# Patient Record
Sex: FEMALE | Race: WHITE | NOT HISPANIC OR LATINO | Employment: OTHER | URBAN - NONMETROPOLITAN AREA
[De-identification: names, ages, dates, MRNs, and addresses within clinical notes are randomized per-mention and may not be internally consistent; named-entity substitution may affect disease eponyms.]

---

## 2017-04-10 ENCOUNTER — APPOINTMENT (EMERGENCY)
Dept: ULTRASOUND IMAGING | Facility: HOSPITAL | Age: 57
End: 2017-04-10
Payer: OTHER MISCELLANEOUS

## 2017-04-10 ENCOUNTER — HOSPITAL ENCOUNTER (EMERGENCY)
Facility: HOSPITAL | Age: 57
Discharge: HOME | End: 2017-04-10
Attending: EMERGENCY MEDICINE | Admitting: EMERGENCY MEDICINE
Payer: OTHER MISCELLANEOUS

## 2017-04-10 VITALS
TEMPERATURE: 98.2 F | OXYGEN SATURATION: 98 % | RESPIRATION RATE: 18 BRPM | SYSTOLIC BLOOD PRESSURE: 134 MMHG | BODY MASS INDEX: 45.99 KG/M2 | HEIGHT: 67 IN | HEART RATE: 78 BPM | WEIGHT: 293 LBS | DIASTOLIC BLOOD PRESSURE: 61 MMHG

## 2017-04-10 DIAGNOSIS — M79.662 PAIN OF LEFT CALF: Primary | ICD-10-CM

## 2017-04-10 DIAGNOSIS — M54.16 LEFT LUMBAR RADICULOPATHY: ICD-10-CM

## 2017-04-10 DIAGNOSIS — S86.819A STRAIN OF CALF MUSCLE: ICD-10-CM

## 2017-04-10 PROCEDURE — 99283 EMERGENCY DEPT VISIT LOW MDM: CPT

## 2017-04-10 PROCEDURE — 93971 EXTREMITY STUDY: CPT

## 2017-04-10 RX ORDER — CLONAZEPAM 0.5 MG/1
1 TABLET ORAL
COMMUNITY
End: 2020-02-27 | Stop reason: SDUPTHER

## 2017-04-10 RX ORDER — CITALOPRAM 40 MG/1
40 TABLET ORAL DAILY
COMMUNITY
End: 2020-01-15 | Stop reason: ALTCHOICE

## 2017-04-10 RX ORDER — GABAPENTIN 100 MG/1
100 CAPSULE ORAL 2 TIMES DAILY
COMMUNITY
End: 2020-01-15 | Stop reason: SDUPTHER

## 2017-04-10 RX ORDER — LISINOPRIL AND HYDROCHLOROTHIAZIDE 25; 20 MG/1; MG/1
1 TABLET ORAL DAILY
COMMUNITY
End: 2020-03-25 | Stop reason: SDUPTHER

## 2017-04-10 RX ORDER — NAPROXEN 500 MG/1
500 TABLET ORAL DAILY
COMMUNITY
End: 2020-01-15 | Stop reason: ALTCHOICE

## 2019-02-27 PROCEDURE — 88304 TISSUE EXAM BY PATHOLOGIST: CPT | Performed by: PATHOLOGY

## 2019-02-28 ENCOUNTER — LAB REQUISITION (OUTPATIENT)
Dept: LAB | Facility: HOSPITAL | Age: 59
End: 2019-02-28
Payer: COMMERCIAL

## 2019-02-28 DIAGNOSIS — R26.9 ABNORMALITY OF GAIT AND MOBILITY: ICD-10-CM

## 2019-02-28 DIAGNOSIS — R22.42 LOCALIZED SWELLING, MASS AND LUMP, LEFT LOWER LIMB: ICD-10-CM

## 2020-01-15 ENCOUNTER — OFFICE VISIT (OUTPATIENT)
Dept: FAMILY MEDICINE CLINIC | Facility: CLINIC | Age: 60
End: 2020-01-15
Payer: MEDICARE

## 2020-01-15 VITALS
WEIGHT: 293 LBS | SYSTOLIC BLOOD PRESSURE: 130 MMHG | DIASTOLIC BLOOD PRESSURE: 64 MMHG | BODY MASS INDEX: 45.99 KG/M2 | HEIGHT: 67 IN

## 2020-01-15 DIAGNOSIS — Z13.228 ENCOUNTER FOR SCREENING FOR OTHER METABOLIC DISORDERS: ICD-10-CM

## 2020-01-15 DIAGNOSIS — Z13.29 SCREENING FOR HYPOTHYROIDISM: ICD-10-CM

## 2020-01-15 DIAGNOSIS — Z12.11 SCREEN FOR COLON CANCER: ICD-10-CM

## 2020-01-15 DIAGNOSIS — E66.9 OBESITY (BMI 35.0-39.9 WITHOUT COMORBIDITY): ICD-10-CM

## 2020-01-15 DIAGNOSIS — Z13.220 SCREENING FOR HYPERLIPIDEMIA: ICD-10-CM

## 2020-01-15 DIAGNOSIS — I10 ESSENTIAL HYPERTENSION: ICD-10-CM

## 2020-01-15 DIAGNOSIS — M47.27 OSTEOARTHRITIS OF SPINE WITH RADICULOPATHY, LUMBOSACRAL REGION: ICD-10-CM

## 2020-01-15 DIAGNOSIS — Z12.39 SCREENING FOR BREAST CANCER: Primary | ICD-10-CM

## 2020-01-15 PROBLEM — G47.33 OBSTRUCTIVE SLEEP APNEA: Status: ACTIVE | Noted: 2017-07-25

## 2020-01-15 PROBLEM — Z86.14 HISTORY OF MRSA INFECTION: Status: ACTIVE | Noted: 2017-07-25

## 2020-01-15 PROBLEM — G25.81 RESTLESS LEGS: Status: ACTIVE | Noted: 2018-07-26

## 2020-01-15 PROBLEM — F32.A DEPRESSION: Status: ACTIVE | Noted: 2017-07-25

## 2020-01-15 PROBLEM — M54.50 LOW BACK PAIN: Status: ACTIVE | Noted: 2017-07-25

## 2020-01-15 PROCEDURE — 99203 OFFICE O/P NEW LOW 30 MIN: CPT | Performed by: FAMILY MEDICINE

## 2020-01-15 RX ORDER — ACETAMINOPHEN 500 MG
1000 TABLET ORAL EVERY 6 HOURS PRN
COMMUNITY
End: 2020-01-15 | Stop reason: ALTCHOICE

## 2020-01-15 RX ORDER — DULOXETIN HYDROCHLORIDE 60 MG/1
60 CAPSULE, DELAYED RELEASE ORAL 2 TIMES DAILY
COMMUNITY
End: 2020-01-15 | Stop reason: SDUPTHER

## 2020-01-15 RX ORDER — HYDROCHLOROTHIAZIDE 25 MG/1
25 TABLET ORAL DAILY PRN
COMMUNITY
End: 2020-12-04

## 2020-01-15 RX ORDER — DULOXETIN HYDROCHLORIDE 60 MG/1
60 CAPSULE, DELAYED RELEASE ORAL 2 TIMES DAILY
Qty: 60 CAPSULE | Refills: 1 | Status: SHIPPED | OUTPATIENT
Start: 2020-01-15 | End: 2020-02-14 | Stop reason: SDUPTHER

## 2020-01-15 RX ORDER — FAMOTIDINE 20 MG/1
20 TABLET, FILM COATED ORAL DAILY
COMMUNITY

## 2020-01-15 RX ORDER — GABAPENTIN 100 MG/1
CAPSULE ORAL
Qty: 120 CAPSULE | Refills: 0 | Status: SHIPPED | OUTPATIENT
Start: 2020-01-15 | End: 2020-02-14 | Stop reason: SDUPTHER

## 2020-01-15 RX ORDER — IBUPROFEN 200 MG
400 TABLET ORAL EVERY 6 HOURS PRN
COMMUNITY

## 2020-01-15 NOTE — PROGRESS NOTES
BMI Counseling: Body mass index is 46 92 kg/m²  The BMI is above normal  Nutrition recommendations include decreasing portion sizes, encouraging healthy choices of fruits and vegetables, decreasing fast food intake, consuming healthier snacks, moderation in carbohydrate intake and increasing intake of lean protein  Exercise recommendations include exercising 3-5 times per week  No pharmacotherapy was ordered  Patient referred to PCP due to patient being overweight  Assessment/Plan:    Problem List Items Addressed This Visit        Cardiovascular and Mediastinum    Essential hypertension    Relevant Medications    hydrochlorothiazide (HYDRODIURIL) 25 mg tablet       Other    Obesity (BMI 35 0-39 9 without comorbidity)      Other Visit Diagnoses     Screening for breast cancer    -  Primary    Relevant Orders    Mammo screening bilateral w 3d & cad    Screen for colon cancer        Relevant Orders    Occult Bloood,Fecal Immunochemical           Diagnoses and all orders for this visit:    Screening for breast cancer  -     Mammo screening bilateral w 3d & cad; Future    Essential hypertension    Obesity (BMI 35 0-39 9 without comorbidity)    Screen for colon cancer  -     Occult Bloood,Fecal Immunochemical; Future    Other orders  -     DULoxetine (CYMBALTA) 60 mg delayed release capsule; Take 60 mg by mouth 2 (two) times a day  -     hydrochlorothiazide (HYDRODIURIL) 25 mg tablet; Take 25 mg by mouth daily as needed  -     famotidine (PEPCID) 20 mg tablet; Take 20 mg by mouth daily  -     ibuprofen (MOTRIN) 200 mg tablet; Take 400 mg by mouth every 6 (six) hours as needed for mild pain        No problem-specific Assessment & Plan notes found for this encounter  Subjective:      Patient ID: Janeen Tillman is a 60 y o  female  Mrs Radha garcia is here to establish reestablish herself as a patient she has been under the care of Dr Yolanda shafer who has recently retired she is currently being treated for a lumbar radiculopathy peripheral neuropathy essential hypertension depression with anxiety and then also obesity and obstructive sleep apnea she no longer smokes       The following portions of the patient's history were reviewed and updated as appropriate:   She has a past medical history of Hypertension, Migraine, and MRSA (methicillin resistant Staphylococcus aureus)  ,  does not have any pertinent problems on file  ,   has a past surgical history that includes Achilles tendon repair; Carpal tunnel release (Right);  section; and Cholecystectomy  ,  family history is not on file  ,   reports that she has quit smoking  She has never used smokeless tobacco  She reports that she does not drink alcohol or use drugs  ,  is allergic to pollen extract and vancomycin     Current Outpatient Medications   Medication Sig Dispense Refill    clonazePAM (KlonoPIN) 0 5 mg tablet Take 1 mg by mouth daily at bedtime as needed for seizures       DULoxetine (CYMBALTA) 60 mg delayed release capsule Take 60 mg by mouth 2 (two) times a day      famotidine (PEPCID) 20 mg tablet Take 20 mg by mouth daily      gabapentin (NEURONTIN) 100 mg capsule Take 100 mg by mouth 2 (two) times a day One in am, one at lunch, one at supper and 2 at hs      hydrochlorothiazide (HYDRODIURIL) 25 mg tablet Take 25 mg by mouth daily as needed      ibuprofen (MOTRIN) 200 mg tablet Take 400 mg by mouth every 6 (six) hours as needed for mild pain      lisinopril-hydrochlorothiazide (PRINZIDE,ZESTORETIC) 20-25 MG per tablet Take 1 tablet by mouth daily      Multiple Vitamins-Minerals (CENTRUM SILVER PO) Take 1 tablet by mouth daily       No current facility-administered medications for this visit  Review of Systems   Constitutional: Positive for activity change and fatigue  Negative for appetite change, diaphoresis and fever  HENT: Negative  Negative for dental problem  Eyes: Negative      Respiratory: Negative for apnea, cough, chest tightness, shortness of breath and wheezing  Cardiovascular: Negative for chest pain, palpitations and leg swelling  Gastrointestinal: Negative for abdominal distention, abdominal pain, anal bleeding, constipation, diarrhea, nausea and vomiting  Endocrine: Negative for cold intolerance, heat intolerance, polydipsia, polyphagia and polyuria  Genitourinary: Negative for difficulty urinating, dysuria, flank pain, hematuria and urgency  Musculoskeletal: Positive for back pain  Negative for arthralgias, gait problem, joint swelling and myalgias  Skin: Negative for color change, rash and wound  Allergic/Immunologic: Negative for environmental allergies, food allergies and immunocompromised state  Neurological: Negative for dizziness, seizures, syncope, speech difficulty, numbness and headaches  Hematological: Negative for adenopathy  Does not bruise/bleed easily  Psychiatric/Behavioral: Positive for dysphoric mood  Negative for agitation, behavioral problems, hallucinations, sleep disturbance and suicidal ideas  The patient is nervous/anxious  Objective:  Vitals:    01/15/20 1234   BP: 130/64   BP Location: Left arm   Patient Position: Sitting   Cuff Size: Large   Weight: 136 kg (299 lb 9 6 oz)   Height: 5' 7" (1 702 m)     Body mass index is 46 92 kg/m²  Physical Exam   Constitutional: She is oriented to person, place, and time  She appears well-developed and well-nourished  No distress  HENT:   Head: Normocephalic  Right Ear: External ear normal    Left Ear: External ear normal    Nose: Nose normal    Mouth/Throat: Oropharynx is clear and moist    Eyes: Pupils are equal, round, and reactive to light  Conjunctivae and EOM are normal  Right eye exhibits no discharge  Left eye exhibits no discharge  No scleral icterus  Neck: Normal range of motion  No tracheal deviation present  No thyromegaly present  Cardiovascular: Normal rate, regular rhythm and normal heart sounds   Exam reveals no gallop and no friction rub  No murmur heard  Pulmonary/Chest: Effort normal and breath sounds normal  No respiratory distress  She has no wheezes  Abdominal: Soft  Bowel sounds are normal  She exhibits no mass  There is no tenderness  There is no guarding  Musculoskeletal: She exhibits no edema or deformity  Lymphadenopathy:     She has no cervical adenopathy  Neurological: She is alert and oriented to person, place, and time  No cranial nerve deficit  Skin: Skin is warm and dry  No rash noted  She is not diaphoretic  No erythema  Psychiatric: She has a normal mood and affect   Thought content normal

## 2020-02-14 DIAGNOSIS — M47.27 OSTEOARTHRITIS OF SPINE WITH RADICULOPATHY, LUMBOSACRAL REGION: ICD-10-CM

## 2020-02-14 RX ORDER — DULOXETIN HYDROCHLORIDE 60 MG/1
60 CAPSULE, DELAYED RELEASE ORAL 2 TIMES DAILY
Qty: 60 CAPSULE | Refills: 0 | Status: SHIPPED | OUTPATIENT
Start: 2020-02-14 | End: 2020-03-25 | Stop reason: SDUPTHER

## 2020-02-14 RX ORDER — GABAPENTIN 100 MG/1
CAPSULE ORAL
Qty: 120 CAPSULE | Refills: 0 | Status: SHIPPED | OUTPATIENT
Start: 2020-02-14 | End: 2020-03-25 | Stop reason: SDUPTHER

## 2020-02-27 DIAGNOSIS — F41.1 GENERALIZED ANXIETY DISORDER: Primary | ICD-10-CM

## 2020-02-28 RX ORDER — CLONAZEPAM 0.5 MG/1
1 TABLET ORAL
Qty: 60 TABLET | Refills: 0 | Status: SHIPPED | OUTPATIENT
Start: 2020-02-28 | End: 2020-03-25 | Stop reason: SDUPTHER

## 2020-02-28 NOTE — TELEPHONE ENCOUNTER
Patient has care gaps that need to be closed also looks like she needs a Medicare well visit so let's get her in sometime in the next 60 days for a Medicare well visit

## 2020-03-25 DIAGNOSIS — F41.1 GENERALIZED ANXIETY DISORDER: ICD-10-CM

## 2020-03-25 DIAGNOSIS — M47.27 OSTEOARTHRITIS OF SPINE WITH RADICULOPATHY, LUMBOSACRAL REGION: ICD-10-CM

## 2020-03-25 DIAGNOSIS — I10 ESSENTIAL HYPERTENSION: Primary | ICD-10-CM

## 2020-03-25 RX ORDER — DULOXETIN HYDROCHLORIDE 60 MG/1
60 CAPSULE, DELAYED RELEASE ORAL 2 TIMES DAILY
Qty: 60 CAPSULE | Refills: 0 | Status: SHIPPED | OUTPATIENT
Start: 2020-03-25 | End: 2020-04-27 | Stop reason: SDUPTHER

## 2020-03-25 RX ORDER — CLONAZEPAM 0.5 MG/1
1 TABLET ORAL
Qty: 60 TABLET | Refills: 0 | Status: SHIPPED | OUTPATIENT
Start: 2020-03-25 | End: 2020-04-27 | Stop reason: SDUPTHER

## 2020-03-25 RX ORDER — GABAPENTIN 100 MG/1
CAPSULE ORAL
Qty: 120 CAPSULE | Refills: 0 | Status: SHIPPED | OUTPATIENT
Start: 2020-03-25 | End: 2020-04-27 | Stop reason: SDUPTHER

## 2020-03-25 RX ORDER — LISINOPRIL AND HYDROCHLOROTHIAZIDE 25; 20 MG/1; MG/1
1 TABLET ORAL DAILY
Qty: 90 TABLET | Refills: 1 | Status: SHIPPED | OUTPATIENT
Start: 2020-03-25 | End: 2020-09-24

## 2020-04-14 ENCOUNTER — TELEPHONE (OUTPATIENT)
Dept: OTHER | Facility: OTHER | Age: 60
End: 2020-04-14

## 2020-04-15 ENCOUNTER — TELEMEDICINE (OUTPATIENT)
Dept: FAMILY MEDICINE CLINIC | Facility: CLINIC | Age: 60
End: 2020-04-15
Payer: MEDICARE

## 2020-04-15 VITALS
DIASTOLIC BLOOD PRESSURE: 78 MMHG | BODY MASS INDEX: 45.99 KG/M2 | WEIGHT: 293 LBS | SYSTOLIC BLOOD PRESSURE: 128 MMHG | HEIGHT: 67 IN | TEMPERATURE: 98.3 F | HEART RATE: 76 BPM

## 2020-04-15 DIAGNOSIS — Z00.00 MEDICARE ANNUAL WELLNESS VISIT, SUBSEQUENT: Primary | ICD-10-CM

## 2020-04-15 PROCEDURE — 3074F SYST BP LT 130 MM HG: CPT | Performed by: FAMILY MEDICINE

## 2020-04-15 PROCEDURE — 3008F BODY MASS INDEX DOCD: CPT | Performed by: FAMILY MEDICINE

## 2020-04-15 PROCEDURE — G0438 PPPS, INITIAL VISIT: HCPCS | Performed by: FAMILY MEDICINE

## 2020-04-15 PROCEDURE — 1036F TOBACCO NON-USER: CPT | Performed by: FAMILY MEDICINE

## 2020-04-15 PROCEDURE — 3078F DIAST BP <80 MM HG: CPT | Performed by: FAMILY MEDICINE

## 2020-04-22 DIAGNOSIS — G47.33 OBSTRUCTIVE SLEEP APNEA: Primary | ICD-10-CM

## 2020-04-27 DIAGNOSIS — M47.27 OSTEOARTHRITIS OF SPINE WITH RADICULOPATHY, LUMBOSACRAL REGION: ICD-10-CM

## 2020-04-27 DIAGNOSIS — F41.1 GENERALIZED ANXIETY DISORDER: ICD-10-CM

## 2020-04-27 RX ORDER — GABAPENTIN 100 MG/1
CAPSULE ORAL
Qty: 120 CAPSULE | Refills: 0 | Status: SHIPPED | OUTPATIENT
Start: 2020-04-27 | End: 2020-05-26 | Stop reason: SDUPTHER

## 2020-04-27 RX ORDER — DULOXETIN HYDROCHLORIDE 60 MG/1
60 CAPSULE, DELAYED RELEASE ORAL 2 TIMES DAILY
Qty: 60 CAPSULE | Refills: 0 | Status: SHIPPED | OUTPATIENT
Start: 2020-04-27 | End: 2020-05-26 | Stop reason: SDUPTHER

## 2020-04-27 RX ORDER — CLONAZEPAM 0.5 MG/1
1 TABLET ORAL
Qty: 60 TABLET | Refills: 0 | Status: SHIPPED | OUTPATIENT
Start: 2020-04-27 | End: 2020-05-26 | Stop reason: SDUPTHER

## 2020-05-26 DIAGNOSIS — F41.1 GENERALIZED ANXIETY DISORDER: ICD-10-CM

## 2020-05-26 DIAGNOSIS — M47.27 OSTEOARTHRITIS OF SPINE WITH RADICULOPATHY, LUMBOSACRAL REGION: ICD-10-CM

## 2020-05-26 RX ORDER — DULOXETIN HYDROCHLORIDE 60 MG/1
60 CAPSULE, DELAYED RELEASE ORAL 2 TIMES DAILY
Qty: 60 CAPSULE | Refills: 0 | Status: SHIPPED | OUTPATIENT
Start: 2020-05-26 | End: 2020-06-24 | Stop reason: SDUPTHER

## 2020-05-26 RX ORDER — GABAPENTIN 100 MG/1
CAPSULE ORAL
Qty: 120 CAPSULE | Refills: 0 | Status: SHIPPED | OUTPATIENT
Start: 2020-05-26 | End: 2020-06-24 | Stop reason: SDUPTHER

## 2020-05-26 RX ORDER — CLONAZEPAM 0.5 MG/1
1 TABLET ORAL
Qty: 60 TABLET | Refills: 0 | Status: SHIPPED | OUTPATIENT
Start: 2020-05-26 | End: 2020-06-24 | Stop reason: SDUPTHER

## 2020-06-24 DIAGNOSIS — F41.1 GENERALIZED ANXIETY DISORDER: ICD-10-CM

## 2020-06-24 DIAGNOSIS — M47.27 OSTEOARTHRITIS OF SPINE WITH RADICULOPATHY, LUMBOSACRAL REGION: ICD-10-CM

## 2020-06-24 RX ORDER — DULOXETIN HYDROCHLORIDE 60 MG/1
60 CAPSULE, DELAYED RELEASE ORAL 2 TIMES DAILY
Qty: 180 CAPSULE | Refills: 1 | Status: SHIPPED | OUTPATIENT
Start: 2020-06-24 | End: 2021-02-08 | Stop reason: SDUPTHER

## 2020-06-24 RX ORDER — GABAPENTIN 100 MG/1
CAPSULE ORAL
Qty: 360 CAPSULE | Refills: 1 | Status: SHIPPED | OUTPATIENT
Start: 2020-06-24 | End: 2020-12-04 | Stop reason: SDUPTHER

## 2020-06-24 RX ORDER — CLONAZEPAM 0.5 MG/1
1 TABLET ORAL
Qty: 60 TABLET | Refills: 0 | Status: SHIPPED | OUTPATIENT
Start: 2020-06-24 | End: 2020-07-28 | Stop reason: SDUPTHER

## 2020-07-28 DIAGNOSIS — F41.1 GENERALIZED ANXIETY DISORDER: ICD-10-CM

## 2020-07-28 RX ORDER — CLONAZEPAM 0.5 MG/1
1 TABLET ORAL
Qty: 6 TABLET | Refills: 0 | Status: SHIPPED | OUTPATIENT
Start: 2020-07-28 | End: 2020-07-30 | Stop reason: SDUPTHER

## 2020-07-28 NOTE — TELEPHONE ENCOUNTER
She called for a refill on clonazepam, and she needs an appt    She will schedule, wants to know if you are willing to cover her until she comes in on the 30th

## 2020-07-30 ENCOUNTER — OFFICE VISIT (OUTPATIENT)
Dept: FAMILY MEDICINE CLINIC | Facility: CLINIC | Age: 60
End: 2020-07-30
Payer: MEDICARE

## 2020-07-30 VITALS
OXYGEN SATURATION: 95 % | TEMPERATURE: 98.9 F | SYSTOLIC BLOOD PRESSURE: 122 MMHG | BODY MASS INDEX: 45.42 KG/M2 | HEART RATE: 95 BPM | HEIGHT: 67 IN | DIASTOLIC BLOOD PRESSURE: 76 MMHG | WEIGHT: 289.4 LBS

## 2020-07-30 DIAGNOSIS — F41.1 GENERALIZED ANXIETY DISORDER: ICD-10-CM

## 2020-07-30 PROCEDURE — 1036F TOBACCO NON-USER: CPT | Performed by: PHYSICIAN ASSISTANT

## 2020-07-30 PROCEDURE — 3078F DIAST BP <80 MM HG: CPT | Performed by: PHYSICIAN ASSISTANT

## 2020-07-30 PROCEDURE — 3008F BODY MASS INDEX DOCD: CPT | Performed by: PHYSICIAN ASSISTANT

## 2020-07-30 PROCEDURE — 99213 OFFICE O/P EST LOW 20 MIN: CPT | Performed by: PHYSICIAN ASSISTANT

## 2020-07-30 PROCEDURE — 3074F SYST BP LT 130 MM HG: CPT | Performed by: PHYSICIAN ASSISTANT

## 2020-07-30 RX ORDER — CLONAZEPAM 0.5 MG/1
1 TABLET ORAL
Qty: 60 TABLET | Refills: 0 | Status: SHIPPED | OUTPATIENT
Start: 2020-07-30 | End: 2020-09-08 | Stop reason: SDUPTHER

## 2020-07-30 NOTE — PROGRESS NOTES
Assessment/Plan:    Problem List Items Addressed This Visit     None      Visit Diagnoses     Generalized anxiety disorder        Relevant Medications    clonazePAM (KlonoPIN) 0 5 mg tablet           Diagnoses and all orders for this visit:    Generalized anxiety disorder  -     clonazePAM (KlonoPIN) 0 5 mg tablet; Take 2 tablets (1 mg total) by mouth daily at bedtime as needed for anxiety        [place] PDMP reviewed  Patient is compliant  No red flags  Labs, FIT test, and mammogram were all ordered in the past, but she is waiting for her supplemental insurance  Follow-up in 3 months or sooner if needed  Subjective:      Patient ID: Phani Loredo is a 60 y o  female  Sony Russo is a 60year old female who is here today for a medication check  She is asking for a refill on her clonazepam  She takes this at bedtime to help with her anxiety and sleep  She has been on the same dose for many years  She denies any side effects or problems with the medication  She denies any other concerns or problems at this time  The following portions of the patient's history were reviewed and updated as appropriate:   She has a past medical history of Hypertension, Migraine, and MRSA (methicillin resistant Staphylococcus aureus)  ,  does not have any pertinent problems on file  ,   has a past surgical history that includes Achilles tendon repair; Carpal tunnel release (Right);  section; Cholecystectomy; Ankle surgery (Left, 2019); Nasal sinus surgery (); Lumbar laminectomy (2018); and Ganglion cyst excision (Left, )  ,  family history includes Arrhythmia in her father; Hepatitis in her mother; Hypertension in her mother; Melanoma in her father; Stroke in her father  ,   reports that she has quit smoking  She has never used smokeless tobacco  She reports that she drank alcohol  She reports that she does not use drugs  ,  is allergic to pollen extract and vancomycin     Current Outpatient Medications   Medication Sig Dispense Refill    clonazePAM (KlonoPIN) 0 5 mg tablet Take 2 tablets (1 mg total) by mouth daily at bedtime as needed for anxiety 60 tablet 0    DULoxetine (CYMBALTA) 60 mg delayed release capsule Take 1 capsule (60 mg total) by mouth 2 (two) times a day 180 capsule 1    famotidine (PEPCID) 20 mg tablet Take 20 mg by mouth daily      gabapentin (NEURONTIN) 100 mg capsule One in am, one at lunch,  and 2 at hs 360 capsule 1    hydrochlorothiazide (HYDRODIURIL) 25 mg tablet Take 25 mg by mouth daily as needed      ibuprofen (MOTRIN) 200 mg tablet Take 400 mg by mouth every 6 (six) hours as needed for mild pain      lisinopril-hydrochlorothiazide (PRINZIDE,ZESTORETIC) 20-25 MG per tablet Take 1 tablet by mouth daily 90 tablet 1    Multiple Vitamins-Minerals (CENTRUM SILVER PO) Take 1 tablet by mouth daily       No current facility-administered medications for this visit  Review of Systems   Constitutional: Negative for chills, diaphoresis, fatigue and fever  HENT: Negative for congestion, ear pain, postnasal drip, rhinorrhea, sneezing, sore throat and trouble swallowing  Eyes: Negative for pain and visual disturbance  Respiratory: Negative for apnea, cough, shortness of breath and wheezing  Cardiovascular: Negative for chest pain and palpitations  Gastrointestinal: Negative for abdominal pain, constipation, diarrhea, nausea and vomiting  Genitourinary: Negative for dysuria and hematuria  Musculoskeletal: Positive for arthralgias and gait problem  Negative for myalgias  Neurological: Negative for dizziness, syncope, weakness, light-headedness, numbness and headaches  Psychiatric/Behavioral: Positive for sleep disturbance  Negative for suicidal ideas  The patient is nervous/anxious  Objective:  Vitals:    07/30/20 1502   BP: 122/76   Pulse: 95   Temp: 98 9 °F (37 2 °C)   SpO2: 95%   Weight: 131 kg (289 lb 6 4 oz)   Height: 5' 7" (1 702 m)     Body mass index is 45 33 kg/m²  Physical Exam   Constitutional: She is oriented to person, place, and time  She appears well-developed and well-nourished  Face mask in place  HENT:   Head: Normocephalic and atraumatic  Right Ear: External ear normal    Left Ear: External ear normal    Eyes: EOM are normal    Neck: Normal range of motion  Neck supple  Cardiovascular: Normal rate, regular rhythm and normal heart sounds  Exam reveals no gallop and no friction rub  No murmur heard  Pulmonary/Chest: Effort normal and breath sounds normal  No respiratory distress  She has no wheezes  She has no rales  Neurological: She is alert and oriented to person, place, and time  Skin: Skin is warm and dry  Psychiatric: She has a normal mood and affect  Her behavior is normal  Judgment and thought content normal    Nursing note and vitals reviewed

## 2020-09-08 DIAGNOSIS — F41.1 GENERALIZED ANXIETY DISORDER: ICD-10-CM

## 2020-09-08 RX ORDER — CLONAZEPAM 0.5 MG/1
1 TABLET ORAL
Qty: 60 TABLET | Refills: 0 | Status: SHIPPED | OUTPATIENT
Start: 2020-09-08 | End: 2020-10-09 | Stop reason: SDUPTHER

## 2020-09-24 DIAGNOSIS — I10 ESSENTIAL HYPERTENSION: ICD-10-CM

## 2020-09-24 RX ORDER — LISINOPRIL AND HYDROCHLOROTHIAZIDE 25; 20 MG/1; MG/1
1 TABLET ORAL DAILY
Qty: 90 TABLET | Refills: 1 | Status: SHIPPED | OUTPATIENT
Start: 2020-09-24 | End: 2020-12-29 | Stop reason: SDUPTHER

## 2020-09-24 RX ORDER — LISINOPRIL AND HYDROCHLOROTHIAZIDE 25; 20 MG/1; MG/1
1 TABLET ORAL DAILY
Qty: 90 TABLET | Refills: 1 | Status: SHIPPED | OUTPATIENT
Start: 2020-09-24 | End: 2020-12-04

## 2020-10-09 DIAGNOSIS — F41.1 GENERALIZED ANXIETY DISORDER: ICD-10-CM

## 2020-10-09 RX ORDER — CLONAZEPAM 0.5 MG/1
1 TABLET ORAL
Qty: 60 TABLET | Refills: 0 | Status: SHIPPED | OUTPATIENT
Start: 2020-10-09 | End: 2020-11-10 | Stop reason: SDUPTHER

## 2020-11-09 DIAGNOSIS — F41.1 GENERALIZED ANXIETY DISORDER: ICD-10-CM

## 2020-11-09 RX ORDER — CLONAZEPAM 1 MG/1
1 TABLET ORAL
Qty: 30 TABLET | Refills: 0 | Status: CANCELLED | OUTPATIENT
Start: 2020-11-09

## 2020-11-10 DIAGNOSIS — F41.1 GENERALIZED ANXIETY DISORDER: ICD-10-CM

## 2020-11-10 RX ORDER — CLONAZEPAM 0.5 MG/1
1 TABLET ORAL
Qty: 60 TABLET | Refills: 0 | Status: SHIPPED | OUTPATIENT
Start: 2020-11-10 | End: 2020-12-10 | Stop reason: SDUPTHER

## 2020-11-10 RX ORDER — CLONAZEPAM 0.5 MG/1
1 TABLET ORAL
Qty: 60 TABLET | Refills: 0 | OUTPATIENT
Start: 2020-11-10

## 2020-12-04 ENCOUNTER — TELEMEDICINE (OUTPATIENT)
Dept: FAMILY MEDICINE CLINIC | Facility: CLINIC | Age: 60
End: 2020-12-04
Payer: MEDICARE

## 2020-12-04 VITALS
WEIGHT: 289 LBS | SYSTOLIC BLOOD PRESSURE: 132 MMHG | HEIGHT: 67 IN | BODY MASS INDEX: 45.36 KG/M2 | DIASTOLIC BLOOD PRESSURE: 80 MMHG | TEMPERATURE: 97.9 F

## 2020-12-04 DIAGNOSIS — M47.27 OSTEOARTHRITIS OF SPINE WITH RADICULOPATHY, LUMBOSACRAL REGION: ICD-10-CM

## 2020-12-04 DIAGNOSIS — G25.81 RESTLESS LEGS: ICD-10-CM

## 2020-12-04 DIAGNOSIS — F41.1 GENERALIZED ANXIETY DISORDER: ICD-10-CM

## 2020-12-04 DIAGNOSIS — Z20.822 EXPOSURE TO COVID-19 VIRUS: Primary | ICD-10-CM

## 2020-12-04 PROCEDURE — U0003 INFECTIOUS AGENT DETECTION BY NUCLEIC ACID (DNA OR RNA); SEVERE ACUTE RESPIRATORY SYNDROME CORONAVIRUS 2 (SARS-COV-2) (CORONAVIRUS DISEASE [COVID-19]), AMPLIFIED PROBE TECHNIQUE, MAKING USE OF HIGH THROUGHPUT TECHNOLOGIES AS DESCRIBED BY CMS-2020-01-R: HCPCS | Performed by: PHYSICIAN ASSISTANT

## 2020-12-04 PROCEDURE — 99442 PR PHYS/QHP TELEPHONE EVALUATION 11-20 MIN: CPT | Performed by: PHYSICIAN ASSISTANT

## 2020-12-04 RX ORDER — GABAPENTIN 100 MG/1
CAPSULE ORAL
Qty: 360 CAPSULE | Refills: 1
Start: 2020-12-04 | End: 2020-12-10 | Stop reason: SDUPTHER

## 2020-12-05 LAB — SARS-COV-2 RNA SPEC QL NAA+PROBE: NOT DETECTED

## 2020-12-10 DIAGNOSIS — M47.27 OSTEOARTHRITIS OF SPINE WITH RADICULOPATHY, LUMBOSACRAL REGION: ICD-10-CM

## 2020-12-10 DIAGNOSIS — F41.1 GENERALIZED ANXIETY DISORDER: ICD-10-CM

## 2020-12-10 RX ORDER — GABAPENTIN 100 MG/1
CAPSULE ORAL
Qty: 360 CAPSULE | Refills: 1 | Status: SHIPPED | OUTPATIENT
Start: 2020-12-10 | End: 2021-05-17 | Stop reason: SDUPTHER

## 2020-12-10 RX ORDER — CLONAZEPAM 0.5 MG/1
1 TABLET ORAL
Qty: 60 TABLET | Refills: 0 | Status: SHIPPED | OUTPATIENT
Start: 2020-12-10 | End: 2021-01-11 | Stop reason: SDUPTHER

## 2020-12-23 ENCOUNTER — TELEPHONE (OUTPATIENT)
Dept: FAMILY MEDICINE CLINIC | Facility: CLINIC | Age: 60
End: 2020-12-23

## 2020-12-23 DIAGNOSIS — R50.9 FEBRILE ILLNESS, ACUTE: Primary | ICD-10-CM

## 2020-12-23 RX ORDER — AMOXICILLIN AND CLAVULANATE POTASSIUM 875; 125 MG/1; MG/1
1 TABLET, FILM COATED ORAL EVERY 12 HOURS SCHEDULED
Qty: 20 TABLET | Refills: 0 | Status: SHIPPED | OUTPATIENT
Start: 2020-12-23 | End: 2021-01-02

## 2020-12-29 DIAGNOSIS — I10 ESSENTIAL HYPERTENSION: ICD-10-CM

## 2020-12-29 RX ORDER — LISINOPRIL AND HYDROCHLOROTHIAZIDE 25; 20 MG/1; MG/1
1 TABLET ORAL DAILY
Qty: 90 TABLET | Refills: 2 | Status: SHIPPED | OUTPATIENT
Start: 2020-12-29 | End: 2021-03-02 | Stop reason: ALTCHOICE

## 2021-01-11 DIAGNOSIS — F41.1 GENERALIZED ANXIETY DISORDER: ICD-10-CM

## 2021-01-11 RX ORDER — CLONAZEPAM 0.5 MG/1
1 TABLET ORAL
Qty: 60 TABLET | Refills: 0 | Status: SHIPPED | OUTPATIENT
Start: 2021-01-11 | End: 2021-02-11 | Stop reason: SDUPTHER

## 2021-02-08 DIAGNOSIS — M47.27 OSTEOARTHRITIS OF SPINE WITH RADICULOPATHY, LUMBOSACRAL REGION: ICD-10-CM

## 2021-02-08 RX ORDER — DULOXETIN HYDROCHLORIDE 60 MG/1
60 CAPSULE, DELAYED RELEASE ORAL 2 TIMES DAILY
Qty: 180 CAPSULE | Refills: 0 | Status: SHIPPED | OUTPATIENT
Start: 2021-02-08 | End: 2021-03-04 | Stop reason: SDUPTHER

## 2021-02-11 DIAGNOSIS — F41.1 GENERALIZED ANXIETY DISORDER: ICD-10-CM

## 2021-02-11 RX ORDER — CLONAZEPAM 0.5 MG/1
1 TABLET ORAL
Qty: 60 TABLET | Refills: 0 | Status: SHIPPED | OUTPATIENT
Start: 2021-02-11 | End: 2021-03-12 | Stop reason: SDUPTHER

## 2021-02-15 ENCOUNTER — TELEPHONE (OUTPATIENT)
Dept: FAMILY MEDICINE CLINIC | Facility: CLINIC | Age: 61
End: 2021-02-15

## 2021-02-15 NOTE — TELEPHONE ENCOUNTER
fyi:Her prescription plan is changed, she will need to go to [facility] with the new one, but in the mean time, she is going to get the clonazepam  (only 2 weeks) refilled at [facility], and then call for a new script to [facility]

## 2021-02-16 ENCOUNTER — TELEPHONE (OUTPATIENT)
Dept: FAMILY MEDICINE CLINIC | Facility: CLINIC | Age: 61
End: 2021-02-16

## 2021-02-17 DIAGNOSIS — R53.82 CHRONIC FATIGUE: Primary | ICD-10-CM

## 2021-02-17 DIAGNOSIS — E66.01 MORBID OBESITY (HCC): ICD-10-CM

## 2021-02-17 DIAGNOSIS — E74.39 GLUCOSE INTOLERANCE: ICD-10-CM

## 2021-02-17 DIAGNOSIS — R73.9 ELEVATED BLOOD SUGAR: ICD-10-CM

## 2021-02-17 DIAGNOSIS — I10 ESSENTIAL HYPERTENSION: ICD-10-CM

## 2021-02-24 ENCOUNTER — LAB (OUTPATIENT)
Dept: LAB | Facility: MEDICAL CENTER | Age: 61
End: 2021-02-24
Payer: MEDICARE

## 2021-02-24 ENCOUNTER — TELEPHONE (OUTPATIENT)
Dept: OTHER | Facility: OTHER | Age: 61
End: 2021-02-24

## 2021-02-24 LAB
ALBUMIN SERPL BCP-MCNC: 3.9 G/DL (ref 3.5–5)
ALP SERPL-CCNC: 116 U/L (ref 46–116)
ALT SERPL W P-5'-P-CCNC: 56 U/L (ref 12–78)
ANION GAP SERPL CALCULATED.3IONS-SCNC: 11 MMOL/L (ref 4–13)
AST SERPL W P-5'-P-CCNC: 44 U/L (ref 5–45)
BILIRUB SERPL-MCNC: 0.66 MG/DL (ref 0.2–1)
BUN SERPL-MCNC: 25 MG/DL (ref 5–25)
CALCIUM SERPL-MCNC: 10.2 MG/DL (ref 8.3–10.1)
CHLORIDE SERPL-SCNC: 88 MMOL/L (ref 100–108)
CHOLEST SERPL-MCNC: 191 MG/DL (ref 50–200)
CO2 SERPL-SCNC: 25 MMOL/L (ref 21–32)
CREAT SERPL-MCNC: 1.37 MG/DL (ref 0.6–1.3)
ERYTHROCYTE [DISTWIDTH] IN BLOOD BY AUTOMATED COUNT: 12.4 % (ref 11.6–15.1)
EST. AVERAGE GLUCOSE BLD GHB EST-MCNC: >355 MG/DL
GFR SERPL CREATININE-BSD FRML MDRD: 42 ML/MIN/1.73SQ M
GLUCOSE P FAST SERPL-MCNC: 595 MG/DL (ref 65–99)
HBA1C MFR BLD: >14 %
HCT VFR BLD AUTO: 40.5 % (ref 34.8–46.1)
HDLC SERPL-MCNC: 41 MG/DL
HGB BLD-MCNC: 13.6 G/DL (ref 11.5–15.4)
LDLC SERPL CALC-MCNC: 99 MG/DL (ref 0–100)
MCH RBC QN AUTO: 28.8 PG (ref 26.8–34.3)
MCHC RBC AUTO-ENTMCNC: 33.6 G/DL (ref 31.4–37.4)
MCV RBC AUTO: 86 FL (ref 82–98)
NONHDLC SERPL-MCNC: 150 MG/DL
PLATELET # BLD AUTO: 247 THOUSANDS/UL (ref 149–390)
PMV BLD AUTO: 11 FL (ref 8.9–12.7)
POTASSIUM SERPL-SCNC: 4 MMOL/L (ref 3.5–5.3)
PROT SERPL-MCNC: 8.3 G/DL (ref 6.4–8.2)
RBC # BLD AUTO: 4.72 MILLION/UL (ref 3.81–5.12)
SODIUM SERPL-SCNC: 124 MMOL/L (ref 136–145)
TRIGL SERPL-MCNC: 255 MG/DL
TSH SERPL DL<=0.05 MIU/L-ACNC: 3.7 UIU/ML (ref 0.36–3.74)
WBC # BLD AUTO: 6.1 THOUSAND/UL (ref 4.31–10.16)

## 2021-02-24 PROCEDURE — 36415 COLL VENOUS BLD VENIPUNCTURE: CPT | Performed by: FAMILY MEDICINE

## 2021-02-24 PROCEDURE — 85027 COMPLETE CBC AUTOMATED: CPT | Performed by: FAMILY MEDICINE

## 2021-02-24 PROCEDURE — 83036 HEMOGLOBIN GLYCOSYLATED A1C: CPT | Performed by: FAMILY MEDICINE

## 2021-02-24 PROCEDURE — 80053 COMPREHEN METABOLIC PANEL: CPT | Performed by: FAMILY MEDICINE

## 2021-02-24 PROCEDURE — 80061 LIPID PANEL: CPT | Performed by: FAMILY MEDICINE

## 2021-02-24 PROCEDURE — 84443 ASSAY THYROID STIM HORMONE: CPT | Performed by: FAMILY MEDICINE

## 2021-02-25 ENCOUNTER — TELEPHONE (OUTPATIENT)
Dept: FAMILY MEDICINE CLINIC | Facility: CLINIC | Age: 61
End: 2021-02-25

## 2021-02-25 DIAGNOSIS — E11.9 TYPE 2 DIABETES MELLITUS WITHOUT COMPLICATION, WITHOUT LONG-TERM CURRENT USE OF INSULIN (HCC): Primary | ICD-10-CM

## 2021-02-25 NOTE — TELEPHONE ENCOUNTER
Asking for a call from Dr Sayda Guerrero with Rx's sent in   [address] [place] [address] Asking if you are going to order her a glucometer & supplies to test her blood?     Pharm: [facility]    Pt will be holding off on MO because coverage to begin 3/1/31

## 2021-02-25 NOTE — TELEPHONE ENCOUNTER
Madiha Perez from [facility] AND [facility] labs with Critical lab results

## 2021-02-26 ENCOUNTER — TELEPHONE (OUTPATIENT)
Dept: ENDOCRINOLOGY | Facility: CLINIC | Age: 61
End: 2021-02-26

## 2021-03-02 ENCOUNTER — OFFICE VISIT (OUTPATIENT)
Dept: FAMILY MEDICINE CLINIC | Facility: CLINIC | Age: 61
End: 2021-03-02
Payer: MEDICARE

## 2021-03-02 VITALS
TEMPERATURE: 96.2 F | WEIGHT: 246 LBS | SYSTOLIC BLOOD PRESSURE: 96 MMHG | HEIGHT: 67 IN | BODY MASS INDEX: 38.61 KG/M2 | DIASTOLIC BLOOD PRESSURE: 74 MMHG

## 2021-03-02 DIAGNOSIS — E11.9 TYPE 2 DIABETES MELLITUS WITHOUT COMPLICATION, WITHOUT LONG-TERM CURRENT USE OF INSULIN (HCC): ICD-10-CM

## 2021-03-02 DIAGNOSIS — I10 ESSENTIAL HYPERTENSION: ICD-10-CM

## 2021-03-02 DIAGNOSIS — Z12.31 ENCOUNTER FOR SCREENING MAMMOGRAM FOR MALIGNANT NEOPLASM OF BREAST: Primary | ICD-10-CM

## 2021-03-02 DIAGNOSIS — E66.01 OBESITY, MORBID (HCC): ICD-10-CM

## 2021-03-02 PROCEDURE — 99213 OFFICE O/P EST LOW 20 MIN: CPT | Performed by: FAMILY MEDICINE

## 2021-03-02 RX ORDER — LISINOPRIL 5 MG/1
5 TABLET ORAL DAILY
Qty: 90 TABLET | Refills: 3
Start: 2021-03-02 | End: 2021-05-17 | Stop reason: SDUPTHER

## 2021-03-02 NOTE — PROGRESS NOTES
BMI Counseling: Body mass index is 38 53 kg/m²  The BMI is above normal  Nutrition recommendations include decreasing portion sizes, encouraging healthy choices of fruits and vegetables, decreasing fast food intake, consuming healthier snacks, moderation in carbohydrate intake and increasing intake of lean protein  Exercise recommendations include moderate physical activity 150 minutes/week  Assessment/Plan:    Problem List Items Addressed This Visit        Endocrine    Type 2 diabetes mellitus without complication, without long-term current use of insulin ([address] )       Cardiovascular and Mediastinum    Essential hypertension      Other Visit Diagnoses     Encounter for screening mammogram for malignant neoplasm of breast    -  Primary    Relevant Orders    Mammo screening bilateral w 3d & cad           Diagnoses and all orders for this visit:    Encounter for screening mammogram for malignant neoplasm of breast  -     Mammo screening bilateral w 3d & cad; Future    Type 2 diabetes mellitus without complication, without long-term current use of insulin ([address] )    Essential hypertension        No problem-specific Assessment & Plan notes found for this encounter  Subjective:      Patient ID: Kenya Jones is a 63 y o  female      Sadanikole Smithted is here today because we discovered that she is diabetic her sugars were in the 500 range it is down in the mid 300s now she has started the metformin and the [place] Offer she has not started the true [place] her insurance is just coming into affected and this will affect what medications she can take I have asked her to look for coupons on the Internet there should be coupons available for all of these medications also were going to set her up with [facility] for diabetic education and management on were also going to set her up with Dr Rody khan for diabetic foot care and we will do an iris scan today and will also order a mammogram      The following portions of the patient's history were reviewed and updated as appropriate:   She has a past medical history of Hypertension, Migraine, and MRSA (methicillin resistant Staphylococcus aureus)  ,  does not have any pertinent problems on file  ,   has a past surgical history that includes Achilles tendon repair; Carpal tunnel release (Right);  section; Cholecystectomy; Ankle surgery (Left, 2019); Nasal sinus surgery (); Lumbar laminectomy (2018); and Ganglion cyst excision (Left, )  ,  family history includes Arrhythmia in her father; Hepatitis in her mother; Hypertension in her mother; Melanoma in her father; Stroke in her father  ,   reports that she has quit smoking  She has never used smokeless tobacco  She reports previous alcohol use  She reports that she does not use drugs  ,  is allergic to pollen extract and vancomycin     Current Outpatient Medications   Medication Sig Dispense Refill    clonazePAM (KlonoPIN) 0 5 mg tablet Take 2 tablets (1 mg total) by mouth daily at bedtime as needed for anxiety 60 tablet 0    Dapagliflozin Propanediol 10 MG TABS 1/2 tablet daily for 1 month then increase to 1 full tablet daily in a m  30 tablet 5    Dulaglutide 0 75 MG/0 5ML SOPN Inject 0 5 mL (0 75 mg total) under the skin once a week 1 pen 3    DULoxetine (CYMBALTA) 60 mg delayed release capsule Take 1 capsule (60 mg total) by mouth 2 (two) times a day 180 capsule 0    famotidine (PEPCID) 20 mg tablet Take 20 mg by mouth daily      gabapentin (NEURONTIN) 100 mg capsule One in am, one at lunch, and 3 at hs 360 capsule 1    ibuprofen (MOTRIN) 200 mg tablet Take 400 mg by mouth every 6 (six) hours as needed for mild pain      metFORMIN (GLUCOPHAGE) 500 mg tablet Take 1 tablet with breakfast for 2 weeks then 1 tablet with breakfast and 1 tablet with lunch for 2 weeks and then 1 tablet t i d  with meals 90 tablet 5    Multiple Vitamins-Minerals (CENTRUM SILVER PO) Take 1 tablet by mouth daily       No current facility-administered medications for this visit  Review of Systems   Constitutional: Positive for activity change  Negative for appetite change, diaphoresis, fatigue and fever  HENT: Positive for dental problem  Negative for hearing loss  Eyes: Positive for visual disturbance  Visual disturbance secondary to elevated blood sugars   Respiratory: Negative for apnea, cough, chest tightness, shortness of breath and wheezing  Cardiovascular: Negative for chest pain, palpitations and leg swelling  Gastrointestinal: Negative for abdominal distention, abdominal pain, anal bleeding, constipation, diarrhea, nausea and vomiting  Endocrine: Negative for cold intolerance, heat intolerance, polydipsia, polyphagia and polyuria  Genitourinary: Negative for difficulty urinating, dysuria, flank pain, hematuria and urgency  Musculoskeletal: Positive for arthralgias and back pain  Negative for gait problem, joint swelling and myalgias  Skin: Negative for color change, rash and wound  Allergic/Immunologic: Negative for environmental allergies, food allergies and immunocompromised state  Neurological: Negative for dizziness, seizures, syncope, speech difficulty, numbness and headaches  Hematological: Negative for adenopathy  Does not bruise/bleed easily  Psychiatric/Behavioral: Negative for agitation, behavioral problems, hallucinations, sleep disturbance and suicidal ideas  Objective:  Vitals:    03/02/21 0947   BP: 96/74   BP Location: Left arm   Patient Position: Sitting   Cuff Size: Standard   Temp: (!) 96 2 °F (35 7 °C)   TempSrc: Temporal   Weight: 112 kg (246 lb)   Height: 5' 7" (1 702 m)     Body mass index is 38 53 kg/m²  Physical Exam  Constitutional:       General: She is not in acute distress  Appearance: She is well-developed  She is not diaphoretic  HENT:      Head: Normocephalic        Right Ear: External ear normal       Left Ear: External ear normal       Nose: Nose normal    Eyes:      General: No scleral icterus  Right eye: No discharge  Left eye: No discharge  Conjunctiva/sclera: Conjunctivae normal       Pupils: Pupils are equal, round, and reactive to light  Neck:      Musculoskeletal: Normal range of motion  Thyroid: No thyromegaly  Trachea: No tracheal deviation  Cardiovascular:      Rate and Rhythm: Normal rate and regular rhythm  Pulses: no weak pulses          Dorsalis pedis pulses are 2+ on the right side and 2+ on the left side  Posterior tibial pulses are 2+ on the right side and 2+ on the left side  Heart sounds: Normal heart sounds  No murmur  No friction rub  No gallop  Pulmonary:      Effort: Pulmonary effort is normal  No respiratory distress  Breath sounds: Normal breath sounds  No wheezing  Abdominal:      General: Bowel sounds are normal       Palpations: Abdomen is soft  There is no mass  Tenderness: There is no abdominal tenderness  There is no guarding  Musculoskeletal:         General: No deformity  Feet:      Right foot:      Skin integrity: No ulcer, skin breakdown, erythema, warmth, callus or dry skin  Left foot:      Skin integrity: No ulcer, skin breakdown, erythema, warmth, callus or dry skin  Lymphadenopathy:      Cervical: No cervical adenopathy  Skin:     General: Skin is warm and dry  Findings: No erythema or rash  Neurological:      Mental Status: She is alert and oriented to person, place, and time  Cranial Nerves: No cranial nerve deficit  Psychiatric:         Thought Content: Thought content normal        Patient's shoes and socks removed  Right Foot/Ankle   Right Foot Inspection  Skin Exam: skin normal and skin intact no dry skin, no warmth, no callus, no erythema, no maceration, no abnormal color, no pre-ulcer, no ulcer and no callus                          Toe Exam: ROM and strength within normal limits  Sensory   Vibration: intact  Proprioception: intact   Monofilament testing: intact  Vascular  Capillary refills: < 3 seconds  The right DP pulse is 2+  The right PT pulse is 2+  Left Foot/Ankle  Left Foot Inspection  Skin Exam: skin normal and skin intactno dry skin, no warmth, no erythema, no maceration, normal color, no pre-ulcer, no ulcer and no callus                         Toe Exam: ROM and strength within normal limits                   Sensory   Vibration: intact  Proprioception: intact  Monofilament: intact  Vascular  Capillary refills: < 3 seconds  The left DP pulse is 2+  The left PT pulse is 2+  Assign Risk Category:  No deformity present; No loss of protective sensation;  No weak pulses       Risk: 0

## 2021-03-04 DIAGNOSIS — M47.27 OSTEOARTHRITIS OF SPINE WITH RADICULOPATHY, LUMBOSACRAL REGION: ICD-10-CM

## 2021-03-04 DIAGNOSIS — E11.9 TYPE 2 DIABETES MELLITUS WITHOUT COMPLICATION, WITHOUT LONG-TERM CURRENT USE OF INSULIN (HCC): ICD-10-CM

## 2021-03-04 RX ORDER — DULOXETIN HYDROCHLORIDE 60 MG/1
60 CAPSULE, DELAYED RELEASE ORAL 2 TIMES DAILY
Qty: 180 CAPSULE | Refills: 1 | Status: SHIPPED | OUTPATIENT
Start: 2021-03-04 | End: 2021-09-10 | Stop reason: SDUPTHER

## 2021-03-04 NOTE — TELEPHONE ENCOUNTER
Pt will be working on getting on [employer] Pt assistance Program, but it may take 3 weeks to a month until she is a[pproved, & won't be able to start Trulicity until approved by Pt Assistance

## 2021-03-05 RX ORDER — DAPAGLIFLOZIN 10 MG/1
TABLET, FILM COATED ORAL DAILY
Qty: 90 TABLET | Refills: 3 | OUTPATIENT
Start: 2021-03-05

## 2021-03-05 NOTE — TELEPHONE ENCOUNTER
Patient has prescription is for the original prescription that she got an the signature is wrong also I think she already has no one that has gone to her mail order please check with the patient

## 2021-03-10 ENCOUNTER — TELEPHONE (OUTPATIENT)
Dept: FAMILY MEDICINE CLINIC | Facility: CLINIC | Age: 61
End: 2021-03-10

## 2021-03-10 DIAGNOSIS — E66.9 TYPE 2 DIABETES MELLITUS WITH OBESITY (HCC): Primary | ICD-10-CM

## 2021-03-10 DIAGNOSIS — Z23 ENCOUNTER FOR IMMUNIZATION: ICD-10-CM

## 2021-03-10 DIAGNOSIS — E11.69 TYPE 2 DIABETES MELLITUS WITH OBESITY (HCC): Primary | ICD-10-CM

## 2021-03-10 NOTE — TELEPHONE ENCOUNTER
Provider update -   1  Only able to take Metformin 500 mg daily will be increasing this Friday - Waiting on [address] shipment (Approved for Pt assistance)     2  Left multiple voice mails trying to reach [place] w/o response - Not sure how to get callback for appt  Feeling pretty good - Sugars coming down slowly (290 this morning)  Starting to move more when able    Any thoughts or recommendations Please let her know

## 2021-03-10 NOTE — TELEPHONE ENCOUNTER
Please call and order diabetic teaching and nutritional teaching for patient she would prefer going to [facility] but she will go as far as [place]

## 2021-03-12 DIAGNOSIS — F41.1 GENERALIZED ANXIETY DISORDER: ICD-10-CM

## 2021-03-12 RX ORDER — CLONAZEPAM 0.5 MG/1
1 TABLET ORAL
Qty: 60 TABLET | Refills: 0 | Status: SHIPPED | OUTPATIENT
Start: 2021-03-12 | End: 2021-04-19 | Stop reason: SDUPTHER

## 2021-03-15 ENCOUNTER — TELEPHONE (OUTPATIENT)
Dept: FAMILY MEDICINE CLINIC | Facility: CLINIC | Age: 61
End: 2021-03-15

## 2021-03-15 NOTE — TELEPHONE ENCOUNTER
Patient has eye dr mcclure and due for new glasses  You told her to wait until her sugars get under control  Asking how long do you think she has to wait?

## 2021-03-18 ENCOUNTER — TELEPHONE (OUTPATIENT)
Dept: NUTRITION | Facility: HOSPITAL | Age: 61
End: 2021-03-18

## 2021-03-18 NOTE — TELEPHONE ENCOUNTER
Received phone call from patient requesting virtual visit is possible  She depends on her son to bring her to visit and doesn't know if would be available  RD attempted to schedule but unable to schedule due to missing booking  Working with IT to add  Call patient next week to schedule virtual visit

## 2021-03-19 ENCOUNTER — TELEPHONE (OUTPATIENT)
Dept: FAMILY MEDICINE CLINIC | Facility: CLINIC | Age: 61
End: 2021-03-19

## 2021-03-19 ENCOUNTER — TELEPHONE (OUTPATIENT)
Dept: ENDOCRINOLOGY | Facility: CLINIC | Age: 61
End: 2021-03-19

## 2021-03-19 NOTE — TELEPHONE ENCOUNTER
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

## 2021-03-19 NOTE — TELEPHONE ENCOUNTER
She wants to hold off on the trulicity, does not like all the side effects and feels she can get her sugars down with her current drug regiment   She would conceder it at the next a1c level if it is elevated  She changed her diet and is taking 2 metformin and farxiga    Her bs are, Tuesday 205, Wednesday 164, Thursday 193 and today 164

## 2021-03-25 ENCOUNTER — TELEMEDICINE (OUTPATIENT)
Dept: ENDOCRINOLOGY | Facility: CLINIC | Age: 61
End: 2021-03-25
Payer: MEDICARE

## 2021-03-25 DIAGNOSIS — E11.9 TYPE 2 DIABETES MELLITUS WITHOUT COMPLICATION, WITHOUT LONG-TERM CURRENT USE OF INSULIN (HCC): Primary | ICD-10-CM

## 2021-03-25 DIAGNOSIS — E11.69 TYPE 2 DIABETES MELLITUS WITH OBESITY (HCC): ICD-10-CM

## 2021-03-25 DIAGNOSIS — E66.9 TYPE 2 DIABETES MELLITUS WITH OBESITY (HCC): ICD-10-CM

## 2021-03-25 DIAGNOSIS — I10 ESSENTIAL HYPERTENSION: ICD-10-CM

## 2021-03-25 DIAGNOSIS — G47.33 OBSTRUCTIVE SLEEP APNEA: ICD-10-CM

## 2021-03-25 PROCEDURE — 99204 OFFICE O/P NEW MOD 45 MIN: CPT | Performed by: NURSE PRACTITIONER

## 2021-03-25 NOTE — PROGRESS NOTES
Virtual Regular Visit      Assessment/Plan:    Problem List Items Addressed This Visit        Endocrine    Type 2 diabetes mellitus without complication, without long-term current use of insulin ([facility] ) - Primary     Patient's control is improving rapidly through improved diet and use of Metformin  Due to her reduced GFR, recommend that she not start farxiga at this time (GFR 2/24/2021 is 42)  Will recheck CMP prior to next appointment  Educated patient on the pathophysiology of type 2 diabetes  Counseled on the long-term negative effects of uncontrolled diabetes including retinopathy, neuropathy, nephropathy, heart attack, and stroke  Unfortunately, based on limited lab data, it would appear patient already has chronic kidney disease  This will limit medication options for diabetes management  Encouraged patient to establish an exercise regimen  Counseled on symptoms of hypoglycemia as well as appropriate treatments of any blood sugars under 70  Lab Results   Component Value Date    HGBA1C >14 0 (H) 02/24/2021            Relevant Orders    Comprehensive metabolic panel Lab Collect    HEMOGLOBIN A1C W/ EAG ESTIMATION Lab Collect    Microalbumin / creatinine urine ratio Lab Collect       Respiratory    Obstructive sleep apnea      Continue CPAP  Cardiovascular and Mediastinum    Essential hypertension       Continue current regimen             Other Visit Diagnoses     Type 2 diabetes mellitus with obesity ([facility] )                   Reason for visit is   Chief Complaint   Patient presents with    Diabetes Type 2    Virtual Regular Visit        Encounter provider COLIN Hughes    Provider located at [address] [address] [phone]      Recent Visits  Date Type Provider Dept   03/19/21 Telephone Tenisha Muñoz RN [facility] [place] Showing recent visits within past 7 days and meeting all other requirements     Today's Visits  Date Type Provider Dept   03/25/21 [address], CRNP [facility] [place]   Showing today's visits and meeting all other requirements     Future Appointments  No visits were found meeting these conditions  Showing future appointments within next 150 days and meeting all other requirements        The patient was identified by name and date of birth  Skip Serrano was informed that this is a telemedicine visit and that the visit is being conducted through [employer] and patient was informed that this is a secure, HIPAA-compliant platform  She agrees to proceed     My office door was closed  No one else was in the room  She acknowledged consent and understanding of privacy and security of the video platform  The patient has agreed to participate and understands they can discontinue the visit at any time  Patient is aware this is a billable service  History of Present Illness:   Skip Serrano is a 63 y o  female with hypertension, elevated triglycerides, obstructive sleep apnea, and new diagnosis of type 2 diabetes presents today to establish care  Patient was having symptoms of polydipsia, polyuria, and fatigue  Her PCP ordered routine laboratory work  Lab work from 02/24/2021 showed a hemoglobin A1c greater than 14%, a fasting blood sugar of 595 mg/dL, elevated triglycerides of 255 mg/dL, and a GFR of 42  Review of past laboratory values show a hemoglobin A1c of 6 8% on 01/23/2018  And a GFR of 50 on 01/25/2019  Neither of these were addressed at the time according to the patient  However, she does admit that she has not been a "good patient" and never followed up with any of her providers regularly  Patient reports resolution of polydipsia and polyuria since starting her metformin      Current regimen:   Metformin 500 mg (currently only on two with BF and L), starts third one at supper on monday    Medications: PCP ordered metformin, farxiga, and trulicity  Patient was in between insurance carriers; took [employer] for two weeks but cost became prohibitive  Spoke with [place] [facility] [address] who will provide financial assistance, she has a 30 day supply arriving shortly    Blood Sugars: Once daily in the morning  3/25 183  3/26 178  3/27 179  3/28 151  3/29 156  3/30 130    Meter: Relion     Diet: switched to whole wheat, stopped potatoes and started yams; stopped drinking sweetened iced tea and soda  Will have 1-2 cans of diet soda with dinner  Only drinks water other times     Does eat BF, L, D- son is a professional  and has calculated carbs/calories    Exercise: Hasn't been doing any exercise yet, plans to start in the next few weeks    Influenza vaccine: Missed this year, but usually gets    Pneumovax: not yet    Ophthalmology: Upcoming on 3/30/2021    Podiatry/Foot care: 3/25/2021; + neuropathy since spinal surgery    Dentist: Had started the process of having teeth pulled for dentures, looking for dental insurance    Family history:  Denies    Diabetes education/nutrition: getting printed materials from our office, can't drive currently              Past Medical History:   Diagnosis Date    Hypertension     Migraine     MRSA (methicillin resistant Staphylococcus aureus)        Past Surgical History:   Procedure Laterality Date    ACHILLES TENDON REPAIR      ANKLE SURGERY Left 2019    Excision of Lipoma    CARPAL TUNNEL RELEASE Right      SECTION      CHOLECYSTECTOMY      GANGLION CYST EXCISION Left     Left wrist    LUMBAR LAMINECTOMY  2018    x 3 disks    NASAL SINUS SURGERY         Current Outpatient Medications   Medication Sig Dispense Refill    clonazePAM (KlonoPIN) 0 5 mg tablet Take 2 tablets (1 mg total) by mouth daily at bedtime as needed for anxiety 60 tablet 0    Dapagliflozin Propanediol 10 MG TABS Take 0 5 tablets (5 mg total) by mouth daily for 30 days, THEN 1 tablet (10 mg total) daily  90 tablet 3    Dulaglutide 0 75 MG/0 5ML SOPN Inject 0 5 mL (0 75 mg total) under the skin once a week 1 pen 3    DULoxetine (CYMBALTA) 60 mg delayed release capsule Take 1 capsule (60 mg total) by mouth 2 (two) times a day 180 capsule 1    famotidine (PEPCID) 20 mg tablet Take 20 mg by mouth daily      gabapentin (NEURONTIN) 100 mg capsule One in am, one at lunch, and 3 at hs 360 capsule 1    ibuprofen (MOTRIN) 200 mg tablet Take 400 mg by mouth every 6 (six) hours as needed for mild pain      lisinopril (ZESTRIL) 5 mg tablet Take 1 tablet (5 mg total) by mouth daily 90 tablet 3    metFORMIN (GLUCOPHAGE) 500 mg tablet Take 1 tablet with breakfast for 2 weeks then 1 tablet with breakfast and 1 tablet with lunch for 2 weeks and then 1 tablet t i d  with meals 270 tablet 1    Multiple Vitamins-Minerals (CENTRUM SILVER PO) Take 1 tablet by mouth daily       No current facility-administered medications for this visit  Allergies   Allergen Reactions    Pollen Extract     Vancomycin        Review of Systems   Constitutional: Negative for activity change, appetite change, fatigue and unexpected weight change  HENT: Negative for sore throat, trouble swallowing and voice change  Eyes: Negative for visual disturbance  Respiratory: Negative for cough, chest tightness and shortness of breath  Cardiovascular: Negative for chest pain, palpitations and leg swelling  Gastrointestinal: Negative for constipation, diarrhea, nausea and vomiting  Endocrine: Negative for polydipsia, polyphagia and polyuria  Genitourinary: Negative for frequency  Musculoskeletal: Positive for back pain  Negative for arthralgias, gait problem, joint swelling and myalgias  Skin: Negative for wound  Allergic/Immunologic: Negative for food allergies  Neurological: Positive for numbness   Negative for dizziness, weakness, light-headedness and headaches  Hematological: Does not bruise/bleed easily  Psychiatric/Behavioral: Negative for decreased concentration, dysphoric mood and sleep disturbance  The patient is not nervous/anxious  Video Exam    There were no vitals filed for this visit  Physical Exam     Physical Exam   Constitutional: She is oriented to person, place, and time  She appears well-developed and well-nourished  No distress  HENT:   Head: Normocephalic and atraumatic  Neck: Normal range of motion  Pulmonary/Chest: Effort normal    Musculoskeletal: Normal range of motion  Neurological: She is alert and oriented to person, place, and time  Skin: She is not diaphoretic  Psychiatric: He has a normal mood and affect  His behavior is normal        I spent 45 minutes directly with the patient during this visit      VIRTUAL VISIT DISCLAIMER    Sarina Plascencia acknowledges that she has consented to an online visit or consultation  She understands that the online visit is based solely on information provided by her, and that, in the absence of a face-to-face physical evaluation by the physician, the diagnosis she receives is both limited and provisional in terms of accuracy and completeness  This is not intended to replace a full medical face-to-face evaluation by the physician  Sarina Plascencia understands and accepts these terms

## 2021-03-25 NOTE — ASSESSMENT & PLAN NOTE
Patient's control is improving rapidly through improved diet and use of Metformin  Due to her reduced GFR, recommend that she not start farxiga at this time (GFR 2/24/2021 is 42)  Will recheck CMP prior to next appointment  Educated patient on the pathophysiology of type 2 diabetes  Counseled on the long-term negative effects of uncontrolled diabetes including retinopathy, neuropathy, nephropathy, heart attack, and stroke  Unfortunately, based on limited lab data, it would appear patient already has chronic kidney disease  This will limit medication options for diabetes management  Encouraged patient to establish an exercise regimen  Counseled on symptoms of hypoglycemia as well as appropriate treatments of any blood sugars under 70    Lab Results   Component Value Date    HGBA1C >14 0 (H) 02/24/2021

## 2021-03-26 ENCOUNTER — TELEPHONE (OUTPATIENT)
Dept: FAMILY MEDICINE CLINIC | Facility: CLINIC | Age: 61
End: 2021-03-26

## 2021-03-26 ENCOUNTER — IMMUNIZATIONS (OUTPATIENT)
Dept: FAMILY MEDICINE CLINIC | Facility: HOSPITAL | Age: 61
End: 2021-03-26

## 2021-03-26 DIAGNOSIS — Z23 ENCOUNTER FOR IMMUNIZATION: Primary | ICD-10-CM

## 2021-03-26 PROCEDURE — 91301 SARS-COV-2 / COVID-19 MRNA VACCINE (MODERNA) 100 MCG: CPT

## 2021-03-26 PROCEDURE — 0011A SARS-COV-2 / COVID-19 MRNA VACCINE (MODERNA) 100 MCG: CPT

## 2021-03-26 NOTE — TELEPHONE ENCOUNTER
Lat night she had dizziness/ nausea, it lasted a couple of hours  She is having waves of skin sweating and face flushing, they think it is form the metformin    Her blood sugar is 155

## 2021-03-26 NOTE — TELEPHONE ENCOUNTER
So just go back to once a day for another month and then we can try doing the twice a day again after that it may be just to related titrate her dosage up

## 2021-03-26 NOTE — TELEPHONE ENCOUNTER
It started at 5:30 last night, and her bs was 150  She took sips of regular spite and feels better  She feels her body is acting that 150 is hypoglycemia    She is taking metformin 500mg bid, just started bid last week,

## 2021-03-30 LAB
LEFT EYE DIABETIC RETINOPATHY: NORMAL
RIGHT EYE DIABETIC RETINOPATHY: NORMAL

## 2021-04-15 ENCOUNTER — TELEMEDICINE (OUTPATIENT)
Dept: FAMILY MEDICINE CLINIC | Facility: CLINIC | Age: 61
End: 2021-04-15
Payer: MEDICARE

## 2021-04-15 VITALS — HEIGHT: 67 IN | WEIGHT: 243 LBS | BODY MASS INDEX: 38.14 KG/M2

## 2021-04-15 DIAGNOSIS — E11.9 TYPE 2 DIABETES MELLITUS WITHOUT COMPLICATION, WITHOUT LONG-TERM CURRENT USE OF INSULIN (HCC): Primary | ICD-10-CM

## 2021-04-15 PROCEDURE — 97802 MEDICAL NUTRITION INDIV IN: CPT | Performed by: DIETITIAN, REGISTERED

## 2021-04-15 NOTE — PROGRESS NOTES
Initial Nutrition Assessment Form    Patient Name: Patricio Cohen    YOB: 1960    Sex: Female     Assessment Date: 4/15/2021  Start Time: 1:00 Stop Time: 2:00 Total Minutes: 61     Data:  Present at session: Self/son   Parent/Patient Concerns: "what to eat with diabetes"   Medical Dx/Reason for Referral: E11 69, E66 9 (ICD-10-CM) - Type 2 diabetes mellitus with obesity ([facility] )   Past Medical History:   Diagnosis Date    Hypertension     Migraine     MRSA (methicillin resistant Staphylococcus aureus)        Current Outpatient Medications   Medication Sig Dispense Refill    clonazePAM (KlonoPIN) 0 5 mg tablet Take 2 tablets (1 mg total) by mouth daily at bedtime as needed for anxiety 60 tablet 0    Dapagliflozin Propanediol 10 MG TABS Take 0 5 tablets (5 mg total) by mouth daily for 30 days, THEN 1 tablet (10 mg total) daily  90 tablet 3    Dulaglutide 0 75 MG/0 5ML SOPN Inject 0 5 mL (0 75 mg total) under the skin once a week 1 pen 3    DULoxetine (CYMBALTA) 60 mg delayed release capsule Take 1 capsule (60 mg total) by mouth 2 (two) times a day 180 capsule 1    famotidine (PEPCID) 20 mg tablet Take 20 mg by mouth daily      gabapentin (NEURONTIN) 100 mg capsule One in am, one at lunch, and 3 at hs 360 capsule 1    ibuprofen (MOTRIN) 200 mg tablet Take 400 mg by mouth every 6 (six) hours as needed for mild pain      lisinopril (ZESTRIL) 5 mg tablet Take 1 tablet (5 mg total) by mouth daily 90 tablet 3    metFORMIN (GLUCOPHAGE) 500 mg tablet Take 1 tablet with breakfast for 2 weeks then 1 tablet with breakfast and 1 tablet with lunch for 2 weeks and then 1 tablet t i d  with meals 270 tablet 1    Multiple Vitamins-Minerals (CENTRUM SILVER PO) Take 1 tablet by mouth daily       No current facility-administered medications for this visit  Additional Meds/Supplements: meds reviewed; patient was on metformin BID, trulicity and farxiga     She started with metformin once daily as prescribed and farxiga but started experiencing s/s of hypoglycemia; instructed by PCP to stop farxiga and reduce metformin dose  Did not start trulicity due to cost    Special Learning Needs:    Height: HC Readings from Last 3 Encounters:   No data found for [facility]       Weight: Wt Readings from Last 10 Encounters:   04/15/21 110 kg (243 lb)   03/02/21 112 kg (246 lb)   12/04/20 131 kg (289 lb)   07/30/20 131 kg (289 lb 6 4 oz)   04/15/20 136 kg (299 lb)   01/15/20 136 kg (299 lb 9 6 oz)   04/10/17 136 kg (299 lb)     Estimated body mass index is 38 06 kg/m² as calculated from the following:    Height as of this encounter: 5' 7" (1 702 m)  Weight as of this encounter: 110 kg (243 lb)  Recent Weight Change: [x]Yes     []No  Amount: Patient reports a 46# wt loss since December when she changed her diet       Energy Needs: 1800 calories/225 g carbs (30 benson/kg IBW)   Allergies   Allergen Reactions    Pollen Extract     Vancomycin        Social History     Substance and Sexual Activity   Alcohol Use Not Currently       Social History     Tobacco Use   Smoking Status Former Smoker   Smokeless Tobacco Never Used       Who shops? patient/son   Who cooks? patient/son   Exercise: Sedentary but her son is a  and she plans to start exercise program once her sugars are better controlled   Prior Counseling? []Yes     [x]No  When:      Why:         Diet Hx:  Breakfast: 10-1 p m   2 scramble or fried eggs with 1-2 slices whole wheat toast (was white in past) with margarine or multi-grain cheerios with whole milk and sugar; water   Lunch: 4-5 p m  [facility] with whole wheat bread of either ham or baked turkey breast and ibarra; gave up chips as patient reports she could eat a whole bag in 2 days       Dinner: 8-9 p m     Cooked meal most nights; meat, not breaded with yam and carrots or green beans or broccoli or asparagus; occasionally corn or peas       Snacks: Patient reports completely giving up sugary drinks of soda or ice tea which she reports she was consuming excessive amounts  She is only consuming water and perhaps 2 diet sodas per day    HS - small amount of doritos, sour dough pretzels nuggets or salad with lettuce, tomato and onions        Nutrition Diagnosis:   Excess carbohydrate intake  related to Physiological causes requiring modified carbohydrate intake (i e  diabetes mellitus) as  evidenced by Hemoglobin A1C >6%       Medical Nutrition Therapy Intervention:  [x]Individualized Meal Plan:  Carb counting reviewed [x]Understanding Lab Values   []Basic Pathophysiology of Disease []Food/Medication Interactions   []Food Diary [x]Exercise   [x]Lifestyle/Behavior Modification Techniques []Medication, Mechanism of Action   [x]Label Reading [x]Self Blood Glucose Monitoring   [x]Weight/BMI Goals:  Patient would like to continue her wt loss journey  [x]Other -  Patient made many beneficial diet changes  She use to work night shift an reports trouble sleeping which explains her erratic meal schedule  Other Notes:        Comprehension: []Excellent  []Very Good  [x]Good  []Fair   []Poor    Receptivity: []Excellent  []Very Good  [x]Good  []Fair   []Poor    Expected Compliance: []Excellent  []Very Good  [x]Good  []Fair   []Poor        Goals:  1  Carb counting with goal intake <225 g/day   2  Wt loss 1# weekly until goal weight acheived   3    Exercise 150 minutes weekly       Follow up May 18, 2021  Labs:  [facility]  Lab Results   Component Value Date    K 4 0 02/24/2021    CL 88 (L) 02/24/2021    CO2 25 02/24/2021    BUN 25 02/24/2021    CREATININE 1 37 (H) 02/24/2021    GLUF 595 (HH) 02/24/2021    CALCIUM 10 2 (H) 02/24/2021    AST 44 02/24/2021    ALT 56 02/24/2021    ALKPHOS 116 02/24/2021    EGFR 42 02/24/2021       BMP  Lab Results   Component Value Date    CALCIUM 10 2 (H) 02/24/2021    K 4 0 02/24/2021    CO2 25 02/24/2021    CL 88 (L) 02/24/2021    BUN 25 02/24/2021    CREATININE 1 37 (H) 02/24/2021       Lipids  No results found for: CHOL  Lab Results   Component Value Date    HDL 41 02/24/2021     Lab Results   Component Value Date    LDLCALC 99 02/24/2021     Lab Results   Component Value Date    TRIG 255 (H) 02/24/2021     No results found for: CHOLHDL    Hemoglobin A1C  Lab Results   Component Value Date    HGBA1C >14 0 (H) 02/24/2021       Fasting Glucose  Lab Results   Component Value Date    GLUF 595 (HH) 02/24/2021       Insulin     Thyroid  No results found for: TSH, L5FCKRS, V4ECUGX, THYROIDAB    Hepatic Function Panel  Lab Results   Component Value Date    ALT 56 02/24/2021    AST 44 02/24/2021    ALKPHOS 116 02/24/2021       Celiac Disease Antibody Panel  No results found for: ENDOMYSIAL IGA, GLIADIN IGA, GLIADIN IGG, IGA, TISSUE TRANSGLUT AB, TTG IGA   Iron  No results found for: IRON, TIBC, FERRITIN    Vitamins  No results found for: VITAMIN B2   No results found for: NICOTINAMIDE, NICOTINIC ACID   No results found for: VITAMINB6  No results found for: PNOHJEGB15  No results found for: VITB5  No results found for: U9FHACLE  No results found for: THYROGLB  No results found for: VITAMIN K   No results found for: 25-HYDROXY VIT D   No components found for: [facility] [facility] MA,RD,LDN,[facility]  [address] [address]

## 2021-04-15 NOTE — PROGRESS NOTES
Virtual Brief Visit    Assessment/Plan:  Medical nutrition therapy for diabetes education    Problem List Items Addressed This Visit     None                Reason for visit is   Chief Complaint   Patient presents with    Nutrition Counseling     inital visit        Encounter provider Yamilex Leahy    Provider located at [address] [place] [address] [place] [phone]    Recent Visits  No visits were found meeting these conditions  Showing recent visits within past 7 days and meeting all other requirements     Today's Visits  Date Type Provider Dept   04/15/21 Telemedicine [address] today's visits and meeting all other requirements     Future Appointments  No visits were found meeting these conditions  Showing future appointments within next 150 days and meeting all other requirements        After connecting through [employer] and patient was informed that this is a secure, HIPAA-compliant platform  She agrees to proceed  , the patient was identified by name and date of birth  Christina Matute was informed that this is a telemedicine visit and that the visit is being conducted through [employer] and patient was informed that this is a secure, HIPAA-compliant platform  She agrees to proceed     My office door was closed  No one else was in the room  She acknowledged consent and understanding of privacy and security of the platform  The patient has agreed to participate and understands she can discontinue the visit at any time  Patient is aware this is a billable service  Subjective    Christina Matute is a 63 y o  female newly dx with Type 2 diabetes  Referred for MNT counseling  Refer to initial visit documentation    HPI     Past Medical History:   Diagnosis Date    Hypertension     Migraine     MRSA (methicillin resistant Staphylococcus aureus)        Past Surgical History:   Procedure Laterality Date  ACHILLES TENDON REPAIR      ANKLE SURGERY Left 2019    Excision of Lipoma    CARPAL TUNNEL RELEASE Right      SECTION      CHOLECYSTECTOMY      GANGLION CYST EXCISION Left 1999    Left wrist    LUMBAR LAMINECTOMY  2018    x 3 disks    NASAL SINUS SURGERY  2000       Current Outpatient Medications   Medication Sig Dispense Refill    clonazePAM (KlonoPIN) 0 5 mg tablet Take 2 tablets (1 mg total) by mouth daily at bedtime as needed for anxiety 60 tablet 0    Dapagliflozin Propanediol 10 MG TABS Take 0 5 tablets (5 mg total) by mouth daily for 30 days, THEN 1 tablet (10 mg total) daily  90 tablet 3    Dulaglutide 0 75 MG/0 5ML SOPN Inject 0 5 mL (0 75 mg total) under the skin once a week 1 pen 3    DULoxetine (CYMBALTA) 60 mg delayed release capsule Take 1 capsule (60 mg total) by mouth 2 (two) times a day 180 capsule 1    famotidine (PEPCID) 20 mg tablet Take 20 mg by mouth daily      gabapentin (NEURONTIN) 100 mg capsule One in am, one at lunch, and 3 at hs 360 capsule 1    ibuprofen (MOTRIN) 200 mg tablet Take 400 mg by mouth every 6 (six) hours as needed for mild pain      lisinopril (ZESTRIL) 5 mg tablet Take 1 tablet (5 mg total) by mouth daily 90 tablet 3    metFORMIN (GLUCOPHAGE) 500 mg tablet Take 1 tablet with breakfast for 2 weeks then 1 tablet with breakfast and 1 tablet with lunch for 2 weeks and then 1 tablet t i d  with meals 270 tablet 1    Multiple Vitamins-Minerals (CENTRUM SILVER PO) Take 1 tablet by mouth daily       No current facility-administered medications for this visit  Allergies   Allergen Reactions    Pollen Extract     Vancomycin        Review of Systems    There were no vitals filed for this visit  I spent 60 minutes directly with the patient during this visit    VIRTUAL VISIT DISCLAIMER    Luis Antonio Belle acknowledges that she has consented to an online visit or consultation   She understands that the online visit is based solely on information provided by her, and that, in the absence of a face-to-face physical evaluation by the physician, the diagnosis she receives is both limited and provisional in terms of accuracy and completeness  This is not intended to replace a full medical face-to-face evaluation by the physician  Robert Ku understands and accepts these terms

## 2021-04-19 DIAGNOSIS — F41.1 GENERALIZED ANXIETY DISORDER: ICD-10-CM

## 2021-04-19 RX ORDER — CLONAZEPAM 0.5 MG/1
1 TABLET ORAL
Qty: 60 TABLET | Refills: 0 | Status: SHIPPED | OUTPATIENT
Start: 2021-04-19 | End: 2021-05-17 | Stop reason: SDUPTHER

## 2021-04-28 ENCOUNTER — IMMUNIZATIONS (OUTPATIENT)
Dept: FAMILY MEDICINE CLINIC | Facility: HOSPITAL | Age: 61
End: 2021-04-28

## 2021-04-28 DIAGNOSIS — Z23 ENCOUNTER FOR IMMUNIZATION: Primary | ICD-10-CM

## 2021-04-28 PROCEDURE — 91301 SARS-COV-2 / COVID-19 MRNA VACCINE (MODERNA) 100 MCG: CPT

## 2021-04-28 PROCEDURE — 0012A SARS-COV-2 / COVID-19 MRNA VACCINE (MODERNA) 100 MCG: CPT

## 2021-05-17 DIAGNOSIS — I10 ESSENTIAL HYPERTENSION: ICD-10-CM

## 2021-05-17 DIAGNOSIS — E11.9 TYPE 2 DIABETES MELLITUS WITHOUT COMPLICATION, WITHOUT LONG-TERM CURRENT USE OF INSULIN (HCC): ICD-10-CM

## 2021-05-17 DIAGNOSIS — M47.27 OSTEOARTHRITIS OF SPINE WITH RADICULOPATHY, LUMBOSACRAL REGION: ICD-10-CM

## 2021-05-17 DIAGNOSIS — F41.1 GENERALIZED ANXIETY DISORDER: ICD-10-CM

## 2021-05-17 RX ORDER — GABAPENTIN 100 MG/1
CAPSULE ORAL
Qty: 360 CAPSULE | Refills: 0 | Status: SHIPPED | OUTPATIENT
Start: 2021-05-17 | End: 2021-08-17 | Stop reason: SDUPTHER

## 2021-05-17 RX ORDER — CLONAZEPAM 0.5 MG/1
1 TABLET ORAL
Qty: 60 TABLET | Refills: 0 | Status: SHIPPED | OUTPATIENT
Start: 2021-05-17 | End: 2021-06-21 | Stop reason: SDUPTHER

## 2021-05-17 RX ORDER — LISINOPRIL 5 MG/1
5 TABLET ORAL DAILY
Qty: 90 TABLET | Refills: 0
Start: 2021-05-17 | End: 2021-05-18 | Stop reason: SDUPTHER

## 2021-05-18 ENCOUNTER — CLINICAL SUPPORT (OUTPATIENT)
Dept: FAMILY MEDICINE CLINIC | Facility: CLINIC | Age: 61
End: 2021-05-18
Payer: MEDICARE

## 2021-05-18 VITALS — BODY MASS INDEX: 37.59 KG/M2 | WEIGHT: 240 LBS

## 2021-05-18 DIAGNOSIS — E11.69 TYPE 2 DIABETES MELLITUS WITH OBESITY (HCC): Primary | ICD-10-CM

## 2021-05-18 DIAGNOSIS — E11.9 TYPE 2 DIABETES MELLITUS WITHOUT COMPLICATION, WITHOUT LONG-TERM CURRENT USE OF INSULIN (HCC): ICD-10-CM

## 2021-05-18 DIAGNOSIS — I10 ESSENTIAL HYPERTENSION: ICD-10-CM

## 2021-05-18 DIAGNOSIS — E66.9 TYPE 2 DIABETES MELLITUS WITH OBESITY (HCC): Primary | ICD-10-CM

## 2021-05-18 PROCEDURE — 97803 MED NUTRITION INDIV SUBSEQ: CPT

## 2021-05-18 RX ORDER — LISINOPRIL 5 MG/1
5 TABLET ORAL DAILY
Qty: 90 TABLET | Refills: 1 | Status: SHIPPED | OUTPATIENT
Start: 2021-05-18 | End: 2021-11-19 | Stop reason: SDUPTHER

## 2021-05-18 NOTE — PROGRESS NOTES
Follow-Up Nutrition Assessment Form    Patient Name: Shawn Abel    YOB: 1960    Sex: Female      Follow Up Date: 5/18/2021  Start Time: 1:45 Stop Time: 2:30 Total Minutes: 39     Data:  Present at session: Self, son Marquise Mcginnis   Parent/Patient Concerns: She doesn't want to go on more diabetes medication   Medical Dx/Reason for Referral: E11 69, E66 9 (ICD-10-CM) - Type 2 diabetes mellitus with obesity ([facility] [address] )   Past Medical History:   Diagnosis Date    Hypertension     Migraine     MRSA (methicillin resistant Staphylococcus aureus)        Current Outpatient Medications   Medication Sig Dispense Refill    clonazePAM (KlonoPIN) 0 5 mg tablet Take 2 tablets (1 mg total) by mouth daily at bedtime as needed for anxiety 60 tablet 0    Dapagliflozin Propanediol 10 MG TABS Take 0 5 tablets (5 mg total) by mouth daily for 30 days, THEN 1 tablet (10 mg total) daily  90 tablet 3    Dulaglutide 0 75 MG/0 5ML SOPN Inject 0 5 mL (0 75 mg total) under the skin once a week 1 pen 3    DULoxetine (CYMBALTA) 60 mg delayed release capsule Take 1 capsule (60 mg total) by mouth 2 (two) times a day 180 capsule 1    famotidine (PEPCID) 20 mg tablet Take 20 mg by mouth daily      gabapentin (NEURONTIN) 100 mg capsule One in am, one at lunch, and 3 at hs 360 capsule 0    ibuprofen (MOTRIN) 200 mg tablet Take 400 mg by mouth every 6 (six) hours as needed for mild pain      lisinopril (ZESTRIL) 5 mg tablet Take 1 tablet (5 mg total) by mouth daily 90 tablet 0    metFORMIN (GLUCOPHAGE) 500 mg tablet Take 1 tablet with breakfast for 2 weeks then 1 tablet with breakfast and 1 tablet with lunch for 2 weeks and then 1 tablet t i d  with meals 270 tablet 1    Multiple Vitamins-Minerals (CENTRUM SILVER PO) Take 1 tablet by mouth daily       No current facility-administered medications for this visit           Additional Meds/Supplements: No medication changes   Barriers to Learning: None   Labs: Verbally reviewed SMBG levels; per patient, her fasting blood sugars range from 150-145; occasionally will take a random blood sugars and not above 150 for the last month   Height: Ht Readings from Last 3 Encounters:   04/15/21 5' 7" (1 702 m)   03/02/21 5' 7" (1 702 m)   12/04/20 5' 7" (1 702 m)      Weight: Wt Readings from Last 10 Encounters:   04/15/21 110 kg (243 lb)   03/02/21 112 kg (246 lb)   12/04/20 131 kg (289 lb)   07/30/20 131 kg (289 lb 6 4 oz)   04/15/20 136 kg (299 lb)   01/15/20 136 kg (299 lb 9 6 oz)   04/10/17 136 kg (299 lb)     Estimated body mass index is 38 06 kg/m² as calculated from the following:    Height as of 4/15/21: 5' 7" (1 702 m)  Weight as of 4/15/21: 110 kg (243 lb)  Wt  Change Since Last Visit: []Yes     [x]No  Amount:       Energy Needs: No calculations performed for this visit   Pain Screen: Are you having pain now? No      Goals Achieved:  1  Under goal carb count most days      New Goals:   1  Start exercise plan with goal of 150 minutes weekly   2  Check post prandial blood sugars several times per month   3  Initial PES: Excess carbohydrate intake  related to Physiological causes requiring modified carbohydrate intake (i e  diabetes mellitus) as  evidenced by Hemoglobin A1C >6%      New PES: No Change      New Problem List:  1  Did not implement exercise as previously planned   2      3        Assessment:  Patient had virtual visit for diabetes  She has been carb counting and continues to monitor her po intake closely  She reports her blood sugars have not been >150 for the month of May  She did not lose any weight but is feeling better  Discussed adding several post prandial blood sugars to make sure she is in goal range  Now that her blood sugars are improved, she is going to start her exercise program   Her plan is to use treadmill or stationary bike in 5 to 10 minutes increments every other day and continue to increase as tolerated    She was hesitant to start when her blood sugars were high and when she first started on metformin  Discussed with patient how to find nutritional information for eating out, especially for chain restaurants  Patient is not interested in diabetes classes at current time  She is aware that once she starts the classes, she has 1 year to complete them  She is for blood work next week and is anxious to have a repeat A1c level         Medical Nutrition Therapy Intervention:  []Individualized Meal Plan []Understanding Lab Values   []Basic Pathophysiology of Disease []Food/Medication Interactions   []Food Diary [x]Exercise   [x]Lifestyle/Behavior Modification Techniques []Medication, Mechanism of Action   []Label Reading [x]Self Blood Glucose Monitoring   []Weight/BMI Goals []Other -    Other Notes:        Comprehension: []Excellent  []Very Good  [x]Good  []Fair   []Poor    Receptivity: []Excellent  []Very Good  [x]Good  []Fair   []Poor    Expected Compliance: []Excellent  []Very Good  [x]Good  []Fair   []Poor      Labs:  CMP  Lab Results   Component Value Date    K 4 0 02/24/2021    CL 88 (L) 02/24/2021    CO2 25 02/24/2021    BUN 25 02/24/2021    CREATININE 1 37 (H) 02/24/2021    GLUF 595 (HH) 02/24/2021    CALCIUM 10 2 (H) 02/24/2021    AST 44 02/24/2021    ALT 56 02/24/2021    ALKPHOS 116 02/24/2021    EGFR 42 02/24/2021       BMP  Lab Results   Component Value Date    CALCIUM 10 2 (H) 02/24/2021    K 4 0 02/24/2021    CO2 25 02/24/2021    CL 88 (L) 02/24/2021    BUN 25 02/24/2021    CREATININE 1 37 (H) 02/24/2021       Lipids  No results found for: CHOL  Lab Results   Component Value Date    HDL 41 02/24/2021     Lab Results   Component Value Date    LDLCALC 99 02/24/2021     Lab Results   Component Value Date    TRIG 255 (H) 02/24/2021     No results found for: CHOLHDL    Hemoglobin A1C  Lab Results   Component Value Date    HGBA1C >14 0 (H) 02/24/2021       Fasting Glucose  Lab Results   Component Value Date    GLUF 595 ([facility]) 02/24/2021       Insulin Thyroid  No results found for: TSH, V3WBSNR, B1ZSSTX, THYROIDAB    Hepatic Function Panel  Lab Results   Component Value Date    ALT 56 02/24/2021    AST 44 02/24/2021    ALKPHOS 116 02/24/2021       Celiac Disease Antibody Panel  No results found for: ENDOMYSIAL IGA, GLIADIN IGA, GLIADIN IGG, IGA, TISSUE TRANSGLUT AB, TTG IGA   Iron  No results found for: IRON, TIBC, FERRITIN    Vitamins  No results found for: VITAMIN B2   No results found for: NICOTINAMIDE, NICOTINIC ACID   No results found for: VITAMINB6  No results found for: OHFDKPXE06  No results found for: VITB5  No results found for: T5MNYTCN  No results found for: THYROGLB  No results found for: VITAMIN K   No results found for: 25-HYDROXY VIT D   No components found for: VITAMINE     Follow up to be determined      Maverick Russell MA,RD,LDN,[address] [id number]

## 2021-06-01 ENCOUNTER — LAB (OUTPATIENT)
Dept: LAB | Facility: MEDICAL CENTER | Age: 61
End: 2021-06-01
Payer: MEDICARE

## 2021-06-01 DIAGNOSIS — E11.9 TYPE 2 DIABETES MELLITUS WITHOUT COMPLICATION, WITHOUT LONG-TERM CURRENT USE OF INSULIN (HCC): ICD-10-CM

## 2021-06-01 LAB
ALBUMIN SERPL BCP-MCNC: 4 G/DL (ref 3.5–5)
ALP SERPL-CCNC: 74 U/L (ref 46–116)
ALT SERPL W P-5'-P-CCNC: 30 U/L (ref 12–78)
ANION GAP SERPL CALCULATED.3IONS-SCNC: 7 MMOL/L (ref 4–13)
AST SERPL W P-5'-P-CCNC: 22 U/L (ref 5–45)
BILIRUB SERPL-MCNC: 0.34 MG/DL (ref 0.2–1)
BUN SERPL-MCNC: 26 MG/DL (ref 5–25)
CALCIUM SERPL-MCNC: 9.8 MG/DL (ref 8.3–10.1)
CHLORIDE SERPL-SCNC: 107 MMOL/L (ref 100–108)
CO2 SERPL-SCNC: 23 MMOL/L (ref 21–32)
CREAT SERPL-MCNC: 1.28 MG/DL (ref 0.6–1.3)
EST. AVERAGE GLUCOSE BLD GHB EST-MCNC: 160 MG/DL
GFR SERPL CREATININE-BSD FRML MDRD: 45 ML/MIN/1.73SQ M
GLUCOSE P FAST SERPL-MCNC: 116 MG/DL (ref 65–99)
HBA1C MFR BLD: 7.2 %
POTASSIUM SERPL-SCNC: 4.2 MMOL/L (ref 3.5–5.3)
PROT SERPL-MCNC: 8.4 G/DL (ref 6.4–8.2)
SODIUM SERPL-SCNC: 137 MMOL/L (ref 136–145)

## 2021-06-01 PROCEDURE — 36415 COLL VENOUS BLD VENIPUNCTURE: CPT

## 2021-06-01 PROCEDURE — 83036 HEMOGLOBIN GLYCOSYLATED A1C: CPT

## 2021-06-01 PROCEDURE — 80053 COMPREHEN METABOLIC PANEL: CPT

## 2021-06-02 ENCOUNTER — APPOINTMENT (OUTPATIENT)
Dept: LAB | Facility: MEDICAL CENTER | Age: 61
End: 2021-06-02
Payer: MEDICARE

## 2021-06-02 ENCOUNTER — OFFICE VISIT (OUTPATIENT)
Dept: FAMILY MEDICINE CLINIC | Facility: CLINIC | Age: 61
End: 2021-06-02
Payer: MEDICARE

## 2021-06-02 VITALS
WEIGHT: 255 LBS | BODY MASS INDEX: 40.02 KG/M2 | TEMPERATURE: 97.3 F | DIASTOLIC BLOOD PRESSURE: 70 MMHG | HEIGHT: 67 IN | SYSTOLIC BLOOD PRESSURE: 102 MMHG

## 2021-06-02 DIAGNOSIS — Z00.00 MEDICARE ANNUAL WELLNESS VISIT, SUBSEQUENT: Primary | ICD-10-CM

## 2021-06-02 LAB
CREAT UR-MCNC: 47.3 MG/DL
MICROALBUMIN UR-MCNC: 11.2 MG/L (ref 0–20)
MICROALBUMIN/CREAT 24H UR: 24 MG/G CREATININE (ref 0–30)

## 2021-06-02 PROCEDURE — G0439 PPPS, SUBSEQ VISIT: HCPCS | Performed by: FAMILY MEDICINE

## 2021-06-02 PROCEDURE — 82570 ASSAY OF URINE CREATININE: CPT

## 2021-06-02 PROCEDURE — 82043 UR ALBUMIN QUANTITATIVE: CPT

## 2021-06-02 RX ORDER — DOCUSATE SODIUM 100 MG/1
100 CAPSULE, LIQUID FILLED ORAL DAILY PRN
COMMUNITY

## 2021-06-02 NOTE — PROGRESS NOTES
Assessment and Plan:     Problem List Items Addressed This Visit     None          Tobacco Cessation Counseling: Tobacco cessation counseling was provided  The patient is sincerely urged to quit consumption of tobacco  She is not ready to quit tobacco  Medication options discussed  Side effects of medication not discussed  Patient refused medication  Preventive health issues were discussed with patient, and age appropriate screening tests were ordered as noted in patient's After Visit Summary  Personalized health advice and appropriate referrals for health education or preventive services given if needed, as noted in patient's After Visit Summary  History of Present Illness:     Patient presents for Welcome to Medicare visit  Patient Care Team:  Jacinta Gamboa DO as PCP - General (Family Medicine)  Yury Bass DPM as PCP - Surgeon (Podiatry)     Review of Systems:     Review of Systems   Constitutional: Negative for activity change, appetite change, diaphoresis, fatigue and fever  HENT: Positive for dental problem  Negative for hearing loss  Eyes: Positive for visual disturbance  Respiratory: Negative for apnea, cough, chest tightness, shortness of breath and wheezing  Cardiovascular: Negative for chest pain, palpitations and leg swelling  Gastrointestinal: Negative for abdominal distention, abdominal pain, anal bleeding, constipation, diarrhea, nausea and vomiting  Endocrine: Negative for cold intolerance, heat intolerance, polydipsia, polyphagia and polyuria  Genitourinary: Negative for difficulty urinating, dysuria, flank pain, hematuria and urgency  Musculoskeletal: Positive for arthralgias and back pain  Negative for gait problem, joint swelling and myalgias  Skin: Negative for color change, rash and wound  Allergic/Immunologic: Negative for environmental allergies, food allergies and immunocompromised state     Neurological: Negative for dizziness, seizures, syncope, speech difficulty, numbness and headaches  Hematological: Negative for adenopathy  Does not bruise/bleed easily  Psychiatric/Behavioral: Positive for dysphoric mood  Negative for agitation, behavioral problems, hallucinations, sleep disturbance and suicidal ideas  The patient is nervous/anxious         Problem List:     Patient Active Problem List   Diagnosis    Essential hypertension    History of MRSA infection    Low back pain    Obesity (BMI 35 0-39 9 without comorbidity)    Obstructive sleep apnea    Depression    Restless legs    Osteoarthritis of spine with radiculopathy, lumbosacral region    Generalized anxiety disorder    Type 2 diabetes mellitus without complication, without long-term current use of insulin ([facility])    Obesity, morbid ([address] )      Past Medical and Surgical History:     Past Medical History:   Diagnosis Date    Hypertension     Migraine     MRSA (methicillin resistant Staphylococcus aureus)      Past Surgical History:   Procedure Laterality Date    ACHILLES TENDON REPAIR      ANKLE SURGERY Left 2019    Excision of Lipoma    CARPAL TUNNEL RELEASE Right      SECTION      CHOLECYSTECTOMY      GANGLION CYST EXCISION Left     Left wrist    LUMBAR LAMINECTOMY  2018    x 3 disks    NASAL SINUS SURGERY        Family History:     Family History   Problem Relation Age of Onset    Hypertension Mother     Hepatitis Mother         Hep C    Stroke Father     Arrhythmia Father     Melanoma Father       Social History:     E-Cigarette/Vaping    E-Cigarette Use Current Every Day User     Start Date 15     Cartridges/Day 3 - 3mL tanks / day      E-Cigarette/Vaping Substances    Nicotine Yes     THC No     CBD No     Flavoring Yes      Social History     Socioeconomic History    Marital status: Single     Spouse name: None    Number of children: None    Years of education: None    Highest education level: None   Occupational History    None   Social Needs    Financial resource strain: None    Food insecurity     Worry: None     Inability: None    Transportation needs     Medical: None     Non-medical: None   Tobacco Use    Smoking status: Former Smoker     Quit date:      Years since quittin 4    Smokeless tobacco: Current User    Tobacco comment: Vapes   Substance and Sexual Activity    Alcohol use: Not Currently    Drug use: Never    Sexual activity: None   Lifestyle    Physical activity     Days per week: None     Minutes per session: None    Stress: None   Relationships    Social connections     Talks on phone: None     Gets together: None     Attends Hindu service: None     Active member of club or organization: None     Attends meetings of clubs or organizations: None     Relationship status: None    Intimate partner violence     Fear of current or ex partner: None     Emotionally abused: None     Physically abused: None     Forced sexual activity: None   Other Topics Concern    None   Social History Narrative    None      Medications and Allergies:     Current Outpatient Medications   Medication Sig Dispense Refill    clonazePAM (KlonoPIN) 0 5 mg tablet Take 2 tablets (1 mg total) by mouth daily at bedtime as needed for anxiety 60 tablet 0    docusate sodium (COLACE) 100 mg capsule Take 100 mg by mouth daily as needed for constipation      DULoxetine (CYMBALTA) 60 mg delayed release capsule Take 1 capsule (60 mg total) by mouth 2 (two) times a day 180 capsule 1    famotidine (PEPCID) 20 mg tablet Take 20 mg by mouth daily      gabapentin (NEURONTIN) 100 mg capsule One in am, one at lunch, and 3 at hs 360 capsule 0    ibuprofen (MOTRIN) 200 mg tablet Take 400 mg by mouth every 6 (six) hours as needed for mild pain      lisinopril (ZESTRIL) 5 mg tablet Take 1 tablet (5 mg total) by mouth daily 90 tablet 1    metFORMIN (GLUCOPHAGE) 500 mg tablet Take 1 tablet with breakfast for 2 weeks then 1 tablet with breakfast and 1 tablet with lunch for 2 weeks and then 1 tablet t i d  with meals (Patient taking differently: Take 500 mg by mouth daily with breakfast ) 270 tablet 1    Multiple Vitamins-Minerals (CENTRUM SILVER PO) Take 1 tablet by mouth daily      Dapagliflozin Propanediol 10 MG TABS Take 0 5 tablets (5 mg total) by mouth daily for 30 days, THEN 1 tablet (10 mg total) daily  90 tablet 3    Dulaglutide 0 75 MG/0 5ML SOPN Inject 0 5 mL (0 75 mg total) under the skin once a week 1 pen 3     No current facility-administered medications for this visit  Allergies   Allergen Reactions    Pollen Extract     Vancomycin       Immunizations:     Immunization History   Administered Date(s) Administered    SARS-CoV-2 / COVID-19 mRNA IM (Moderna) 03/26/2021, 04/28/2021      Health Maintenance:         Topic Date Due    Hepatitis C Screening  Never done    MAMMOGRAM  Never done    HIV Screening  Never done    Cervical Cancer Screening  Never done    Colorectal Cancer Screening  Never done         Topic Date Due    Pneumococcal Vaccine: Pediatrics (0 to 5 Years) and At-Risk Patients (6 to 59 Years) (1 of 1 - PPSV23) Never done    DTaP,Tdap,and Td Vaccines (1 - Tdap) Never done    Influenza Vaccine (Season Ended) 09/01/2021      Medicare Screening Tests and Risk Assessments:     Ebenezer Nathan is here for her Subsequent Wellness visit  Health Risk Assessment:   Patient rates overall health as very good  Patient feels that their physical health rating is slightly better  Patient is satisfied with their life  Eyesight was rated as same  Hearing was rated as same  Patient feels that their emotional and mental health rating is same  Patients states they are sometimes angry  Patient states they are sometimes unusually tired/fatigued  Pain experienced in the last 7 days has been some  Patient's pain rating has been 5/10  Patient states that she has experienced weight loss or gain in last 6 months   Weight loss due to diabetes    Depression Screening:   PHQ-2 Score: 1  PHQ-9 Score: 3      Fall Risk Screening: In the past year, patient has experienced: no history of falling in past year      Urinary Incontinence Screening:   Patient has leaked urine accidently in the last six months  Urinary incontinence is improving as sugars are coming under control    Home Safety:  Patient does not have trouble with stairs inside or outside of their home  Patient has working smoke alarms and has working carbon monoxide detector  Home safety hazards include: none  Nutrition:   Current diet is Regular and Limited junk food  Portion control - watching carbs    Medications:   Patient is currently taking over-the-counter supplements  OTC medications include: see medication list  Patient is able to manage medications  Activities of Daily Living (ADLs)/Instrumental Activities of Daily Living (IADLs):   Walk and transfer into and out of bed and chair?: Yes  Dress and groom yourself?: Yes    Bathe or shower yourself?: Yes    Feed yourself?  Yes  Do your laundry/housekeeping?: Yes  Manage your money, pay your bills and track your expenses?: Yes  Make your own meals?: Yes    Do your own shopping?: Yes    Previous Hospitalizations:   Any hospitalizations or ED visits within the last 12 months?: No      Advance Care Planning:   Living will: No    Durable POA for healthcare: No    Advanced directive: No    Advanced directive counseling given: Yes    Five wishes given: No    Patient declined ACP directive: No      Cognitive Screening:   Provider or family/friend/caregiver concerned regarding cognition?: No    PREVENTIVE SCREENINGS      Cardiovascular Screening:    General: Screening Current      Diabetes Screening:     General: Screening Not Indicated and History Diabetes      Colorectal Cancer Screening:     General: Patient Declines      Breast Cancer Screening:     General: Patient Declines      Cervical Cancer Screening:    General: Patient Declines      Lung Cancer Screening:     General: Screening Not Indicated      Hepatitis C Screening:    General: Screening Not Indicated    Screening, Brief Intervention, and Referral to Treatment (SBIRT)    Screening  Typical number of drinks in a day: 0  Typical number of drinks in a week: 0  Interpretation: Low risk drinking behavior  Single Item Drug Screening:  How often have you used an illegal drug (including marijuana) or a prescription medication for non-medical reasons in the past year? never    Single Item Drug Screen Score: 0  Interpretation: Negative screen for possible drug use disorder    No exam data present     Physical Exam:     /70 (BP Location: Left arm, Patient Position: Sitting, Cuff Size: Large)   Temp (!) 97 3 °F (36 3 °C) (Temporal)   Ht 5' 7" (1 702 m)   Wt 116 kg (255 lb)   LMP 04/10/2017   BMI 39 94 kg/m²     Physical Exam  Constitutional:       Appearance: She is well-developed  She is obese  HENT:      Head: Normocephalic and atraumatic  Right Ear: External ear normal       Left Ear: External ear normal       Nose: Nose normal    Eyes:      Conjunctiva/sclera: Conjunctivae normal       Pupils: Pupils are equal, round, and reactive to light  Neck:      Musculoskeletal: Normal range of motion and neck supple  Cardiovascular:      Rate and Rhythm: Normal rate and regular rhythm  Heart sounds: Normal heart sounds  No murmur  No friction rub  Pulmonary:      Effort: Pulmonary effort is normal  No respiratory distress  Breath sounds: Normal breath sounds  No wheezing or rales  Chest:      Chest wall: No tenderness  Abdominal:      General: Bowel sounds are normal       Palpations: Abdomen is soft  Musculoskeletal: Normal range of motion  Skin:     General: Skin is warm and dry  Capillary Refill: Capillary refill takes 2 to 3 seconds  Neurological:      Mental Status: She is alert and oriented to person, place, and time     Psychiatric: Behavior: Behavior normal          Thought Content:  Thought content normal          Judgment: Judgment normal           Biagio Closs, DO

## 2021-06-02 NOTE — PATIENT INSTRUCTIONS
Medicare Preventive Visit Patient Instructions  Thank you for completing your Welcome to Medicare Visit or Medicare Annual Wellness Visit today  Your next wellness visit will be due in one year (6/3/2022)  The screening/preventive services that you may require over the next 5-10 years are detailed below  Some tests may not apply to you based off risk factors and/or age  Screening tests ordered at today's visit but not completed yet may show as past due  Also, please note that scanned in results may not display below  Preventive Screenings:  Service Recommendations Previous Testing/Comments   Colorectal Cancer Screening  * Colonoscopy    * Fecal Occult Blood Test (FOBT)/Fecal Immunochemical Test (FIT)  * Fecal DNA/Cologuard Test  * Flexible Sigmoidoscopy Age: 54-65 years old   Colonoscopy: every 10 years (may be performed more frequently if at higher risk)  OR  FOBT/FIT: every 1 year  OR  Cologuard: every 3 years  OR  Sigmoidoscopy: every 5 years  Screening may be recommended earlier than age 48 if at higher risk for colorectal cancer  Also, an individualized decision between you and your healthcare provider will decide whether screening between the ages of 74-80 would be appropriate  Colonoscopy: Not on file  FOBT/FIT: Not on file  Cologuard: Not on file  Sigmoidoscopy: Not on file          Breast Cancer Screening Age: 36 years old  Frequency: every 1-2 years  Not required if history of left and right mastectomy Mammogram: Not on file        Cervical Cancer Screening Between the ages of 21-29, pap smear recommended once every 3 years  Between the ages of 33-67, can perform pap smear with HPV co-testing every 5 years     Recommendations may differ for women with a history of total hysterectomy, cervical cancer, or abnormal pap smears in past  Pap Smear: Not on file        Hepatitis C Screening Once for adults born between [facility]  More frequently in patients at high risk for Hepatitis C Hep C Antibody: Not on file        Diabetes Screening 1-2 times per year if you're at risk for diabetes or have pre-diabetes Fasting glucose: 116 mg/dL   A1C: 7 2 %    Screening Not Indicated  History Diabetes   Cholesterol Screening Once every 5 years if you don't have a lipid disorder  May order more often based on risk factors  Lipid panel: 02/24/2021    Screening Current     Other Preventive Screenings Covered by Medicare:  1  Abdominal Aortic Aneurysm (AAA) Screening: covered once if your at risk  You're considered to be at risk if you have a family history of AAA  2  Lung Cancer Screening: covers low dose CT scan once per year if you meet all of the following conditions: (1) Age 50-69; (2) No signs or symptoms of lung cancer; (3) Current smoker or have quit smoking within the last 15 years; (4) You have a tobacco smoking history of at least 30 pack years (packs per day multiplied by number of years you smoked); (5) You get a written order from a healthcare provider  3  Glaucoma Screening: covered annually if you're considered high risk: (1) You have diabetes OR (2) Family history of glaucoma OR (3)  aged 48 and older OR (3)  American aged 72 and older  3  Osteoporosis Screening: covered every 2 years if you meet one of the following conditions: (1) You're estrogen deficient and at risk for osteoporosis based off medical history and other findings; (2) Have a vertebral abnormality; (3) On glucocorticoid therapy for more than 3 months; (4) Have primary hyperparathyroidism; (5) On osteoporosis medications and need to assess response to drug therapy  · Last bone density test (DXA Scan): Not on file  5  HIV Screening: covered annually if you're between the age of 12-76  Also covered annually if you are younger than 13 and older than 72 with risk factors for HIV infection  For pregnant patients, it is covered up to 3 times per pregnancy      Immunizations:  Immunization Recommendations   Influenza Vaccine Annual influenza vaccination during flu season is recommended for all persons aged >= 6 months who do not have contraindications   Pneumococcal Vaccine (Prevnar and Pneumovax)  * Prevnar = PCV13  * Pneumovax = PPSV23   Adults 25-60 years old: 1-3 doses may be recommended based on certain risk factors  Adults 72 years old: Prevnar (PCV13) vaccine recommended followed by Pneumovax (PPSV23) vaccine  If already received PPSV23 since turning 65, then PCV13 recommended at least one year after PPSV23 dose  Hepatitis B Vaccine 3 dose series if at intermediate or high risk (ex: diabetes, end stage renal disease, liver disease)   Tetanus (Td) Vaccine - COST NOT COVERED BY MEDICARE PART B Following completion of primary series, a booster dose should be given every 10 years to maintain immunity against tetanus  Td may also be given as tetanus wound prophylaxis  Tdap Vaccine - COST NOT COVERED BY MEDICARE PART B Recommended at least once for all adults  For pregnant patients, recommended with each pregnancy  Shingles Vaccine (Shingrix) - COST NOT COVERED BY MEDICARE PART B  2 shot series recommended in those aged 48 and above     Health Maintenance Due:      Topic Date Due    Hepatitis C Screening  Never done    MAMMOGRAM  Never done    HIV Screening  Never done    Cervical Cancer Screening  Never done    Colorectal Cancer Screening  Never done     Immunizations Due:      Topic Date Due    Pneumococcal Vaccine: Pediatrics (0 to 5 Years) and At-Risk Patients (6 to 59 Years) (1 of 1 - PPSV23) Never done    DTaP,Tdap,and Td Vaccines (1 - Tdap) Never done    Influenza Vaccine (Season Ended) 09/01/2021     Advance Directives   What are advance directives? Advance directives are legal documents that state your wishes and plans for medical care  These plans are made ahead of time in case you lose your ability to make decisions for yourself   Advance directives can apply to any medical decision, such as the treatments you want, and if you want to donate organs  What are the types of advance directives? There are many types of advance directives, and each state has rules about how to use them  You may choose a combination of any of the following:  · Living will: This is a written record of the treatment you want  You can also choose which treatments you do not want, which to limit, and which to stop at a certain time  This includes surgery, medicine, IV fluid, and tube feedings  · Durable power of  for healthcare [facility] [place]): This is a written record that states who you want to make healthcare choices for you when you are unable to make them for yourself  This person, called a proxy, is usually a family member or a friend  You may choose more than 1 proxy  · Do not resuscitate (DNR) order:  A DNR order is used in case your heart stops beating or you stop breathing  It is a request not to have certain forms of treatment, such as CPR  A DNR order may be included in other types of advance directives  · Medical directive: This covers the care that you want if you are in a coma, near death, or unable to make decisions for yourself  You can list the treatments you want for each condition  Treatment may include pain medicine, surgery, blood transfusions, dialysis, IV or tube feedings, and a ventilator (breathing machine)  · Values history: This document has questions about your views, beliefs, and how you feel and think about life  This information can help others choose the care that you would choose  Why are advance directives important? An advance directive helps you control your care  Although spoken wishes may be used, it is better to have your wishes written down  Spoken wishes can be misunderstood, or not followed  Treatments may be given even if you do not want them  An advance directive may make it easier for your family to make difficult choices about your care     Urinary Incontinence   Urinary incontinence (UI)  is when you lose control of your bladder  UI develops because your bladder cannot store or empty urine properly  The 3 most common types of UI are stress incontinence, urge incontinence, or both  Medicines:   · May be given to help strengthen your bladder control  Report any side effects of medication to your healthcare provider  Do pelvic muscle exercises often:  Your pelvic muscles help you stop urinating  Squeeze these muscles tight for 5 seconds, then relax for 5 seconds  Gradually work up to squeezing for 10 seconds  Do 3 sets of 15 repetitions a day, or as directed  This will help strengthen your pelvic muscles and improve bladder control  Train your bladder:  Go to the bathroom at set times, such as every 2 hours, even if you do not feel the urge to go  You can also try to hold your urine when you feel the urge to go  For example, hold your urine for 5 minutes when you feel the urge to go  As that becomes easier, hold your urine for 10 minutes  Self-care:   · Keep a UI record  Write down how often you leak urine and how much you leak  Make a note of what you were doing when you leaked urine  · Drink liquids as directed  You may need to limit the amount of liquid you drink to help control your urine leakage  Do not drink any liquid right before you go to bed  Limit or do not have drinks that contain caffeine or alcohol  · Prevent constipation  Eat a variety of high-fiber foods  Good examples are high-fiber cereals, beans, vegetables, and whole-grain breads  Walking is the best way to trigger your intestines to have a bowel movement  · Exercise regularly and maintain a healthy weight  Weight loss and exercise will decrease pressure on your bladder and help you control your leakage  · Use a catheter as directed  to help empty your bladder  A catheter is a tiny, plastic tube that is put into your bladder to drain your urine  · Go to behavior therapy as directed    Behavior therapy may be used to help you learn to control your urge to urinate  How to Quit Using Smokeless Tobacco   Why it is important to stop using smokeless tobacco:  Smokeless tobacco comes in many forms  Examples include chew, snuff, dip, dissolvable tobacco, and snus  All smokeless tobacco products contain nicotine and may contain as much nicotine as 3 cigarettes  You may be physically dependent on nicotine  You may also be emotionally addicted to it  The cravings can be strong, but it is important to quit using smokeless tobacco  You will improve your health and decrease your cancer, stroke, and heart attack risk  Mouth sores and tooth problems will also improve when you quit  You can benefit from quitting no matter how long you have used smokeless tobacco    Prepare to stop using smokeless tobacco:  Nicotine is a highly addictive drug  Withdrawal symptoms can happen when you stop and make it hard to quit  The following can help keep you on track:  · Set a quit date  · Tell friends, family, and coworkers that you plan to quit  · Remove all smokeless tobacco products from your home, car, and workplace  Manage weight gain after you quit:  Nicotine can affect your metabolism  You may gain a few pounds after you quit  The following can help you control your weight:  · Eat healthy foods  · Drink water before, during, and between meals  · Exercise as directed  Weight Management   Why it is important to manage your weight:  Being overweight increases your risk of health conditions such as heart disease, high blood pressure, type 2 diabetes, and certain types of cancer  It can also increase your risk for osteoarthritis, sleep apnea, and other respiratory problems  Aim for a slow, steady weight loss  Even a small amount of weight loss can lower your risk of health problems  How to lose weight safely:  A safe and healthy way to lose weight is to eat fewer calories and get regular exercise   You can lose up about 1 pound a week by decreasing the number of calories you eat by 500 calories each day  Healthy meal plan for weight management:  A healthy meal plan includes a variety of foods, contains fewer calories, and helps you stay healthy  A healthy meal plan includes the following:  · Eat whole-grain foods more often  A healthy meal plan should contain fiber  Fiber is the part of grains, fruits, and vegetables that is not broken down by your body  Whole-grain foods are healthy and provide extra fiber in your diet  Some examples of whole-grain foods are whole-wheat breads and pastas, oatmeal, brown rice, and bulgur  · Eat a variety of vegetables every day  Include dark, leafy greens such as spinach, kale, dominick greens, and mustard greens  Eat yellow and orange vegetables such as carrots, sweet potatoes, and winter squash  · Eat a variety of fruits every day  Choose fresh or canned fruit (canned in its own juice or light syrup) instead of juice  Fruit juice has very little or no fiber  · Eat low-fat dairy foods  Drink fat-free (skim) milk or 1% milk  Eat fat-free yogurt and low-fat cottage cheese  Try low-fat cheeses such as mozzarella and other reduced-fat cheeses  · Choose meat and other protein foods that are low in fat  Choose beans or other legumes such as split peas or lentils  Choose fish, skinless poultry (chicken or turkey), or lean cuts of red meat (beef or pork)  Before you cook meat or poultry, cut off any visible fat  · Use less fat and oil  Try baking foods instead of frying them  Add less fat, such as margarine, sour cream, regular salad dressing and mayonnaise to foods  Eat fewer high-fat foods  Some examples of high-fat foods include french fries, doughnuts, ice cream, and cakes  · Eat fewer sweets  Limit foods and drinks that are high in sugar  This includes candy, cookies, regular soda, and sweetened drinks    Exercise:  Exercise at least 30 minutes per day on most days of the week  Some examples of exercise include walking, biking, dancing, and swimming  You can also fit in more physical activity by taking the stairs instead of the elevator or parking farther away from stores  Ask your healthcare provider about the best exercise plan for you  © Copyright [employer] 2018 Information is for End User's use only and may not be sold, redistributed or otherwise used for commercial purposes   All illustrations and images included in CareNotes® are the copyrighted property of A D A M , Inc  or [address] [place] [address]

## 2021-06-03 ENCOUNTER — TELEPHONE (OUTPATIENT)
Dept: ENDOCRINOLOGY | Facility: CLINIC | Age: 61
End: 2021-06-03

## 2021-06-03 NOTE — RESULT ENCOUNTER NOTE
Results reviewed  Hemoglobin A1cs improved significantly from over 14% down to 7 2%  This is almost at goal of less than 7%  No protein in the urine

## 2021-06-03 NOTE — TELEPHONE ENCOUNTER
----- Message from [id number] sent at 6/3/2021 11:13 AM EDT -----  Results reviewed  Hemoglobin A1cs improved significantly from over 14% down to 7 2%  This is almost at goal of less than 7%  No protein in the urine

## 2021-06-21 DIAGNOSIS — F41.1 GENERALIZED ANXIETY DISORDER: ICD-10-CM

## 2021-06-21 RX ORDER — CLONAZEPAM 0.5 MG/1
1 TABLET ORAL
Qty: 60 TABLET | Refills: 0 | Status: SHIPPED | OUTPATIENT
Start: 2021-06-21 | End: 2021-07-21 | Stop reason: SDUPTHER

## 2021-07-21 DIAGNOSIS — F41.1 GENERALIZED ANXIETY DISORDER: ICD-10-CM

## 2021-07-21 RX ORDER — CLONAZEPAM 0.5 MG/1
1 TABLET ORAL
Qty: 60 TABLET | Refills: 0 | Status: SHIPPED | OUTPATIENT
Start: 2021-07-21 | End: 2021-08-25 | Stop reason: SDUPTHER

## 2021-08-17 DIAGNOSIS — M47.27 OSTEOARTHRITIS OF SPINE WITH RADICULOPATHY, LUMBOSACRAL REGION: ICD-10-CM

## 2021-08-17 RX ORDER — GABAPENTIN 100 MG/1
CAPSULE ORAL
Qty: 360 CAPSULE | Refills: 0 | Status: SHIPPED | OUTPATIENT
Start: 2021-08-17 | End: 2021-11-04 | Stop reason: SDUPTHER

## 2021-08-25 DIAGNOSIS — F41.1 GENERALIZED ANXIETY DISORDER: ICD-10-CM

## 2021-08-25 RX ORDER — CLONAZEPAM 0.5 MG/1
1 TABLET ORAL
Qty: 60 TABLET | Refills: 0 | Status: SHIPPED | OUTPATIENT
Start: 2021-08-25 | End: 2021-10-04 | Stop reason: SDUPTHER

## 2021-09-10 DIAGNOSIS — M47.27 OSTEOARTHRITIS OF SPINE WITH RADICULOPATHY, LUMBOSACRAL REGION: ICD-10-CM

## 2021-09-13 RX ORDER — DULOXETIN HYDROCHLORIDE 60 MG/1
60 CAPSULE, DELAYED RELEASE ORAL 2 TIMES DAILY
Qty: 180 CAPSULE | Refills: 1 | Status: SHIPPED | OUTPATIENT
Start: 2021-09-13 | End: 2022-03-01 | Stop reason: SDUPTHER

## 2021-10-04 DIAGNOSIS — F41.1 GENERALIZED ANXIETY DISORDER: ICD-10-CM

## 2021-10-04 RX ORDER — CLONAZEPAM 0.5 MG/1
1 TABLET ORAL
Qty: 60 TABLET | Refills: 0 | Status: SHIPPED | OUTPATIENT
Start: 2021-10-04 | End: 2021-11-04 | Stop reason: SDUPTHER

## 2021-10-08 ENCOUNTER — APPOINTMENT (OUTPATIENT)
Dept: LAB | Facility: MEDICAL CENTER | Age: 61
End: 2021-10-08
Payer: MEDICARE

## 2021-10-12 ENCOUNTER — OFFICE VISIT (OUTPATIENT)
Dept: FAMILY MEDICINE CLINIC | Facility: CLINIC | Age: 61
End: 2021-10-12
Payer: MEDICARE

## 2021-10-12 VITALS
TEMPERATURE: 96 F | BODY MASS INDEX: 41.03 KG/M2 | DIASTOLIC BLOOD PRESSURE: 74 MMHG | WEIGHT: 261.4 LBS | SYSTOLIC BLOOD PRESSURE: 130 MMHG | HEIGHT: 67 IN

## 2021-10-12 DIAGNOSIS — E11.9 TYPE 2 DIABETES MELLITUS WITHOUT COMPLICATION, WITHOUT LONG-TERM CURRENT USE OF INSULIN (HCC): Primary | ICD-10-CM

## 2021-10-12 DIAGNOSIS — I10 ESSENTIAL HYPERTENSION: ICD-10-CM

## 2021-10-12 DIAGNOSIS — M54.50 LUMBAR PAIN: ICD-10-CM

## 2021-10-12 DIAGNOSIS — G47.33 OBSTRUCTIVE SLEEP APNEA: ICD-10-CM

## 2021-10-12 PROCEDURE — 99213 OFFICE O/P EST LOW 20 MIN: CPT | Performed by: FAMILY MEDICINE

## 2021-10-12 RX ORDER — METHOCARBAMOL 500 MG/1
TABLET, FILM COATED ORAL
Qty: 15 TABLET | Refills: 0 | Status: SHIPPED | OUTPATIENT
Start: 2021-10-12 | End: 2022-04-28 | Stop reason: SDUPTHER

## 2021-11-04 DIAGNOSIS — F41.1 GENERALIZED ANXIETY DISORDER: ICD-10-CM

## 2021-11-04 DIAGNOSIS — M47.27 OSTEOARTHRITIS OF SPINE WITH RADICULOPATHY, LUMBOSACRAL REGION: ICD-10-CM

## 2021-11-04 RX ORDER — CLONAZEPAM 0.5 MG/1
1 TABLET ORAL
Qty: 60 TABLET | Refills: 0 | Status: SHIPPED | OUTPATIENT
Start: 2021-11-04 | End: 2021-12-06 | Stop reason: SDUPTHER

## 2021-11-04 RX ORDER — GABAPENTIN 100 MG/1
CAPSULE ORAL
Qty: 360 CAPSULE | Refills: 0 | Status: SHIPPED | OUTPATIENT
Start: 2021-11-04 | End: 2022-02-08 | Stop reason: SDUPTHER

## 2021-11-19 DIAGNOSIS — E11.9 TYPE 2 DIABETES MELLITUS WITHOUT COMPLICATION, WITHOUT LONG-TERM CURRENT USE OF INSULIN (HCC): ICD-10-CM

## 2021-11-19 DIAGNOSIS — I10 ESSENTIAL HYPERTENSION: ICD-10-CM

## 2021-11-22 RX ORDER — LISINOPRIL 5 MG/1
5 TABLET ORAL DAILY
Qty: 90 TABLET | Refills: 1 | Status: SHIPPED | OUTPATIENT
Start: 2021-11-22 | End: 2022-05-10 | Stop reason: SDUPTHER

## 2021-12-06 DIAGNOSIS — F41.1 GENERALIZED ANXIETY DISORDER: ICD-10-CM

## 2021-12-07 RX ORDER — CLONAZEPAM 0.5 MG/1
1 TABLET ORAL
Qty: 60 TABLET | Refills: 0 | Status: SHIPPED | OUTPATIENT
Start: 2021-12-07 | End: 2022-01-14 | Stop reason: SDUPTHER

## 2021-12-29 ENCOUNTER — TELEPHONE (OUTPATIENT)
Dept: FAMILY MEDICINE CLINIC | Facility: CLINIC | Age: 61
End: 2021-12-29

## 2021-12-29 DIAGNOSIS — B34.9 VIRAL INFECTION, UNSPECIFIED: Primary | ICD-10-CM

## 2021-12-29 PROCEDURE — 87636 SARSCOV2 & INF A&B AMP PRB: CPT | Performed by: FAMILY MEDICINE

## 2022-01-14 DIAGNOSIS — F41.1 GENERALIZED ANXIETY DISORDER: ICD-10-CM

## 2022-01-14 RX ORDER — CLONAZEPAM 0.5 MG/1
1 TABLET ORAL
Qty: 60 TABLET | Refills: 0 | Status: SHIPPED | OUTPATIENT
Start: 2022-01-14 | End: 2022-02-24 | Stop reason: SDUPTHER

## 2022-02-08 ENCOUNTER — TELEPHONE (OUTPATIENT)
Dept: FAMILY MEDICINE CLINIC | Facility: CLINIC | Age: 62
End: 2022-02-08

## 2022-02-08 DIAGNOSIS — J01.01 ACUTE RECURRENT MAXILLARY SINUSITIS: Primary | ICD-10-CM

## 2022-02-08 DIAGNOSIS — M47.27 OSTEOARTHRITIS OF SPINE WITH RADICULOPATHY, LUMBOSACRAL REGION: ICD-10-CM

## 2022-02-08 RX ORDER — AMOXICILLIN AND CLAVULANATE POTASSIUM 875; 125 MG/1; MG/1
1 TABLET, FILM COATED ORAL EVERY 12 HOURS SCHEDULED
Qty: 20 TABLET | Refills: 0 | Status: SHIPPED | OUTPATIENT
Start: 2022-02-08 | End: 2022-02-18

## 2022-02-08 RX ORDER — GABAPENTIN 100 MG/1
CAPSULE ORAL
Qty: 360 CAPSULE | Refills: 1 | Status: SHIPPED | OUTPATIENT
Start: 2022-02-08 | End: 2022-07-11 | Stop reason: SDUPTHER

## 2022-02-08 NOTE — TELEPHONE ENCOUNTER
She is fighting a sinus infection for 2 weeks, she would like a script for Augmentin    She needs at least 2 weeks of the antibiotic

## 2022-02-24 DIAGNOSIS — F41.1 GENERALIZED ANXIETY DISORDER: ICD-10-CM

## 2022-02-24 RX ORDER — CLONAZEPAM 0.5 MG/1
1 TABLET ORAL
Qty: 60 TABLET | Refills: 0 | Status: SHIPPED | OUTPATIENT
Start: 2022-02-24 | End: 2022-03-29 | Stop reason: SDUPTHER

## 2022-02-28 ENCOUNTER — APPOINTMENT (OUTPATIENT)
Dept: LAB | Facility: MEDICAL CENTER | Age: 62
End: 2022-02-28
Payer: COMMERCIAL

## 2022-03-01 ENCOUNTER — HOSPITAL ENCOUNTER (OUTPATIENT)
Dept: MAMMOGRAPHY | Facility: HOSPITAL | Age: 62
Discharge: HOME | End: 2022-03-01
Attending: FAMILY MEDICINE
Payer: COMMERCIAL

## 2022-03-01 VITALS — WEIGHT: 262.35 LBS | HEIGHT: 67 IN | BODY MASS INDEX: 41.18 KG/M2

## 2022-03-01 DIAGNOSIS — M47.27 OSTEOARTHRITIS OF SPINE WITH RADICULOPATHY, LUMBOSACRAL REGION: ICD-10-CM

## 2022-03-01 DIAGNOSIS — Z12.31 ENCOUNTER FOR SCREENING MAMMOGRAM FOR MALIGNANT NEOPLASM OF BREAST: ICD-10-CM

## 2022-03-01 PROCEDURE — 77063 BREAST TOMOSYNTHESIS BI: CPT

## 2022-03-01 PROCEDURE — 77067 SCR MAMMO BI INCL CAD: CPT

## 2022-03-01 RX ORDER — DULOXETIN HYDROCHLORIDE 60 MG/1
60 CAPSULE, DELAYED RELEASE ORAL 2 TIMES DAILY
Qty: 180 CAPSULE | Refills: 1 | Status: SHIPPED | OUTPATIENT
Start: 2022-03-01

## 2022-03-02 ENCOUNTER — OFFICE VISIT (OUTPATIENT)
Dept: FAMILY MEDICINE CLINIC | Facility: CLINIC | Age: 62
End: 2022-03-02
Payer: COMMERCIAL

## 2022-03-02 VITALS
TEMPERATURE: 96.4 F | HEIGHT: 67 IN | BODY MASS INDEX: 41.28 KG/M2 | SYSTOLIC BLOOD PRESSURE: 118 MMHG | WEIGHT: 263 LBS | DIASTOLIC BLOOD PRESSURE: 74 MMHG

## 2022-03-02 DIAGNOSIS — Z12.12 SCREENING FOR COLORECTAL CANCER: ICD-10-CM

## 2022-03-02 DIAGNOSIS — Z12.11 SCREENING FOR COLORECTAL CANCER: ICD-10-CM

## 2022-03-02 DIAGNOSIS — I10 ESSENTIAL HYPERTENSION: ICD-10-CM

## 2022-03-02 DIAGNOSIS — E66.01 OBESITY, MORBID (HCC): ICD-10-CM

## 2022-03-02 DIAGNOSIS — G47.33 OBSTRUCTIVE SLEEP APNEA: ICD-10-CM

## 2022-03-02 DIAGNOSIS — E11.9 TYPE 2 DIABETES MELLITUS WITHOUT COMPLICATION, WITHOUT LONG-TERM CURRENT USE OF INSULIN (HCC): Primary | ICD-10-CM

## 2022-03-02 DIAGNOSIS — N18.31 STAGE 3A CHRONIC KIDNEY DISEASE (HCC): ICD-10-CM

## 2022-03-02 PROCEDURE — G0439 PPPS, SUBSEQ VISIT: HCPCS | Performed by: FAMILY MEDICINE

## 2022-03-02 PROCEDURE — 3074F SYST BP LT 130 MM HG: CPT | Performed by: FAMILY MEDICINE

## 2022-03-02 PROCEDURE — 1036F TOBACCO NON-USER: CPT | Performed by: FAMILY MEDICINE

## 2022-03-02 PROCEDURE — 3008F BODY MASS INDEX DOCD: CPT | Performed by: FAMILY MEDICINE

## 2022-03-02 PROCEDURE — 3078F DIAST BP <80 MM HG: CPT | Performed by: FAMILY MEDICINE

## 2022-03-02 NOTE — ASSESSMENT & PLAN NOTE
Lab Results   Component Value Date    EGFR 50 02/28/2022    EGFR 51 10/08/2021    EGFR 45 06/01/2021    CREATININE 1 16 02/28/2022    CREATININE 1 15 10/08/2021    CREATININE 1 28 06/01/2021

## 2022-03-02 NOTE — PROGRESS NOTES
BMI Counseling: Body mass index is 41 19 kg/m²  The BMI is above normal  Nutrition recommendations include decreasing portion sizes, encouraging healthy choices of fruits and vegetables, moderation in carbohydrate intake and increasing intake of lean protein  Rationale for BMI follow-up plan is due to patient being overweight or obese  Assessment/Plan:    Problem List Items Addressed This Visit        Endocrine    Type 2 diabetes mellitus without complication, without long-term current use of insulin ([address] ) - Primary       Respiratory    Obstructive sleep apnea       Cardiovascular and Mediastinum    Essential hypertension       Other    Obesity, morbid ([address] )           Diagnoses and all orders for this visit:    Type 2 diabetes mellitus without complication, without long-term current use of insulin ([address] )    Obstructive sleep apnea    Essential hypertension    Obesity, morbid ([address] )        No problem-specific Assessment & Plan notes found for this encounter  Subjective:      Patient ID: Luis Antonio Belle is a 57 y o  female  Mrs Radha garcia is here to day for a follow-up visit her diabetic log book was examined and it is absolutely great all of her sugars are very well controlled her most recent hemoglobin A1c is 7 1 that is great on we did do a diabetic foot exam she has a small eschar on her left heel where she had an Achilles tendon repair but on it is basically an eschar from rubbing against her shoes and her her in her monofilament exam is intact except for where she has a deficit from her lumbar radiculopathy      The following portions of the patient's history were reviewed and updated as appropriate:   She has a past medical history of Hypertension, Migraine, and MRSA (methicillin resistant Staphylococcus aureus)  ,  does not have any pertinent problems on file  ,   has a past surgical history that includes Achilles tendon repair; Carpal tunnel release (Right);  section; Cholecystectomy;  Ankle surgery (Left, 2019); Nasal sinus surgery (2000); Lumbar laminectomy (2018); and Ganglion cyst excision (Left, 1999)  ,  family history includes Arrhythmia in her father; Hepatitis in her mother; Hypertension in her mother; Melanoma in her father; No Known Problems in her maternal aunt, maternal aunt, maternal aunt, maternal aunt, maternal grandfather, maternal grandmother, paternal aunt, paternal grandfather, paternal grandmother, sister, and son; Stroke in her father  ,   reports that she quit smoking about 7 years ago  She uses smokeless tobacco  She reports previous alcohol use  She reports that she does not use drugs  ,  is allergic to pollen extract and vancomycin     Current Outpatient Medications   Medication Sig Dispense Refill    clonazePAM (KlonoPIN) 0 5 mg tablet Take 2 tablets (1 mg total) by mouth daily at bedtime as needed for anxiety 60 tablet 0    docusate sodium (COLACE) 100 mg capsule Take 100 mg by mouth daily as needed for constipation      DULoxetine (CYMBALTA) 60 mg delayed release capsule Take 1 capsule (60 mg total) by mouth 2 (two) times a day 180 capsule 1    famotidine (PEPCID) 20 mg tablet Take 20 mg by mouth daily      gabapentin (NEURONTIN) 100 mg capsule One in am, one at lunch, and 3 at hs 360 capsule 1    ibuprofen (MOTRIN) 200 mg tablet Take 400 mg by mouth every 6 (six) hours as needed for mild pain      lisinopril (ZESTRIL) 5 mg tablet Take 1 tablet (5 mg total) by mouth daily 90 tablet 1    metFORMIN (GLUCOPHAGE) 500 mg tablet Take 1 tablet (500 mg total) by mouth daily with breakfast 90 tablet 1    methocarbamol (ROBAXIN) 500 mg tablet 1 tablet twice daily if needed for back spasm 15 tablet 0    Multiple Vitamins-Minerals (CENTRUM SILVER PO) Take 1 tablet by mouth daily       No current facility-administered medications for this visit  Review of Systems   Constitutional: Negative for activity change, appetite change, diaphoresis, fatigue and fever  HENT: Negative  Negative for dental problem and hearing loss  Eyes: Positive for visual disturbance  Patient wears corrective lenses does not have any macular degeneration glaucoma or diabetic retinopathy at the present time   Respiratory: Negative for apnea, cough, chest tightness, shortness of breath and wheezing  Cardiovascular: Negative for chest pain, palpitations and leg swelling  Gastrointestinal: Negative for abdominal distention, abdominal pain, anal bleeding, constipation, diarrhea, nausea and vomiting  Endocrine: Negative for cold intolerance, heat intolerance, polydipsia, polyphagia and polyuria  Genitourinary: Positive for urgency  Negative for difficulty urinating, dysuria, flank pain and hematuria  Musculoskeletal: Positive for arthralgias  Negative for back pain, gait problem, joint swelling and myalgias  Skin: Negative for color change, rash and wound  Allergic/Immunologic: Negative for environmental allergies, food allergies and immunocompromised state  Neurological: Positive for headaches  Negative for dizziness, seizures, syncope, speech difficulty and numbness  Hematological: Negative for adenopathy  Does not bruise/bleed easily  Psychiatric/Behavioral: Negative for agitation, behavioral problems, hallucinations, sleep disturbance and suicidal ideas  The patient is nervous/anxious  Objective:  Vitals:    03/02/22 1234   BP: 118/74   BP Location: Left arm   Patient Position: Sitting   Cuff Size: Large   Temp: (!) 96 4 °F (35 8 °C)   TempSrc: Temporal   Weight: 119 kg (263 lb)   Height: 5' 7" (1 702 m)     Body mass index is 41 19 kg/m²  Physical Exam  Constitutional:       General: She is not in acute distress  Appearance: She is well-developed  She is obese  She is not diaphoretic  HENT:      Head: Normocephalic  Right Ear: External ear normal       Left Ear: External ear normal       Nose: Nose normal    Eyes:      General: No scleral icterus  Right eye: No discharge  Left eye: No discharge  Conjunctiva/sclera: Conjunctivae normal       Pupils: Pupils are equal, round, and reactive to light  Neck:      Thyroid: No thyromegaly  Trachea: No tracheal deviation  Cardiovascular:      Rate and Rhythm: Normal rate and regular rhythm  Pulses: no weak pulses          Dorsalis pedis pulses are 2+ on the right side and 2+ on the left side  Posterior tibial pulses are 2+ on the right side and 2+ on the left side  Heart sounds: Normal heart sounds  No murmur heard  No friction rub  No gallop  Pulmonary:      Effort: Pulmonary effort is normal  No respiratory distress  Breath sounds: Normal breath sounds  No wheezing  Abdominal:      General: Bowel sounds are normal       Palpations: Abdomen is soft  There is no mass  Tenderness: There is no abdominal tenderness  There is no guarding  Musculoskeletal:         General: No deformity  Cervical back: Normal range of motion  Feet:    Feet:      Right foot:      Skin integrity: No ulcer, skin breakdown, erythema, warmth, callus or dry skin  Left foot:      Skin integrity: No ulcer, skin breakdown, erythema, warmth, callus or dry skin  Lymphadenopathy:      Cervical: No cervical adenopathy  Skin:     General: Skin is warm and dry  Findings: No erythema or rash  Neurological:      Mental Status: She is alert and oriented to person, place, and time  Cranial Nerves: No cranial nerve deficit  Psychiatric:         Thought Content: Thought content normal        Patient's shoes and socks removed  Right Foot/Ankle   Right Foot Inspection  Skin Exam: skin normal and skin intact  No dry skin, no warmth, no callus, no erythema, no maceration, no abnormal color, no pre-ulcer, no ulcer and no callus  Toe Exam: ROM and strength within normal limits       Sensory   Vibration: intact  Proprioception: intact  Monofilament testing: intact    Vascular  Capillary refills: < 3 seconds  The right DP pulse is 2+  The right PT pulse is 2+  Left Foot/Ankle  Left Foot Inspection  Skin Exam: skin normal and skin intact  No dry skin, no warmth, no erythema, no maceration, normal color, no pre-ulcer, no ulcer and no callus  Toe Exam: ROM and strength within normal limits  Sensory   Vibration: intact  Proprioception: intact  Monofilament testing: intact    Vascular  Capillary refills: < 3 seconds  The left DP pulse is 2+  The left PT pulse is 2+       Assign Risk Category  No deformity present  No loss of protective sensation  No weak pulses  Risk: 0

## 2022-03-24 ENCOUNTER — TELEPHONE (OUTPATIENT)
Dept: FAMILY MEDICINE CLINIC | Facility: CLINIC | Age: 62
End: 2022-03-24

## 2022-03-24 NOTE — TELEPHONE ENCOUNTER
Pt thinks she needs to start seeing someone for depression symptoms getting worse    Currently Meds: Clonazepam 1mg hs & Cymbalta 60mg BID    Looking for guidance    Do you have anyone you would recommend? Who would manager her medication? Could or would you manage medication if they do not?

## 2022-03-29 DIAGNOSIS — F41.1 GENERALIZED ANXIETY DISORDER: ICD-10-CM

## 2022-03-29 RX ORDER — CLONAZEPAM 0.5 MG/1
1 TABLET ORAL
Qty: 60 TABLET | Refills: 0 | Status: SHIPPED | OUTPATIENT
Start: 2022-03-29 | End: 2022-04-28 | Stop reason: SDUPTHER

## 2022-04-28 DIAGNOSIS — F41.1 GENERALIZED ANXIETY DISORDER: ICD-10-CM

## 2022-04-28 DIAGNOSIS — M54.50 LUMBAR PAIN: ICD-10-CM

## 2022-04-28 RX ORDER — METHOCARBAMOL 500 MG/1
TABLET, FILM COATED ORAL
Qty: 15 TABLET | Refills: 0 | Status: SHIPPED | OUTPATIENT
Start: 2022-04-28

## 2022-04-29 RX ORDER — CLONAZEPAM 0.5 MG/1
1 TABLET ORAL
Qty: 60 TABLET | Refills: 0 | Status: SHIPPED | OUTPATIENT
Start: 2022-04-29 | End: 2022-06-10 | Stop reason: SDUPTHER

## 2022-05-10 DIAGNOSIS — I10 ESSENTIAL HYPERTENSION: ICD-10-CM

## 2022-05-10 DIAGNOSIS — E11.9 TYPE 2 DIABETES MELLITUS WITHOUT COMPLICATION, WITHOUT LONG-TERM CURRENT USE OF INSULIN (HCC): ICD-10-CM

## 2022-05-10 RX ORDER — LISINOPRIL 5 MG/1
5 TABLET ORAL DAILY
Qty: 90 TABLET | Refills: 1 | Status: SHIPPED | OUTPATIENT
Start: 2022-05-10

## 2022-06-10 DIAGNOSIS — F41.1 GENERALIZED ANXIETY DISORDER: ICD-10-CM

## 2022-06-10 RX ORDER — CLONAZEPAM 0.5 MG/1
1 TABLET ORAL
Qty: 60 TABLET | Refills: 0 | Status: SHIPPED | OUTPATIENT
Start: 2022-06-10 | End: 2022-07-21 | Stop reason: SDUPTHER

## 2022-07-11 DIAGNOSIS — M47.27 OSTEOARTHRITIS OF SPINE WITH RADICULOPATHY, LUMBOSACRAL REGION: ICD-10-CM

## 2022-07-11 RX ORDER — GABAPENTIN 100 MG/1
CAPSULE ORAL
Qty: 360 CAPSULE | Refills: 1 | Status: SHIPPED | OUTPATIENT
Start: 2022-07-11

## 2022-07-21 DIAGNOSIS — F41.1 GENERALIZED ANXIETY DISORDER: ICD-10-CM

## 2022-07-22 RX ORDER — CLONAZEPAM 0.5 MG/1
1 TABLET ORAL
Qty: 60 TABLET | Refills: 0 | Status: SHIPPED | OUTPATIENT
Start: 2022-07-22 | End: 2022-09-16 | Stop reason: SDUPTHER

## 2022-09-06 DIAGNOSIS — F41.1 GENERALIZED ANXIETY DISORDER: ICD-10-CM

## 2022-09-07 DIAGNOSIS — E11.9 TYPE 2 DIABETES MELLITUS WITHOUT COMPLICATION, WITHOUT LONG-TERM CURRENT USE OF INSULIN (HCC): Primary | ICD-10-CM

## 2022-09-07 DIAGNOSIS — M47.27 OSTEOARTHRITIS OF SPINE WITH RADICULOPATHY, LUMBOSACRAL REGION: ICD-10-CM

## 2022-09-07 DIAGNOSIS — N18.31 STAGE 3A CHRONIC KIDNEY DISEASE (HCC): ICD-10-CM

## 2022-09-08 RX ORDER — DULOXETIN HYDROCHLORIDE 60 MG/1
60 CAPSULE, DELAYED RELEASE ORAL 2 TIMES DAILY
Qty: 180 CAPSULE | Refills: 1 | Status: SHIPPED | OUTPATIENT
Start: 2022-09-08

## 2022-09-08 RX ORDER — CLONAZEPAM 0.5 MG/1
1 TABLET ORAL
Qty: 60 TABLET | Refills: 0 | OUTPATIENT
Start: 2022-09-08

## 2022-09-15 ENCOUNTER — LAB (OUTPATIENT)
Dept: LAB | Facility: MEDICAL CENTER | Age: 62
End: 2022-09-15
Payer: COMMERCIAL

## 2022-09-15 DIAGNOSIS — N18.31 STAGE 3A CHRONIC KIDNEY DISEASE (HCC): ICD-10-CM

## 2022-09-15 DIAGNOSIS — E11.9 TYPE 2 DIABETES MELLITUS WITHOUT COMPLICATION, WITHOUT LONG-TERM CURRENT USE OF INSULIN (HCC): ICD-10-CM

## 2022-09-15 LAB
CHOLEST SERPL-MCNC: 166 MG/DL
HDLC SERPL-MCNC: 42 MG/DL
LDLC SERPL CALC-MCNC: 96 MG/DL (ref 0–100)
NONHDLC SERPL-MCNC: 124 MG/DL
TRIGL SERPL-MCNC: 139 MG/DL

## 2022-09-15 PROCEDURE — 3060F POS MICROALBUMINURIA REV: CPT | Performed by: FAMILY MEDICINE

## 2022-09-15 PROCEDURE — 80061 LIPID PANEL: CPT

## 2022-09-16 ENCOUNTER — OFFICE VISIT (OUTPATIENT)
Dept: FAMILY MEDICINE CLINIC | Facility: CLINIC | Age: 62
End: 2022-09-16
Payer: COMMERCIAL

## 2022-09-16 VITALS
HEIGHT: 67 IN | BODY MASS INDEX: 41.91 KG/M2 | SYSTOLIC BLOOD PRESSURE: 124 MMHG | WEIGHT: 267 LBS | DIASTOLIC BLOOD PRESSURE: 72 MMHG | HEART RATE: 87 BPM | OXYGEN SATURATION: 97 % | TEMPERATURE: 97.3 F

## 2022-09-16 DIAGNOSIS — F33.41 RECURRENT MAJOR DEPRESSIVE DISORDER, IN PARTIAL REMISSION (HCC): ICD-10-CM

## 2022-09-16 DIAGNOSIS — F41.1 GENERALIZED ANXIETY DISORDER: ICD-10-CM

## 2022-09-16 DIAGNOSIS — N18.31 STAGE 3A CHRONIC KIDNEY DISEASE (HCC): ICD-10-CM

## 2022-09-16 DIAGNOSIS — I10 ESSENTIAL HYPERTENSION: ICD-10-CM

## 2022-09-16 DIAGNOSIS — G47.33 OBSTRUCTIVE SLEEP APNEA: ICD-10-CM

## 2022-09-16 DIAGNOSIS — E11.9 TYPE 2 DIABETES MELLITUS WITHOUT COMPLICATION, WITHOUT LONG-TERM CURRENT USE OF INSULIN (HCC): ICD-10-CM

## 2022-09-16 DIAGNOSIS — Z23 ENCOUNTER FOR IMMUNIZATION: Primary | ICD-10-CM

## 2022-09-16 PROCEDURE — 90682 RIV4 VACC RECOMBINANT DNA IM: CPT | Performed by: FAMILY MEDICINE

## 2022-09-16 PROCEDURE — 99214 OFFICE O/P EST MOD 30 MIN: CPT | Performed by: FAMILY MEDICINE

## 2022-09-16 PROCEDURE — G0008 ADMIN INFLUENZA VIRUS VAC: HCPCS | Performed by: FAMILY MEDICINE

## 2022-09-16 PROCEDURE — 3078F DIAST BP <80 MM HG: CPT | Performed by: FAMILY MEDICINE

## 2022-09-16 PROCEDURE — 3074F SYST BP LT 130 MM HG: CPT | Performed by: FAMILY MEDICINE

## 2022-09-16 RX ORDER — CLONAZEPAM 0.5 MG/1
1 TABLET ORAL
Qty: 60 TABLET | Refills: 0 | Status: SHIPPED | OUTPATIENT
Start: 2022-09-16 | End: 2022-10-24 | Stop reason: SDUPTHER

## 2022-09-16 NOTE — PROGRESS NOTES
Tobacco Cessation Counseling: Tobacco cessation counseling was provided  The patient is sincerely urged to quit consumption of tobacco  She is ready to quit tobacco  Medication options and side effects of medication discussed  Patient refused medication  Nicotine inhaler was prescribed  Assessment/Plan:    Problem List Items Addressed This Visit        Endocrine    Type 2 diabetes mellitus without complication, without long-term current use of insulin ([facility] )       Respiratory    Obstructive sleep apnea    Relevant Orders    Cpap New DME       Cardiovascular and Mediastinum    Essential hypertension       Genitourinary    Stage 3a chronic kidney disease ([facility] )       Other    Depression      Other Visit Diagnoses     Encounter for immunization    -  Primary    Relevant Orders    influenza vaccine, quadrivalent, recombinant, PF, 0 5 mL, for patients 18 yr+ ([facility]) (Completed)           Diagnoses and all orders for this visit:    Encounter for immunization  -     influenza vaccine, quadrivalent, recombinant, PF, 0 5 mL, for patients 18 yr+ ([facility])    Type 2 diabetes mellitus without complication, without long-term current use of insulin ([facility])    Obstructive sleep apnea  -     Cpap New DME    Essential hypertension    Stage 3a chronic kidney disease ([facility] )    Recurrent major depressive disorder, in partial remission ([place] [address] )        No problem-specific Assessment & Plan notes found for this encounter  Subjective:      Patient ID: Savage Lay is a 57 y o  female      Shereen garcia is here for a follow-up of visit she looks well she has been active she has been walking also has actually met with some friends so she is getting out of the house thinking of looking for a part-time job weight is constant blood sugars good hemoglobin A1c is good so everything seems to be going along quite nicely should continue her metformin and her other medications she also will investigate the cost of a Prevnar injection and Shingrix injection she will complete her Cologuard which was ordered and we will see her again in 3 months      The following portions of the patient's history were reviewed and updated as appropriate:   She has a past medical history of Hypertension, Migraine, and MRSA (methicillin resistant Staphylococcus aureus)  ,  does not have any pertinent problems on file  ,   has a past surgical history that includes Achilles tendon repair; Carpal tunnel release (Right);  section; Cholecystectomy; Ankle surgery (Left, 2019); Nasal sinus surgery (); Lumbar laminectomy (2018); and Ganglion cyst excision (Left, )  ,  family history includes Arrhythmia in her father; Hepatitis in her mother; Hypertension in her mother; Melanoma in her father; No Known Problems in her maternal aunt, maternal aunt, maternal aunt, maternal aunt, maternal grandfather, maternal grandmother, paternal aunt, paternal grandfather, paternal grandmother, sister, and son; Stroke in her father  ,   reports that she quit smoking about 7 years ago  She quit after 50 00 years of use  She uses smokeless tobacco  She reports previous alcohol use  She reports that she does not use drugs  ,  is allergic to pollen extract and vancomycin     Current Outpatient Medications   Medication Sig Dispense Refill    clonazePAM (KlonoPIN) 0 5 mg tablet Take 2 tablets (1 mg total) by mouth daily at bedtime as needed for anxiety 60 tablet 0    docusate sodium (COLACE) 100 mg capsule Take 100 mg by mouth daily as needed for constipation      DULoxetine (CYMBALTA) 60 mg delayed release capsule Take 1 capsule (60 mg total) by mouth 2 (two) times a day 180 capsule 1    famotidine (PEPCID) 20 mg tablet Take 20 mg by mouth daily      gabapentin (NEURONTIN) 100 mg capsule One in am, one at lunch, and 3 at hs 360 capsule 1    ibuprofen (MOTRIN) 200 mg tablet Take 400 mg by mouth every 6 (six) hours as needed for mild pain      lisinopril (ZESTRIL) 5 mg tablet Take 1 tablet (5 mg total) by mouth daily 90 tablet 1    metFORMIN (GLUCOPHAGE) 500 mg tablet Take 1 tablet (500 mg total) by mouth daily with breakfast 90 tablet 1    methocarbamol (ROBAXIN) 500 mg tablet 1 tablet twice daily if needed for back spasm 15 tablet 0    Multiple Vitamins-Minerals (CENTRUM SILVER PO) Take 1 tablet by mouth daily       No current facility-administered medications for this visit  Review of Systems   Constitutional: Negative for activity change, appetite change, diaphoresis, fatigue and fever  HENT: Negative  Eyes: Negative  Respiratory: Negative for apnea, cough, chest tightness, shortness of breath and wheezing  Cardiovascular: Negative for chest pain, palpitations and leg swelling  Gastrointestinal: Negative for abdominal distention, abdominal pain, anal bleeding, constipation, diarrhea, nausea and vomiting  Endocrine: Negative for cold intolerance, heat intolerance, polydipsia, polyphagia and polyuria  Genitourinary: Negative for difficulty urinating, dysuria, flank pain, hematuria and urgency  Musculoskeletal: Negative for arthralgias, back pain, gait problem, joint swelling and myalgias  Skin: Negative for color change, rash and wound  Allergic/Immunologic: Negative for environmental allergies, food allergies and immunocompromised state  Neurological: Negative for dizziness, seizures, syncope, speech difficulty, numbness and headaches  Hematological: Negative for adenopathy  Does not bruise/bleed easily  Psychiatric/Behavioral: Negative for agitation, behavioral problems, hallucinations, sleep disturbance and suicidal ideas  Objective:  Vitals:    09/16/22 1309   BP: 124/72   BP Location: Left arm   Patient Position: Sitting   Cuff Size: Standard   Pulse: 87   Temp: (!) 97 3 °F (36 3 °C)   TempSrc: Temporal   SpO2: 97%   Weight: 121 kg (267 lb)   Height: 5' 7" (1 702 m)     Body mass index is 41 82 kg/m²       Physical Exam  Constitutional: General: She is not in acute distress  Appearance: She is well-developed  She is obese  She is not diaphoretic  HENT:      Head: Normocephalic  Right Ear: External ear normal       Left Ear: External ear normal       Nose: Nose normal    Eyes:      General: No scleral icterus  Right eye: No discharge  Left eye: No discharge  Conjunctiva/sclera: Conjunctivae normal       Pupils: Pupils are equal, round, and reactive to light  Neck:      Thyroid: No thyromegaly  Trachea: No tracheal deviation  Cardiovascular:      Rate and Rhythm: Normal rate and regular rhythm  Heart sounds: Normal heart sounds  No murmur heard  No friction rub  No gallop  Pulmonary:      Effort: Pulmonary effort is normal  No respiratory distress  Breath sounds: Normal breath sounds  No wheezing  Abdominal:      General: Bowel sounds are normal       Palpations: Abdomen is soft  There is no mass  Tenderness: There is no abdominal tenderness  There is no guarding  Musculoskeletal:         General: No deformity  Cervical back: Normal range of motion  Lymphadenopathy:      Cervical: No cervical adenopathy  Skin:     General: Skin is warm and dry  Findings: No erythema or rash  Neurological:      Mental Status: She is alert and oriented to person, place, and time  Cranial Nerves: No cranial nerve deficit  Psychiatric:         Thought Content:  Thought content normal

## 2022-10-24 DIAGNOSIS — F41.1 GENERALIZED ANXIETY DISORDER: ICD-10-CM

## 2022-10-25 RX ORDER — CLONAZEPAM 0.5 MG/1
1 TABLET ORAL
Qty: 60 TABLET | Refills: 0 | Status: SHIPPED | OUTPATIENT
Start: 2022-10-25

## 2022-10-31 DIAGNOSIS — G47.33 OBSTRUCTIVE SLEEP APNEA: Primary | ICD-10-CM

## 2022-11-08 DIAGNOSIS — E11.9 TYPE 2 DIABETES MELLITUS WITHOUT COMPLICATION, WITHOUT LONG-TERM CURRENT USE OF INSULIN (HCC): ICD-10-CM

## 2022-11-08 DIAGNOSIS — I10 ESSENTIAL HYPERTENSION: ICD-10-CM

## 2022-11-08 RX ORDER — LISINOPRIL 5 MG/1
5 TABLET ORAL DAILY
Qty: 90 TABLET | Refills: 1 | Status: SHIPPED | OUTPATIENT
Start: 2022-11-08

## 2022-11-21 DIAGNOSIS — M47.27 OSTEOARTHRITIS OF SPINE WITH RADICULOPATHY, LUMBOSACRAL REGION: ICD-10-CM

## 2022-11-21 RX ORDER — GABAPENTIN 100 MG/1
CAPSULE ORAL
Qty: 360 CAPSULE | Refills: 1 | Status: SHIPPED | OUTPATIENT
Start: 2022-11-21

## 2022-12-04 DIAGNOSIS — F41.1 GENERALIZED ANXIETY DISORDER: ICD-10-CM

## 2022-12-05 RX ORDER — CLONAZEPAM 0.5 MG/1
1 TABLET ORAL
Qty: 60 TABLET | Refills: 0 | Status: SHIPPED | OUTPATIENT
Start: 2022-12-05

## 2022-12-11 DIAGNOSIS — M47.27 OSTEOARTHRITIS OF SPINE WITH RADICULOPATHY, LUMBOSACRAL REGION: ICD-10-CM

## 2022-12-12 RX ORDER — DULOXETIN HYDROCHLORIDE 60 MG/1
60 CAPSULE, DELAYED RELEASE ORAL 2 TIMES DAILY
Qty: 180 CAPSULE | Refills: 0 | Status: SHIPPED | OUTPATIENT
Start: 2022-12-12

## 2023-01-04 ENCOUNTER — TELEPHONE (OUTPATIENT)
Dept: FAMILY MEDICINE CLINIC | Facility: CLINIC | Age: 63
End: 2023-01-04

## 2023-01-04 DIAGNOSIS — R53.82 CHRONIC FATIGUE: Primary | ICD-10-CM

## 2023-01-04 DIAGNOSIS — E11.9 TYPE 2 DIABETES MELLITUS WITHOUT COMPLICATION, UNSPECIFIED WHETHER LONG TERM INSULIN USE (HCC): ICD-10-CM

## 2023-01-04 NOTE — TELEPHONE ENCOUNTER
Pt is going to schedule a 3 month diabetic appointment with you and will need to have labs drawn prior  Can you please order normal routine labs along with a cbc, thyroid, iron and any other labs you think she should have done  Pt c/o feeling very exhausted lately with no want to get up off the couch

## 2023-01-10 ENCOUNTER — LAB (OUTPATIENT)
Dept: LAB | Facility: MEDICAL CENTER | Age: 63
End: 2023-01-10

## 2023-01-10 DIAGNOSIS — R53.82 CHRONIC FATIGUE: ICD-10-CM

## 2023-01-10 LAB
ANION GAP SERPL CALCULATED.3IONS-SCNC: 6 MMOL/L (ref 4–13)
BUN SERPL-MCNC: 33 MG/DL (ref 5–25)
CALCIUM SERPL-MCNC: 9.7 MG/DL (ref 8.3–10.1)
CHLORIDE SERPL-SCNC: 106 MMOL/L (ref 96–108)
CO2 SERPL-SCNC: 25 MMOL/L (ref 21–32)
CREAT SERPL-MCNC: 1.48 MG/DL (ref 0.6–1.3)
CREAT UR-MCNC: 60.1 MG/DL
ERYTHROCYTE [DISTWIDTH] IN BLOOD BY AUTOMATED COUNT: 13.7 % (ref 11.6–15.1)
FERRITIN SERPL-MCNC: 37 NG/ML (ref 8–388)
GFR SERPL CREATININE-BSD FRML MDRD: 37 ML/MIN/1.73SQ M
GLUCOSE P FAST SERPL-MCNC: 139 MG/DL (ref 65–99)
HCT VFR BLD AUTO: 36.4 % (ref 34.8–46.1)
HGB BLD-MCNC: 11.2 G/DL (ref 11.5–15.4)
IRON SATN MFR SERPL: 13 % (ref 15–50)
IRON SERPL-MCNC: 43 UG/DL (ref 50–170)
LDLC SERPL DIRECT ASSAY-MCNC: 98 MG/DL (ref 0–100)
MCH RBC QN AUTO: 28 PG (ref 26.8–34.3)
MCHC RBC AUTO-ENTMCNC: 30.8 G/DL (ref 31.4–37.4)
MCV RBC AUTO: 91 FL (ref 82–98)
MICROALBUMIN UR-MCNC: 28.7 MG/L (ref 0–20)
MICROALBUMIN/CREAT 24H UR: 48 MG/G CREATININE (ref 0–30)
PLATELET # BLD AUTO: 336 THOUSANDS/UL (ref 149–390)
PMV BLD AUTO: 9.8 FL (ref 8.9–12.7)
POTASSIUM SERPL-SCNC: 4.3 MMOL/L (ref 3.5–5.3)
RBC # BLD AUTO: 4 MILLION/UL (ref 3.81–5.12)
SODIUM SERPL-SCNC: 137 MMOL/L (ref 135–147)
TIBC SERPL-MCNC: 326 UG/DL (ref 250–450)
TSH SERPL DL<=0.05 MIU/L-ACNC: 3.3 UIU/ML (ref 0.45–4.5)
WBC # BLD AUTO: 7.1 THOUSAND/UL (ref 4.31–10.16)

## 2023-01-11 NOTE — RESULT ENCOUNTER NOTE
Please call the patient regarding her abnormal result    Kidney function stable small amount of protein in the urine but very good for diabetic iron saturation is slightly low do not recommend iron supplementation but I do recommend multiple vitamin that contains iron

## 2023-01-11 NOTE — RESULT ENCOUNTER NOTE
Please call the patient regarding her abnormal result    Iron level slightly low eat green vegetables eat red meats and start ferrous sulfate 324 mg 1 capsule once daily with food

## 2023-01-12 LAB
EST. AVERAGE GLUCOSE BLD GHB EST-MCNC: 157 MG/DL
HBA1C MFR BLD: 7.1 %

## 2023-01-12 NOTE — RESULT ENCOUNTER NOTE
Please call the patient regarding her abnormal result    Hemoglobin A1c is 7 1 which is much better than it was 1 year ago but it is creeping up so be careful and watch diet and continue exercising

## 2023-01-16 ENCOUNTER — OFFICE VISIT (OUTPATIENT)
Dept: URGENT CARE | Facility: MEDICAL CENTER | Age: 63
End: 2023-01-16

## 2023-01-16 VITALS
TEMPERATURE: 98 F | RESPIRATION RATE: 20 BRPM | DIASTOLIC BLOOD PRESSURE: 84 MMHG | HEIGHT: 67 IN | HEART RATE: 97 BPM | WEIGHT: 270 LBS | SYSTOLIC BLOOD PRESSURE: 114 MMHG | OXYGEN SATURATION: 98 % | BODY MASS INDEX: 42.38 KG/M2

## 2023-01-16 DIAGNOSIS — I10 CHRONIC HYPERTENSION: Primary | ICD-10-CM

## 2023-01-16 NOTE — LETTER
January 16, 2023     DO [facility] [address]    Patient: Tamanna Garcia   YOB: 1960   Date of Visit: 1/16/2023        Dear Jagruti Mitchell DO:    Your patient, Tamanna Garcia was seen in our urgent care department on 1/16/2023  Enclosed is a full report of that visit  Please follow up with patient regarding her concerns for slight elevations in her BP's and potential for change in her chronic medications  I have not made/recommended any changes at this time  I also recommended she provide a record of her home BP readings for your review  If you have any questions or concerns, please don't hesitate to call             Sincerely,        COLIN Woods      CC: [unfilled]    Enclosure

## 2023-01-16 NOTE — PROGRESS NOTES
[facility]        NAME: Ludy Begum is a 60 y o  female  : 1960    MRN: [id number]  DATE: 2023  TIME: 5:20 PM    Assessment and Plan   Chronic hypertension [I10]  1  Chronic hypertension              Patient Instructions     Please see the patient's After Visit Summary for patient provided instructions  Other verbal instructions given as noted within  Follow up with PCP in 3-5 days  Proceed to  ER if symptoms worsen  Chief Complaint     Chief Complaint   Patient presents with   • Dizziness   • Fatigue         History of Present Illness       Back in  had a similar episode of dizziness and lightheaded feeling with DX of hypertension at that time  Up until last year was on Zestoretic (Lisinopril/HCTZ) and then approximately in early spring 2022 Dr Israel Jacobo took the HCTZ out and she is now just on plain lisinopril  Typically see's Dr Paige Sierra for check up and has an appt  for routine check up  She was just recently Dx with DM last year and has been closely following it - initial A1C was 14 and significant drop over 3mth with diet and exercise to 7 2  Sugars at home 130-150 per patient's reports  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 Reports she did have a eadache last weekend, woke up with it and has been intermittent since that time took tylenol with some relief  She has been avoiding NSAIDs as much as possible due to her kidney numbers  She has been monitoring her BP at home in the mornings, but has not checked them at other times throughout the day   Recommended she start checking them throughout the day once in awhile and keep record- also recommended she provide a record for Dr Dami Felton to allow him to make any adjustments to her chronic medications  Patient understands that should she have any acute changes in symptoms or extreme elevations in her BP numbers she should be evaluated in the ED  Review of Systems   Review of Systems   Constitutional: Positive for fatigue  Negative for chills and fever  HENT: Negative for congestion, ear pain, postnasal drip, rhinorrhea, sinus pain and sore throat  Eyes: Negative for pain and visual disturbance  Respiratory: Negative for cough, chest tightness and shortness of breath  Cardiovascular: Negative for chest pain, palpitations and leg swelling  Gastrointestinal: Negative for abdominal pain, constipation, diarrhea, nausea and vomiting  Genitourinary: Negative for dysuria and hematuria  Musculoskeletal: Negative for arthralgias and back pain  Skin: Negative for color change and rash  Neurological: Positive for light-headedness  Negative for seizures and syncope  All other systems reviewed and are negative          Current Medications       Current Outpatient Medications:   •  clonazePAM (KlonoPIN) 0 5 mg tablet, Take 2 tablets (1 mg total) by mouth daily at bedtime as needed for anxiety, Disp: 60 tablet, Rfl: 0  •  docusate sodium (COLACE) 100 mg capsule, Take 100 mg by mouth daily as needed for constipation, Disp: , Rfl:   •  DULoxetine (CYMBALTA) 60 mg delayed release capsule, Take 1 capsule (60 mg total) by mouth 2 (two) times a day, Disp: 180 capsule, Rfl: 0  •  famotidine (PEPCID) 20 mg tablet, Take 20 mg by mouth daily, Disp: , Rfl:   •  Ferrous Sulfate 28 MG TABS, Take 1 tablet by mouth in the morning, Disp: , Rfl:   •  gabapentin (NEURONTIN) 100 mg capsule, One in am, one at lunch, and 3 at hs, Disp: 360 capsule, Rfl: 1  •  ibuprofen (MOTRIN) 200 mg tablet, Take 400 mg by mouth every 6 (six) hours as needed for mild pain, Disp: , Rfl:   •  lisinopril (ZESTRIL) 5 mg tablet, Take 1 tablet (5 mg total) by mouth daily, Disp: 90 tablet, Rfl: 1  •  metFORMIN (GLUCOPHAGE) 500 mg tablet, Take 1 tablet (500 mg total) by mouth daily with breakfast, Disp: 90 tablet, Rfl: 1  •  methocarbamol (ROBAXIN) 500 mg tablet, 1 tablet twice daily if needed for back spasm, Disp: 15 tablet, Rfl: 0  •  Multiple Vitamins-Minerals (CENTRUM SILVER PO), Take 1 tablet by mouth daily, Disp: , Rfl:     Current Allergies     Allergies as of 2023 - Reviewed 2023   Allergen Reaction Noted   • Pollen extract Sneezing 2017   • Vancomycin Rash 04/10/2017            The following portions of the patient's history were reviewed and updated as appropriate: allergies, current medications, past family history, past medical history, past social history, past surgical history and problem list      Past Medical History:   Diagnosis Date   • Hypertension    • Migraine    • MRSA (methicillin resistant Staphylococcus aureus)        Past Surgical History:   Procedure Laterality Date   • ACHILLES TENDON REPAIR     • ANKLE SURGERY Left 2019    Excision of Lipoma   • CARPAL TUNNEL RELEASE Right    •  SECTION     • CHOLECYSTECTOMY     • GANGLION CYST EXCISION Left     Left wrist   • LUMBAR LAMINECTOMY  2018    x 3 disks   • NASAL SINUS SURGERY         Family History   Problem Relation Age of Onset   • Hypertension Mother    • Hepatitis Mother         Hep C   • Stroke Father    • Arrhythmia Father    • Melanoma Father    • No Known Problems Sister    • No Known Problems Maternal Grandmother    • No Known Problems Maternal Grandfather    • No Known Problems Paternal Grandmother    • No Known Problems Paternal Grandfather    • No Known Problems Son    • No Known Problems Maternal Aunt    • No Known Problems Maternal Aunt    • No Known Problems Maternal Aunt    • No Known Problems Maternal Aunt    • No Known Problems Paternal Aunt Medications have been verified  Objective   /84 (BP Location: Right arm, Patient Position: Sitting, Cuff Size: Adult) Comment: Taken Manually  Pulse 97   Temp 98 °F (36 7 °C)   Resp 20   Ht 5' 7" (1 702 m)   Wt 122 kg (270 lb)   LMP 04/10/2017   SpO2 98%   BMI 42 29 kg/m²        Physical Exam     Physical Exam  Vitals and nursing note reviewed  Constitutional:       General: She is not in acute distress  Appearance: Normal appearance  She is normal weight  She is not ill-appearing  HENT:      Head: Normocephalic and atraumatic  Nose: Nose normal       Mouth/Throat:      Mouth: Mucous membranes are moist       Pharynx: Oropharynx is clear  Eyes:      Extraocular Movements: Extraocular movements intact  Conjunctiva/sclera: Conjunctivae normal       Pupils: Pupils are equal, round, and reactive to light  Cardiovascular:      Rate and Rhythm: Normal rate and regular rhythm  Pulses: Normal pulses  Heart sounds: S1 normal and S2 normal      No gallop  Comments: Very slight/subtle S4 noted on auscultation with amplification stethoscope  Patient states her BP has been running 140-150s SBP She has a record that she has been keeping - she has been checking her BP in the AM    Pulmonary:      Effort: Pulmonary effort is normal       Breath sounds: Normal breath sounds  Abdominal:      General: Abdomen is flat  Bowel sounds are normal       Palpations: Abdomen is soft  Musculoskeletal:         General: Normal range of motion  Cervical back: Normal range of motion and neck supple  Skin:     General: Skin is warm  Capillary Refill: Capillary refill takes less than 2 seconds  Neurological:      General: No focal deficit present  Mental Status: She is alert and oriented to person, place, and time  GCS: GCS eye subscore is 4  GCS verbal subscore is 5  GCS motor subscore is 6  Cranial Nerves: Cranial nerves 2-12 are intact  Comments: Has peripheral neuropathy prior to diabetes      Psychiatric:         Mood and Affect: Mood normal          Behavior: Behavior normal

## 2023-01-16 NOTE — PATIENT INSTRUCTIONS
Continue to monitor your BP at home and record for your PCP, please drop off this record for your PCP for him to review and if needed suggest changes if necessary prior to your appt on 2/1  Should you have any significant changes in symptoms or any acute symptoms please present to the ED for full evaluation  Monitor and limit salt intake avoid high-sodium items like processed foods

## 2023-01-22 DIAGNOSIS — F41.1 GENERALIZED ANXIETY DISORDER: ICD-10-CM

## 2023-01-23 RX ORDER — CLONAZEPAM 0.5 MG/1
1 TABLET ORAL
Qty: 60 TABLET | Refills: 0 | Status: SHIPPED | OUTPATIENT
Start: 2023-01-23

## 2023-02-01 ENCOUNTER — TELEPHONE (OUTPATIENT)
Dept: FAMILY MEDICINE CLINIC | Facility: CLINIC | Age: 63
End: 2023-02-01

## 2023-02-01 ENCOUNTER — OFFICE VISIT (OUTPATIENT)
Dept: FAMILY MEDICINE CLINIC | Facility: CLINIC | Age: 63
End: 2023-02-01

## 2023-02-01 VITALS
DIASTOLIC BLOOD PRESSURE: 82 MMHG | WEIGHT: 277 LBS | SYSTOLIC BLOOD PRESSURE: 142 MMHG | HEART RATE: 88 BPM | BODY MASS INDEX: 43.47 KG/M2 | HEIGHT: 67 IN | TEMPERATURE: 96.5 F | OXYGEN SATURATION: 97 %

## 2023-02-01 DIAGNOSIS — Z12.31 ENCOUNTER FOR SCREENING MAMMOGRAM FOR MALIGNANT NEOPLASM OF BREAST: ICD-10-CM

## 2023-02-01 DIAGNOSIS — N18.32 CHRONIC KIDNEY DISEASE (CKD) STAGE G3B/A3, MODERATELY DECREASED GLOMERULAR FILTRATION RATE (GFR) BETWEEN 30-44 ML/MIN/1.73 SQUARE METER AND ALBUMINURIA CREATININE RATIO GREATER THAN 300 MG/G (HCC): ICD-10-CM

## 2023-02-01 DIAGNOSIS — E66.01 OBESITY, MORBID (HCC): ICD-10-CM

## 2023-02-01 DIAGNOSIS — F33.41 RECURRENT MAJOR DEPRESSIVE DISORDER, IN PARTIAL REMISSION (HCC): ICD-10-CM

## 2023-02-01 DIAGNOSIS — I10 ESSENTIAL HYPERTENSION: ICD-10-CM

## 2023-02-01 DIAGNOSIS — M54.16 LUMBAR RADICULOPATHY, CHRONIC: ICD-10-CM

## 2023-02-01 DIAGNOSIS — F41.1 GENERALIZED ANXIETY DISORDER: ICD-10-CM

## 2023-02-01 DIAGNOSIS — G47.33 OBSTRUCTIVE SLEEP APNEA: ICD-10-CM

## 2023-02-01 DIAGNOSIS — N18.31 STAGE 3A CHRONIC KIDNEY DISEASE (HCC): ICD-10-CM

## 2023-02-01 DIAGNOSIS — E11.9 TYPE 2 DIABETES MELLITUS WITHOUT COMPLICATION, WITHOUT LONG-TERM CURRENT USE OF INSULIN (HCC): Primary | ICD-10-CM

## 2023-02-01 RX ORDER — AMLODIPINE BESYLATE 5 MG/1
5 TABLET ORAL DAILY
Qty: 30 TABLET | Refills: 5 | Status: SHIPPED | OUTPATIENT
Start: 2023-02-01

## 2023-02-01 NOTE — PROGRESS NOTES
Assessment/Plan:    Problem List Items Addressed This Visit        Endocrine    Type 2 diabetes mellitus without complication, without long-term current use of insulin ([facility] [address] ) - Primary       Respiratory    Obstructive sleep apnea       Cardiovascular and Mediastinum    Essential hypertension       Genitourinary    Stage 3a chronic kidney disease (HCC)       Other    Generalized anxiety disorder    Obesity, morbid ([facility] [address] )   Other Visit Diagnoses     Chronic kidney disease (CKD) stage G3b/A3, moderately decreased glomerular filtration rate (GFR) between 30-44 mL/min/1 73 square meter and albuminuria creatinine ratio greater than 300 mg/g (HCC)        Lumbar radiculopathy, chronic               Diagnoses and all orders for this visit:    Type 2 diabetes mellitus without complication, without long-term current use of insulin (HCC)    Obstructive sleep apnea    Essential hypertension    Stage 3a chronic kidney disease (HCC)    Obesity, morbid (HCC)    Generalized anxiety disorder    Chronic kidney disease (CKD) stage G3b/A3, moderately decreased glomerular filtration rate (GFR) between 30-44 mL/min/1 73 square meter and albuminuria creatinine ratio greater than 300 mg/g (HCC)    Lumbar radiculopathy, chronic    Other orders  -     Acetaminophen 500 MG        No problem-specific Assessment & Plan notes found for this encounter  Subjective:      Patient ID: Arpan Loaiza is a 60 y o  female  follow up on ckd jennifer,hbp      The following portions of the patient's history were reviewed and updated as appropriate:   She has a past medical history of Hypertension, Migraine, and MRSA (methicillin resistant Staphylococcus aureus)  ,  does not have any pertinent problems on file  ,   has a past surgical history that includes Achilles tendon repair; Carpal tunnel release (Right);  section; Cholecystectomy; Ankle surgery (Left, 2019); Nasal sinus surgery ();  Lumbar laminectomy (2018); and Ganglion cyst excision (Left, 1999)  ,  family history includes Arrhythmia in her father; Hepatitis in her mother; Hypertension in her mother; Melanoma in her father; No Known Problems in her maternal aunt, maternal aunt, maternal aunt, maternal aunt, maternal grandfather, maternal grandmother, paternal aunt, paternal grandfather, paternal grandmother, sister, and son; Stroke in her father  ,   reports that she quit smoking about 8 years ago  Her smoking use included cigarettes  She uses smokeless tobacco  She reports that she does not currently use alcohol  She reports that she does not use drugs  ,  is allergic to pollen extract and vancomycin     Current Outpatient Medications   Medication Sig Dispense Refill   • Acetaminophen 500 MG      • clonazePAM (KlonoPIN) 0 5 mg tablet Take 2 tablets (1 mg total) by mouth daily at bedtime as needed for anxiety 60 tablet 0   • docusate sodium (COLACE) 100 mg capsule Take 100 mg by mouth daily as needed for constipation     • DULoxetine (CYMBALTA) 60 mg delayed release capsule Take 1 capsule (60 mg total) by mouth 2 (two) times a day 180 capsule 0   • famotidine (PEPCID) 20 mg tablet Take 20 mg by mouth daily     • Ferrous Sulfate 28 MG TABS Take 1 tablet by mouth in the morning     • gabapentin (NEURONTIN) 100 mg capsule One in am, one at lunch, and 3 at hs 360 capsule 1   • lisinopril (ZESTRIL) 5 mg tablet Take 1 tablet (5 mg total) by mouth daily 90 tablet 1   • metFORMIN (GLUCOPHAGE) 500 mg tablet Take 1 tablet (500 mg total) by mouth daily with breakfast 90 tablet 1   • methocarbamol (ROBAXIN) 500 mg tablet 1 tablet twice daily if needed for back spasm 15 tablet 0   • Multiple Vitamins-Minerals (CENTRUM SILVER PO) Take 1 tablet by mouth daily       No current facility-administered medications for this visit  Review of Systems   Constitutional: Positive for activity change and fatigue  Negative for appetite change, diaphoresis and fever  HENT: Negative      Eyes: Positive for visual disturbance  Respiratory: Negative for apnea, cough, chest tightness, shortness of breath and wheezing  Cardiovascular: Negative for chest pain, palpitations and leg swelling  Gastrointestinal: Negative for abdominal distention, abdominal pain, anal bleeding, constipation, diarrhea, nausea and vomiting  Endocrine: Negative for cold intolerance, heat intolerance, polydipsia, polyphagia and polyuria  Genitourinary: Negative for difficulty urinating, dysuria, flank pain, hematuria and urgency  Musculoskeletal: Positive for arthralgias and back pain  Negative for gait problem, joint swelling and myalgias  Skin: Negative for color change, rash and wound  Allergic/Immunologic: Negative for environmental allergies, food allergies and immunocompromised state  Neurological: Negative for dizziness, seizures, syncope, speech difficulty, numbness and headaches  Lumbar radiculopathy   Hematological: Negative for adenopathy  Does not bruise/bleed easily  Psychiatric/Behavioral: Negative for agitation, behavioral problems, hallucinations, sleep disturbance and suicidal ideas  Objective:  Vitals:    02/01/23 1356   BP: 142/82   BP Location: Left arm   Patient Position: Sitting   Cuff Size: Large   Pulse: 88   Temp: (!) 96 5 °F (35 8 °C)   TempSrc: Temporal   SpO2: 97%   Weight: 126 kg (277 lb)   Height: 5' 7" (1 702 m)     Body mass index is 43 38 kg/m²  Physical Exam  Constitutional:       General: She is not in acute distress  Appearance: She is well-developed  She is obese  She is not diaphoretic  HENT:      Head: Normocephalic  Right Ear: External ear normal       Left Ear: External ear normal       Nose: Nose normal    Eyes:      General: No scleral icterus  Right eye: No discharge  Left eye: No discharge  Conjunctiva/sclera: Conjunctivae normal       Pupils: Pupils are equal, round, and reactive to light  Neck:      Thyroid: No thyromegaly        Trachea: No tracheal deviation  Cardiovascular:      Rate and Rhythm: Normal rate and regular rhythm  Heart sounds: Normal heart sounds  No murmur heard  No friction rub  No gallop  Pulmonary:      Effort: Pulmonary effort is normal  No respiratory distress  Breath sounds: Normal breath sounds  No wheezing  Abdominal:      General: Bowel sounds are normal       Palpations: Abdomen is soft  There is no mass  Tenderness: There is no abdominal tenderness  There is no guarding  Musculoskeletal:         General: No deformity  Cervical back: Normal range of motion  Lymphadenopathy:      Cervical: No cervical adenopathy  Skin:     General: Skin is warm and dry  Findings: No erythema or rash  Neurological:      Mental Status: She is alert and oriented to person, place, and time  Cranial Nerves: No cranial nerve deficit  Psychiatric:         Thought Content:  Thought content normal

## 2023-02-02 NOTE — TELEPHONE ENCOUNTER
LEFT VM FIRST AVAILABLE FOR DR ARREAGA OFFICE WAS 6/1/23 PER PATIENT REQUEST  CHART FAXED FOR INTAKE TO REVIEW  ALSO GAVE OFFICE # FOR PT TO CX OR CHANGE APPT

## 2023-02-08 ENCOUNTER — HOSPITAL ENCOUNTER (OUTPATIENT)
Dept: ULTRASOUND IMAGING | Facility: HOSPITAL | Age: 63
Discharge: HOME | End: 2023-02-08
Attending: FAMILY MEDICINE

## 2023-02-08 DIAGNOSIS — N18.32 CHRONIC KIDNEY DISEASE (CKD) STAGE G3B/A3, MODERATELY DECREASED GLOMERULAR FILTRATION RATE (GFR) BETWEEN 30-44 ML/MIN/1.73 SQUARE METER AND ALBUMINURIA CREATININE RATIO GREATER THAN 300 MG/G (HCC): ICD-10-CM

## 2023-02-14 ENCOUNTER — TELEPHONE (OUTPATIENT)
Dept: FAMILY MEDICINE CLINIC | Facility: CLINIC | Age: 63
End: 2023-02-14

## 2023-02-14 NOTE — TELEPHONE ENCOUNTER
CC: Chronic Sinusitis from November Discolored sinuses drainage     Sleeping 18 hrs a day - No energy - Taking the Iron    Asking for a Refill of Augmentin 875 BID x 10 days -  It's the only thing that relieves her symptoms

## 2023-02-15 DIAGNOSIS — J01.01 ACUTE RECURRENT MAXILLARY SINUSITIS: Primary | ICD-10-CM

## 2023-02-15 RX ORDER — AMOXICILLIN AND CLAVULANATE POTASSIUM 875; 125 MG/1; MG/1
1 TABLET, FILM COATED ORAL EVERY 12 HOURS SCHEDULED
Qty: 20 TABLET | Refills: 0 | Status: SHIPPED | OUTPATIENT
Start: 2023-02-15 | End: 2023-02-25

## 2023-02-27 DIAGNOSIS — I10 ESSENTIAL HYPERTENSION: ICD-10-CM

## 2023-02-27 DIAGNOSIS — N18.32 CHRONIC KIDNEY DISEASE (CKD) STAGE G3B/A3, MODERATELY DECREASED GLOMERULAR FILTRATION RATE (GFR) BETWEEN 30-44 ML/MIN/1.73 SQUARE METER AND ALBUMINURIA CREATININE RATIO GREATER THAN 300 MG/G (HCC): ICD-10-CM

## 2023-02-27 DIAGNOSIS — N18.31 STAGE 3A CHRONIC KIDNEY DISEASE (HCC): ICD-10-CM

## 2023-02-27 DIAGNOSIS — F41.1 GENERALIZED ANXIETY DISORDER: ICD-10-CM

## 2023-02-27 DIAGNOSIS — M47.27 OSTEOARTHRITIS OF SPINE WITH RADICULOPATHY, LUMBOSACRAL REGION: ICD-10-CM

## 2023-02-27 RX ORDER — CLONAZEPAM 0.5 MG/1
1 TABLET ORAL
Qty: 60 TABLET | Refills: 0 | Status: SHIPPED | OUTPATIENT
Start: 2023-02-27

## 2023-02-27 RX ORDER — DULOXETIN HYDROCHLORIDE 60 MG/1
60 CAPSULE, DELAYED RELEASE ORAL 2 TIMES DAILY
Qty: 180 CAPSULE | Refills: 0 | Status: SHIPPED | OUTPATIENT
Start: 2023-02-27

## 2023-02-27 RX ORDER — AMLODIPINE BESYLATE 5 MG/1
5 TABLET ORAL DAILY
Qty: 30 TABLET | Refills: 0 | Status: SHIPPED | OUTPATIENT
Start: 2023-02-27

## 2023-03-15 ENCOUNTER — CONSULT (OUTPATIENT)
Dept: NEPHROLOGY | Facility: CLINIC | Age: 63
End: 2023-03-15

## 2023-03-15 VITALS
WEIGHT: 267 LBS | OXYGEN SATURATION: 97 % | BODY MASS INDEX: 41.91 KG/M2 | HEART RATE: 91 BPM | HEIGHT: 67 IN | SYSTOLIC BLOOD PRESSURE: 112 MMHG | DIASTOLIC BLOOD PRESSURE: 72 MMHG

## 2023-03-15 DIAGNOSIS — N18.32 CHRONIC KIDNEY DISEASE (CKD) STAGE G3B/A3, MODERATELY DECREASED GLOMERULAR FILTRATION RATE (GFR) BETWEEN 30-44 ML/MIN/1.73 SQUARE METER AND ALBUMINURIA CREATININE RATIO GREATER THAN 300 MG/G (HCC): Primary | ICD-10-CM

## 2023-03-15 DIAGNOSIS — M54.50 LUMBAR PAIN: ICD-10-CM

## 2023-03-15 DIAGNOSIS — D50.0 IRON DEFICIENCY ANEMIA DUE TO CHRONIC BLOOD LOSS: ICD-10-CM

## 2023-03-15 DIAGNOSIS — E53.8 VITAMIN B12 DEFICIENCY: ICD-10-CM

## 2023-03-15 DIAGNOSIS — I10 ESSENTIAL HYPERTENSION: ICD-10-CM

## 2023-03-15 DIAGNOSIS — R80.9 ALBUMINURIA: ICD-10-CM

## 2023-03-15 DIAGNOSIS — E11.9 TYPE 2 DIABETES MELLITUS WITHOUT COMPLICATION, WITHOUT LONG-TERM CURRENT USE OF INSULIN (HCC): ICD-10-CM

## 2023-03-15 RX ORDER — TIZANIDINE 2 MG/1
2 TABLET ORAL EVERY 8 HOURS PRN
Qty: 45 TABLET | Refills: 3 | Status: SHIPPED | OUTPATIENT
Start: 2023-03-15

## 2023-03-15 RX ORDER — METHOCARBAMOL 500 MG/1
TABLET, FILM COATED ORAL
Qty: 15 TABLET | Refills: 0
Start: 2023-03-15 | End: 2023-03-15

## 2023-03-15 RX ORDER — LISINOPRIL 20 MG/1
20 TABLET ORAL DAILY
Qty: 90 TABLET | Refills: 1 | Status: SHIPPED | OUTPATIENT
Start: 2023-03-15

## 2023-03-15 NOTE — PATIENT INSTRUCTIONS
Go to [facility] and type "heme iron" please take 1 tab twice daily for now  Then, after a month or two, reduce to once daily  For the Zanaflex 2 mg, if this is not enough, please take 2 tabs and let me know

## 2023-03-20 ENCOUNTER — TELEPHONE (OUTPATIENT)
Dept: NEPHROLOGY | Facility: CLINIC | Age: 63
End: 2023-03-20

## 2023-03-20 NOTE — TELEPHONE ENCOUNTER
Yes, we can try to place the iron infusions and will hopefully be covered, if not, we will need to show that she continues to feel iron supplementation and requires infusions

## 2023-03-20 NOTE — TELEPHONE ENCOUNTER
Call from patient to let you know she started iron tablets you recommended  She was questioning iron infusion you talked about  She states she is is totally exhausted, even when she just takes the dog out  She is sleeping sometimes 16 to 20 hours a day  She is wondering if she is eligible with insurance for iron infusion sooner

## 2023-03-22 DIAGNOSIS — D50.0 IRON DEFICIENCY ANEMIA DUE TO CHRONIC BLOOD LOSS: Primary | ICD-10-CM

## 2023-03-22 PROBLEM — D50.9 IRON DEFICIENCY ANEMIA: Status: ACTIVE | Noted: 2023-03-22

## 2023-03-22 RX ORDER — SODIUM CHLORIDE 9 MG/ML
20 INJECTION, SOLUTION INTRAVENOUS ONCE
Status: CANCELLED | OUTPATIENT
Start: 2023-03-27

## 2023-03-22 NOTE — TELEPHONE ENCOUNTER
Tried reaching patient, no answer  Left message on voicemail for patient, infusion center will be reaching out to her to schedule infusion  Copy of lab orders , mailing to her for end of April  She should contact office with any questions/concerns  to return call

## 2023-03-22 NOTE — TELEPHONE ENCOUNTER
Orders are in the chart for the patient to have her infusions at [facility]  Please contact the infusion center and have them schedule when possible    Labs are already in the chart to be done toward the end of April, may include rechecking iron stores at that time

## 2023-03-24 NOTE — ASSESSMENT & PLAN NOTE
Patient has failed elemental iron supplementation  She will trial heme iron supplement at this time, but we will likely order iron infusions for the patient given how low her iron stores are and how long she has been on oral iron supplements to try to achieve a repleted state

## 2023-03-24 NOTE — ASSESSMENT & PLAN NOTE
Lab Results   Component Value Date    EGFR 37 01/10/2023    EGFR 42 09/15/2022    EGFR 50 02/28/2022    CREATININE 1 48 (H) 01/10/2023    CREATININE 1 35 (H) 09/15/2022    CREATININE 1 16 02/28/2022     Patient kidney function is a little lower than typical, unclear if this represents a progression of her underlying chronic kidney disease or if this simply represents a transient dip that may improve with next set of labs  Patient recently had a kidney ultrasound which noted appropriate kidney anatomy  Continue to avoid potential nephrotoxins, may continue lisinopril at this time, and reassess kidney function in a few weeks

## 2023-03-24 NOTE — ASSESSMENT & PLAN NOTE
Lab Results   Component Value Date    HGBA1C 7 1 (H) 01/10/2023     Most recent A1c is excellent for the patient, continue with current care according to endocrinology and primary care provider recommendations  As mentioned above, patient may benefit from the use of an SGLT-2 inhibitor

## 2023-03-24 NOTE — ASSESSMENT & PLAN NOTE
Is most likely due to diabetic nephropathy  We will rule out a monoclonal gammopathy serum and urine electrophoresis with immunofixation  Patient may be a candidate for an SGLT-2 inhibitor depending on how she progresses  In the meantime continue with lisinopril 20 mg once daily

## 2023-03-27 ENCOUNTER — HOSPITAL ENCOUNTER (OUTPATIENT)
Dept: INFUSION CENTER | Facility: HOSPITAL | Age: 63
Discharge: HOME | End: 2023-03-27
Attending: INTERNAL MEDICINE

## 2023-03-27 VITALS
SYSTOLIC BLOOD PRESSURE: 158 MMHG | OXYGEN SATURATION: 94 % | HEART RATE: 89 BPM | RESPIRATION RATE: 18 BRPM | TEMPERATURE: 96.4 F | DIASTOLIC BLOOD PRESSURE: 74 MMHG

## 2023-03-27 DIAGNOSIS — D50.0 IRON DEFICIENCY ANEMIA DUE TO CHRONIC BLOOD LOSS: Primary | ICD-10-CM

## 2023-03-27 RX ORDER — ALPHA LIPOIC ACID 300 MG
300 CAPSULE ORAL 2 TIMES DAILY
COMMUNITY

## 2023-03-27 RX ORDER — SODIUM CHLORIDE 9 MG/ML
20 INJECTION, SOLUTION INTRAVENOUS ONCE
Status: CANCELLED | OUTPATIENT
Start: 2023-04-03

## 2023-03-27 RX ORDER — SODIUM CHLORIDE 9 MG/ML
20 INJECTION, SOLUTION INTRAVENOUS ONCE
Status: COMPLETED | OUTPATIENT
Start: 2023-03-27 | End: 2023-03-27

## 2023-03-27 RX ADMIN — FERUMOXYTOL 510 MG: 510 INJECTION INTRAVENOUS at 11:44

## 2023-03-27 RX ADMIN — SODIUM CHLORIDE 20 ML/HR: 0.9 INJECTION, SOLUTION INTRAVENOUS at 11:43

## 2023-03-28 ENCOUNTER — EVALUATION (OUTPATIENT)
Dept: PHYSICAL THERAPY | Facility: CLINIC | Age: 63
End: 2023-03-28

## 2023-03-28 DIAGNOSIS — H81.10 BENIGN PAROXYSMAL POSITIONAL VERTIGO, UNSPECIFIED LATERALITY: Primary | ICD-10-CM

## 2023-03-28 NOTE — PROGRESS NOTES
PT Evaluation     Today's date: 3/28/2023  Patient name: Dillan Del Cid  : 1960  MRN: [id number]  Referring provider: Diony Combs MD  Dx:   Encounter Diagnosis     ICD-10-CM    1  Benign paroxysmal positional vertigo, unspecified laterality  H81 10           Start Time: 1515  Stop Time: 1550  Total time in clinic (min): 35 minutes    Assessment  Assessment details: The patient is a 59 yo female who presents to physical therapy with signs and symptoms consistent with vertigo  She was sick last November with sinus congestion and a month later experienced a bout of vertigo when laying on her right side  The vertigo continues to occur when she lays on her side or turns her head quickly to the right  Today, she experienced mild vertigo during positional testing but no nystagmus is present  Static balance and oculomotor exam is normal  A R Epley manuever was performed with a negative Francia-Hallpike afterwards  She is instructed to monitor her symptoms and to contact the PT clinic is symptoms return  Other impairment: vertigo  Understanding of Dx/Px/POC: good   Prognosis: good    Goals  STG (1 week)   1  Patient will reports no episodes of vertigo for 1 week   2  Patient will resume all daily activities with no vertigo    LTG (3 weeks)   1  Patient will remain vertigo free   2  Return to PLOF   3  Patient will be independent with HEP     Plan  Patient would benefit from: skilled physical therapy  Planned therapy interventions: canalith repositioning, neuromuscular re-education and balance  Frequency: 2x week  Duration in weeks: 3  Plan of Care beginning date: 3/28/2023  Plan of Care expiration date: 2023  Treatment plan discussed with: patient        Subjective Evaluation    History of Present Illness  Mechanism of injury: In 2022, patient developed a sinus infection  She states that it took her a long time to get over it  She noticed about a month later, she rolled in bed and had vertigo   She saw a neurologist last week  She is now referred to OPPT  She describes spinning, tilting and nauseous that lasted a few seconds to a minute  The symptoms occur when laying on her R side or when turning her head quickly to the right  Recurrent probem    Pain  No pain reported    Social Support  Steps to enter house: yes  Stairs in house: yes   Lives in: multiple-level home  Lives with: adult children    Employment status: not working  Treatments  No previous or current treatments  Patient Goals  Patient goal: get rid of vertigo        Objective     Concurrent Complaints  Positive for memory loss, poor concentration and peripheral neuropathy  Negative for headaches, nausea/motion sickness, tinnitus, visual change, hearing loss and aural fullness  Neuro Exam:     Dizziness  Positive for vertigo  Negative for disequilibrium, oscillopsia, motion sickness, light-headedness, rocking or swaying, diplopia and floating or swimming  Exacerbating factors  Positive for rolling in bed, turning head and supine to/from sitting (if laying on R side)  Negative for bending over, looking up, walking, optokinetic movement and walking in busy environment       Symptoms   Duration: seconds to a minute  Frequency: daily    Headaches   Patient reports headaches: No      Oculomotor exam   Oculomotor ROM: WNL  Resting nystagmus: not present   Gaze holding nystagmus: not present left  and not present right  Smooth pursuits: within normal limits  Vertical saccades: hypometric  Horizontal saccades: hypometric   Convergence: normal    Positional testing   [place]-Hallpike   Left posterior canal: WNL  Right posterior canal: symptomatic  Roll test   Left horizontal canal: WNL  Right horizontal canal: WNL      Balance assessments   MCTSIB   Eyes open level surface: 30 seconds  Eyes closed level surface: 30 seconds             Precautions: DM  Diagnosis, prognosis and PT POC discussed with patient      Manuals 3/28 Neuro Re-Ed             Assessment JM Epley R Epleyx1                                                                             Ther Ex                                                                                                                     Ther Activity                                                                              Modalities

## 2023-03-28 NOTE — LETTER
2023    Reed Frazier MD  [address]  [address] [place] [address] [place]    Patient: Ramesh Grider   YOB: 1960   Date of Visit: 3/28/2023     Encounter Diagnosis     ICD-10-CM    1  Benign paroxysmal positional vertigo, unspecified laterality  H81 10           Dear Dr Hancock Hallmark: Thank you for your recent referral of Ramesh Grider  Please review the attached evaluation summary from Loretta's recent visit  Please verify that you agree with the plan of care by signing the attached order  If you have any questions or concerns, please do not hesitate to call  I sincerely appreciate the opportunity to share in the care of one of your patients and hope to have another opportunity to work with you in the near future  Sincerely,    Sheba Brasher, PT      Referring Provider:      I certify that I have read the below Plan of Care and certify the need for these services furnished under this plan of treatment while under my care  Reed Frazier MD  [address]  [address] [place] [address]  Via Fax: [phone]          PT Evaluation     Today's date: 3/28/2023  Patient name: Ramesh Grider  : 1960  MRN: [id number]  Referring provider: Jesu Pepper MD  Dx:   Encounter Diagnosis     ICD-10-CM    1  Benign paroxysmal positional vertigo, unspecified laterality  H81 10           Start Time: 1515  Stop Time: 1550  Total time in clinic (min): 35 minutes    Assessment  Assessment details: The patient is a 59 yo female who presents to physical therapy with signs and symptoms consistent with vertigo  She was sick last November with sinus congestion and a month later experienced a bout of vertigo when laying on her right side  The vertigo continues to occur when she lays on her side or turns her head quickly to the right  Today, she experienced mild vertigo during positional testing but no nystagmus is present   Static balance and oculomotor exam is normal  A R Epley manuever was performed with a negative Francia-Hallpike afterwards  She is instructed to monitor her symptoms and to contact the PT clinic is symptoms return  Other impairment: vertigo  Understanding of Dx/Px/POC: good   Prognosis: good    Goals  STG (1 week)   1  Patient will reports no episodes of vertigo for 1 week   2  Patient will resume all daily activities with no vertigo    LTG (3 weeks)   1  Patient will remain vertigo free   2  Return to PLOF   3  Patient will be independent with HEP     Plan  Patient would benefit from: skilled physical therapy  Planned therapy interventions: canalith repositioning, neuromuscular re-education and balance  Frequency: 2x week  Duration in weeks: 3  Plan of Care beginning date: 3/28/2023  Plan of Care expiration date: 4/18/2023  Treatment plan discussed with: patient        Subjective Evaluation    History of Present Illness  Mechanism of injury: In November 2022, patient developed a sinus infection  She states that it took her a long time to get over it  She noticed about a month later, she rolled in bed and had vertigo  She saw a neurologist last week  She is now referred to OPPT  She describes spinning, tilting and nauseous that lasted a few seconds to a minute  The symptoms occur when laying on her R side or when turning her head quickly to the right  Recurrent probem    Pain  No pain reported    Social Support  Steps to enter house: yes  Stairs in house: yes   Lives in: multiple-level home  Lives with: adult children    Employment status: not working  Treatments  No previous or current treatments  Patient Goals  Patient goal: get rid of vertigo        Objective     Concurrent Complaints  Positive for memory loss, poor concentration and peripheral neuropathy  Negative for headaches, nausea/motion sickness, tinnitus, visual change, hearing loss and aural fullness  Neuro Exam:     Dizziness  Positive for vertigo     Negative for disequilibrium, oscillopsia, motion sickness, light-headedness, rocking or swaying, diplopia and floating or swimming  Exacerbating factors  Positive for rolling in bed, turning head and supine to/from sitting (if laying on R side)  Negative for bending over, looking up, walking, optokinetic movement and walking in busy environment       Symptoms   Duration: seconds to a minute  Frequency: daily    Headaches   Patient reports headaches: No      Oculomotor exam   Oculomotor ROM: WNL  Resting nystagmus: not present   Gaze holding nystagmus: not present left  and not present right  Smooth pursuits: within normal limits  Vertical saccades: hypometric  Horizontal saccades: hypometric   Convergence: normal    Positional testing   [place]-Hallpike   Left posterior canal: WNL  Right posterior canal: symptomatic  Roll test   Left horizontal canal: WNL  Right horizontal canal: WNL      Balance assessments   MCTSIB   Eyes open level surface: 30 seconds  Eyes closed level surface: 30 seconds            Precautions: DM  Diagnosis, prognosis and PT POC discussed with patient      Manuals 3/28                                                                Neuro Re-Ed             Assessment JM Epley R Epleyx1                                                                             Ther Ex                                                                                                                     Ther Activity                                                                              Modalities

## 2023-04-03 ENCOUNTER — HOSPITAL ENCOUNTER (OUTPATIENT)
Dept: INFUSION CENTER | Facility: HOSPITAL | Age: 63
Discharge: HOME | End: 2023-04-03
Attending: INTERNAL MEDICINE

## 2023-04-03 VITALS
RESPIRATION RATE: 18 BRPM | HEART RATE: 95 BPM | DIASTOLIC BLOOD PRESSURE: 58 MMHG | SYSTOLIC BLOOD PRESSURE: 125 MMHG | TEMPERATURE: 97 F

## 2023-04-03 DIAGNOSIS — D50.0 IRON DEFICIENCY ANEMIA DUE TO CHRONIC BLOOD LOSS: Primary | ICD-10-CM

## 2023-04-03 RX ORDER — SODIUM CHLORIDE 9 MG/ML
20 INJECTION, SOLUTION INTRAVENOUS ONCE
Status: COMPLETED | OUTPATIENT
Start: 2023-04-03 | End: 2023-04-03

## 2023-04-03 RX ORDER — SODIUM CHLORIDE 9 MG/ML
20 INJECTION, SOLUTION INTRAVENOUS ONCE
Status: CANCELLED | OUTPATIENT
Start: 2023-04-03

## 2023-04-03 RX ADMIN — SODIUM CHLORIDE 20 ML/HR: 9 INJECTION, SOLUTION INTRAVENOUS at 13:55

## 2023-04-03 RX ADMIN — FERUMOXYTOL 510 MG: 510 INJECTION INTRAVENOUS at 14:01

## 2023-04-21 ENCOUNTER — LAB (OUTPATIENT)
Dept: LAB | Facility: MEDICAL CENTER | Age: 63
End: 2023-04-21

## 2023-04-21 DIAGNOSIS — E11.9 TYPE 2 DIABETES MELLITUS WITHOUT COMPLICATION, WITHOUT LONG-TERM CURRENT USE OF INSULIN (HCC): ICD-10-CM

## 2023-04-21 DIAGNOSIS — E53.8 VITAMIN B12 DEFICIENCY: ICD-10-CM

## 2023-04-21 DIAGNOSIS — R80.9 ALBUMINURIA: ICD-10-CM

## 2023-04-21 DIAGNOSIS — N18.32 CHRONIC KIDNEY DISEASE (CKD) STAGE G3B/A3, MODERATELY DECREASED GLOMERULAR FILTRATION RATE (GFR) BETWEEN 30-44 ML/MIN/1.73 SQUARE METER AND ALBUMINURIA CREATININE RATIO GREATER THAN 300 MG/G (HCC): ICD-10-CM

## 2023-04-21 LAB
ALBUMIN SERPL BCP-MCNC: 3.8 G/DL (ref 3.5–5)
ALP SERPL-CCNC: 65 U/L (ref 46–116)
ALT SERPL W P-5'-P-CCNC: 25 U/L (ref 12–78)
ANION GAP SERPL CALCULATED.3IONS-SCNC: 5 MMOL/L (ref 4–13)
AST SERPL W P-5'-P-CCNC: 18 U/L (ref 5–45)
BASOPHILS # BLD AUTO: 0.03 THOUSANDS/ΜL (ref 0–0.1)
BASOPHILS NFR BLD AUTO: 1 % (ref 0–1)
BILIRUB SERPL-MCNC: 0.3 MG/DL (ref 0.2–1)
BUN SERPL-MCNC: 22 MG/DL (ref 5–25)
CALCIUM SERPL-MCNC: 9.7 MG/DL (ref 8.3–10.1)
CHLORIDE SERPL-SCNC: 107 MMOL/L (ref 96–108)
CO2 SERPL-SCNC: 21 MMOL/L (ref 21–32)
CREAT SERPL-MCNC: 1.31 MG/DL (ref 0.6–1.3)
EOSINOPHIL # BLD AUTO: 0.13 THOUSAND/ΜL (ref 0–0.61)
EOSINOPHIL NFR BLD AUTO: 2 % (ref 0–6)
ERYTHROCYTE [DISTWIDTH] IN BLOOD BY AUTOMATED COUNT: 14 % (ref 11.6–15.1)
FERRITIN SERPL-MCNC: 410 NG/ML (ref 8–388)
GFR SERPL CREATININE-BSD FRML MDRD: 43 ML/MIN/1.73SQ M
GLUCOSE P FAST SERPL-MCNC: 128 MG/DL (ref 65–99)
HCT VFR BLD AUTO: 36.9 % (ref 34.8–46.1)
HGB BLD-MCNC: 11.6 G/DL (ref 11.5–15.4)
IMM GRANULOCYTES # BLD AUTO: 0.01 THOUSAND/UL (ref 0–0.2)
IMM GRANULOCYTES NFR BLD AUTO: 0 % (ref 0–2)
IRON SATN MFR SERPL: 34 % (ref 15–50)
IRON SERPL-MCNC: 92 UG/DL (ref 50–170)
LYMPHOCYTES # BLD AUTO: 1.97 THOUSANDS/ΜL (ref 0.6–4.47)
LYMPHOCYTES NFR BLD AUTO: 35 % (ref 14–44)
MCH RBC QN AUTO: 28.9 PG (ref 26.8–34.3)
MCHC RBC AUTO-ENTMCNC: 31.4 G/DL (ref 31.4–37.4)
MCV RBC AUTO: 92 FL (ref 82–98)
MONOCYTES # BLD AUTO: 0.4 THOUSAND/ΜL (ref 0.17–1.22)
MONOCYTES NFR BLD AUTO: 7 % (ref 4–12)
NEUTROPHILS # BLD AUTO: 3.11 THOUSANDS/ΜL (ref 1.85–7.62)
NEUTS SEG NFR BLD AUTO: 55 % (ref 43–75)
NRBC BLD AUTO-RTO: 0 /100 WBCS
PLATELET # BLD AUTO: 266 THOUSANDS/UL (ref 149–390)
PMV BLD AUTO: 10.3 FL (ref 8.9–12.7)
POTASSIUM SERPL-SCNC: 4.1 MMOL/L (ref 3.5–5.3)
PROT SERPL-MCNC: 8 G/DL (ref 6.4–8.4)
RBC # BLD AUTO: 4.01 MILLION/UL (ref 3.81–5.12)
SODIUM SERPL-SCNC: 133 MMOL/L (ref 135–147)
TIBC SERPL-MCNC: 269 UG/DL (ref 250–450)
VIT B12 SERPL-MCNC: 497 PG/ML (ref 100–900)
WBC # BLD AUTO: 5.65 THOUSAND/UL (ref 4.31–10.16)

## 2023-04-22 LAB
EST. AVERAGE GLUCOSE BLD GHB EST-MCNC: 163 MG/DL
HBA1C MFR BLD: 7.3 %

## 2023-04-25 LAB
ALBUMIN SERPL ELPH-MCNC: 5.21 G/DL (ref 3.5–5)
ALBUMIN SERPL ELPH-MCNC: 67.6 % (ref 52–65)
ALBUMIN UR ELPH-MCNC: 100 %
ALPHA1 GLOB MFR UR ELPH: 0 %
ALPHA1 GLOB SERPL ELPH-MCNC: 0.27 G/DL (ref 0.1–0.4)
ALPHA1 GLOB SERPL ELPH-MCNC: 3.5 % (ref 2.5–5)
ALPHA2 GLOB MFR UR ELPH: 0 %
ALPHA2 GLOB SERPL ELPH-MCNC: 0.72 G/DL (ref 0.4–1.2)
ALPHA2 GLOB SERPL ELPH-MCNC: 9.3 % (ref 7–13)
B-GLOBULIN MFR UR ELPH: 0 %
BETA GLOB ABNORMAL SERPL ELPH-MCNC: 0.27 G/DL (ref 0.4–0.8)
BETA1 GLOB SERPL ELPH-MCNC: 3.5 % (ref 5–13)
BETA2 GLOB SERPL ELPH-MCNC: 5.1 % (ref 2–8)
BETA2+GAMMA GLOB SERPL ELPH-MCNC: 0.39 G/DL (ref 0.2–0.5)
GAMMA GLOB ABNORMAL SERPL ELPH-MCNC: 0.85 G/DL (ref 0.5–1.6)
GAMMA GLOB MFR UR ELPH: 0 %
GAMMA GLOB SERPL ELPH-MCNC: 11 % (ref 12–22)
IGG/ALB SER: 2.09 {RATIO} (ref 1.1–1.8)
PROT PATTERN SERPL ELPH-IMP: ABNORMAL
PROT PATTERN UR ELPH-IMP: NORMAL
PROT SERPL-MCNC: 7.7 G/DL (ref 6.4–8.2)
PROT UR-MCNC: 6 MG/DL

## 2023-04-26 ENCOUNTER — OFFICE VISIT (OUTPATIENT)
Dept: FAMILY MEDICINE CLINIC | Facility: CLINIC | Age: 63
End: 2023-04-26

## 2023-04-26 VITALS
HEIGHT: 67 IN | OXYGEN SATURATION: 98 % | DIASTOLIC BLOOD PRESSURE: 66 MMHG | TEMPERATURE: 97.6 F | HEART RATE: 86 BPM | WEIGHT: 278 LBS | SYSTOLIC BLOOD PRESSURE: 128 MMHG | BODY MASS INDEX: 43.63 KG/M2

## 2023-04-26 DIAGNOSIS — Z12.31 ENCOUNTER FOR SCREENING MAMMOGRAM FOR MALIGNANT NEOPLASM OF BREAST: ICD-10-CM

## 2023-04-26 DIAGNOSIS — Z00.00 MEDICARE ANNUAL WELLNESS VISIT, SUBSEQUENT: Primary | ICD-10-CM

## 2023-04-26 DIAGNOSIS — E11.22 TYPE 2 DIABETES MELLITUS WITH STAGE 3A CHRONIC KIDNEY DISEASE, WITHOUT LONG-TERM CURRENT USE OF INSULIN (HCC): ICD-10-CM

## 2023-04-26 DIAGNOSIS — N18.31 TYPE 2 DIABETES MELLITUS WITH STAGE 3A CHRONIC KIDNEY DISEASE, WITHOUT LONG-TERM CURRENT USE OF INSULIN (HCC): ICD-10-CM

## 2023-04-26 NOTE — PATIENT INSTRUCTIONS
Medicare Preventive Visit Patient Instructions  Thank you for completing your Welcome to Medicare Visit or Medicare Annual Wellness Visit today  Your next wellness visit will be due in one year (4/26/2024)  The screening/preventive services that you may require over the next 5-10 years are detailed below  Some tests may not apply to you based off risk factors and/or age  Screening tests ordered at today's visit but not completed yet may show as past due  Also, please note that scanned in results may not display below  Preventive Screenings:  Service Recommendations Previous Testing/Comments   Colorectal Cancer Screening  * Colonoscopy    * Fecal Occult Blood Test (FOBT)/Fecal Immunochemical Test (FIT)  * Fecal DNA/Cologuard Test  * Flexible Sigmoidoscopy Age: 39-70 years old   Colonoscopy: every 10 years (may be performed more frequently if at higher risk)  OR  FOBT/FIT: every 1 year  OR  Cologuard: every 3 years  OR  Sigmoidoscopy: every 5 years  Screening may be recommended earlier than age 39 if at higher risk for colorectal cancer  Also, an individualized decision between you and your healthcare provider will decide whether screening between the ages of 74-80 would be appropriate  Colonoscopy: Not on file  FOBT/FIT: Not on file  Cologuard: Not on file  Sigmoidoscopy: Not on file          Breast Cancer Screening Age: 36 years old  Frequency: every 1-2 years  Not required if history of left and right mastectomy Mammogram: 03/01/2022    Screening Current   Cervical Cancer Screening Between the ages of 21-29, pap smear recommended once every 3 years  Between the ages of 33-67, can perform pap smear with HPV co-testing every 5 years     Recommendations may differ for women with a history of total hysterectomy, cervical cancer, or abnormal pap smears in past  Pap Smear: Not on file        Hepatitis C Screening Once for adults born between 1945 and 1965  More frequently in patients at high risk for Hepatitis C Hep C Antibody: Not on file        Diabetes Screening 1-2 times per year if you're at risk for diabetes or have pre-diabetes Fasting glucose: 128 mg/dL (4/21/2023)  A1C: 7 3 % (4/21/2023)  Screening Not Indicated  History Diabetes   Cholesterol Screening Once every 5 years if you don't have a lipid disorder  May order more often based on risk factors  Lipid panel: 09/15/2022    Screening Current     Other Preventive Screenings Covered by Medicare:  1  Abdominal Aortic Aneurysm (AAA) Screening: covered once if your at risk  You're considered to be at risk if you have a family history of AAA  2  Lung Cancer Screening: covers low dose CT scan once per year if you meet all of the following conditions: (1) Age 50-69; (2) No signs or symptoms of lung cancer; (3) Current smoker or have quit smoking within the last 15 years; (4) You have a tobacco smoking history of at least 20 pack years (packs per day multiplied by number of years you smoked); (5) You get a written order from a healthcare provider  3  Glaucoma Screening: covered annually if you're considered high risk: (1) You have diabetes OR (2) Family history of glaucoma OR (3)  aged 48 and older OR (3)  American aged 72 and older  3  Osteoporosis Screening: covered every 2 years if you meet one of the following conditions: (1) You're estrogen deficient and at risk for osteoporosis based off medical history and other findings; (2) Have a vertebral abnormality; (3) On glucocorticoid therapy for more than 3 months; (4) Have primary hyperparathyroidism; (5) On osteoporosis medications and need to assess response to drug therapy  · Last bone density test (DXA Scan): Not on file  5  HIV Screening: covered annually if you're between the age of 12-76  Also covered annually if you are younger than 13 and older than 72 with risk factors for HIV infection   For pregnant patients, it is covered up to 3 times per pregnancy  Immunizations:  Immunization Recommendations   Influenza Vaccine Annual influenza vaccination during flu season is recommended for all persons aged >= 6 months who do not have contraindications   Pneumococcal Vaccine   * Pneumococcal conjugate vaccine = PCV13 (Prevnar 13), PCV15 (Vaxneuvance), PCV20 (Prevnar 20)  * Pneumococcal polysaccharide vaccine = PPSV23 (Pneumovax) Adults 25-60 years old: 1-3 doses may be recommended based on certain risk factors  Adults 72 years old: 1-2 doses may be recommended based off what pneumonia vaccine you previously received   Hepatitis B Vaccine 3 dose series if at intermediate or high risk (ex: diabetes, end stage renal disease, liver disease)   Tetanus (Td) Vaccine - COST NOT COVERED BY MEDICARE PART B Following completion of primary series, a booster dose should be given every 10 years to maintain immunity against tetanus  Td may also be given as tetanus wound prophylaxis  Tdap Vaccine - COST NOT COVERED BY MEDICARE PART B Recommended at least once for all adults  For pregnant patients, recommended with each pregnancy  Shingles Vaccine (Shingrix) - COST NOT COVERED BY MEDICARE PART B  2 shot series recommended in those aged 48 and above     Health Maintenance Due:      Topic Date Due   • Hepatitis C Screening  Never done   • HIV Screening  Never done   • Cervical Cancer Screening  Never done   • Colorectal Cancer Screening  Never done   • Breast Cancer Screening: Mammogram  03/01/2023     Immunizations Due:      Topic Date Due   • Pneumococcal Vaccine: Pediatrics (0 to 5 Years) and At-Risk Patients (6 to 59 Years) (1 - PCV) Never done   • COVID-19 Vaccine (3 - Booster for Valeda Phillips series) 06/23/2021     Advance Directives   What are advance directives? Advance directives are legal documents that state your wishes and plans for medical care  These plans are made ahead of time in case you lose your ability to make decisions for yourself   Advance directives can apply to any medical decision, such as the treatments you want, and if you want to donate organs  What are the types of advance directives? There are many types of advance directives, and each state has rules about how to use them  You may choose a combination of any of the following:  · Living will: This is a written record of the treatment you want  You can also choose which treatments you do not want, which to limit, and which to stop at a certain time  This includes surgery, medicine, IV fluid, and tube feedings  · Durable power of  for healthcare [facility]): This is a written record that states who you want to make healthcare choices for you when you are unable to make them for yourself  This person, called a proxy, is usually a family member or a friend  You may choose more than 1 proxy  · Do not resuscitate (DNR) order:  A DNR order is used in case your heart stops beating or you stop breathing  It is a request not to have certain forms of treatment, such as CPR  A DNR order may be included in other types of advance directives  · Medical directive: This covers the care that you want if you are in a coma, near death, or unable to make decisions for yourself  You can list the treatments you want for each condition  Treatment may include pain medicine, surgery, blood transfusions, dialysis, IV or tube feedings, and a ventilator (breathing machine)  · Values history: This document has questions about your views, beliefs, and how you feel and think about life  This information can help others choose the care that you would choose  Why are advance directives important? An advance directive helps you control your care  Although spoken wishes may be used, it is better to have your wishes written down  Spoken wishes can be misunderstood, or not followed  Treatments may be given even if you do not want them   An advance directive may make it easier for your family to make difficult choices about your care    Urinary Incontinence   Urinary incontinence (UI)  is when you lose control of your bladder  UI develops because your bladder cannot store or empty urine properly  The 3 most common types of UI are stress incontinence, urge incontinence, or both  Medicines:   · May be given to help strengthen your bladder control  Report any side effects of medication to your healthcare provider  Do pelvic muscle exercises often:  Your pelvic muscles help you stop urinating  Squeeze these muscles tight for 5 seconds, then relax for 5 seconds  Gradually work up to squeezing for 10 seconds  Do 3 sets of 15 repetitions a day, or as directed  This will help strengthen your pelvic muscles and improve bladder control  Train your bladder:  Go to the bathroom at set times, such as every 2 hours, even if you do not feel the urge to go  You can also try to hold your urine when you feel the urge to go  For example, hold your urine for 5 minutes when you feel the urge to go  As that becomes easier, hold your urine for 10 minutes  Self-care:   · Keep a UI record  Write down how often you leak urine and how much you leak  Make a note of what you were doing when you leaked urine  · Drink liquids as directed  You may need to limit the amount of liquid you drink to help control your urine leakage  Do not drink any liquid right before you go to bed  Limit or do not have drinks that contain caffeine or alcohol  · Prevent constipation  Eat a variety of high-fiber foods  Good examples are high-fiber cereals, beans, vegetables, and whole-grain breads  Walking is the best way to trigger your intestines to have a bowel movement  · Exercise regularly and maintain a healthy weight  Weight loss and exercise will decrease pressure on your bladder and help you control your leakage  · Use a catheter as directed  to help empty your bladder  A catheter is a tiny, plastic tube that is put into your bladder to drain your urine     · Go to behavior therapy as directed  Behavior therapy may be used to help you learn to control your urge to urinate  How to Quit Using Smokeless Tobacco   Why it is important to stop using smokeless tobacco:  Smokeless tobacco comes in many forms  Examples include chew, snuff, dip, dissolvable tobacco, and snus  All smokeless tobacco products contain nicotine and may contain as much nicotine as 3 cigarettes  You may be physically dependent on nicotine  You may also be emotionally addicted to it  The cravings can be strong, but it is important to quit using smokeless tobacco  You will improve your health and decrease your cancer, stroke, and heart attack risk  Mouth sores and tooth problems will also improve when you quit  You can benefit from quitting no matter how long you have used smokeless tobacco    Prepare to stop using smokeless tobacco:  Nicotine is a highly addictive drug  Withdrawal symptoms can happen when you stop and make it hard to quit  The following can help keep you on track:  · Set a quit date  · Tell friends, family, and coworkers that you plan to quit  · Remove all smokeless tobacco products from your home, car, and workplace  Manage weight gain after you quit:  Nicotine can affect your metabolism  You may gain a few pounds after you quit  The following can help you control your weight:  · Eat healthy foods  · Drink water before, during, and between meals  · Exercise as directed  Weight Management   Why it is important to manage your weight:  Being overweight increases your risk of health conditions such as heart disease, high blood pressure, type 2 diabetes, and certain types of cancer  It can also increase your risk for osteoarthritis, sleep apnea, and other respiratory problems  Aim for a slow, steady weight loss  Even a small amount of weight loss can lower your risk of health problems    How to lose weight safely:  A safe and healthy way to lose weight is to eat fewer calories and get regular exercise  You can lose up about 1 pound a week by decreasing the number of calories you eat by 500 calories each day  Healthy meal plan for weight management:  A healthy meal plan includes a variety of foods, contains fewer calories, and helps you stay healthy  A healthy meal plan includes the following:  · Eat whole-grain foods more often  A healthy meal plan should contain fiber  Fiber is the part of grains, fruits, and vegetables that is not broken down by your body  Whole-grain foods are healthy and provide extra fiber in your diet  Some examples of whole-grain foods are whole-wheat breads and pastas, oatmeal, brown rice, and bulgur  · Eat a variety of vegetables every day  Include dark, leafy greens such as spinach, kale, dominick greens, and mustard greens  Eat yellow and orange vegetables such as carrots, sweet potatoes, and winter squash  · Eat a variety of fruits every day  Choose fresh or canned fruit (canned in its own juice or light syrup) instead of juice  Fruit juice has very little or no fiber  · Eat low-fat dairy foods  Drink fat-free (skim) milk or 1% milk  Eat fat-free yogurt and low-fat cottage cheese  Try low-fat cheeses such as mozzarella and other reduced-fat cheeses  · Choose meat and other protein foods that are low in fat  Choose beans or other legumes such as split peas or lentils  Choose fish, skinless poultry (chicken or turkey), or lean cuts of red meat (beef or pork)  Before you cook meat or poultry, cut off any visible fat  · Use less fat and oil  Try baking foods instead of frying them  Add less fat, such as margarine, sour cream, regular salad dressing and mayonnaise to foods  Eat fewer high-fat foods  Some examples of high-fat foods include french fries, doughnuts, ice cream, and cakes  · Eat fewer sweets  Limit foods and drinks that are high in sugar  This includes candy, cookies, regular soda, and sweetened drinks    Exercise:  Exercise at least 30 minutes per day on most days of the week  Some examples of exercise include walking, biking, dancing, and swimming  You can also fit in more physical activity by taking the stairs instead of the elevator or parking farther away from stores  Ask your healthcare provider about the best exercise plan for you  © Copyright [employer] 2018 Information is for End User's use only and may not be sold, redistributed or otherwise used for commercial purposes   All illustrations and images included in CareNotes® are the copyrighted property of [employer]  or [address] [place] [address]

## 2023-04-26 NOTE — PROGRESS NOTES
Assessment and Plan:     Problem List Items Addressed This Visit    None    BMI Counseling: Body mass index is 43 54 kg/m²  The BMI is above normal  Nutrition recommendations include decreasing portion sizes, encouraging healthy choices of fruits and vegetables, moderation in carbohydrate intake and increasing intake of lean protein  Exercise recommendations include moderate physical activity 150 minutes/week  Rationale for BMI follow-up plan is due to patient being overweight or obese  Preventive health issues were discussed with patient, and age appropriate screening tests were ordered as noted in patient's After Visit Summary  Personalized health advice and appropriate referrals for health education or preventive services given if needed, as noted in patient's After Visit Summary  History of Present Illness:     Patient presents for a Medicare Wellness Visit    Shereen is here today for the purposes of a Medicare well visit however any acute issues that are discovered will be addressed immediately     Patient Care Team:  Gertrude Clifford DO as PCP - General (Family Medicine)  Jamilah Alexis DPM as PCP - Surgeon (Podiatry)  Tyler Lynch DO (Nephrology)     Review of Systems:     Review of Systems   Constitutional: Positive for activity change and fatigue  Negative for appetite change, diaphoresis and fever  HENT: Positive for dental problem  Negative for hearing loss and tinnitus  Eyes: Positive for visual disturbance  Patient had a good eye exam in December there was no sign of any diabetic retinopathy no macular degeneration no glaucoma she was refracted for new lenses   Respiratory: Negative for apnea, cough, chest tightness, shortness of breath and wheezing  Cardiovascular: Negative for chest pain, palpitations and leg swelling  Gastrointestinal: Negative for abdominal distention, abdominal pain, anal bleeding, constipation, diarrhea, nausea and vomiting     Endocrine: Negative for cold intolerance, heat intolerance, polydipsia, polyphagia and polyuria  Genitourinary: Negative for difficulty urinating, dysuria, flank pain, hematuria and urgency  Mild female stress incontinence   Musculoskeletal: Positive for arthralgias, back pain and joint swelling  Negative for gait problem and myalgias  Skin: Negative for color change, rash and wound  Allergic/Immunologic: Negative for environmental allergies, food allergies and immunocompromised state  Neurological: Positive for headaches  Negative for dizziness, seizures, syncope, speech difficulty and numbness  Hematological: Negative for adenopathy  Does not bruise/bleed easily  Psychiatric/Behavioral: Negative for agitation, behavioral problems, hallucinations, sleep disturbance and suicidal ideas  The patient is nervous/anxious           Problem List:     Patient Active Problem List   Diagnosis   • Essential hypertension   • History of MRSA infection   • Low back pain   • Obesity (BMI 35 0-39 9 without comorbidity)   • Obstructive sleep apnea   • Depression   • Restless legs   • Osteoarthritis of spine with radiculopathy, lumbosacral region   • Generalized anxiety disorder   • Type 2 diabetes mellitus without complication, without long-term current use of insulin ([facility])   • Obesity, morbid ([facility])   • Chronic kidney disease (CKD) stage G3b/A3, moderately decreased glomerular filtration rate (GFR) between 30-44 mL/min/1 73 square meter and albuminuria creatinine ratio greater than 300 mg/g ([facility])   • Recurrent major depressive disorder, in partial remission [facility] )   • Albuminuria   • Iron deficiency anemia      Past Medical and Surgical History:     Past Medical History:   Diagnosis Date   • Hypertension    • Migraine    • MRSA (methicillin resistant Staphylococcus aureus)      Past Surgical History:   Procedure Laterality Date   • ACHILLES TENDON REPAIR     • ANKLE SURGERY Left 2019    Excision of Lipoma   • CARPAL TUNNEL RELEASE Right    •  SECTION     • CHOLECYSTECTOMY     • GANGLION CYST EXCISION Left     Left wrist   • LUMBAR LAMINECTOMY  2018    x 3 disks   • NASAL SINUS SURGERY        Family History:     Family History   Problem Relation Age of Onset   • Hypertension Mother    • Hepatitis Mother         Hep C   • Stroke Father    • Arrhythmia Father    • Melanoma Father    • No Known Problems Sister    • No Known Problems Maternal Grandmother    • No Known Problems Maternal Grandfather    • No Known Problems Paternal Grandmother    • No Known Problems Paternal Grandfather    • No Known Problems Son    • No Known Problems Maternal Aunt    • No Known Problems Maternal Aunt    • No Known Problems Maternal Aunt    • No Known Problems Maternal Aunt    • No Known Problems Paternal Aunt       Social History:     Social History     Socioeconomic History   • Marital status: Single     Spouse name: None   • Number of children: None   • Years of education: None   • Highest education level: None   Occupational History   • None   Tobacco Use   • Smoking status: Former     Years: 50 00     Types: Cigarettes     Quit date:      Years since quittin 3   • Smokeless tobacco: Current   • Tobacco comments:     Vapes - Daily - includes 3 mg nicotine   Vaping Use   • Vaping Use: Every day   • Start date: 2016   • Substances: Nicotine, Flavoring   Substance and Sexual Activity   • Alcohol use: Not Currently   • Drug use: Never   • Sexual activity: Not Currently   Other Topics Concern   • None   Social History Narrative   • None     Social Determinants of Health     Financial Resource Strain: Not on file   Food Insecurity: Not on file   Transportation Needs: Not on file   Physical Activity: Not on file   Stress: Not on file   Social Connections: Not on file   Intimate Partner Violence: Not on file   Housing Stability: Not on file      Medications and Allergies:     Current Outpatient Medications   Medication Sig Dispense Refill   • Alpha-Lipoic Acid 300 MG CAPS Take 300 mg by mouth 2 (two) times a day     • clonazePAM (KlonoPIN) 0 5 mg tablet Take 2 tablets (1 mg total) by mouth daily at bedtime as needed for anxiety 60 tablet 0   • docusate sodium (COLACE) 100 mg capsule Take 100 mg by mouth daily as needed for constipation     • DULoxetine (CYMBALTA) 60 mg delayed release capsule Take 1 capsule (60 mg total) by mouth 2 (two) times a day 180 capsule 0   • famotidine (PEPCID) 20 mg tablet Take 20 mg by mouth daily     • gabapentin (NEURONTIN) 100 mg capsule One in am, one at lunch, and 3 at hs 360 capsule 1   • IRON HEME POLYPEPTIDE PO Take 20 mg by mouth 3 (three) times a week     • lisinopril (ZESTRIL) 20 mg tablet Take 1 tablet (20 mg total) by mouth daily 90 tablet 1   • metFORMIN (GLUCOPHAGE) 500 mg tablet Take 1 tablet (500 mg total) by mouth daily with breakfast 90 tablet 1   • Multiple Vitamins-Minerals (CENTRUM SILVER PO) Take 1 tablet by mouth daily     • tiZANidine (ZANAFLEX) 2 mg tablet Take 1 tablet (2 mg total) by mouth every 8 (eight) hours as needed for muscle spasms 45 tablet 3     No current facility-administered medications for this visit       Allergies   Allergen Reactions   • Pollen Extract Sneezing   • Vancomycin Rash      Immunizations:     Immunization History   Administered Date(s) Administered   • COVID-19 MODERNA VACC 0 5 ML IM 03/26/2021, 04/28/2021   • Influenza, recombinant, quadrivalent,injectable, preservative free 09/16/2022      Health Maintenance:         Topic Date Due   • Hepatitis C Screening  Never done   • HIV Screening  Never done   • Cervical Cancer Screening  Never done   • Colorectal Cancer Screening  Never done   • Breast Cancer Screening: Mammogram  03/01/2023         Topic Date Due   • Pneumococcal Vaccine: Pediatrics (0 to 5 Years) and At-Risk Patients (6 to 59 Years) (1 - PCV) Never done   • COVID-19 Vaccine (3 - Booster for Moderna series) 06/23/2021      Medicare Screening Tests and Risk Assessments:     Gabriele Marcelo is here for her Subsequent Wellness visit  Health Risk Assessment:   Patient rates overall health as good  Patient feels that their physical health rating is slightly better  Patient is very satisfied with their life  Eyesight was rated as same  Hearing was rated as same  Patient feels that their emotional and mental health rating is much better  Patients states they are sometimes angry  Patient states they are sometimes unusually tired/fatigued  Pain experienced in the last 7 days has been some  Patient's pain rating has been 10/10  Patient states that she has experienced no weight loss or gain in last 6 months  Depression Screening:   PHQ-9 Score: 1      Fall Risk Screening: In the past year, patient has experienced: no history of falling in past year      Urinary Incontinence Screening:   Patient has leaked urine accidently in the last six months  Home Safety:  Patient does not have trouble with stairs inside or outside of their home  Patient has working smoke alarms and has no working carbon monoxide detector  Home safety hazards include: none  Nutrition:   Current diet is Regular and Limited junk food  Medications:   Patient is currently taking over-the-counter supplements  OTC medications include: see medication list  Patient is able to manage medications  Activities of Daily Living (ADLs)/Instrumental Activities of Daily Living (IADLs):   Walk and transfer into and out of bed and chair?: Yes  Dress and groom yourself?: Yes    Bathe or shower yourself?: Yes    Feed yourself?  Yes  Do your laundry/housekeeping?: Yes  Manage your money, pay your bills and track your expenses?: Yes  Make your own meals?: Yes    Do your own shopping?: Yes    Previous Hospitalizations:   Any hospitalizations or ED visits within the last 12 months?: No      Advance Care Planning:   Living will: No    Durable POA for healthcare: No    Advanced directive: No    Advanced directive "counseling given: Yes    Five wishes given: Yes    Patient declined ACP directive: No    End of Life Decisions reviewed with patient: Yes    Provider agrees with end of life decisions: Yes      Cognitive Screening:   Provider or family/friend/caregiver concerned regarding cognition?: No    PREVENTIVE SCREENINGS      Cardiovascular Screening:    General: Screening Current      Diabetes Screening:     General: Screening Not Indicated and History Diabetes      Colorectal Cancer Screening:     General: Patient Declines      Breast Cancer Screening:     General: Screening Current      Cervical Cancer Screening:    General: Screening Not Indicated      Osteoporosis Screening:    General: Screening Not Indicated      Abdominal Aortic Aneurysm (AAA) Screening:        General: Screening Not Indicated      Lung Cancer Screening:     General: Screening Not Indicated      Hepatitis C Screening:    General: Screening Not Indicated    Screening, Brief Intervention, and Referral to Treatment (SBIRT)    Screening  Typical number of drinks in a day: 0  Typical number of drinks in a week: 0  Interpretation: Low risk drinking behavior  Single Item Drug Screening:  How often have you used an illegal drug (including marijuana) or a prescription medication for non-medical reasons in the past year? never    Single Item Drug Screen Score: 0  Interpretation: Negative screen for possible drug use disorder    No results found  Physical Exam:     /66 (BP Location: Left arm, Patient Position: Sitting, Cuff Size: Standard)   Pulse 86   Temp 97 6 °F (36 4 °C) (Temporal)   Ht 5' 7\" (1 702 m)   Wt 126 kg (278 lb)   LMP 04/10/2017   SpO2 98%   BMI 43 54 kg/m²     Physical Exam  Vitals and nursing note reviewed  Constitutional:       General: She is not in acute distress  Appearance: She is well-developed  She is obese  HENT:      Head: Normocephalic and atraumatic     Eyes:      Conjunctiva/sclera: Conjunctivae normal  " Cardiovascular:      Rate and Rhythm: Normal rate and regular rhythm  Pulses: no weak pulses          Dorsalis pedis pulses are 2+ on the right side and 2+ on the left side  Posterior tibial pulses are 2+ on the right side and 2+ on the left side  Heart sounds: No murmur heard  Pulmonary:      Effort: Pulmonary effort is normal  No respiratory distress  Breath sounds: Normal breath sounds  Abdominal:      Palpations: Abdomen is soft  Tenderness: There is no abdominal tenderness  Musculoskeletal:         General: No swelling  Cervical back: Neck supple  Feet:      Right foot:      Skin integrity: No ulcer, skin breakdown, erythema, warmth, callus or dry skin  Left foot:      Skin integrity: No ulcer, skin breakdown, erythema, warmth, callus or dry skin  Skin:     General: Skin is warm and dry  Capillary Refill: Capillary refill takes less than 2 seconds  Neurological:      Mental Status: She is alert  Psychiatric:         Mood and Affect: Mood normal       Patient's shoes and socks removed  Right Foot/Ankle   Right Foot Inspection  Skin Exam: skin normal and skin intact  No dry skin, no warmth, no callus, no erythema, no maceration, no abnormal color, no pre-ulcer, no ulcer and no callus  Toe Exam: ROM and strength within normal limits  Sensory   Vibration: intact  Proprioception: intact  Monofilament testing: intact    Vascular  Capillary refills: < 3 seconds  The right DP pulse is 2+  The right PT pulse is 2+  Left Foot/Ankle  Left Foot Inspection  Skin Exam: skin normal and skin intact  No dry skin, no warmth, no erythema, no maceration, normal color, no pre-ulcer, no ulcer and no callus  Toe Exam: ROM and strength within normal limits  Sensory   Vibration: intact  Proprioception: intact  Monofilament testing: intact    Vascular  Capillary refills: < 3 seconds  The left DP pulse is 2+  The left PT pulse is 2+       Assign Risk Category  No deformity present  No loss of protective sensation  No weak pulses  Risk: 0      Heather Boston DO

## 2023-04-27 ENCOUNTER — TELEPHONE (OUTPATIENT)
Dept: ADMINISTRATIVE | Facility: OTHER | Age: 63
End: 2023-04-27

## 2023-04-27 NOTE — LETTER
Diabetic Eye Exam Form    Date Requested: 23  Patient: Eliot Ndiaye  Patient : 1960   Referring Provider: Rajwinder Camara DO      DIABETIC Eye Exam Date _______________________________      Type of Exam MUST be documented for Diabetic Eye Exams  Please CHECK ONE  Retinal Exam       Dilated Retinal Exam       OCT       Optomap-Iris Exam      Fundus Photography       Left Eye - Please check Retinopathy or No Retinopathy        Exam did show retinopathy    Exam did not show retinopathy       Right Eye - Please check Retinopathy or No Retinopathy       Exam did show retinopathy    Exam did not show retinopathy       Comments __________________________________________________________    Practice Providing Exam ______________________________________________    Exam Performed By (print name) _______________________________________      Provider Signature ___________________________________________________      These reports are needed for  compliance  Please fax this completed form and a copy of the Diabetic Eye Exam report to our office located at [place] [address] as soon as possible via Fax [phone] attention Karissa: Phone [phone]  We thank you for your assistance in treating our mutual patient

## 2023-04-27 NOTE — LETTER
Diabetic Eye Exam Form    Date Requested: 23  Patient: Devi Anand  Patient : 1960   Referring Provider: Kaley Medina DO      DIABETIC Eye Exam Date _______________________________      Type of Exam MUST be documented for Diabetic Eye Exams  Please CHECK ONE  Retinal Exam       Dilated Retinal Exam       OCT       Optomap-Iris Exam      Fundus Photography       Left Eye - Please check Retinopathy or No Retinopathy        Exam did show retinopathy    Exam did not show retinopathy       Right Eye - Please check Retinopathy or No Retinopathy       Exam did show retinopathy    Exam did not show retinopathy       Comments __________________________________________________________    Practice Providing Exam ______________________________________________    Exam Performed By (print name) _______________________________________      Provider Signature ___________________________________________________      These reports are needed for  compliance  Please fax this completed form and a copy of the Diabetic Eye Exam report to our office located at [place] [address] as soon as possible via Fax [phone] attention Karissa: Phone [phone]  We thank you for your assistance in treating our mutual patient

## 2023-04-27 NOTE — TELEPHONE ENCOUNTER
Upon review of the In Basket request and the patient's chart, initial outreach has been made via fax to facility  Please see Contacts section for details       Thank you  Kong Bui MA

## 2023-04-27 NOTE — TELEPHONE ENCOUNTER
----- Message from Declan Sharpe sent at 4/26/2023  1:53 PM EDT -----  04/26/23 1:53 PM    Maynor, our patient Oz Butler has had Diabetic Eye Exam completed/performed  Please assist in updating the patient chart by making an External outreach to [facility] facility located in [address] [place] [address]  The date of service is 12/2022      Thank you,  Barbara Beebe   [facility]

## 2023-05-03 NOTE — TELEPHONE ENCOUNTER
As a follow-up, a second attempt has been made for outreach via fax to facility  Please see Contacts section for details      Thank you  Diya Wise MA

## 2023-05-08 DIAGNOSIS — E11.9 TYPE 2 DIABETES MELLITUS WITHOUT COMPLICATION, WITHOUT LONG-TERM CURRENT USE OF INSULIN (HCC): ICD-10-CM

## 2023-05-08 NOTE — TELEPHONE ENCOUNTER
As a final attempt, a third outreach has been made via telephone call to facility  Please see Contacts section for details  This encounter will be closed and completed by end of day  Should we receive the requested information because of previous outreach attempts, the requested patient's chart will be updated appropriately       Thank you  Odilia Prado MA

## 2023-05-24 DIAGNOSIS — M47.27 OSTEOARTHRITIS OF SPINE WITH RADICULOPATHY, LUMBOSACRAL REGION: ICD-10-CM

## 2023-05-24 DIAGNOSIS — F41.1 GENERALIZED ANXIETY DISORDER: ICD-10-CM

## 2023-05-24 RX ORDER — DULOXETIN HYDROCHLORIDE 60 MG/1
60 CAPSULE, DELAYED RELEASE ORAL 2 TIMES DAILY
Qty: 180 CAPSULE | Refills: 0 | Status: SHIPPED | OUTPATIENT
Start: 2023-05-24

## 2023-05-24 RX ORDER — CLONAZEPAM 0.5 MG/1
1 TABLET ORAL
Qty: 60 TABLET | Refills: 0 | Status: SHIPPED | OUTPATIENT
Start: 2023-05-24

## 2023-05-31 DIAGNOSIS — F41.1 GENERALIZED ANXIETY DISORDER: Primary | ICD-10-CM

## 2023-05-31 RX ORDER — CLONAZEPAM 1 MG/1
1 TABLET ORAL
Qty: 30 TABLET | Refills: 0 | Status: SHIPPED | OUTPATIENT
Start: 2023-05-31 | End: 2023-06-30

## 2023-07-07 ENCOUNTER — HOSPITAL ENCOUNTER (EMERGENCY)
Facility: HOSPITAL | Age: 63
Discharge: HOME | DRG: 871 | End: 2023-07-07
Attending: EMERGENCY MEDICINE
Payer: COMMERCIAL

## 2023-07-07 ENCOUNTER — APPOINTMENT (EMERGENCY)
Dept: RADIOLOGY | Facility: HOSPITAL | Age: 63
DRG: 871 | End: 2023-07-07
Payer: COMMERCIAL

## 2023-07-07 ENCOUNTER — OFFICE VISIT (OUTPATIENT)
Dept: URGENT CARE | Facility: MEDICAL CENTER | Age: 63
End: 2023-07-07
Payer: COMMERCIAL

## 2023-07-07 VITALS
BODY MASS INDEX: 43.16 KG/M2 | HEIGHT: 67 IN | WEIGHT: 275 LBS | HEART RATE: 93 BPM | OXYGEN SATURATION: 98 % | RESPIRATION RATE: 20 BRPM | TEMPERATURE: 99.3 F | SYSTOLIC BLOOD PRESSURE: 117 MMHG | DIASTOLIC BLOOD PRESSURE: 56 MMHG

## 2023-07-07 VITALS
BODY MASS INDEX: 43.63 KG/M2 | TEMPERATURE: 98.7 F | DIASTOLIC BLOOD PRESSURE: 78 MMHG | OXYGEN SATURATION: 96 % | RESPIRATION RATE: 23 BRPM | HEART RATE: 109 BPM | SYSTOLIC BLOOD PRESSURE: 150 MMHG | HEIGHT: 67 IN | WEIGHT: 278 LBS

## 2023-07-07 DIAGNOSIS — R00.0 TACHYCARDIA: Primary | ICD-10-CM

## 2023-07-07 DIAGNOSIS — R53.1 WEAKNESS: Primary | ICD-10-CM

## 2023-07-07 DIAGNOSIS — R53.83 FATIGUE, UNSPECIFIED TYPE: ICD-10-CM

## 2023-07-07 DIAGNOSIS — B34.9 VIRAL SYNDROME: ICD-10-CM

## 2023-07-07 PROBLEM — M54.16 LUMBAR RADICULOPATHY: Status: ACTIVE | Noted: 2023-07-07

## 2023-07-07 PROBLEM — M79.2 NEUROPATHIC PAIN: Status: ACTIVE | Noted: 2018-07-26

## 2023-07-07 LAB
ALBUMIN SERPL BCP-MCNC: 4.2 G/DL (ref 3.5–5)
ALP SERPL-CCNC: 55 U/L (ref 34–104)
ALT SERPL W P-5'-P-CCNC: 23 U/L (ref 7–52)
ANION GAP SERPL CALCULATED.3IONS-SCNC: 12 MMOL/L
APTT PPP: 36 SECONDS (ref 23–37)
AST SERPL W P-5'-P-CCNC: 57 U/L (ref 13–39)
BACTERIA UR QL AUTO: NORMAL /HPF
BASE EX.OXY STD BLDV CALC-SCNC: 76.3 % (ref 60–80)
BASE EXCESS BLDV CALC-SCNC: -4.8 MMOL/L
BETA-HYDROXYBUTYRATE: 0.6 MMOL/L
BILIRUB SERPL-MCNC: 0.67 MG/DL (ref 0.2–1)
BILIRUB UR QL STRIP: NEGATIVE
BUN SERPL-MCNC: 30 MG/DL (ref 5–25)
CALCIUM SERPL-MCNC: 9.8 MG/DL (ref 8.4–10.2)
CHLORIDE SERPL-SCNC: 94 MMOL/L (ref 96–108)
CLARITY UR: ABNORMAL
CO2 SERPL-SCNC: 20 MMOL/L (ref 21–32)
COLOR UR: ABNORMAL
CREAT SERPL-MCNC: 2.61 MG/DL (ref 0.6–1.3)
CRYSTALS URNS QL MICRO: NORMAL /HPF
ERYTHROCYTE [DISTWIDTH] IN BLOOD BY AUTOMATED COUNT: 12.9 % (ref 11.6–15.1)
FLUAV RNA RESP QL NAA+PROBE: NEGATIVE
FLUBV RNA RESP QL NAA+PROBE: NEGATIVE
GFR SERPL CREATININE-BSD FRML MDRD: 18 ML/MIN/1.73SQ M
GLUCOSE SERPL-MCNC: 155 MG/DL (ref 65–140)
GLUCOSE SERPL-MCNC: 161 MG/DL (ref 65–140)
GLUCOSE UR STRIP-MCNC: NEGATIVE MG/DL
HCO3 BLDV-SCNC: 19.7 MMOL/L (ref 24–30)
HCT VFR BLD AUTO: 33.7 % (ref 34.8–46.1)
HGB BLD-MCNC: 11.6 G/DL (ref 11.5–15.4)
HGB UR QL STRIP.AUTO: ABNORMAL
INR PPP: 1.15 (ref 0.84–1.19)
KETONES UR STRIP-MCNC: NEGATIVE MG/DL
LACTATE SERPL-SCNC: 1.9 MMOL/L (ref 0.5–2)
LEUKOCYTE ESTERASE UR QL STRIP: ABNORMAL
MCH RBC QN AUTO: 31.4 PG (ref 26.8–34.3)
MCHC RBC AUTO-ENTMCNC: 34.4 G/DL (ref 31.4–37.4)
MCV RBC AUTO: 91 FL (ref 82–98)
NITRITE UR QL STRIP: NEGATIVE
NON-SQ EPI CELLS URNS QL MICRO: NORMAL /HPF
O2 CT BLDV-SCNC: 13.4 ML/DL
PCO2 BLDV: 35.1 MM HG (ref 42–50)
PH BLDV: 7.37 [PH] (ref 7.3–7.4)
PH UR STRIP.AUTO: 5.5 [PH]
PLATELET # BLD AUTO: 218 THOUSANDS/UL (ref 149–390)
PMV BLD AUTO: 9.5 FL (ref 8.9–12.7)
PO2 BLDV: 41.8 MM HG (ref 35–45)
POTASSIUM SERPL-SCNC: 4.2 MMOL/L (ref 3.5–5.3)
PROCALCITONIN SERPL-MCNC: 191.87 NG/ML
PROT SERPL-MCNC: 7.9 G/DL (ref 6.4–8.4)
PROT UR STRIP-MCNC: NEGATIVE MG/DL
PROTHROMBIN TIME: 14.9 SECONDS (ref 11.6–14.5)
RBC # BLD AUTO: 3.69 MILLION/UL (ref 3.81–5.12)
RBC #/AREA URNS AUTO: NORMAL /HPF
RSV RNA RESP QL NAA+PROBE: NEGATIVE
S PYO DNA THROAT QL NAA+PROBE: NOT DETECTED
SARS-COV-2 RNA RESP QL NAA+PROBE: NEGATIVE
SODIUM SERPL-SCNC: 126 MMOL/L (ref 135–147)
SP GR UR STRIP.AUTO: 1.01 (ref 1–1.03)
UROBILINOGEN UR QL STRIP.AUTO: 0.2 E.U./DL
WBC # BLD AUTO: 15.99 THOUSAND/UL (ref 4.31–10.16)
WBC #/AREA URNS AUTO: NORMAL /HPF

## 2023-07-07 PROCEDURE — 99212 OFFICE O/P EST SF 10 MIN: CPT

## 2023-07-07 PROCEDURE — 87186 SC STD MICRODIL/AGAR DIL: CPT | Performed by: EMERGENCY MEDICINE

## 2023-07-07 PROCEDURE — 96360 HYDRATION IV INFUSION INIT: CPT

## 2023-07-07 PROCEDURE — 99285 EMERGENCY DEPT VISIT HI MDM: CPT

## 2023-07-07 PROCEDURE — 85730 THROMBOPLASTIN TIME PARTIAL: CPT | Performed by: EMERGENCY MEDICINE

## 2023-07-07 PROCEDURE — 71045 X-RAY EXAM CHEST 1 VIEW: CPT

## 2023-07-07 PROCEDURE — S9088 SERVICES PROVIDED IN URGENT: HCPCS

## 2023-07-07 PROCEDURE — 82948 REAGENT STRIP/BLOOD GLUCOSE: CPT

## 2023-07-07 PROCEDURE — 84145 PROCALCITONIN (PCT): CPT | Performed by: EMERGENCY MEDICINE

## 2023-07-07 PROCEDURE — 82805 BLOOD GASES W/O2 SATURATION: CPT | Performed by: EMERGENCY MEDICINE

## 2023-07-07 PROCEDURE — 36415 COLL VENOUS BLD VENIPUNCTURE: CPT | Performed by: EMERGENCY MEDICINE

## 2023-07-07 PROCEDURE — 96361 HYDRATE IV INFUSION ADD-ON: CPT

## 2023-07-07 PROCEDURE — 87154 CUL TYP ID BLD PTHGN 6+ TRGT: CPT | Performed by: EMERGENCY MEDICINE

## 2023-07-07 PROCEDURE — 93005 ELECTROCARDIOGRAM TRACING: CPT

## 2023-07-07 PROCEDURE — 87651 STREP A DNA AMP PROBE: CPT | Performed by: EMERGENCY MEDICINE

## 2023-07-07 PROCEDURE — 0241U HB NFCT DS VIR RESP RNA 4 TRGT: CPT | Performed by: EMERGENCY MEDICINE

## 2023-07-07 PROCEDURE — 85610 PROTHROMBIN TIME: CPT | Performed by: EMERGENCY MEDICINE

## 2023-07-07 PROCEDURE — 87040 BLOOD CULTURE FOR BACTERIA: CPT | Performed by: EMERGENCY MEDICINE

## 2023-07-07 PROCEDURE — 80053 COMPREHEN METABOLIC PANEL: CPT | Performed by: EMERGENCY MEDICINE

## 2023-07-07 PROCEDURE — 81001 URINALYSIS AUTO W/SCOPE: CPT | Performed by: EMERGENCY MEDICINE

## 2023-07-07 PROCEDURE — 99285 EMERGENCY DEPT VISIT HI MDM: CPT | Performed by: EMERGENCY MEDICINE

## 2023-07-07 PROCEDURE — 82010 KETONE BODYS QUAN: CPT | Performed by: EMERGENCY MEDICINE

## 2023-07-07 PROCEDURE — 87077 CULTURE AEROBIC IDENTIFY: CPT | Performed by: EMERGENCY MEDICINE

## 2023-07-07 PROCEDURE — 83605 ASSAY OF LACTIC ACID: CPT | Performed by: EMERGENCY MEDICINE

## 2023-07-07 PROCEDURE — 85025 COMPLETE CBC W/AUTO DIFF WBC: CPT | Performed by: EMERGENCY MEDICINE

## 2023-07-07 RX ORDER — ERYTHROMYCIN 5 MG/G
0.5 OINTMENT OPHTHALMIC ONCE
Status: DISCONTINUED | OUTPATIENT
Start: 2023-07-07 | End: 2023-07-07

## 2023-07-07 RX ORDER — ACETAMINOPHEN 325 MG/1
975 TABLET ORAL ONCE
Status: COMPLETED | OUTPATIENT
Start: 2023-07-07 | End: 2023-07-07

## 2023-07-07 RX ADMIN — ACETAMINOPHEN 975 MG: 325 TABLET, FILM COATED ORAL at 22:26

## 2023-07-07 RX ADMIN — SODIUM CHLORIDE 1000 ML: 0.9 INJECTION, SOLUTION INTRAVENOUS at 21:30

## 2023-07-07 RX ADMIN — SODIUM CHLORIDE 1000 ML: 0.9 INJECTION, SOLUTION INTRAVENOUS at 19:30

## 2023-07-07 NOTE — PROGRESS NOTES
[facility]        NAME: Adrián Brennan is a 60 y.o. female  : 1960    MRN: [id number]  DATE: 2023  TIME: 5:44 PM    Assessment and Plan   Tachycardia [R00.0]  1. Tachycardia  Transfer to other facility    ECG 12 lead      2. Fatigue, unspecified type  Transfer to other facility    ECG 12 lead            Patient Instructions       Follow up with PCP in 3-5 days. Proceed to  ER if symptoms worsen. Chief Complaint     Chief Complaint   Patient presents with   • Fever     Fever of 103.5 last night , chills , nausea and vomiting thinks she ate under cooked pork last night   • Fall     Fell x 4 days ago outside when letting the dogs out. Slipped in the mud no pain noted          History of Present Illness       About 3-4 days ago (/Monday) she was walking the dogs, after it had rained and she stepped in mud and fell. She denies striking her head. She does not take any blood thinners. Patient started with a fever last night TMAX 103. 4. today she has had some nausea without vomiting. Patient has had some change in mental status, per son, she is less responsive at times and inability to concentrate and increased fatigue. She states she feels "wifty". She is also concerned because she ate 'undercooked pork'. Son was cooking and patient ate some which was not fully cooked- this was on Wednesday evening. She has not had any headache or dizziness. But has felt lightheaded. She hasn't been eating much. Today drank some sprite but did not eat anything. Her Fingerstick currently is 161. She is not insulin dependent and takes metformin. Last BM was last night- she states this was a little loose. She does feel a little short of breath. She is tachycardic and diaphoretic. Patient appears ill, pale, diaphoretic. Discussed options for treatment. Patient and son agreeable to being evaluated in the ER for full workup given decreased appetite and PO intake complicated by chronic diseases.  Offered EMS, patient would like to go POV. Son willing to take her POV. She denies any abdominal pain, trouble with urination. PERC Rule to clinically rule out PE (<15%)  If any of the follow are positive a PE cannot be ruled out. Age>/= 50 :     YES: 1  HR >/= 100:     YES: 1  O2 Sat on Room Air <95%:   YES: 1  Unilateral leg swelling:     NO: 0  Hemoptysis:     NO: 0  Recent surgery or trauma <4wks:  NO: 0    Prior PE or DVT:    NO: 0   Hormone use:    NO: 0      Fever  This is a new problem. Associated symptoms include diaphoresis, fatigue, a fever, myalgias and nausea. Pertinent negatives include no abdominal pain, arthralgias, chest pain, chills, congestion, coughing, rash, sore throat or vomiting. Fall  Associated symptoms include a fever and nausea. Pertinent negatives include no abdominal pain, hematuria or vomiting. Review of Systems   Review of Systems   Constitutional: Positive for appetite change, diaphoresis, fatigue and fever. Negative for chills. HENT: Negative for congestion, ear pain, postnasal drip, rhinorrhea, sinus pressure, sinus pain and sore throat. Eyes: Negative for pain and visual disturbance. Respiratory: Negative for cough and shortness of breath. Cardiovascular: Negative for chest pain, palpitations and leg swelling. Gastrointestinal: Positive for nausea. Negative for abdominal pain and vomiting. Genitourinary: Negative for dysuria and hematuria. Musculoskeletal: Positive for myalgias. Negative for arthralgias and back pain. Skin: Negative for color change and rash. Neurological: Negative for seizures and syncope. All other systems reviewed and are negative.         Current Medications       Current Outpatient Medications:   •  Alpha-Lipoic Acid 300 MG CAPS, Take 300 mg by mouth 2 (two) times a day, Disp: , Rfl:   •  clonazePAM (KlonoPIN) 0.5 mg tablet, Take 2 tablets (1 mg total) by mouth daily at bedtime as needed for anxiety, Disp: 60 tablet, Rfl: 0  •  docusate sodium (COLACE) 100 mg capsule, Take 100 mg by mouth daily as needed for constipation, Disp: , Rfl:   •  DULoxetine (CYMBALTA) 60 mg delayed release capsule, Take 1 capsule (60 mg total) by mouth 2 (two) times a day, Disp: 180 capsule, Rfl: 0  •  famotidine (PEPCID) 20 mg tablet, Take 20 mg by mouth daily, Disp: , Rfl:   •  gabapentin (NEURONTIN) 100 mg capsule, One in am, one at lunch, and 3 at hs, Disp: 360 capsule, Rfl: 1  •  IRON HEME POLYPEPTIDE PO, Take 20 mg by mouth 3 (three) times a week, Disp: , Rfl:   •  lisinopril (ZESTRIL) 20 mg tablet, Take 1 tablet (20 mg total) by mouth daily, Disp: 90 tablet, Rfl: 1  •  metFORMIN (GLUCOPHAGE) 500 mg tablet, Take 1 tablet (500 mg total) by mouth daily with breakfast, Disp: 90 tablet, Rfl: 1  •  Multiple Vitamins-Minerals (CENTRUM SILVER PO), Take 1 tablet by mouth daily, Disp: , Rfl:   •  tiZANidine (ZANAFLEX) 2 mg tablet, Take 1 tablet (2 mg total) by mouth every 8 (eight) hours as needed for muscle spasms, Disp: 45 tablet, Rfl: 3  •  clonazePAM (KlonoPIN) 1 mg tablet, Take 1 tablet (1 mg total) by mouth daily at bedtime, Disp: 30 tablet, Rfl: 0    Current Allergies     Allergies as of 2023 - Reviewed 2023   Allergen Reaction Noted   • Pollen extract Sneezing 2017   • Vancomycin Rash 04/10/2017            The following portions of the patient's history were reviewed and updated as appropriate: allergies, current medications, past family history, past medical history, past social history, past surgical history and problem list.     Past Medical History:   Diagnosis Date   • Hypertension    • Migraine    • MRSA (methicillin resistant Staphylococcus aureus)        Past Surgical History:   Procedure Laterality Date   • ACHILLES TENDON REPAIR     • ANKLE SURGERY Left 2019    Excision of Lipoma   • CARPAL TUNNEL RELEASE Right    •  SECTION     • CHOLECYSTECTOMY     • GANGLION CYST EXCISION Left     Left wrist   • LUMBAR LAMINECTOMY  2018    x 3 disks   • NASAL SINUS SURGERY  2000       Family History   Problem Relation Age of Onset   • Hypertension Mother    • Hepatitis Mother         Hep C   • Stroke Father    • Arrhythmia Father    • Melanoma Father    • No Known Problems Sister    • No Known Problems Maternal Grandmother    • No Known Problems Maternal Grandfather    • No Known Problems Paternal Grandmother    • No Known Problems Paternal Grandfather    • No Known Problems Son    • No Known Problems Maternal Aunt    • No Known Problems Maternal Aunt    • No Known Problems Maternal Aunt    • No Known Problems Maternal Aunt    • No Known Problems Paternal Aunt          Medications have been verified. Objective   /78   Pulse (!) 109   Temp 98.7 °F (37.1 °C)   Resp (!) 23   Ht 5' 7" (1.702 m)   Wt 126 kg (278 lb)   LMP 04/10/2017   SpO2 96%   BMI 43.54 kg/m²        Physical Exam     Physical Exam  Vitals and nursing note reviewed. Constitutional:       General: She is not in acute distress. Appearance: Normal appearance. She is normal weight. She is ill-appearing and toxic-appearing. HENT:      Head: Normocephalic and atraumatic. Nose: Nose normal.      Mouth/Throat:      Lips: Pink. Mouth: Mucous membranes are moist.   Eyes:      Extraocular Movements: Extraocular movements intact. Conjunctiva/sclera: Conjunctivae normal.      Pupils: Pupils are equal, round, and reactive to light. Cardiovascular:      Rate and Rhythm: Normal rate and regular rhythm. Pulses: Normal pulses. Heart sounds: Normal heart sounds. Comments: EKG Interpretation:     Rate: 106   Rhythm: sinus tachycardia   QRS Axis: 32   KY Interval: 134   QRS Duration: 80   QTc: 430      Narrative Interpretation: Sinus tachycarida    Pulmonary:      Effort: Tachypnea present. Breath sounds: Normal breath sounds. Abdominal:      Palpations: Abdomen is soft. Tenderness: There is no abdominal tenderness. Musculoskeletal:         General: Normal range of motion. Cervical back: Normal range of motion and neck supple. Lymphadenopathy:      Cervical: No cervical adenopathy. Skin:     General: Skin is warm and dry. Capillary Refill: Capillary refill takes less than 2 seconds. Neurological:      General: No focal deficit present. Mental Status: She is oriented to person, place, and time. She is lethargic. GCS: GCS eye subscore is 4. GCS verbal subscore is 5. GCS motor subscore is 6.    Psychiatric:         Mood and Affect: Mood normal.         Behavior: Behavior normal.

## 2023-07-07 NOTE — ED PROVIDER NOTES
Final Diagnosis:  1. Weakness    2. Viral syndrome        Chief Complaint   Patient presents with   • Weakness - Generalized     Patient c/o fever and feeling weak/sweaty since yesterday     HPI  Patient presents for evaluation of fever and generalized malaise. Patient states that she woke up this morning and she felt warm and she just does not feel right. She denies any trauma. No chest pain, shortness of breath, cough, congestion, sick contacts, abdominal pain, dysuria, hematuria, constipation or diarrhea. Overall prior to this patient states that she was feeling her normal self. Patient denies any rash. Unless otherwise specified:  - No language barrier.   - History obtained from patient. - There are no limitations to the history obtained. - Previous charting was reviewed    PMH:   has a past medical history of Diabetes mellitus ([address]), Hypertension, Migraine, and MRSA (methicillin resistant Staphylococcus aureus). PSH:   has a past surgical history that includes Achilles tendon repair; Carpal tunnel release (Right);  section; Cholecystectomy; Ankle surgery (Left, 2019); Nasal sinus surgery (); Lumbar laminectomy (2018); and Ganglion cyst excision (Left, ). ROS:  Review of Systems   - 13 point ROS was performed and all are normal unless stated in the history above. - Nursing note reviewed. Vitals reviewed. - Orders placed by myself and/or advanced practitioner / resident. PE:   Vitals:    23 2100 23 2130 23 2155 23 2200   BP: 117/56 124/60  117/56   Pulse: 89 99  93   Resp: 20 22  20   Temp:   99.3 °F (37.4 °C)    TempSrc:   Oral    SpO2: 96% 99%  98%   Weight:       Height:         Vitals reviewed by me. Patient is laying back in bed, appears nondistressed. No actual work of breathing. No significant erythema. No lower extremity swelling. No tenderness of abdomen.     Unless otherwise specified above:    General: VS reviewed  Appears in NAD    Head: Normocephalic, atraumatic  Eyes: EOM-I.     ENT: Atraumatic external nose and ears. No drooling. Neck: No JVD. CV: No pallor noted  Lungs:   No tachypnea  No respiratory distress    Abdomen:  Soft, non-tender, non-distended    MSK:   No obvious deformity    Skin: Dry, intact. No obvious rash. Psychiatric/Behavioral: Appropriate mood and affect   Exam: deferred    Physical Exam     Procedures   A:  - Nursing note reviewed. ED Course as of 07/07/23 2216 Fri Jul 07, 2023 1948 Comprehensive metabolic panel(!)  Patient with significant INESSA at 2.6 from baseline of about 1.3. Also hyponatremic. I went back and discussed with patient as well as son who is bedside. Discussed admission to the hospital.  Patient states that she does not want to be admitted and preferred to be discharged home. We will provide fluids here. Will check UA. XR chest 1 view portable   ED Interpretation   No clear pneumothorax, effusion on my interpretation        Orders Placed This Encounter   Procedures   • Blood culture #1   • Blood culture #2   • Strep A PCR   • FLU/RSV/COVID - if FLU/RSV clinically relevant   • XR chest 1 view portable   • CBC and differential   • Comprehensive metabolic panel   • Lactic acid   • Procalcitonin   • Protime-INR   • APTT   • UA w Reflex to Microscopic w Reflex to Culture   • Blood gas, venous   • Beta Hydroxybutyrate   • Urine Microscopic     Labs Reviewed   CBC AND DIFFERENTIAL - Abnormal       Result Value Ref Range Status    WBC 15.99 (*) 4.31 - 10.16 Thousand/uL Final    RBC 3.69 (*) 3.81 - 5.12 Million/uL Final    Hemoglobin 11.6  11.5 - 15.4 g/dL Final    Hematocrit 33.7 (*) 34.8 - 46.1 % Final    MCV 91  82 - 98 fL Final    MCH 31.4  26.8 - 34.3 pg Final    MCHC 34.4  31.4 - 37.4 g/dL Final    RDW 12.9  11.6 - 15.1 % Final    MPV 9.5  8.9 - 12.7 fL Final    Platelets 011  413 - 390 Thousands/uL Final    Narrative: This is an appended report. These results have been appended to a previously verified report. COMPREHENSIVE METABOLIC PANEL - Abnormal    Sodium 126 (*) 135 - 147 mmol/L Final    Potassium 4.2  3.5 - 5.3 mmol/L Final    Chloride 94 (*) 96 - 108 mmol/L Final    CO2 20 (*) 21 - 32 mmol/L Final    ANION GAP 12  mmol/L Final    BUN 30 (*) 5 - 25 mg/dL Final    Creatinine 2.61 (*) 0.60 - 1.30 mg/dL Final    Comment: Standardized to IDMS reference method    Glucose 155 (*) 65 - 140 mg/dL Final    Comment: If the patient is fasting, the ADA then defines impaired fasting glucose as > 100 mg/dL and diabetes as > or equal to 123 mg/dL. Calcium 9.8  8.4 - 10.2 mg/dL Final    AST 57 (*) 13 - 39 U/L Final    ALT 23  7 - 52 U/L Final    Comment: Specimen collection should occur prior to Sulfasalazine administration due to the potential for falsely depressed results. Alkaline Phosphatase 55  34 - 104 U/L Final    Total Protein 7.9  6.4 - 8.4 g/dL Final    Albumin 4.2  3.5 - 5.0 g/dL Final    Total Bilirubin 0.67  0.20 - 1.00 mg/dL Final    Comment: Use of this assay is not recommended for patients undergoing treatment with eltrombopag due to the potential for falsely elevated results. N-acetyl-p-benzoquinone imine (metabolite of Acetaminophen) will generate erroneously low results in samples for patients that have taken an overdose of Acetaminophen.     eGFR 18  ml/min/1.73sq m Final    Narrative:     [place] guidelines for Chronic Kidney Disease (CKD):   •  Stage 1 with normal or high GFR (GFR > 90 mL/min/1.73 square meters)  •  Stage 2 Mild CKD (GFR = 60-89 mL/min/1.73 square meters)  •  Stage 3A Moderate CKD (GFR = 45-59 mL/min/1.73 square meters)  •  Stage 3B Moderate CKD (GFR = 30-44 mL/min/1.73 square meters)  •  Stage 4 Severe CKD (GFR = 15-29 mL/min/1.73 square meters)  •  Stage 5 End Stage CKD (GFR <15 mL/min/1.73 square meters)  Note: GFR calculation is accurate only with a steady state creatinine   PROTIME-INR - Abnormal    Protime 14.9 (*) 11.6 - 14.5 seconds Final    INR 1.15  0.84 - 1.19 Final   UA W REFLEX TO MICROSCOPIC WITH REFLEX TO CULTURE - Abnormal    Color, UA Light Yellow   Final    Clarity, UA Hazy   Final    Specific Gravity, UA 1.010  1.003 - 1.030 Final    pH, UA 5.5  4.5, 5.0, 5.5, 6.0, 6.5, 7.0, 7.5, 8.0 Final    Leukocytes, UA Moderate (*) Negative Final    Nitrite, UA Negative  Negative Final    Protein, UA Negative  Negative mg/dl Final    Glucose, UA Negative  Negative mg/dl Final    Ketones, UA Negative  Negative mg/dl Final    Urobilinogen, UA 0.2  0.2, 1.0 E.U./dl E.U./dl Final    Bilirubin, UA Negative  Negative Final    Occult Blood, UA Moderate (*) Negative Final   BLOOD GAS, VENOUS - Abnormal    pH, Julian 7.368  7.300 - 7.400 Final    pCO2, Julian 35.1 (*) 42.0 - 50.0 mm Hg Final    pO2, Julian 41.8  35.0 - 45.0 mm Hg Final    HCO3, Julain 19.7 (*) 24 - 30 mmol/L Final    Base Excess, Julian -4.8  mmol/L Final    O2 Content, Julian 13.4  ml/dL Final    O2 HGB, VENOUS 76.3  60.0 - 80.0 % Final   BETA HYDROXYBUTYRATE - Abnormal    BETA-HYDROXYBUTYRATE 0.6 (*) <0.6 mmol/L Final   STREP A PCR - Normal    STREP A PCR Not Detected  Not Detected Final   COVID19, INFLUENZA A/B, RSV PCR, [facility] - Normal    SARS-CoV-2 Negative  Negative Final    INFLUENZA A PCR Negative  Negative Final    INFLUENZA B PCR Negative  Negative Final    RSV PCR Negative  Negative Final    Narrative:     FOR PEDIATRIC PATIENTS - copy/paste COVID Guidelines URL to browser: [email or web address]    SARS-CoV-2 assay is a Nucleic Acid Amplification assay intended for the  qualitative detection of nucleic acid from SARS-CoV-2 in nasopharyngeal  swabs. Results are for the presumptive identification of SARS-CoV-2 RNA. Positive results are indicative of infection with SARS-CoV-2, the virus  causing COVID-19, but do not rule out bacterial infection or co-infection  with other viruses.  Laboratories within the [facility] and its  territories are required to report all positive results to the appropriate  public health authorities. Negative results do not preclude SARS-CoV-2  infection and should not be used as the sole basis for treatment or other  patient management decisions. Negative results must be combined with  clinical observations, patient history, and epidemiological information. This test has not been FDA cleared or approved. This test has been authorized by FDA under an Emergency Use Authorization  (EUA). This test is only authorized for the duration of time the  declaration that circumstances exist justifying the authorization of the  emergency use of an in vitro diagnostic tests for detection of SARS-CoV-2  virus and/or diagnosis of COVID-19 infection under section 564(b)(1) of  the Act, 21 U. S.C. 095JQF-0(A)(2), unless the authorization is terminated  or revoked sooner. The test has been validated but independent review by FDA  and CLIA is pending. Test performed using [employer]: This RT-PCR assay targets N2,  a region unique to SARS-CoV-2. A conserved region in the E-gene was chosen  for pan-Sarbecovirus detection which includes SARS-CoV-2. According to CMS-2020-01-R, this platform meets the definition of high-throughput technology. LACTIC ACID, PLASMA (W/REFLEX IF RESULT > 2.0) - Normal    LACTIC ACID 1.9  0.5 - 2.0 mmol/L Final    Narrative:     Result may be elevated if tourniquet was used during collection.    APTT - Normal    PTT 36  23 - 37 seconds Final    Comment: Therapeutic Heparin Range =  60-90 seconds   BLOOD CULTURE   BLOOD CULTURE   URINE MICROSCOPIC    RBC, UA 2-4  None Seen, 0-1, 1-2, 2-4, 0-5 /hpf Final    WBC, UA 0-5  None Seen, 0-1, 1-2, 0-5, 2-4 /hpf Final    Epithelial Cells Occasional  None Seen, Occasional /hpf Final    Bacteria, UA Occasional  None Seen, Occasional /hpf Final    OTHER CRYSTALS Leucine  /hpf Final   PROCALCITONIN TEST         Final Diagnosis:  1. Weakness    2. Viral syndrome        P:  -Patient presents for evaluation of generalized malaise. Will provide broad infectious work-up evaluating for any signs of pneumonia, UTI, flu, strep.  -No obvious explanation for the patient's current presentation. No obvious source of infection. Patient may have some other viral illness. Reviewed once again's again admission versus discharge home. Patient is insistent that she is okay to be discharged home. Son is bedside as well. Provided 2 L of fluids here. Will provide Tylenol as well as patient has occasional chills. Discussed return precautions and aggressive treatment. Unless otherwise noted the patient's medications were reviewed and they should continue as directed. Medications   sodium chloride 0.9 % bolus 1,000 mL (1,000 mL Intravenous New Bag 7/7/23 2130)   acetaminophen (TYLENOL) tablet 975 mg (has no administration in time range)   sodium chloride 0.9 % bolus 1,000 mL (0 mL Intravenous Stopped 7/7/23 2030)     Time reflects when diagnosis was documented in both MDM as applicable and the Disposition within this note     Time User Action Codes Description Comment    7/7/2023 10:14 PM Macy Buys Add [R53.1] Weakness     7/7/2023 10:14 PM Macy Buys Add [B34.9] Viral syndrome       ED Disposition     ED Disposition   Discharge    Condition   Stable    Date/Time   Fri Jul 7, 2023 10:14 PM    Comment   Samson Gotti discharge to home/self care. Follow-up Information     Follow up With Specialties Details Why [address] [facility] [address] [facility] [address] [phone]        Patient's Medications   Discharge Prescriptions    No medications on file     No discharge procedures on file. Prior to Admission Medications   Prescriptions Last Dose Informant Patient Reported? Taking?    Alpha-Lipoic Acid 300 MG CAPS   Yes No   Sig: Take 300 mg by mouth 2 (two) times a day   DULoxetine (CYMBALTA) 60 mg delayed release capsule   No No   Sig: Take 1 capsule (60 mg total) by mouth 2 (two) times a day   IRON HEME POLYPEPTIDE PO  Self Yes No   Sig: Take 20 mg by mouth 3 (three) times a week   Multiple Vitamins-Minerals (CENTRUM SILVER PO)  Self Yes No   Sig: Take 1 tablet by mouth daily   clonazePAM (KlonoPIN) 0.5 mg tablet   No No   Sig: Take 2 tablets (1 mg total) by mouth daily at bedtime as needed for anxiety   clonazePAM (KlonoPIN) 1 mg tablet   No No   Sig: Take 1 tablet (1 mg total) by mouth daily at bedtime   docusate sodium (COLACE) 100 mg capsule  Self Yes No   Sig: Take 100 mg by mouth daily as needed for constipation   famotidine (PEPCID) 20 mg tablet  Self Yes No   Sig: Take 20 mg by mouth daily   gabapentin (NEURONTIN) 100 mg capsule   No No   Sig: One in am, one at lunch, and 3 at hs   lisinopril (ZESTRIL) 20 mg tablet   No No   Sig: Take 1 tablet (20 mg total) by mouth daily   metFORMIN (GLUCOPHAGE) 500 mg tablet   No No   Sig: Take 1 tablet (500 mg total) by mouth daily with breakfast   tiZANidine (ZANAFLEX) 2 mg tablet   No No   Sig: Take 1 tablet (2 mg total) by mouth every 8 (eight) hours as needed for muscle spasms      Facility-Administered Medications: None       Portions of the record may have been created with voice recognition software. Occasional wrong word or "sound a like" substitutions may have occurred due to the inherent limitations of voice recognition software. Read the chart carefully and recognize, using context, where substitutions have occurred.     Electronically signed by:  MD Paulino Gooden MD  07/07/23 [id number]

## 2023-07-09 ENCOUNTER — APPOINTMENT (EMERGENCY)
Dept: CT IMAGING | Facility: HOSPITAL | Age: 63
DRG: 871 | End: 2023-07-09
Payer: COMMERCIAL

## 2023-07-09 ENCOUNTER — HOSPITAL ENCOUNTER (INPATIENT)
Facility: HOSPITAL | Age: 63
LOS: 5 days | Discharge: HOME | DRG: 871 | End: 2023-07-14
Attending: EMERGENCY MEDICINE | Admitting: INTERNAL MEDICINE
Payer: COMMERCIAL

## 2023-07-09 DIAGNOSIS — M47.27 OSTEOARTHRITIS OF SPINE WITH RADICULOPATHY, LUMBOSACRAL REGION: ICD-10-CM

## 2023-07-09 DIAGNOSIS — N17.9 AKI (ACUTE KIDNEY INJURY) (HCC): ICD-10-CM

## 2023-07-09 DIAGNOSIS — A41.9 SEPSIS (HCC): Primary | ICD-10-CM

## 2023-07-09 PROBLEM — R65.20 SEVERE SEPSIS (HCC): Status: ACTIVE | Noted: 2023-07-09

## 2023-07-09 PROBLEM — R65.20 SEPSIS DUE TO ESCHERICHIA COLI WITH ACUTE RENAL FAILURE (HCC): Status: ACTIVE | Noted: 2023-07-09

## 2023-07-09 PROBLEM — A41.51 SEPSIS DUE TO ESCHERICHIA COLI WITH ACUTE RENAL FAILURE (HCC): Status: ACTIVE | Noted: 2023-07-09

## 2023-07-09 PROBLEM — E87.1 HYPONATREMIA: Status: ACTIVE | Noted: 2023-07-09

## 2023-07-09 LAB
ALBUMIN SERPL BCP-MCNC: 3.7 G/DL (ref 3.5–5)
ALP SERPL-CCNC: 69 U/L (ref 34–104)
ALT SERPL W P-5'-P-CCNC: 54 U/L (ref 7–52)
ANION GAP SERPL CALCULATED.3IONS-SCNC: 12 MMOL/L
APTT PPP: 34 SECONDS (ref 23–37)
AST SERPL W P-5'-P-CCNC: 89 U/L (ref 13–39)
BACTERIA BLD CULT: ABNORMAL
BACTERIA UR QL AUTO: ABNORMAL /HPF
BASOPHILS # BLD AUTO: 0.01 THOUSANDS/ÂΜL (ref 0–0.1)
BASOPHILS NFR BLD AUTO: 0 % (ref 0–1)
BILIRUB SERPL-MCNC: 0.59 MG/DL (ref 0.2–1)
BILIRUB UR QL STRIP: NEGATIVE
BUN SERPL-MCNC: 37 MG/DL (ref 5–25)
CALCIUM SERPL-MCNC: 9.3 MG/DL (ref 8.4–10.2)
CHLORIDE SERPL-SCNC: 96 MMOL/L (ref 96–108)
CLARITY UR: CLEAR
CO2 SERPL-SCNC: 20 MMOL/L (ref 21–32)
COLOR UR: YELLOW
CREAT SERPL-MCNC: 3.11 MG/DL (ref 0.6–1.3)
E COLI DNA BLD POS QL NAA+NON-PROBE: DETECTED
EOSINOPHIL # BLD AUTO: 0.03 THOUSAND/ÂΜL (ref 0–0.61)
EOSINOPHIL NFR BLD AUTO: 0 % (ref 0–6)
ERYTHROCYTE [DISTWIDTH] IN BLOOD BY AUTOMATED COUNT: 13.5 % (ref 11.6–15.1)
GFR SERPL CREATININE-BSD FRML MDRD: 15 ML/MIN/1.73SQ M
GLUCOSE SERPL-MCNC: 183 MG/DL (ref 65–140)
GLUCOSE SERPL-MCNC: 218 MG/DL (ref 65–140)
GLUCOSE UR STRIP-MCNC: NEGATIVE MG/DL
GRAM STN SPEC: ABNORMAL
GRAM STN SPEC: ABNORMAL
HCT VFR BLD AUTO: 28.9 % (ref 34.8–46.1)
HGB BLD-MCNC: 9.7 G/DL (ref 11.5–15.4)
HGB UR QL STRIP.AUTO: ABNORMAL
IMM GRANULOCYTES # BLD AUTO: 0.04 THOUSAND/UL (ref 0–0.2)
IMM GRANULOCYTES NFR BLD AUTO: 0 % (ref 0–2)
INR PPP: 1.29 (ref 0.84–1.19)
KETONES UR STRIP-MCNC: NEGATIVE MG/DL
LACTATE SERPL-SCNC: 1.5 MMOL/L (ref 0.5–2)
LACTATE SERPL-SCNC: 1.7 MMOL/L (ref 0.5–2)
LEUKOCYTE ESTERASE UR QL STRIP: ABNORMAL
LYMPHOCYTES # BLD AUTO: 0.61 THOUSANDS/ÂΜL (ref 0.6–4.47)
LYMPHOCYTES NFR BLD AUTO: 6 % (ref 14–44)
MCH RBC QN AUTO: 30.4 PG (ref 26.8–34.3)
MCHC RBC AUTO-ENTMCNC: 33.6 G/DL (ref 31.4–37.4)
MCV RBC AUTO: 91 FL (ref 82–98)
MONOCYTES # BLD AUTO: 0.38 THOUSAND/ÂΜL (ref 0.17–1.22)
MONOCYTES NFR BLD AUTO: 4 % (ref 4–12)
NEUTROPHILS # BLD AUTO: 9.13 THOUSANDS/ÂΜL (ref 1.85–7.62)
NEUTS SEG NFR BLD AUTO: 90 % (ref 43–75)
NITRITE UR QL STRIP: NEGATIVE
NON-SQ EPI CELLS URNS QL MICRO: ABNORMAL /HPF
NRBC BLD AUTO-RTO: 0 /100 WBCS
OTHER STN SPEC: ABNORMAL
PH UR STRIP.AUTO: 6 [PH]
PLATELET # BLD AUTO: 131 THOUSANDS/UL (ref 149–390)
PLATELET # BLD AUTO: 135 THOUSANDS/UL (ref 149–390)
PMV BLD AUTO: 9.6 FL (ref 8.9–12.7)
PMV BLD AUTO: 9.7 FL (ref 8.9–12.7)
POTASSIUM SERPL-SCNC: 4 MMOL/L (ref 3.5–5.3)
PROCALCITONIN SERPL-MCNC: 90.58 NG/ML
PROT SERPL-MCNC: 7.3 G/DL (ref 6.4–8.4)
PROT UR STRIP-MCNC: ABNORMAL MG/DL
PROTHROMBIN TIME: 16.3 SECONDS (ref 11.6–14.5)
RBC # BLD AUTO: 3.19 MILLION/UL (ref 3.81–5.12)
RBC #/AREA URNS AUTO: ABNORMAL /HPF
SODIUM SERPL-SCNC: 128 MMOL/L (ref 135–147)
SP GR UR STRIP.AUTO: <=1.005 (ref 1–1.03)
UROBILINOGEN UR QL STRIP.AUTO: 0.2 E.U./DL
WBC # BLD AUTO: 9.88 THOUSAND/UL (ref 4.31–10.16)
WBC #/AREA URNS AUTO: ABNORMAL /HPF

## 2023-07-09 PROCEDURE — 81001 URINALYSIS AUTO W/SCOPE: CPT | Performed by: EMERGENCY MEDICINE

## 2023-07-09 PROCEDURE — 87154 CUL TYP ID BLD PTHGN 6+ TRGT: CPT | Performed by: EMERGENCY MEDICINE

## 2023-07-09 PROCEDURE — 84145 PROCALCITONIN (PCT): CPT | Performed by: EMERGENCY MEDICINE

## 2023-07-09 PROCEDURE — 99223 1ST HOSP IP/OBS HIGH 75: CPT | Performed by: INTERNAL MEDICINE

## 2023-07-09 PROCEDURE — 85049 AUTOMATED PLATELET COUNT: CPT | Performed by: INTERNAL MEDICINE

## 2023-07-09 PROCEDURE — G1004 CDSM NDSC: HCPCS

## 2023-07-09 PROCEDURE — 85025 COMPLETE CBC W/AUTO DIFF WBC: CPT | Performed by: EMERGENCY MEDICINE

## 2023-07-09 PROCEDURE — 99285 EMERGENCY DEPT VISIT HI MDM: CPT

## 2023-07-09 PROCEDURE — 83605 ASSAY OF LACTIC ACID: CPT | Performed by: EMERGENCY MEDICINE

## 2023-07-09 PROCEDURE — 87086 URINE CULTURE/COLONY COUNT: CPT | Performed by: EMERGENCY MEDICINE

## 2023-07-09 PROCEDURE — 85730 THROMBOPLASTIN TIME PARTIAL: CPT | Performed by: EMERGENCY MEDICINE

## 2023-07-09 PROCEDURE — 83605 ASSAY OF LACTIC ACID: CPT | Performed by: INTERNAL MEDICINE

## 2023-07-09 PROCEDURE — 80053 COMPREHEN METABOLIC PANEL: CPT | Performed by: EMERGENCY MEDICINE

## 2023-07-09 PROCEDURE — 87040 BLOOD CULTURE FOR BACTERIA: CPT | Performed by: EMERGENCY MEDICINE

## 2023-07-09 PROCEDURE — 74176 CT ABD & PELVIS W/O CONTRAST: CPT

## 2023-07-09 PROCEDURE — 87186 SC STD MICRODIL/AGAR DIL: CPT | Performed by: EMERGENCY MEDICINE

## 2023-07-09 PROCEDURE — 36415 COLL VENOUS BLD VENIPUNCTURE: CPT | Performed by: EMERGENCY MEDICINE

## 2023-07-09 PROCEDURE — 87077 CULTURE AEROBIC IDENTIFY: CPT | Performed by: EMERGENCY MEDICINE

## 2023-07-09 PROCEDURE — 82948 REAGENT STRIP/BLOOD GLUCOSE: CPT

## 2023-07-09 PROCEDURE — 85610 PROTHROMBIN TIME: CPT | Performed by: EMERGENCY MEDICINE

## 2023-07-09 PROCEDURE — 99291 CRITICAL CARE FIRST HOUR: CPT | Performed by: EMERGENCY MEDICINE

## 2023-07-09 PROCEDURE — 96365 THER/PROPH/DIAG IV INF INIT: CPT

## 2023-07-09 RX ORDER — INSULIN LISPRO 100 [IU]/ML
1-5 INJECTION, SOLUTION INTRAVENOUS; SUBCUTANEOUS
Status: DISCONTINUED | OUTPATIENT
Start: 2023-07-09 | End: 2023-07-14 | Stop reason: HOSPADM

## 2023-07-09 RX ORDER — CLONAZEPAM 0.5 MG/1
1 TABLET ORAL
Status: CANCELLED | OUTPATIENT
Start: 2023-07-09

## 2023-07-09 RX ORDER — INSULIN LISPRO 100 [IU]/ML
1-5 INJECTION, SOLUTION INTRAVENOUS; SUBCUTANEOUS
Status: CANCELLED | OUTPATIENT
Start: 2023-07-10

## 2023-07-09 RX ORDER — HEPARIN SODIUM 5000 [USP'U]/ML
5000 INJECTION, SOLUTION INTRAVENOUS; SUBCUTANEOUS EVERY 8 HOURS SCHEDULED
Status: DISCONTINUED | OUTPATIENT
Start: 2023-07-09 | End: 2023-07-14 | Stop reason: HOSPADM

## 2023-07-09 RX ORDER — CLONAZEPAM 0.5 MG/1
1 TABLET ORAL
Status: DISCONTINUED | OUTPATIENT
Start: 2023-07-09 | End: 2023-07-14 | Stop reason: HOSPADM

## 2023-07-09 RX ORDER — FAMOTIDINE 20 MG/1
10 TABLET, FILM COATED ORAL DAILY
Status: CANCELLED | OUTPATIENT
Start: 2023-07-10

## 2023-07-09 RX ORDER — DULOXETIN HYDROCHLORIDE 30 MG/1
60 CAPSULE, DELAYED RELEASE ORAL 2 TIMES DAILY
Status: CANCELLED | OUTPATIENT
Start: 2023-07-09

## 2023-07-09 RX ORDER — INSULIN LISPRO 100 [IU]/ML
1-5 INJECTION, SOLUTION INTRAVENOUS; SUBCUTANEOUS
Status: CANCELLED | OUTPATIENT
Start: 2023-07-09

## 2023-07-09 RX ORDER — ACETAMINOPHEN 325 MG/1
650 TABLET ORAL EVERY 6 HOURS PRN
Status: DISCONTINUED | OUTPATIENT
Start: 2023-07-09 | End: 2023-07-14 | Stop reason: HOSPADM

## 2023-07-09 RX ORDER — TIZANIDINE 2 MG/1
2 TABLET ORAL EVERY 8 HOURS PRN
Status: CANCELLED | OUTPATIENT
Start: 2023-07-09

## 2023-07-09 RX ORDER — GABAPENTIN 100 MG/1
100 CAPSULE ORAL
Status: DISCONTINUED | OUTPATIENT
Start: 2023-07-10 | End: 2023-07-14 | Stop reason: HOSPADM

## 2023-07-09 RX ORDER — CEFTRIAXONE 2 G/50ML
2000 INJECTION, SOLUTION INTRAVENOUS ONCE
Status: COMPLETED | OUTPATIENT
Start: 2023-07-09 | End: 2023-07-09

## 2023-07-09 RX ORDER — DOCUSATE SODIUM 100 MG/1
100 CAPSULE, LIQUID FILLED ORAL DAILY PRN
Status: CANCELLED | OUTPATIENT
Start: 2023-07-09

## 2023-07-09 RX ORDER — ENOXAPARIN SODIUM 100 MG/ML
40 INJECTION SUBCUTANEOUS DAILY
Status: CANCELLED | OUTPATIENT
Start: 2023-07-10

## 2023-07-09 RX ORDER — GABAPENTIN 300 MG/1
300 CAPSULE ORAL
Status: CANCELLED | OUTPATIENT
Start: 2023-07-09

## 2023-07-09 RX ORDER — CEFTRIAXONE 2 G/50ML
2000 INJECTION, SOLUTION INTRAVENOUS EVERY 24 HOURS
Status: DISCONTINUED | OUTPATIENT
Start: 2023-07-10 | End: 2023-07-14

## 2023-07-09 RX ORDER — DULOXETIN HYDROCHLORIDE 30 MG/1
60 CAPSULE, DELAYED RELEASE ORAL 2 TIMES DAILY
Status: DISCONTINUED | OUTPATIENT
Start: 2023-07-09 | End: 2023-07-14 | Stop reason: HOSPADM

## 2023-07-09 RX ORDER — ENOXAPARIN SODIUM 100 MG/ML
40 INJECTION SUBCUTANEOUS DAILY
Status: DISCONTINUED | OUTPATIENT
Start: 2023-07-10 | End: 2023-07-09 | Stop reason: ALTCHOICE

## 2023-07-09 RX ORDER — DOCUSATE SODIUM 100 MG/1
100 CAPSULE, LIQUID FILLED ORAL DAILY PRN
Status: DISCONTINUED | OUTPATIENT
Start: 2023-07-09 | End: 2023-07-14 | Stop reason: HOSPADM

## 2023-07-09 RX ORDER — NICOTINE 21 MG/24HR
1 PATCH, TRANSDERMAL 24 HOURS TRANSDERMAL DAILY
Status: DISCONTINUED | OUTPATIENT
Start: 2023-07-10 | End: 2023-07-14 | Stop reason: HOSPADM

## 2023-07-09 RX ORDER — INSULIN LISPRO 100 [IU]/ML
1-5 INJECTION, SOLUTION INTRAVENOUS; SUBCUTANEOUS
Status: DISCONTINUED | OUTPATIENT
Start: 2023-07-10 | End: 2023-07-14 | Stop reason: HOSPADM

## 2023-07-09 RX ORDER — MAGNESIUM HYDROXIDE/ALUMINUM HYDROXICE/SIMETHICONE 120; 1200; 1200 MG/30ML; MG/30ML; MG/30ML
30 SUSPENSION ORAL EVERY 6 HOURS PRN
Status: DISCONTINUED | OUTPATIENT
Start: 2023-07-09 | End: 2023-07-14 | Stop reason: HOSPADM

## 2023-07-09 RX ORDER — NICOTINE 21 MG/24HR
1 PATCH, TRANSDERMAL 24 HOURS TRANSDERMAL DAILY
Status: CANCELLED | OUTPATIENT
Start: 2023-07-10

## 2023-07-09 RX ORDER — ONDANSETRON 2 MG/ML
4 INJECTION INTRAMUSCULAR; INTRAVENOUS EVERY 6 HOURS PRN
Status: DISCONTINUED | OUTPATIENT
Start: 2023-07-09 | End: 2023-07-14 | Stop reason: HOSPADM

## 2023-07-09 RX ORDER — GABAPENTIN 100 MG/1
100 CAPSULE ORAL
Status: CANCELLED | OUTPATIENT
Start: 2023-07-10

## 2023-07-09 RX ORDER — CEFTRIAXONE 2 G/50ML
2000 INJECTION, SOLUTION INTRAVENOUS EVERY 24 HOURS
Status: CANCELLED | OUTPATIENT
Start: 2023-07-10

## 2023-07-09 RX ORDER — TIZANIDINE 4 MG/1
2 TABLET ORAL EVERY 8 HOURS PRN
Status: DISCONTINUED | OUTPATIENT
Start: 2023-07-09 | End: 2023-07-14 | Stop reason: HOSPADM

## 2023-07-09 RX ADMIN — ACETAMINOPHEN 325MG 650 MG: 325 TABLET ORAL at 20:50

## 2023-07-09 RX ADMIN — SODIUM CHLORIDE 1000 ML: 0.9 INJECTION, SOLUTION INTRAVENOUS at 20:49

## 2023-07-09 RX ADMIN — SODIUM CHLORIDE 2000 ML: 0.9 INJECTION, SOLUTION INTRAVENOUS at 17:02

## 2023-07-09 RX ADMIN — CLONAZEPAM 1 MG: 0.5 TABLET ORAL at 21:07

## 2023-07-09 RX ADMIN — DULOXETINE HYDROCHLORIDE 60 MG: 30 CAPSULE, DELAYED RELEASE ORAL at 20:49

## 2023-07-09 RX ADMIN — INSULIN LISPRO 1 UNITS: 100 INJECTION, SOLUTION INTRAVENOUS; SUBCUTANEOUS at 22:29

## 2023-07-09 RX ADMIN — HEPARIN SODIUM 5000 UNITS: 5000 INJECTION INTRAVENOUS; SUBCUTANEOUS at 21:07

## 2023-07-09 RX ADMIN — CEFTRIAXONE 2000 MG: 2 INJECTION, SOLUTION INTRAVENOUS at 17:03

## 2023-07-09 NOTE — PLAN OF CARE
Problem: PAIN - ADULT  Goal: Verbalizes/displays adequate comfort level or baseline comfort level  Description: Interventions:  - Encourage patient to monitor pain and request assistance  - Assess pain using appropriate pain scale  - Administer analgesics based on type and severity of pain and evaluate response  - Implement non-pharmacological measures as appropriate and evaluate response  - Consider cultural and social influences on pain and pain management  - Notify physician/advanced practitioner if interventions unsuccessful or patient reports new pain  Outcome: Progressing     Problem: INFECTION - ADULT  Goal: Absence or prevention of progression during hospitalization  Description: INTERVENTIONS:  - Assess and monitor for signs and symptoms of infection  - Monitor lab/diagnostic results  - Monitor all insertion sites, i.e. indwelling lines, tubes, and drains  - Monitor endotracheal if appropriate and nasal secretions for changes in amount and color  - [place] appropriate cooling/warming therapies per order  - Administer medications as ordered  - Instruct and encourage patient and family to use good hand hygiene technique  - Identify and instruct in appropriate isolation precautions for identified infection/condition  Outcome: Progressing  Goal: Absence of fever/infection during neutropenic period  Description: INTERVENTIONS:  - Monitor WBC    Outcome: Progressing     Problem: SAFETY ADULT  Goal: Patient will remain free of falls  Description: INTERVENTIONS:  - Educate patient/family on patient safety including physical limitations  - Instruct patient to call for assistance with activity   - Consult OT/PT to assist with strengthening/mobility   - Keep Call bell within reach  - Keep bed low and locked with side rails adjusted as appropriate  - Keep care items and personal belongings within reach  - Initiate and maintain comfort rounds  - Make Fall Risk Sign visible to staff  - Offer Toileting every 2 Hours, in advance of need  - Initiate/Maintain bed/chair alarm  - Obtain necessary fall risk management equipment: non skid socks  - Apply yellow socks and bracelet for high fall risk patients  - Consider moving patient to room near nurses station  Outcome: Progressing  Goal: Maintain or return to baseline ADL function  Description: INTERVENTIONS:  -  Assess patient's ability to carry out ADLs; assess patient's baseline for ADL function and identify physical deficits which impact ability to perform ADLs (bathing, care of mouth/teeth, toileting, grooming, dressing, etc.)  - Assess/evaluate cause of self-care deficits   - Assess range of motion  - Assess patient's mobility; develop plan if impaired  - Assess patient's need for assistive devices and provide as appropriate  - Encourage maximum independence but intervene and supervise when necessary  - Involve family in performance of ADLs  - Assess for home care needs following discharge   - Consider OT consult to assist with ADL evaluation and planning for discharge  - Provide patient education as appropriate  Outcome: Progressing  Goal: Maintains/Returns to pre admission functional level  Description: INTERVENTIONS:  - Perform BMAT or MOVE assessment daily.   - Set and communicate daily mobility goal to care team and patient/family/caregiver. - Collaborate with rehabilitation services on mobility goals if consulted  - Perform Range of Motion 3 times a day. - Reposition patient every 3 hours.   - Dangle patient 3 times a day  - Stand patient 3 times a day  - Ambulate patient 3 times a day  - Out of bed to chair 3 times a day   - Out of bed for meals 3 times a day  - Out of bed for toileting  - Record patient progress and toleration of activity level   Outcome: Progressing     Problem: DISCHARGE PLANNING  Goal: Discharge to home or other facility with appropriate resources  Description: INTERVENTIONS:  - Identify barriers to discharge w/patient and caregiver  - Arrange for needed discharge resources and transportation as appropriate  - Identify discharge learning needs (meds, wound care, etc.)  - Arrange for interpretive services to assist at discharge as needed  - Refer to Case Management Department for coordinating discharge planning if the patient needs post-hospital services based on physician/advanced practitioner order or complex needs related to functional status, cognitive ability, or social support system  Outcome: Progressing     Problem: METABOLIC, FLUID AND ELECTROLYTES - ADULT  Goal: Electrolytes maintained within normal limits  Description: INTERVENTIONS:  - Monitor labs and assess patient for signs and symptoms of electrolyte imbalances  - Administer electrolyte replacement as ordered  - Monitor response to electrolyte replacements, including repeat lab results as appropriate  - Instruct patient on fluid and nutrition as appropriate  Outcome: Progressing  Goal: Fluid balance maintained  Description: INTERVENTIONS:  - Monitor labs   - Monitor I/O and WT  - Instruct patient on fluid and nutrition as appropriate  - Assess for signs & symptoms of volume excess or deficit  Outcome: Progressing  Goal: Glucose maintained within target range  Description: INTERVENTIONS:  - Monitor Blood Glucose as ordered  - Assess for signs and symptoms of hyperglycemia and hypoglycemia  - Administer ordered medications to maintain glucose within target range  - Assess nutritional intake and initiate nutrition service referral as needed  Outcome: Progressing

## 2023-07-09 NOTE — ED PROVIDER NOTES
Final Diagnosis:  1. Sepsis ([facility])    2. INESSA (acute kidney injury) [facility])        Chief Complaint   Patient presents with   • Abnormal Lab     Pt had blood cultures done 2 days ago and both were positive. Came back for admission     HPI  Patient presents for evaluation of positive blood cultures. Patient was seen here 2 days ago for generalized malaise and weakness. Found to have an INESSA. Determined that she would prefer to go home and was discharged. Today it was found that both of her blood cultures were positive for E. coli, sensitive to Rocephin. She was contacted to come back in for evaluation. Patient states that she has been drinking Pedialyte to help with her dehydration. States that she otherwise feels tired and well but continues to deny any cough, congestion, dysuria hematuria. Patient does endorse today that she is having some loose stools and complains of some generalized right upper quadrant abdominal pain. Patient does have a history of cholecystectomy. Unless otherwise specified:  - No language barrier.   - History obtained from patient. - There are no limitations to the history obtained. - Previous charting was reviewed    PMH:   has a past medical history of Diabetes mellitus ([facility]), Hypertension, Migraine, and MRSA (methicillin resistant Staphylococcus aureus). PSH:   has a past surgical history that includes Achilles tendon repair; Carpal tunnel release (Right);  section; Cholecystectomy; Ankle surgery (Left, 2019); Nasal sinus surgery (); Lumbar laminectomy (2018); and Ganglion cyst excision (Left, ). ROS:  Review of Systems   - 13 point ROS was performed and all are normal unless stated in the history above. - Nursing note reviewed. Vitals reviewed. - Orders placed by myself and/or advanced practitioner / resident.         PE:   Vitals:    23 1653 23 1745 23 1830   BP: 108/73 134/65 168/71   BP Location: Right arm Right arm Right arm Pulse: 103 99 (!) 111   Resp: 20 18 18   Temp: (!) 97.1 °F (36.2 °C)     TempSrc: Temporal     SpO2: 99% 99% 99%   Weight: 125 kg (275 lb 9.2 oz)       Vitals reviewed by me. Patient appears ill but nondistressed sitting in bed. Nonlabored respirations, saturating well on room air. Patient was able to ambulate in to the room without any issue. Unless otherwise specified above:    General: VS reviewed  Appears in NAD    Head: Normocephalic, atraumatic  Eyes: EOM-I.     ENT: Atraumatic external nose and ears. No drooling. Neck: No JVD. CV: No pallor noted  Lungs:   No tachypnea  No respiratory distress    Abdomen:  Soft, non-tender, non-distended    MSK:   No obvious deformity    Skin: Dry, intact. No obvious rash. Psychiatric/Behavioral: Appropriate mood and affect   Exam: deferred    Physical Exam     CriticalCare Time    Date/Time: 7/9/2023 6:58 PM    Performed by: Simran Saldana MD  Authorized by: Simran Saldana MD    Critical care provider statement:     Critical care time (minutes):  37    Critical care was necessary to treat or prevent imminent or life-threatening deterioration of the following conditions:  Sepsis and renal failure    Critical care was time spent personally by me on the following activities:  Obtaining history from patient or surrogate, development of treatment plan with patient or surrogate, examination of patient, review of old charts, ordering and review of radiographic studies and ordering and review of laboratory studies       A:  - Nursing note reviewed. ED Course as of 07/09/23 1858   Khanh Pascual Jul 09, 2023   1729 CT abdomen pelvis wo contrast  Some stranding around the right kidney, anticipate pyelonephritis as interpretation. No free air on my interpretation. 1735 Creatinine(!): 3.11  Worsening INESSA     CT abdomen pelvis wo contrast   Final Result      1.  Bilateral perinephric soft tissue stranding with mild distention of the upper collecting systems, without evidence of renal calculus. Findings are most suspicious for pyelonephritis. Nonspecific renal inflammatory disease also in the differential.   2. Small right-sided pleural effusion   3. Sigmoid diverticulosis and small hiatal hernia   4. Hepatomegaly and hepatic steatosis            Workstation performed: [id number]           Orders Placed This Encounter   Procedures   • Critical Care   • Blood culture #1   • Blood culture #2   • Urine culture   • CT abdomen pelvis wo contrast   • CBC and differential   • Comprehensive metabolic panel   • Lactic acid   • Procalcitonin   • Protime-INR   • APTT   • UA w Reflex to Microscopic w Reflex to Culture   • Urine Microscopic   • Urinalysis with microscopic   • Intake and Output   • Daily weights   • Bladder Scan   • Urinary retention protocol   • Medication hold parameters, if not noted on medication order   • INPATIENT ADMISSION     Labs Reviewed   CBC AND DIFFERENTIAL - Abnormal       Result Value Ref Range Status    WBC 9.88  4.31 - 10.16 Thousand/uL Final    RBC 3.19 (*) 3.81 - 5.12 Million/uL Final    Hemoglobin 9.7 (*) 11.5 - 15.4 g/dL Final    Hematocrit 28.9 (*) 34.8 - 46.1 % Final    MCV 91  82 - 98 fL Final    MCH 30.4  26.8 - 34.3 pg Final    MCHC 33.6  31.4 - 37.4 g/dL Final    RDW 13.5  11.6 - 15.1 % Final    MPV 9.6  8.9 - 12.7 fL Final    Platelets 869 (*) 881 - 390 Thousands/uL Final    Comment: This is a corrected result. Previous result was 125 Thousands/uL on 7/9/2023 at 1746 EDT    nRBC 0  /100 WBCs Final    Comment: This is an appended report. These results have been appended to a previously preliminary verified report.     Neutrophils Relative 90 (*) 43 - 75 % Final    Immat GRANS % 0  0 - 2 % Final    Lymphocytes Relative 6 (*) 14 - 44 % Final    Monocytes Relative 4  4 - 12 % Final    Eosinophils Relative 0  0 - 6 % Final    Basophils Relative 0  0 - 1 % Final    Neutrophils Absolute 9.13 (*) 1.85 - 7.62 Thousands/µL Final Immature Grans Absolute 0.04  0.00 - 0.20 Thousand/uL Final    Lymphocytes Absolute 0.61  0.60 - 4.47 Thousands/µL Final    Monocytes Absolute 0.38  0.17 - 1.22 Thousand/µL Final    Eosinophils Absolute 0.03  0.00 - 0.61 Thousand/µL Final    Basophils Absolute 0.01  0.00 - 0.10 Thousands/µL Final    Narrative: This is an appended report. These results have been appended to a previously verified report. COMPREHENSIVE METABOLIC PANEL - Abnormal    Sodium 128 (*) 135 - 147 mmol/L Final    Potassium 4.0  3.5 - 5.3 mmol/L Final    Chloride 96  96 - 108 mmol/L Final    CO2 20 (*) 21 - 32 mmol/L Final    ANION GAP 12  mmol/L Final    BUN 37 (*) 5 - 25 mg/dL Final    Creatinine 3.11 (*) 0.60 - 1.30 mg/dL Final    Comment: Standardized to IDMS reference method    Glucose 183 (*) 65 - 140 mg/dL Final    Comment: If the patient is fasting, the ADA then defines impaired fasting glucose as > 100 mg/dL and diabetes as > or equal to 123 mg/dL. Calcium 9.3  8.4 - 10.2 mg/dL Final    AST 89 (*) 13 - 39 U/L Final    ALT 54 (*) 7 - 52 U/L Final    Comment: Specimen collection should occur prior to Sulfasalazine administration due to the potential for falsely depressed results. Alkaline Phosphatase 69  34 - 104 U/L Final    Total Protein 7.3  6.4 - 8.4 g/dL Final    Albumin 3.7  3.5 - 5.0 g/dL Final    Total Bilirubin 0.59  0.20 - 1.00 mg/dL Final    Comment: Use of this assay is not recommended for patients undergoing treatment with eltrombopag due to the potential for falsely elevated results. N-acetyl-p-benzoquinone imine (metabolite of Acetaminophen) will generate erroneously low results in samples for patients that have taken an overdose of Acetaminophen.     eGFR 15  ml/min/1.73sq m Final    Narrative:     [place] guidelines for Chronic Kidney Disease (CKD):   •  Stage 1 with normal or high GFR (GFR > 90 mL/min/1.73 square meters)  •  Stage 2 Mild CKD (GFR = 60-89 mL/min/1.73 square meters)  •  Stage 3A Moderate CKD (GFR = 45-59 mL/min/1.73 square meters)  •  Stage 3B Moderate CKD (GFR = 30-44 mL/min/1.73 square meters)  •  Stage 4 Severe CKD (GFR = 15-29 mL/min/1.73 square meters)  •  Stage 5 End Stage CKD (GFR <15 mL/min/1.73 square meters)  Note: GFR calculation is accurate only with a steady state creatinine   PROCALCITONIN TEST - Abnormal    Procalcitonin 90.58 (*) <=0.25 ng/ml Final    Comment: Suspected Lower Respiratory Tract Infection (LRTI):  - LESS than or EQUAL to 0.25 ng/mL:   low likelihood for bacterial LRTI; antibiotics DISCOURAGED.  - GREATER than 0.25 ng/mL:   increased likelihood for bacterial LRTI; antibiotics ENCOURAGED. Suspected Sepsis:  - Strongly consider initiating antibiotics in ALL UNSTABLE patients. - LESS than or EQUAL to 0.5 ng/mL:   low likelihood for bacterial sepsis; antibiotics DISCOURAGED.  - GREATER than 0.5 ng/mL:   increased likelihood for bacterial sepsis; antibiotics ENCOURAGED.  - GREATER than 2 ng/mL:   high risk for severe sepsis / septic shock; antibiotics strongly ENCOURAGED. Decisions on antibiotic use should not be based solely on Procalcitonin (PCT) levels. If PCT is low but uncertainty exists with stopping antibiotics, repeat PCT in 6-24 hours to confirm the low level. If antibiotics are administered (regardless if initial PCT was high or low), repeat PCT every 1-2 days to consider early antibiotic cessation (when GREATER than 80% decrease from the peak OR when PCT drops below designated cutoffs, whichever comes first), so long as the infection is NOT one that typically requires prolonged treatment durations (e.g., bone/joint infections, endocarditis, Staph. aureus bacteremia).     Situations of FALSE-POSITIVE Procalcitonin values:  1) Newborns < 67 hours old  2) Massive stress from severe trauma / burns, major surgery, acute pancreatitis, cardiogenic / hemorrhagic shock, sickle cell crisis, or other organ perfusion abnormalities  3) Malaria and some Candidal infections  4) Treatment with agents that stimulate cytokines (e.g., OKT3, anti-lymphocyte globulins, alemtuzumab, IL-2, granulocyte transfusion [NOT GCSFs])  5) Chronic renal disease causes elevated baseline levels (consider GREATER than 0.75 ng/mL as an abnormal cut-off); initiating HD/CRRT may cause transient decreases  6) Paraneoplastic syndromes from medullary thyroid or SCLC, some forms of vasculitis, and acute [id number] disease    Situations of FALSE-NEGATIVE Procalcitonin values:  1) Too early in clinical course for PCT to have reached its peak (may repeat in 6-24 hours to confirm low level)  2) Localized infection WITHOUT systemic (SIRS / sepsis) response (e.g., an abscess, osteomyelitis, cystitis)  3) Mycobacteria (e.g., Tuberculosis, MAC)  4) Cystic fibrosis exacerbations     PROTIME-INR - Abnormal    Protime 16.3 (*) 11.6 - 14.5 seconds Final    INR 1.29 (*) 0.84 - 1.19 Final   UA W REFLEX TO MICROSCOPIC WITH REFLEX TO CULTURE - Abnormal    Color, UA Yellow   Final    Clarity, UA Clear   Final    Specific Gravity, UA <=1.005  1.003 - 1.030 Final    pH, UA 6.0  4.5, 5.0, 5.5, 6.0, 6.5, 7.0, 7.5, 8.0 Final    Leukocytes, UA Moderate (*) Negative Final    Nitrite, UA Negative  Negative Final    Protein,  (2+) (*) Negative mg/dl Final    Glucose, UA Negative  Negative mg/dl Final    Ketones, UA Negative  Negative mg/dl Final    Urobilinogen, UA 0.2  0.2, 1.0 E.U./dl E.U./dl Final    Bilirubin, UA Negative  Negative Final    Occult Blood, UA Large (*) Negative Final   URINE MICROSCOPIC - Abnormal    RBC, UA 20-30 (*) None Seen, 0-1, 1-2, 2-4, 0-5 /hpf Final    WBC, UA 20-30 (*) None Seen, 0-1, 1-2, 0-5, 2-4 /hpf Final    Epithelial Cells Occasional  None Seen, Occasional /hpf Final    Bacteria, UA Occasional  None Seen, Occasional /hpf Final    OTHER OBSERVATIONS Renal Epithelial Cells Present   Final    Comment: SEE OTHER OBSERVATIONS RESULTS!!!   LACTIC ACID, PLASMA (W/REFLEX IF RESULT > 2.0) - Normal    LACTIC ACID 1.7  0.5 - 2.0 mmol/L Final    Narrative:     Result may be elevated if tourniquet was used during collection. APTT - Normal    PTT 34  23 - 37 seconds Final    Comment: Therapeutic Heparin Range =  60-90 seconds   BLOOD CULTURE   BLOOD CULTURE   URINE CULTURE   URINALYSIS WITH MICROSCOPIC         Final Diagnosis:  1. Sepsis ([address])    2. INESSA (acute kidney injury) ([address])        P:  -Patient presents for admission secondary to bacteremia. We will repeat sepsis evaluation we will add on a CT of the abdomen and pelvis to evaluate for any signs of significant pyelonephritis or other intra-abdominal pathology. Patient will be admitted  -Laboratory analysis showed increased procalcitonin. Normal white count. CT shows areas concerning for pyelonephritis. Patient was admitted to medical team for further treatment. Unless otherwise noted the patient's medications were reviewed and they should continue as directed. Medications   cefTRIAXone (ROCEPHIN) IVPB (premix in dextrose) 2,000 mg 50 mL (0 mg Intravenous Stopped 7/9/23 1836)   sodium chloride 0.9 % bolus 2,000 mL (2,000 mL Intravenous New Bag 7/9/23 1702)     Time reflects when diagnosis was documented in both MDM as applicable and the Disposition within this note     Time User Action Codes Description Comment    7/9/2023  6:32 PM Rosie Villalobos Add [A41.9] Sepsis ([address])     7/9/2023  6:32 PM Rajat Williamson Add [N17.9] INESSA (acute kidney injury) [facility])       ED Disposition     ED Disposition   Admit    Condition   Stable    Date/Time   Sun Jul 9, 2023  6:32 PM    Comment   Case was discussed with MEREDITH and the patient's admission status was agreed to be Admission Status: inpatient status to the service of Dr. ANDERSEN .            Follow-up Information    None       Current Discharge Medication List      CONTINUE these medications which have NOT CHANGED    Details   Alpha-Lipoic Acid 300 MG CAPS Take 300 mg by mouth 2 (two) times a day      clonazePAM (KlonoPIN) 0.5 mg tablet Take 2 tablets (1 mg total) by mouth daily at bedtime as needed for anxiety  Qty: 60 tablet, Refills: 0    Associated Diagnoses: Generalized anxiety disorder      docusate sodium (COLACE) 100 mg capsule Take 100 mg by mouth daily as needed for constipation      DULoxetine (CYMBALTA) 60 mg delayed release capsule Take 1 capsule (60 mg total) by mouth 2 (two) times a day  Qty: 180 capsule, Refills: 0    Associated Diagnoses: Osteoarthritis of spine with radiculopathy, lumbosacral region      famotidine (PEPCID) 20 mg tablet Take 20 mg by mouth daily      gabapentin (NEURONTIN) 100 mg capsule One in am, one at lunch, and 3 at hs  Qty: 360 capsule, Refills: 1    Associated Diagnoses: Osteoarthritis of spine with radiculopathy, lumbosacral region      IRON HEME POLYPEPTIDE PO Take 20 mg by mouth 3 (three) times a week      lisinopril (ZESTRIL) 20 mg tablet Take 1 tablet (20 mg total) by mouth daily  Qty: 90 tablet, Refills: 1    Associated Diagnoses: Type 2 diabetes mellitus without complication, without long-term current use of insulin ([address]); Essential hypertension      metFORMIN (GLUCOPHAGE) 500 mg tablet Take 1 tablet (500 mg total) by mouth daily with breakfast  Qty: 90 tablet, Refills: 1    Associated Diagnoses: Type 2 diabetes mellitus without complication, without long-term current use of insulin (HCC)      Multiple Vitamins-Minerals (CENTRUM SILVER PO) Take 1 tablet by mouth daily      tiZANidine (ZANAFLEX) 2 mg tablet Take 1 tablet (2 mg total) by mouth every 8 (eight) hours as needed for muscle spasms  Qty: 45 tablet, Refills: 3    Associated Diagnoses: Lumbar pain           No discharge procedures on file. Prior to Admission Medications   Prescriptions Last Dose Informant Patient Reported? Taking?    Alpha-Lipoic Acid 300 MG CAPS   Yes No   Sig: Take 300 mg by mouth 2 (two) times a day   DULoxetine (CYMBALTA) 60 mg delayed release capsule   No No   Sig: Take 1 capsule (60 mg total) by mouth 2 (two) times a day   IRON HEME POLYPEPTIDE PO  Self Yes No   Sig: Take 20 mg by mouth 3 (three) times a week   Multiple Vitamins-Minerals (CENTRUM SILVER PO)  Self Yes No   Sig: Take 1 tablet by mouth daily   clonazePAM (KlonoPIN) 0.5 mg tablet   No No   Sig: Take 2 tablets (1 mg total) by mouth daily at bedtime as needed for anxiety   clonazePAM (KlonoPIN) 1 mg tablet   No No   Sig: Take 1 tablet (1 mg total) by mouth daily at bedtime   docusate sodium (COLACE) 100 mg capsule  Self Yes No   Sig: Take 100 mg by mouth daily as needed for constipation   famotidine (PEPCID) 20 mg tablet  Self Yes No   Sig: Take 20 mg by mouth daily   gabapentin (NEURONTIN) 100 mg capsule   No No   Sig: One in am, one at lunch, and 3 at hs   lisinopril (ZESTRIL) 20 mg tablet   No No   Sig: Take 1 tablet (20 mg total) by mouth daily   metFORMIN (GLUCOPHAGE) 500 mg tablet   No No   Sig: Take 1 tablet (500 mg total) by mouth daily with breakfast   tiZANidine (ZANAFLEX) 2 mg tablet   No No   Sig: Take 1 tablet (2 mg total) by mouth every 8 (eight) hours as needed for muscle spasms      Facility-Administered Medications: None       Portions of the record may have been created with voice recognition software. Occasional wrong word or "sound a like" substitutions may have occurred due to the inherent limitations of voice recognition software. Read the chart carefully and recognize, using context, where substitutions have occurred.     Electronically signed by:  MD Lina Lindsey MD  07/09/23 0265

## 2023-07-09 NOTE — H&P
[address]  H&P  Name: Rafael Hernández 60 y.o. female I MRN: [id number]  Unit/Bed#: 405-01 I Date of Admission: 7/9/2023   Date of Service: 7/9/2023 I Hospital Day: 0      Assessment/Plan   * Sepsis due to Escherichia coli with acute renal failure [facility])  Assessment & Plan  Sepsis secondary to acute complicated urinary tract infection, bilateral pyelonephritis with gram-negative cyndee bacteremia. Continue IV ceftriaxone    Follow-up repeat cultures, treat underlying acute kidney injury. INESSA (acute kidney injury) ([address])  Assessment & Plan  Baseline creatinine 1.31, now increased to 3.11 consistent with acute kidney injury    Neurology consult, avoid nephrotoxins, possible ATN versus prerenal    Check urine electrolytes    Hyponatremia  Assessment & Plan  Continue IV fluid, suspect hypovolemic hyponatremia    Type 2 diabetes mellitus with stage 3a chronic kidney disease, without long-term current use of insulin [facility])  Assessment & Plan  Lab Results   Component Value Date    HGBA1C 7.3 (H) 04/21/2023       Recent Labs     07/07/23  1724   POCGLU 161*       Blood Sugar Average: Last 72 hrs:  Diabetic diet, Accu-Cheks before meals and at bedtime, sliding scale insulin. Hold metformin    Essential hypertension  Assessment & Plan  Hold lisinopril, monitor BP    Obesity, morbid (HCC)  Assessment & Plan  BMI 43.16    Neuropathic pain  Assessment & Plan  Continue Neurontin, Cymbalta         VTE Pharmacologic Prophylaxis:   High Risk (Score >/= 5) - Pharmacological DVT Prophylaxis Ordered: enoxaparin (Lovenox). Sequential Compression Devices Ordered. Code Status: Full code  Discussion with family: Patient declined call to . Anticipated Length of Stay: Patient will be admitted on an inpatient basis with an anticipated length of stay of greater than 2 midnights secondary to Gram-negative cyndee bacteremia, complicated UTI with acute kidney injury.     Total Time Spent on Date of Encounter in care of patient: 85 minutes This time was spent on one or more of the following: performing physical exam; counseling and coordination of care; obtaining or reviewing history; documenting in the medical record; reviewing/ordering tests, medications or procedures; communicating with other healthcare professionals and discussing with patient's family/caregivers. Chief Complaint: Patient reported to ER after being contacted about positive blood cultures 2 days ago. History of Present Illness:  Prince Beck is a 60 y.o. female with a PMH as outlined below who presents with febrile illness, fever at home, was seen in the emergency room 2 days ago, had blood cultures drawn, they turn positive, contacted to return. Again was seen 2 days ago for generalized weakness, was diagnosed with acute kidney injury, patient opted for discharge home with continued p.o. intake. Patient did report some diarrhea, generalized right upper quadrant abdominal pain, shaking chills at home. Review of Systems:  Review of Systems   Constitutional: Positive for appetite change, chills, diaphoresis, fatigue and fever. HENT: Negative. Respiratory: Negative. Cardiovascular: Negative. All other systems reviewed and are negative. Past Medical and Surgical History:   Past Medical History:   Diagnosis Date   • Diabetes mellitus ([address])    • Hypertension    • Migraine    • MRSA (methicillin resistant Staphylococcus aureus)        Past Surgical History:   Procedure Laterality Date   • ACHILLES TENDON REPAIR     • ANKLE SURGERY Left 2019    Excision of Lipoma   • CARPAL TUNNEL RELEASE Right    •  SECTION     • CHOLECYSTECTOMY     • GANGLION CYST EXCISION Left     Left wrist   • LUMBAR LAMINECTOMY  2018    x 3 disks   • NASAL SINUS SURGERY         Meds/Allergies:  Prior to Admission medications    Medication Sig Start Date End Date Taking?  Authorizing Provider   Alpha-Lipoic Acid 300 MG CAPS Take 300 mg by mouth 2 (two) times a day    Historical Provider, MD   clonazePAM (KlonoPIN) 0.5 mg tablet Take 2 tablets (1 mg total) by mouth daily at bedtime as needed for anxiety 23   Loretta Gomez DO   clonazePAM (KlonoPIN) 1 mg tablet Take 1 tablet (1 mg total) by mouth daily at bedtime 23  Loretta Gomez DO   docusate sodium (COLACE) 100 mg capsule Take 100 mg by mouth daily as needed for constipation    Historical Provider, MD   DULoxetine (CYMBALTA) 60 mg delayed release capsule Take 1 capsule (60 mg total) by mouth 2 (two) times a day 23   Loretta Gomez DO   famotidine (PEPCID) 20 mg tablet Take 20 mg by mouth daily    Historical Provider, MD   gabapentin (NEURONTIN) 100 mg capsule One in am, one at lunch, and 3 at hs 23   Loretta Gomez DO   IRON HEME POLYPEPTIDE PO Take 20 mg by mouth 3 (three) times a week    Historical Provider, MD   lisinopril (ZESTRIL) 20 mg tablet Take 1 tablet (20 mg total) by mouth daily 3/15/23   Matt Khan DO   metFORMIN (GLUCOPHAGE) 500 mg tablet Take 1 tablet (500 mg total) by mouth daily with breakfast 23   Loretta Gomez DO   Multiple Vitamins-Minerals (CENTRUM SILVER PO) Take 1 tablet by mouth daily    Historical Provider, MD   tiZANidine (ZANAFLEX) 2 mg tablet Take 1 tablet (2 mg total) by mouth every 8 (eight) hours as needed for muscle spasms 3/15/23   Matt Khan DO     I have reviewed home medications with patient personally. Allergies:    Allergies   Allergen Reactions   • Pollen Extract Sneezing   • Vancomycin Rash       Social History:  Marital Status: Single   Substance Use History:   Social History     Substance and Sexual Activity   Alcohol Use Not Currently     Social History     Tobacco Use   Smoking Status Former   • Years: 50.00   • Types: Cigarettes   • Quit date:    • Years since quittin.5   Smokeless Tobacco Current   Tobacco Comments    Vapes - Daily - includes 3 mg nicotine     Social History     Substance and Sexual Activity   Drug Use Never       Family History:  Family History   Problem Relation Age of Onset   • Hypertension Mother    • Hepatitis Mother         Hep C   • Stroke Father    • Arrhythmia Father    • Melanoma Father    • No Known Problems Sister    • No Known Problems Maternal Grandmother    • No Known Problems Maternal Grandfather    • No Known Problems Paternal Grandmother    • No Known Problems Paternal Grandfather    • No Known Problems Son    • No Known Problems Maternal Aunt    • No Known Problems Maternal Aunt    • No Known Problems Maternal Aunt    • No Known Problems Maternal Aunt    • No Known Problems Paternal Aunt        Physical Exam:     Vitals:   Blood Pressure: 168/71 (07/09/23 1830)  Pulse: (!) 111 (07/09/23 1830)  Temperature: (!) 97.1 °F (36.2 °C) (07/09/23 1653)  Temp Source: Temporal (07/09/23 1653)  Respirations: 18 (07/09/23 1830)  Weight - Scale: 125 kg (275 lb 9.2 oz) (07/09/23 1653)  SpO2: 99 % (07/09/23 1830)    Physical Exam  Vitals and nursing note reviewed. Constitutional:       General: She is not in acute distress. Appearance: She is ill-appearing. HENT:      Head: Normocephalic and atraumatic. Cardiovascular:      Rate and Rhythm: Normal rate. Pulses: Normal pulses. Heart sounds: Normal heart sounds. Pulmonary:      Effort: Pulmonary effort is normal.      Breath sounds: Normal breath sounds. Abdominal:      General: Bowel sounds are normal. There is no distension. Palpations: Abdomen is soft. Tenderness: There is no abdominal tenderness. Comments: Positive CVA tenderness bilaterally   Musculoskeletal:         General: No swelling or tenderness. Normal range of motion. Cervical back: Normal range of motion. Right lower leg: No edema. Left lower leg: No edema. Skin:     General: Skin is warm. Coloration: Skin is pale. Neurological:      Mental Status: She is alert and oriented to person, place, and time. Mental status is at baseline. Psychiatric:         Mood and Affect: Mood normal.         Behavior: Behavior normal.         Thought Content: Thought content normal.         Judgment: Judgment normal.          Additional Data:     Lab Results:  Results from last 7 days   Lab Units 07/09/23  1731   WBC Thousand/uL 9.88   HEMOGLOBIN g/dL 9.7*   HEMATOCRIT % 28.9*   PLATELETS Thousands/uL 135*   NEUTROS PCT % 90*   LYMPHS PCT % 6*   MONOS PCT % 4   EOS PCT % 0     Results from last 7 days   Lab Units 07/09/23  1702   SODIUM mmol/L 128*   POTASSIUM mmol/L 4.0   CHLORIDE mmol/L 96   CO2 mmol/L 20*   BUN mg/dL 37*   CREATININE mg/dL 3.11*   ANION GAP mmol/L 12   CALCIUM mg/dL 9.3   ALBUMIN g/dL 3.7   TOTAL BILIRUBIN mg/dL 0.59   ALK PHOS U/L 69   ALT U/L 54*   AST U/L 89*   GLUCOSE RANDOM mg/dL 183*     Results from last 7 days   Lab Units 07/09/23  1702   INR  1.29*     Results from last 7 days   Lab Units 07/07/23  1724   POC GLUCOSE mg/dl 161*         Results from last 7 days   Lab Units 07/09/23  1702 07/07/23  1854   LACTIC ACID mmol/L 1.7 1.9   PROCALCITONIN ng/ml 90.58* 191.87*       Lines/Drains:  Invasive Devices     Peripheral Intravenous Line  Duration           Peripheral IV 07/09/23 Left Antecubital <1 day                    Imaging: Reviewed radiology reports from this admission including: abdominal/pelvic CT  CT abdomen pelvis wo contrast   Final Result by Shanta Chaudhry MD (07/09 1828)      1. Bilateral perinephric soft tissue stranding with mild distention of the upper collecting systems, without evidence of renal calculus. Findings are most suspicious for pyelonephritis. Nonspecific renal inflammatory disease also in the differential.   2. Small right-sided pleural effusion   3. Sigmoid diverticulosis and small hiatal hernia   4. Hepatomegaly and hepatic steatosis            Workstation performed: [id number]             ** Please Note: This note has been constructed using a voice recognition system.  **

## 2023-07-09 NOTE — ASSESSMENT & PLAN NOTE
Baseline creatinine 1.31, now increased to 3.11 consistent with acute kidney injury    Neurology consult, avoid nephrotoxins, possible ATN versus prerenal    Check urine electrolytes

## 2023-07-09 NOTE — ASSESSMENT & PLAN NOTE
Lab Results   Component Value Date    HGBA1C 7.3 (H) 04/21/2023       Recent Labs     07/07/23  1724   POCGLU 161*       Blood Sugar Average: Last 72 hrs:  Diabetic diet, Accu-Cheks before meals and at bedtime, sliding scale insulin.     Hold metformin

## 2023-07-09 NOTE — ASSESSMENT & PLAN NOTE
Sepsis secondary to acute complicated urinary tract infection, bilateral pyelonephritis with gram-negative cyndee bacteremia. Continue IV ceftriaxone    Follow-up repeat cultures, treat underlying acute kidney injury.

## 2023-07-10 LAB
ALBUMIN SERPL BCP-MCNC: 3.5 G/DL (ref 3.5–5)
ALP SERPL-CCNC: 73 U/L (ref 34–104)
ALT SERPL W P-5'-P-CCNC: 53 U/L (ref 7–52)
ANION GAP SERPL CALCULATED.3IONS-SCNC: 11 MMOL/L
AST SERPL W P-5'-P-CCNC: 79 U/L (ref 13–39)
BACTERIA UR QL AUTO: ABNORMAL /HPF
BASOPHILS # BLD MANUAL: 0 THOUSAND/UL (ref 0–0.1)
BASOPHILS NFR MAR MANUAL: 0 % (ref 0–1)
BILIRUB SERPL-MCNC: 0.57 MG/DL (ref 0.2–1)
BILIRUB UR QL STRIP: NEGATIVE
BUN SERPL-MCNC: 38 MG/DL (ref 5–25)
CALCIUM SERPL-MCNC: 8.9 MG/DL (ref 8.4–10.2)
CHLORIDE SERPL-SCNC: 100 MMOL/L (ref 96–108)
CLARITY UR: CLEAR
CO2 SERPL-SCNC: 21 MMOL/L (ref 21–32)
COLOR UR: YELLOW
CREAT SERPL-MCNC: 2.93 MG/DL (ref 0.6–1.3)
CREAT UR-MCNC: 43.9 MG/DL
EOSINOPHIL # BLD MANUAL: 0 THOUSAND/UL (ref 0–0.4)
EOSINOPHIL NFR BLD MANUAL: 0 % (ref 0–6)
ERYTHROCYTE [DISTWIDTH] IN BLOOD BY AUTOMATED COUNT: 13.4 % (ref 11.6–15.1)
GFR SERPL CREATININE-BSD FRML MDRD: 16 ML/MIN/1.73SQ M
GLUCOSE SERPL-MCNC: 100 MG/DL (ref 65–140)
GLUCOSE SERPL-MCNC: 115 MG/DL (ref 65–140)
GLUCOSE SERPL-MCNC: 145 MG/DL (ref 65–140)
GLUCOSE SERPL-MCNC: 181 MG/DL (ref 65–140)
GLUCOSE SERPL-MCNC: 203 MG/DL (ref 65–140)
GLUCOSE UR STRIP-MCNC: NEGATIVE MG/DL
HCT VFR BLD AUTO: 29.2 % (ref 34.8–46.1)
HGB BLD-MCNC: 9.8 G/DL (ref 11.5–15.4)
HGB UR QL STRIP.AUTO: ABNORMAL
KETONES UR STRIP-MCNC: NEGATIVE MG/DL
LEUKOCYTE ESTERASE UR QL STRIP: ABNORMAL
LYMPHOCYTES # BLD AUTO: 1.11 THOUSAND/UL (ref 0.6–4.47)
LYMPHOCYTES # BLD AUTO: 10 % (ref 14–44)
MAGNESIUM SERPL-MCNC: 1.3 MG/DL (ref 1.9–2.7)
MCH RBC QN AUTO: 30.2 PG (ref 26.8–34.3)
MCHC RBC AUTO-ENTMCNC: 33.6 G/DL (ref 31.4–37.4)
MCV RBC AUTO: 90 FL (ref 82–98)
MONOCYTES # BLD AUTO: 0.33 THOUSAND/UL (ref 0–1.22)
MONOCYTES NFR BLD: 3 % (ref 4–12)
NEUTROPHILS # BLD MANUAL: 9.65 THOUSAND/UL (ref 1.85–7.62)
NEUTS BAND NFR BLD MANUAL: 3 % (ref 0–8)
NEUTS SEG NFR BLD AUTO: 84 % (ref 43–75)
NITRITE UR QL STRIP: NEGATIVE
NON-SQ EPI CELLS URNS QL MICRO: ABNORMAL /HPF
PH UR STRIP.AUTO: 6.5 [PH]
PHOSPHATE SERPL-MCNC: 2.9 MG/DL (ref 2.3–4.1)
PLATELET # BLD AUTO: 154 THOUSANDS/UL (ref 149–390)
PLATELET BLD QL SMEAR: ADEQUATE
PMV BLD AUTO: 10.2 FL (ref 8.9–12.7)
POTASSIUM SERPL-SCNC: 4 MMOL/L (ref 3.5–5.3)
PROCALCITONIN SERPL-MCNC: 84.35 NG/ML
PROT SERPL-MCNC: 6.9 G/DL (ref 6.4–8.4)
PROT UR STRIP-MCNC: ABNORMAL MG/DL
RBC # BLD AUTO: 3.24 MILLION/UL (ref 3.81–5.12)
RBC #/AREA URNS AUTO: ABNORMAL /HPF
RBC MORPH BLD: NORMAL
SODIUM 24H UR-SCNC: 14 MOL/L
SODIUM SERPL-SCNC: 132 MMOL/L (ref 135–147)
SP GR UR STRIP.AUTO: <=1.005 (ref 1–1.03)
UROBILINOGEN UR QL STRIP.AUTO: 0.2 E.U./DL
WBC # BLD AUTO: 11.09 THOUSAND/UL (ref 4.31–10.16)
WBC #/AREA URNS AUTO: ABNORMAL /HPF

## 2023-07-10 PROCEDURE — 85027 COMPLETE CBC AUTOMATED: CPT | Performed by: INTERNAL MEDICINE

## 2023-07-10 PROCEDURE — 85007 BL SMEAR W/DIFF WBC COUNT: CPT | Performed by: INTERNAL MEDICINE

## 2023-07-10 PROCEDURE — 99223 1ST HOSP IP/OBS HIGH 75: CPT | Performed by: INTERNAL MEDICINE

## 2023-07-10 PROCEDURE — 82570 ASSAY OF URINE CREATININE: CPT | Performed by: INTERNAL MEDICINE

## 2023-07-10 PROCEDURE — 81001 URINALYSIS AUTO W/SCOPE: CPT | Performed by: INTERNAL MEDICINE

## 2023-07-10 PROCEDURE — 84300 ASSAY OF URINE SODIUM: CPT | Performed by: INTERNAL MEDICINE

## 2023-07-10 PROCEDURE — 82948 REAGENT STRIP/BLOOD GLUCOSE: CPT

## 2023-07-10 PROCEDURE — 80053 COMPREHEN METABOLIC PANEL: CPT | Performed by: INTERNAL MEDICINE

## 2023-07-10 PROCEDURE — 84145 PROCALCITONIN (PCT): CPT | Performed by: INTERNAL MEDICINE

## 2023-07-10 PROCEDURE — 99232 SBSQ HOSP IP/OBS MODERATE 35: CPT | Performed by: FAMILY MEDICINE

## 2023-07-10 PROCEDURE — 84100 ASSAY OF PHOSPHORUS: CPT | Performed by: INTERNAL MEDICINE

## 2023-07-10 PROCEDURE — G0427 INPT/ED TELECONSULT70: HCPCS | Performed by: INTERNAL MEDICINE

## 2023-07-10 PROCEDURE — 83735 ASSAY OF MAGNESIUM: CPT | Performed by: INTERNAL MEDICINE

## 2023-07-10 RX ORDER — MAGNESIUM SULFATE HEPTAHYDRATE 40 MG/ML
2 INJECTION, SOLUTION INTRAVENOUS
Status: COMPLETED | OUTPATIENT
Start: 2023-07-10 | End: 2023-07-10

## 2023-07-10 RX ORDER — SODIUM CHLORIDE 9 MG/ML
100 INJECTION, SOLUTION INTRAVENOUS CONTINUOUS
Status: DISCONTINUED | OUTPATIENT
Start: 2023-07-10 | End: 2023-07-10

## 2023-07-10 RX ORDER — SODIUM CHLORIDE, SODIUM GLUCONATE, SODIUM ACETATE, POTASSIUM CHLORIDE, MAGNESIUM CHLORIDE, SODIUM PHOSPHATE, DIBASIC, AND POTASSIUM PHOSPHATE .53; .5; .37; .037; .03; .012; .00082 G/100ML; G/100ML; G/100ML; G/100ML; G/100ML; G/100ML; G/100ML
100 INJECTION, SOLUTION INTRAVENOUS CONTINUOUS
Status: DISCONTINUED | OUTPATIENT
Start: 2023-07-10 | End: 2023-07-11

## 2023-07-10 RX ADMIN — SODIUM CHLORIDE 100 ML/HR: 0.9 INJECTION, SOLUTION INTRAVENOUS at 08:56

## 2023-07-10 RX ADMIN — GABAPENTIN 100 MG: 100 CAPSULE ORAL at 13:14

## 2023-07-10 RX ADMIN — DULOXETINE HYDROCHLORIDE 60 MG: 30 CAPSULE, DELAYED RELEASE ORAL at 17:23

## 2023-07-10 RX ADMIN — CEFTRIAXONE 2000 MG: 2 INJECTION, SOLUTION INTRAVENOUS at 17:23

## 2023-07-10 RX ADMIN — MAGNESIUM SULFATE HEPTAHYDRATE 2 G: 40 INJECTION, SOLUTION INTRAVENOUS at 10:03

## 2023-07-10 RX ADMIN — DULOXETINE HYDROCHLORIDE 60 MG: 30 CAPSULE, DELAYED RELEASE ORAL at 08:30

## 2023-07-10 RX ADMIN — HEPARIN SODIUM 5000 UNITS: 5000 INJECTION INTRAVENOUS; SUBCUTANEOUS at 13:14

## 2023-07-10 RX ADMIN — HEPARIN SODIUM 5000 UNITS: 5000 INJECTION INTRAVENOUS; SUBCUTANEOUS at 21:18

## 2023-07-10 RX ADMIN — CLONAZEPAM 1 MG: 0.5 TABLET ORAL at 21:18

## 2023-07-10 RX ADMIN — GABAPENTIN 100 MG: 100 CAPSULE ORAL at 08:30

## 2023-07-10 RX ADMIN — INSULIN LISPRO 1 UNITS: 100 INJECTION, SOLUTION INTRAVENOUS; SUBCUTANEOUS at 21:19

## 2023-07-10 RX ADMIN — ACETAMINOPHEN 325MG 650 MG: 325 TABLET ORAL at 17:25

## 2023-07-10 RX ADMIN — ACETAMINOPHEN 325MG 650 MG: 325 TABLET ORAL at 04:35

## 2023-07-10 RX ADMIN — HEPARIN SODIUM 5000 UNITS: 5000 INJECTION INTRAVENOUS; SUBCUTANEOUS at 05:37

## 2023-07-10 RX ADMIN — MAGNESIUM SULFATE HEPTAHYDRATE 2 G: 40 INJECTION, SOLUTION INTRAVENOUS at 11:05

## 2023-07-10 RX ADMIN — SODIUM CHLORIDE, SODIUM GLUCONATE, SODIUM ACETATE, POTASSIUM CHLORIDE, MAGNESIUM CHLORIDE, SODIUM PHOSPHATE, DIBASIC, AND POTASSIUM PHOSPHATE 100 ML/HR: .53; .5; .37; .037; .03; .012; .00082 INJECTION, SOLUTION INTRAVENOUS at 10:01

## 2023-07-10 RX ADMIN — SODIUM CHLORIDE, SODIUM GLUCONATE, SODIUM ACETATE, POTASSIUM CHLORIDE, MAGNESIUM CHLORIDE, SODIUM PHOSPHATE, DIBASIC, AND POTASSIUM PHOSPHATE 100 ML/HR: .53; .5; .37; .037; .03; .012; .00082 INJECTION, SOLUTION INTRAVENOUS at 21:15

## 2023-07-10 RX ADMIN — ACETAMINOPHEN 325MG 650 MG: 325 TABLET ORAL at 11:06

## 2023-07-10 NOTE — CONSULTS
Tamara Mendiola 60 y.o. female MRN: [id number]  Unit/Bed#: 405-01 Encounter: [id number]        Assessment and Plan:    1. Acute kidney injury, present on admission, on CKD 3. Creatinine 1.3 on 4/21. Her creatinine baseline appears to be on 1.3-1.4 range. Creatinine 2.6 on 7/7. Creatinine peaked at 3.1 on 7/9. Creatinine stable at 2.9 today. Potential prerenal effect with loose stools and insensible losses with fevers. Fractional excretion of sodium can be unreliable in CKD. Also potential etiologies are septic physiology/ischemic ATN. She has been appropriately holding her lisinopril. Suspect urine output nonoliguric. Volume status appears euvolemic. Recommend:  Continue to hold lisinopril. Follow daily chemistry. Recommend balanced electrolyte solution like Plasma-Lyte. Continue intravenous fluids until GI losses resolved. She is still tachycardic. 2.  Hyponatremia, hypovolemic, improving. Mild, asymptomatic. Likely due to GI losses and hypotonic fluids. 3.  Non-anion gap metabolic acidosis due to INESSA/CKD. Change IVF to balanced electrolyte solution. 4.  CKD 3B, suspect due to diabetic nephropathy. Seen by Dr. Sam Lawson in March. Baseline creatinine 1.3-1.4. Will need outpatient follow-up. 5.  Hypomagnesemia, moderate due to GI losses. 4 gram x 1.    6. Elevated LFT in setting of sepsis, defer to primary. 7.  Essential hypertension, hold lisinopril. Follow blood pressure trends. Would not be aggressive with treating. 8 sepsis due to E. Coli. Management per primary. Follow-up repeat blood cultures. Infectious disease consultation  Continue Rocephin. HPI:    Lila Melton is a 60 y.o. female with history of type 2 diabetes with likely diabetic nephropathy, CKD 3, essential hypertension maintained on lisinopril presented to the emergency department at the request of the emergency department.   On 7/7, she presented to the emergency department with fevers, loose stools and general malaise. She received intravenous fluids had blood cultures done and was discharged. She was notified that her blood cultures were positive and recommended to come back to the emergency department. Over the past few days she reports loose stools that are foul-smelling. She reports shaking at home and generalized right upper quadrant abdominal pain. Denies changes to urination. She has been tolerating fluids including Pedialyte. She has not been eating like her usual self. Her blood pressure has been stable since being in the hospital.  Urine output has not been accurately recorded. She had a CT abdomen pelvis without contrast that showed bilateral perinephric stranding. Mildly distended upper collecting systems. No calculus. Left upper cavity pole scarring. Findings are concerning for pyelonephritis. Creatinine 1.3 on . Creatinine 2.6 on . She does sodium of 126. Despite her lab work she was discharged. Her creatinine was 3.11 on  and 2.9 today. She is receiving continuous intravenous fluids with normal saline at 100 mill per hour. She received 3 L normal saline bolus on admission. Patient is maintained on lisinopril chronically but stopped taking her medicines on Thursday. Reason for Consult:jordon    Review of Systems:    A complete 10-point review of systems was performed. Aside from what was mentioned in the HPI, it is otherwise negative.       Historical Information   Past Medical History:   Diagnosis Date   • Diabetes mellitus ([address])    • Hypertension    • Migraine    • MRSA (methicillin resistant Staphylococcus aureus)      Past Surgical History:   Procedure Laterality Date   • ACHILLES TENDON REPAIR     • ANKLE SURGERY Left 2019    Excision of Lipoma   • CARPAL TUNNEL RELEASE Right    •  SECTION     • CHOLECYSTECTOMY     • GANGLION CYST EXCISION Left     Left wrist   • LUMBAR LAMINECTOMY  2018    x 3 disks   • NASAL SINUS SURGERY       Social History   Social History     Substance and Sexual Activity   Alcohol Use Not Currently     Social History     Substance and Sexual Activity   Drug Use Never     Social History     Tobacco Use   Smoking Status Former   • Years: 50.00   • Types: Cigarettes   • Quit date:    • Years since quittin.5   Smokeless Tobacco Current   Tobacco Comments    Vapes - Daily - includes 3 mg nicotine       Family History:   Family History   Problem Relation Age of Onset   • Hypertension Mother    • Hepatitis Mother         Hep C   • Stroke Father    • Arrhythmia Father    • Melanoma Father    • No Known Problems Sister    • No Known Problems Maternal Grandmother    • No Known Problems Maternal Grandfather    • No Known Problems Paternal Grandmother    • No Known Problems Paternal Grandfather    • No Known Problems Son    • No Known Problems Maternal Aunt    • No Known Problems Maternal Aunt    • No Known Problems Maternal Aunt    • No Known Problems Maternal Aunt    • No Known Problems Paternal Aunt        Medications:  Pertinent medications were reviewed  Current Facility-Administered Medications   Medication Dose Route Frequency Provider Last Rate   • acetaminophen  650 mg Oral Q6H PRN Katharine Ash, DO     • aluminum-magnesium hydroxide-simethicone  30 mL Oral Q6H PRN Katharine Ash, DO     • cefTRIAXone  2,000 mg Intravenous Q24H Katharine ANDERSEN, DO     • clonazePAM  1 mg Oral HS Katharine ANDERSEN, DO     • docusate sodium  100 mg Oral Daily PRN Katharine Ash DO     • DULoxetine  60 mg Oral BID Katharine Ash DO     • gabapentin  100 mg Oral BID (AM & Afternoon) Brooks Madrid, DO     • heparin (porcine)  5,000 Units Subcutaneous [facility] Katharine ANDERSEN, DO     • insulin lispro  1-5 Units Subcutaneous TID [facility] Katharine ANDERSEN, DO     • insulin lispro  1-5 Units Subcutaneous HS Katharine ANDERSEN, DO     • nicotine  1 patch Transdermal Daily Katharine ANDERSEN, DO     • ondansetron  4 mg Intravenous Q6H PRN Laureen Ash, DO     • sodium chloride  100 mL/hr Intravenous Continuous Justino Bates  mL/hr (07/10/23 0856)   • tiZANidine  2 mg Oral Q8H PRN Natalee Jones,            Allergies   Allergen Reactions   • Pollen Extract Sneezing   • Vancomycin Rash         Vitals:   /66   Pulse (!) 107   Temp 99.6 °F (37.6 °C)   Resp 17   Ht 5' 7" (1.702 m)   Wt 125 kg (274 lb 11.1 oz)   LMP 04/10/2017   SpO2 95%   BMI 43.02 kg/m²   Body mass index is 43.02 kg/m². SpO2: 95 %,   SpO2 Activity: At Rest,   O2 Device: None (Room air)      Intake/Output Summary (Last 24 hours) at 7/10/2023 8221  Last data filed at 7/10/2023 0845  Gross per 24 hour   Intake 770 ml   Output --   Net 770 ml     Invasive Devices     Peripheral Intravenous Line  Duration           Peripheral IV 07/09/23 Left Antecubital <1 day                Physical Exam:  General: conscious, cooperative, in no acute distress  Eyes: conjunctivae pink, anicteric sclerae  ENT: lips and mucous membranes moist  Neck: supple, no JVD, no masses  Chest: clear breath sounds bilateral, no crackles, ronchus or wheezings  CVS: distinct S1 & S2, tachycardic, regular rhythm  Abdomen: soft, non-tender, non-distended, normoactive bowel sounds  Extremities: no edema of both legs  Skin: no rash  Neuro: awake, alert, oriented      Diagnostic Data:  Lab: I have personally reviewed pertinent lab results. ,   CBC:  Results from last 7 days   Lab Units 07/10/23  0432   WBC Thousand/uL 11.09*   HEMOGLOBIN g/dL 9.8*   HEMATOCRIT % 29.2*   PLATELETS Thousands/uL 154      CMP:   Lab Results   Component Value Date    SODIUM 132 (L) 07/10/2023    K 4.0 07/10/2023     07/10/2023    CO2 21 07/10/2023    BUN 38 (H) 07/10/2023    CREATININE 2.93 (H) 07/10/2023    CALCIUM 8.9 07/10/2023    AST 79 (H) 07/10/2023    ALT 53 (H) 07/10/2023    ALKPHOS 73 07/10/2023    EGFR 16 07/10/2023   ,   PT/INR:   Lab Results   Component Value Date    INR 1.29 (H) 07/09/2023   ,   Magnesium: No components found for: "MAG",  Phosphorous:   Lab Results   Component Value Date    PHOS 2.9 07/10/2023       Microbiology:  @LABRCNTIP,(urinecx:7)@        Rachel Carney,     Portions of the record may have been created with voice recognition software. Occasional wrong word or "sound a like" substitutions may have occurred due to the inherent limitations of voice recognition software. Read the chart carefully and recognize, using context, where substitutions have occurred.

## 2023-07-10 NOTE — CASE MANAGEMENT
Case Management Assessment    Patient name Graeme Evans  Location /636-88 MRN [id number]  : 1960 Date 7/10/2023       Current Admission Date: 2023  Current Admission Diagnosis:Sepsis due to Escherichia coli with acute renal failure [facility])   Patient Active Problem List    Diagnosis Date Noted   • Sepsis due to Escherichia coli with acute renal failure ([address]) 2023   • INESSA (acute kidney injury) ([address]) 2023   • Hyponatremia 2023   • Lumbar radiculopathy 2023   • Iron deficiency anemia 2023   • Albuminuria 03/15/2023   • Recurrent major depressive disorder, in partial remission ([address]) 2023   • Chronic kidney disease (CKD) stage G3b/A3, moderately decreased glomerular filtration rate (GFR) between 30-44 mL/min/1.73 square meter and albuminuria creatinine ratio greater than 300 mg/g ([address]) 2022   • Obesity, morbid ([address]) 2021   • Type 2 diabetes mellitus with stage 3a chronic kidney disease, without long-term current use of insulin ([address])    • Generalized anxiety disorder 2020   • Restless legs 2018   • Neuropathic pain 2018   • Essential hypertension 2017   • History of MRSA infection 2017   • Low back pain 2017   • Obesity (BMI 35.0-39.9 without comorbidity) 2017   • Obstructive sleep apnea 2017   • Depression 2017   • Osteoarthritis of spine with radiculopathy, lumbosacral region 2017   • Neuropathy, peripheral axonal 2015      LOS (days): 1  Geometric Mean LOS (GMLOS) (days):   Days to GMLOS:     OBJECTIVE:    Risk of Unplanned Readmission Score: 19.11         Current admission status: Inpatient       Preferred Pharmacy:   [address] [place] [address], [address] [place] [id number]  Phone: [phone] Fax: [phone]    Primary Care Provider: Candida Mlulen DO    Primary Insurance: [facility]  Secondary Insurance:     ASSESSMENT:  Stephanie Proxies     [facility] - Son   Primary Phone: [phone] (Home)               Advance Directives  Does patient have a [address]?: No  Was patient offered paperwork?: Yes (declined)  Does patient currently have a Health Care decision maker?: Yes, please see Health Care Proxy section  Does patient have Advance Directives?: No  Was patient offered paperwork?: Yes (declined)  Primary Contact: Milagro Declan         Readmission Root Cause  30 Day Readmission: No    Patient Information  Admitted from[de-identified] Home  Mental Status: Alert  During Assessment patient was accompanied by: Not accompanied during assessment  Assessment information provided by[de-identified] Patient  Primary Caregiver: Self  Support Systems: Self, [address] of Residence: [address] [place] [address] do you live in?: [address],[facility] entry access options.  Select all that apply.: Stairs  Number of steps to enter home.:  (14 steps)  Do the steps have railings?: Yes  Type of Current Residence: 2 story home  Upon entering residence, is there a bedroom on the main floor (no further steps)?: No  A bedroom is located on the following floor levels of residence (select all that apply):: 2nd Floor  Upon entering residence, is there a bathroom on the main floor (no further steps)?: Yes  Number of steps to 2nd floor from main floor: One Flight  In the last 12 months, was there a time when you were not able to pay the mortgage or rent on time?: No  In the last 12 months, how many places have you lived?: 1  In the last 12 months, was there a time when you did not have a steady place to sleep or slept in a shelter (including now)?: No  Homeless/housing insecurity resource given?: N/A  Living Arrangements: Lives w/ Son  Is patient a ?: No    Activities of Daily Living Prior to Admission  Functional Status: Independent  Completes ADLs independently?: Yes  Ambulates independently?: Yes  Does patient use assisted devices?: Yes  Assisted Devices (DME) used: CPAP  DME Company Name (respiratory supplies): Unsure of company  Does patient currently own DME?: Yes  What DME does the patient currently own?: Areli Urban  Does patient have a history of Outpatient Therapy (PT/OT)?: No  Does the patient have a history of Short-Term Rehab?: No  Does patient have a history of HHC?: No  Does patient currently have [facility]?: No         Patient Information Continued  Income Source: SSI/SSD  Does patient have prescription coverage?: Yes  Within the past 12 months, you worried that your food would run out before you got the money to buy more.: Never true  Within the past 12 months, the food you bought just didn't last and you didn't have money to get more.: Never true  Food insecurity resource given?: N/A  Does patient receive dialysis treatments?: No  Does patient have a history of substance abuse?: No  Does patient have a history of Mental Health Diagnosis?: Yes  Is patient receiving treatment for mental health?: Yes (depression. follows with a counselor in [address] unsure of agency name)  Has patient received inpatient treatment related to mental health in the last 2 years?: No         Means of Transportation  Means of Transport to Appts[de-identified] Drives Self  In the past 12 months, has lack of transportation kept you from medical appointments or from getting medications?: No  In the past 12 months, has lack of transportation kept you from meetings, work, or from getting things needed for daily living?: No  Was application for public transport provided?: N/A  Cm met with the patient to evaluate the patients prior function and living situation and any barriers to d/c and form a safe d/c plan. Cm also evaluated the patient for any services in the home or needs for services. CM also discussed with patient that their preferences will be taken into account/consideration. Pt admitted due to sepsis. Nephrology consult, ID consult. CM to follow for any discharge needs.

## 2023-07-10 NOTE — PLAN OF CARE
Problem: PAIN - ADULT  Goal: Verbalizes/displays adequate comfort level or baseline comfort level  Description: Interventions:  - Encourage patient to monitor pain and request assistance  - Assess pain using appropriate pain scale  - Administer analgesics based on type and severity of pain and evaluate response  - Implement non-pharmacological measures as appropriate and evaluate response  - Consider cultural and social influences on pain and pain management  - Notify physician/advanced practitioner if interventions unsuccessful or patient reports new pain  Outcome: Progressing     Problem: INFECTION - ADULT  Goal: Absence or prevention of progression during hospitalization  Description: INTERVENTIONS:  - Assess and monitor for signs and symptoms of infection  - Monitor lab/diagnostic results  - Monitor all insertion sites, i.e. indwelling lines, tubes, and drains  - Monitor endotracheal if appropriate and nasal secretions for changes in amount and color  - [place] appropriate cooling/warming therapies per order  - Administer medications as ordered  - Instruct and encourage patient and family to use good hand hygiene technique  - Identify and instruct in appropriate isolation precautions for identified infection/condition  Outcome: Progressing  Goal: Absence of fever/infection during neutropenic period  Description: INTERVENTIONS:  - Monitor WBC    Outcome: Progressing     Problem: SAFETY ADULT  Goal: Patient will remain free of falls  Description: INTERVENTIONS:  - Educate patient/family on patient safety including physical limitations  - Instruct patient to call for assistance with activity   - Consult OT/PT to assist with strengthening/mobility   - Keep Call bell within reach  - Keep bed low and locked with side rails adjusted as appropriate  - Keep care items and personal belongings within reach  - Initiate and maintain comfort rounds  - Make Fall Risk Sign visible to staff  - Offer Toileting every 2 Hours, in advance of need  - Initiate/Maintain bed/chair alarm  - Obtain necessary fall risk management equipment: non skid socks  - Apply yellow socks and bracelet for high fall risk patients  - Consider moving patient to room near nurses station  Outcome: Progressing  Goal: Maintain or return to baseline ADL function  Description: INTERVENTIONS:  -  Assess patient's ability to carry out ADLs; assess patient's baseline for ADL function and identify physical deficits which impact ability to perform ADLs (bathing, care of mouth/teeth, toileting, grooming, dressing, etc.)  - Assess/evaluate cause of self-care deficits   - Assess range of motion  - Assess patient's mobility; develop plan if impaired  - Assess patient's need for assistive devices and provide as appropriate  - Encourage maximum independence but intervene and supervise when necessary  - Involve family in performance of ADLs  - Assess for home care needs following discharge   - Consider OT consult to assist with ADL evaluation and planning for discharge  - Provide patient education as appropriate  Outcome: Progressing  Goal: Maintains/Returns to pre admission functional level  Description: INTERVENTIONS:  - Perform BMAT or MOVE assessment daily.   - Set and communicate daily mobility goal to care team and patient/family/caregiver. - Collaborate with rehabilitation services on mobility goals if consulted  - Perform Range of Motion 3 times a day. - Reposition patient every 3 hours.   - Dangle patient 3 times a day  - Stand patient 3 times a day  - Ambulate patient 3 times a day  - Out of bed to chair 3 times a day   - Out of bed for meals 3 times a day  - Out of bed for toileting  - Record patient progress and toleration of activity level   Outcome: Progressing     Problem: DISCHARGE PLANNING  Goal: Discharge to home or other facility with appropriate resources  Description: INTERVENTIONS:  - Identify barriers to discharge w/patient and caregiver  - Arrange for needed discharge resources and transportation as appropriate  - Identify discharge learning needs (meds, wound care, etc.)  - Arrange for interpretive services to assist at discharge as needed  - Refer to Case Management Department for coordinating discharge planning if the patient needs post-hospital services based on physician/advanced practitioner order or complex needs related to functional status, cognitive ability, or social support system  Outcome: Progressing     Problem: METABOLIC, FLUID AND ELECTROLYTES - ADULT  Goal: Electrolytes maintained within normal limits  Description: INTERVENTIONS:  - Monitor labs and assess patient for signs and symptoms of electrolyte imbalances  - Administer electrolyte replacement as ordered  - Monitor response to electrolyte replacements, including repeat lab results as appropriate  - Instruct patient on fluid and nutrition as appropriate  Outcome: Progressing  Goal: Fluid balance maintained  Description: INTERVENTIONS:  - Monitor labs   - Monitor I/O and WT  - Instruct patient on fluid and nutrition as appropriate  - Assess for signs & symptoms of volume excess or deficit  Outcome: Progressing  Goal: Glucose maintained within target range  Description: INTERVENTIONS:  - Monitor Blood Glucose as ordered  - Assess for signs and symptoms of hyperglycemia and hypoglycemia  - Administer ordered medications to maintain glucose within target range  - Assess nutritional intake and initiate nutrition service referral as needed  Outcome: Progressing

## 2023-07-10 NOTE — PLAN OF CARE
Problem: PAIN - ADULT  Goal: Verbalizes/displays adequate comfort level or baseline comfort level  Description: Interventions:  - Encourage patient to monitor pain and request assistance  - Assess pain using appropriate pain scale  - Administer analgesics based on type and severity of pain and evaluate response  - Implement non-pharmacological measures as appropriate and evaluate response  - Consider cultural and social influences on pain and pain management  - Notify physician/advanced practitioner if interventions unsuccessful or patient reports new pain  Outcome: Progressing     Problem: INFECTION - ADULT  Goal: Absence or prevention of progression during hospitalization  Description: INTERVENTIONS:  - Assess and monitor for signs and symptoms of infection  - Monitor lab/diagnostic results  - Monitor all insertion sites, i.e. indwelling lines, tubes, and drains  - Monitor endotracheal if appropriate and nasal secretions for changes in amount and color  - [place] appropriate cooling/warming therapies per order  - Administer medications as ordered  - Instruct and encourage patient and family to use good hand hygiene technique  - Identify and instruct in appropriate isolation precautions for identified infection/condition  Outcome: Progressing     Problem: SAFETY ADULT  Goal: Patient will remain free of falls  Description: INTERVENTIONS:  - Educate patient/family on patient safety including physical limitations  - Instruct patient to call for assistance with activity   - Consult OT/PT to assist with strengthening/mobility   - Keep Call bell within reach  - Keep bed low and locked with side rails adjusted as appropriate  - Keep care items and personal belongings within reach  - Initiate and maintain comfort rounds  - Make Fall Risk Sign visible to staff  - Offer Toileting every 2 Hours, in advance of need  - Initiate/Maintain bed/chair alarm  - Obtain necessary fall risk management equipment: non skid socks  - Apply yellow socks and bracelet for high fall risk patients  - Consider moving patient to room near nurses station  Outcome: Progressing  Goal: Maintain or return to baseline ADL function  Description: INTERVENTIONS:  -  Assess patient's ability to carry out ADLs; assess patient's baseline for ADL function and identify physical deficits which impact ability to perform ADLs (bathing, care of mouth/teeth, toileting, grooming, dressing, etc.)  - Assess/evaluate cause of self-care deficits   - Assess range of motion  - Assess patient's mobility; develop plan if impaired  - Assess patient's need for assistive devices and provide as appropriate  - Encourage maximum independence but intervene and supervise when necessary  - Involve family in performance of ADLs  - Assess for home care needs following discharge   - Consider OT consult to assist with ADL evaluation and planning for discharge  - Provide patient education as appropriate  Outcome: Progressing  Goal: Maintains/Returns to pre admission functional level  Description: INTERVENTIONS:  - Perform BMAT or MOVE assessment daily.   - Set and communicate daily mobility goal to care team and patient/family/caregiver. - Collaborate with rehabilitation services on mobility goals if consulted  - Perform Range of Motion 3 times a day. - Reposition patient every 3 hours.   - Dangle patient 3 times a day  - Stand patient 3 times a day  - Ambulate patient 3 times a day  - Out of bed to chair 3 times a day   - Out of bed for meals 3 times a day  - Out of bed for toileting  - Record patient progress and toleration of activity level   Outcome: Progressing     Problem: DISCHARGE PLANNING  Goal: Discharge to home or other facility with appropriate resources  Description: INTERVENTIONS:  - Identify barriers to discharge w/patient and caregiver  - Arrange for needed discharge resources and transportation as appropriate  - Identify discharge learning needs (meds, wound care, etc.)  - Arrange for interpretive services to assist at discharge as needed  - Refer to Case Management Department for coordinating discharge planning if the patient needs post-hospital services based on physician/advanced practitioner order or complex needs related to functional status, cognitive ability, or social support system  Outcome: Progressing     Problem: METABOLIC, FLUID AND ELECTROLYTES - ADULT  Goal: Electrolytes maintained within normal limits  Description: INTERVENTIONS:  - Monitor labs and assess patient for signs and symptoms of electrolyte imbalances  - Administer electrolyte replacement as ordered  - Monitor response to electrolyte replacements, including repeat lab results as appropriate  - Instruct patient on fluid and nutrition as appropriate  Outcome: Progressing  Goal: Fluid balance maintained  Description: INTERVENTIONS:  - Monitor labs   - Monitor I/O and WT  - Instruct patient on fluid and nutrition as appropriate  - Assess for signs & symptoms of volume excess or deficit  Outcome: Progressing  Goal: Glucose maintained within target range  Description: INTERVENTIONS:  - Monitor Blood Glucose as ordered  - Assess for signs and symptoms of hyperglycemia and hypoglycemia  - Administer ordered medications to maintain glucose within target range  - Assess nutritional intake and initiate nutrition service referral as needed  Outcome: Progressing

## 2023-07-10 NOTE — UTILIZATION REVIEW
Initial Clinical Review    Admission: Date/Time/Statement:   Admission Orders (From admission, onward)     Ordered        07/09/23 1833  INPATIENT ADMISSION  Once                      Orders Placed This Encounter   Procedures   • INPATIENT ADMISSION     Standing Status:   Standing     Number of Occurrences:   1     Order Specific Question:   Level of Care     Answer:   Med Surg [16]     Order Specific Question:   Estimated length of stay     Answer:   More than 2 Midnights     Order Specific Question:   Certification     Answer:   I certify that inpatient services are medically necessary for this patient for a duration of greater than two midnights. See H&P and MD Progress Notes for additional information about the patient's course of treatment. ED Arrival Information     Expected   -    Arrival   7/9/2023 16:45    Acuity   Emergent            Means of arrival   Walk-In    Escorted by   Family Member    Service   Hospitalist    Admission type   Emergency            Arrival complaint   Abnormal labs           Chief Complaint   Patient presents with   • Abnormal Lab     Pt had blood cultures done 2 days ago and both were positive. Came back for admission       Initial Presentation: 60 y.o. female presents to ed from home for evaluation and treatment of positive blood cultures drawn 2 days ago. Found also to have INESSA. Today blood  cultures positive to E coli. Procalcitonin elevated. Imaging concerning for pyelonephritis. Initiate iv ceftriaxone, received 2L Iv .9% ns bolus. Admit to inpatient med surg for e coli sepsis with INESSA 3.11 ( baseline 1.3) , bilateral  pyelonephritis. HWWF:[id number]    Day 2: inpatient med surg   Tachycardia persists. Consult ID recommend continue iv ceftriaxone and follow up urine and blood cultures. Renal dose antibiotics. Consult nephrology completed. CR 2.9 today. Potential pre renal with loose stools and insensible losses with fevers.   Hold lisinopril and follow daily chemistry. Continue iv fluids. .Date: 7-11-23   Day 3: Has surpassed a 2nd midnight with active treatments and services, which include  Iv ceftriaxone, chest, x ray to assess pulmonary edema, iv diuresis if needed, medication adjustments, monitor renal function    ED Triage Vitals [07/09/23 1653]   (!) 97.1 °F   (36.2 °C) 103 20 108/73 99 %      Temporal Monitor         Pain Score       4          07/10/23 125 kg (274 lb 11.1 oz)     Additional Vital Signs:     Patient Vitals for the past 24 hrs:   BP Temp Pulse Resp SpO2 Weight   07/10/23 1443 142/74 98 °F (36.7 °C) 95 20 93 % --   07/10/23 0712 111/66 99.6 °F (37.6 °C) (!) 107 17 95 % --   07/10/23 0536 -- -- -- -- -- 125 kg (274 lb 11.1 oz)   07/10/23 0427 -- 97.5 °F (36.4 °C) (!) 121 -- 97 % --   07/10/23 0005 -- -- 91 -- 94 % --   07/09/23 2056 136/70 98.7 °F (37.1 °C) (!) 129 -- 94 % --   07/09/23 1901 126/96 97.7 °F (36.5 °C) (!) 109 20 95 % 124 kg (273 lb 5.9 oz)   07/09/23 1830 168/71 -- (!) 111 18 99 % --   07/09/23 1745 134/65 -- 99 18 99 % --   07/09/23 1653 108/73 (!)97.1°F   (36.2°C) 103 20 99 % 125 kg (275 lb 9.2 oz)         Pertinent Labs/Diagnostic Test Results:   CT abdomen pelvis wo contrast   Final  (07/09 1828)      1. Bilateral perinephric soft tissue stranding with mild distention of the upper collecting systems, without evidence of renal calculus. Findings are most suspicious for pyelonephritis. Nonspecific renal inflammatory disease also in the differential.   2. Small right-sided pleural effusion   3. Sigmoid diverticulosis and small hiatal hernia   4.  Hepatomegaly and hepatic steatosis           Results from last 7 days   Lab Units 07/07/23  1854   SARS-COV-2  Negative     Results from last 7 days   Lab Units 07/10/23  0432 07/09/23  2045 07/09/23  1731 07/07/23  1854   WBC Thousand/uL 11.09*  --  9.88 15.99*   HEMOGLOBIN g/dL 9.8*  --  9.7* 11.6   HEMATOCRIT % 29.2*  --  28.9* 33.7*   PLATELETS Thousands/uL 154 131* 135* 218 NEUTROS ABS Thousands/µL  --   --  9.13*  --    BANDS PCT % 3  --   --   --          Results from last 7 days   Lab Units 07/10/23  0432 07/09/23  1702 07/07/23  1854   SODIUM mmol/L 132* 128* 126*   POTASSIUM mmol/L 4.0 4.0 4.2   CHLORIDE mmol/L 100 96 94*   CO2 mmol/L 21 20* 20*   ANION GAP mmol/L 11 12 12   BUN mg/dL 38* 37* 30*   CREATININE mg/dL 2.93* 3.11* 2.61*   EGFR ml/min/1.73sq m 16 15 18   CALCIUM mg/dL 8.9 9.3 9.8   MAGNESIUM mg/dL 1.3*  --   --    PHOSPHORUS mg/dL 2.9  --   --      Results from last 7 days   Lab Units 07/10/23  0432 07/09/23  1702 07/07/23  1854   AST U/L 79* 89* 57*   ALT U/L 53* 54* 23   ALK PHOS U/L 73 69 55   TOTAL PROTEIN g/dL 6.9 7.3 7.9   ALBUMIN g/dL 3.5 3.7 4.2   TOTAL BILIRUBIN mg/dL 0.57 0.59 0.67     Results from last 7 days   Lab Units 07/10/23  1544 07/10/23  1053 07/10/23  0708 07/09/23  2210 07/07/23  1724   POC GLUCOSE mg/dl 203* 100 145* 218* 161*     Results from last 7 days   Lab Units 07/10/23  0432 07/09/23  1702 07/07/23  1854   GLUCOSE RANDOM mg/dL 115 183* 155*             BETA-HYDROXYBUTYRATE   Date Value Ref Range Status   07/07/2023 0.6 (H) <0.6 mmol/L Final          Results from last 7 days   Lab Units 07/07/23  1854   PH MELANIE  7.368   PCO2 MELANIE mm Hg 35.1*   PO2 MELANIE mm Hg 41.8   HCO3 MELANIE mmol/L 19.7*   BASE EXC MELANIE mmol/L -4.8   O2 CONTENT MELANIE ml/dL 13.4   O2 HGB, VENOUS % 76.3         Results from last 7 days   Lab Units 07/09/23 1702 07/07/23  1854   PROTIME seconds 16.3* 14.9*   INR  1.29* 1.15   PTT seconds 34 36         Results from last 7 days   Lab Units 07/10/23  0432 07/09/23  1702 07/07/23  1854   PROCALCITONIN ng/ml 84.35* 90.58* 191.87*     Results from last 7 days   Lab Units 07/09/23  2045 07/09/23  1702 07/07/23  1854   LACTIC ACID mmol/L 1.5 1.7 1.9       Results from last 7 days   Lab Units 07/10/23  0111 07/09/23  1740 07/07/23  2128   CLARITY UA  Clear Clear Hazy   COLOR UA  Yellow Yellow Light Yellow   SPEC GRAV UA  <=1.005 <=1.005 1.010   PH UA  6.5 6.0 5.5   GLUCOSE UA mg/dl Negative Negative Negative   KETONES UA mg/dl Negative Negative Negative   BLOOD UA  Moderate* Large* Moderate*   PROTEIN UA mg/dl 30 (1+)* 100 (2+)* Negative   NITRITE UA  Negative Negative Negative   BILIRUBIN UA  Negative Negative Negative   UROBILINOGEN UA E.U./dl 0.2 0.2 0.2   LEUKOCYTES UA  Small* Moderate* Moderate*   WBC UA /hpf 4-10* 20-30* 0-5   RBC UA /hpf 2-4 20-30* 2-4   BACTERIA UA /hpf Occasional Occasional Occasional   EPITHELIAL CELLS WET PREP /hpf Occasional Occasional Occasional   SODIUM UR  14  --   --    CREATININE UR mg/dL 43.9  --   --      Results from last 7 days   Lab Units 07/07/23  1854   INFLUENZA A PCR  Negative   INFLUENZA B PCR  Negative   RSV PCR  Negative         Results from last 7 days   Lab Units 07/09/23  1702 07/07/23  1854   BLOOD CULTURES X2   --  Gram Negative Harris Enteric Like*  Escherichia coli*   GRAM STAIN RESULT Blood cultures x2 Gram negative rods*  Gram negative rods* Gram negative rods*  Gram negative rods*                   ED Treatment:   Medication Administration from 07/09/2023 1645 to 07/09/2023 1848       Date/Time Order Dose Route Action     07/09/2023 1703 EDT cefTRIAXone (ROCEPHIN) IVPB (premix in dextrose) 2,000 mg 50 mL 2,000 mg Intravenous New Bag     07/09/2023 1702 EDT sodium chloride 0.9 % bolus 2,000 mL 2,000 mL Intravenous New Bag        Past Medical History:   Diagnosis Date   • Diabetes mellitus ([facility])    • Hypertension    • Migraine    • MRSA (methicillin resistant Staphylococcus aureus)      Present on Admission:  • Sepsis due to Escherichia coli with acute renal failure ([facility])  • Essential hypertension  • Obesity, morbid ([facility])  • Type 2 diabetes mellitus with stage 3a chronic kidney disease, without long-term current use of insulin ([facility])  • Neuropathic pain  • INESSA (acute kidney injury) ([facility])  • Hyponatremia      Admitting Diagnosis:     Weakness [R53.1]  INESSA (acute kidney injury) ([facility]) [N17.9]  Sepsis ([address]) [A41.9]    Age/Sex: 60 y.o. female    Scheduled Medications:    cefTRIAXone, 2,000 mg, Intravenous, Q24H  clonazePAM, 1 mg, Oral, HS  DULoxetine, 60 mg, Oral, BID  gabapentin, 100 mg, Oral, BID (AM & Afternoon)  heparin (porcine), 5,000 Units, Subcutaneous, Q8H DOMINGA  insulin lispro, 1-5 Units, Subcutaneous, TID AC  insulin lispro, 1-5 Units, Subcutaneous, HS  nicotine, 1 patch, Transdermal, Daily      Continuous IV Infusions:  multi-electrolyte, 100 mL/hr, Intravenous, Continuous      PRN Meds:  acetaminophen, 650 mg, Oral, Q6H PRN  aluminum-magnesium hydroxide-simethicone, 30 mL, Oral, Q6H PRN  docusate sodium, 100 mg, Oral, Daily PRN  ondansetron, 4 mg, Intravenous, Q6H PRN  tiZANidine, 2 mg, Oral, Q8H PRN        IP CONSULT TO NEPHROLOGY  IP CONSULT TO INFECTIOUS DISEASES    Network Utilization Review Department  ATTENTION: Please call with any questions or concerns to [phone] and carefully listen to the prompts so that you are directed to the right person. All voicemails are confidential.  Ivon Yap all requests for admission clinical reviews, approved or denied determinations and any other requests to dedicated fax number below belonging to the campus where the patient is receiving treatment.  List of dedicated fax numbers for the Facilities:  [place] DENIALS (Administrative/Medical Necessity) [phone] [address] (Maternity/NICU/Pediatrics) [phone]   [address] [place] [address] Drive [phone]   [facility] [phone]   [address] [place], [place] [address]e N [phone] [address] [phone]   [facility] [address] [facility]  [facility] [phone]

## 2023-07-10 NOTE — PROGRESS NOTES
[address]  Progress Note  Name: Dalila Luna  MRN: [id number]  Unit/Bed#: 353-83 I Date of Admission: 7/9/2023   Date of Service: 7/10/2023 I Hospital Day: 1    Assessment/Plan   * Sepsis due to Escherichia coli with acute renal failure [facility])  Assessment & Plan  Sepsis secondary to acute complicated urinary tract infection, bilateral pyelonephritis with gram-negative cyndee bacteremia. Repeat blood cultures obtained  Continue ceftriaxone  Consult ID   FeNa =  .7 likely pre-renal, start NS @ 100cc/hr  Follow up nephro input     Hyponatremia  Assessment & Plan  mild    Essential hypertension  Assessment & Plan  Hold lisinopril, monitor BP             Progress Note - Lila Melton 60 y.o. female MRN: [id number]    Unit/Bed#: 405-01 Encounter: [id number]        Subjective:   Patient seen and examined, feeling better, improving back pain and appetite     Objective:     Vitals:   Vitals:    07/10/23 0712   BP: 111/66   Pulse: (!) 107   Resp: 17   Temp: 99.6 °F (37.6 °C)   SpO2: 95%     Body mass index is 43.02 kg/m².     Intake/Output Summary (Last 24 hours) at 7/10/2023 0841  Last data filed at 7/10/2023 0539  Gross per 24 hour   Intake 530 ml   Output --   Net 530 ml       Physical Exam:   /66   Pulse (!) 107   Temp 99.6 °F (37.6 °C)   Resp 17   Ht 5' 7" (1.702 m)   Wt 125 kg (274 lb 11.1 oz)   LMP 04/10/2017   SpO2 95%   BMI 43.02 kg/m²   General appearance: alert and oriented, in no acute distress  Head: Normocephalic, without obvious abnormality, atraumatic  Lungs: clear to auscultation bilaterally  Heart: regular rate and rhythm, S1, S2 normal, no murmur, click, rub or gallop  Abdomen: soft, non-tender; bowel sounds normal; no masses,  no organomegaly  Extremities: extremities normal, warm and well-perfused; no cyanosis, clubbing, or edema  Pulses: 2+ and symmetric  Neurologic: Grossly normal     Invasive Devices     Peripheral Intravenous Line  Duration Peripheral IV 07/09/23 Left Antecubital <1 day                Results from last 7 days   Lab Units 07/10/23  0432 07/09/23  2045 07/09/23  1731 07/07/23  1854   WBC Thousand/uL 11.09*  --  9.88 15.99*   HEMOGLOBIN g/dL 9.8*  --  9.7* 11.6   HEMATOCRIT % 29.2*  --  28.9* 33.7*   PLATELETS Thousands/uL 154 131* 135* 218       Results from last 7 days   Lab Units 07/10/23  0432 07/09/23  1702 07/07/23  1854   POTASSIUM mmol/L 4.0 4.0 4.2   CHLORIDE mmol/L 100 96 94*   CO2 mmol/L 21 20* 20*   BUN mg/dL 38* 37* 30*   CREATININE mg/dL 2.93* 3.11* 2.61*   CALCIUM mg/dL 8.9 9.3 9.8   ALK PHOS U/L 73 69 55   ALT U/L 53* 54* 23   AST U/L 79* 89* 57*       Medication Administration - last 24 hours from 07/09/2023 0841 to 07/10/2023 0841       Date/Time Order Dose Route Action Action by     07/09/2023 1836 EDT cefTRIAXone (ROCEPHIN) IVPB (premix in dextrose) 2,000 mg 50 mL 0 mg Intravenous Stopped [facility] Farmer, ELEN     07/09/2023 1703 EDT cefTRIAXone (ROCEPHIN) IVPB (premix in dextrose) 2,000 mg 50 mL 2,000 mg Intravenous [address] Farmer, ELEN     07/09/2023 1900 EDT sodium chloride 0.9 % bolus 2,000 mL 0 mL Intravenous Stopped Vidhya Rabago, ELEN     07/09/2023 1702 EDT sodium chloride 0.9 % bolus 2,000 mL 2,000 mL Intravenous [address] Farmer, ELEN     07/09/2023 2107 EDT clonazePAM (KlonoPIN) tablet 1 mg 1 mg Oral Given Raimundo Leone RN     07/10/2023 0830 EDT DULoxetine (CYMBALTA) delayed release capsule 60 mg 60 mg Oral Given Hortensia Sagastume RN     07/09/2023 2049 EDT DULoxetine (CYMBALTA) delayed release capsule 60 mg 60 mg Oral Given Raimundo Leone RN     07/10/2023 0830 EDT gabapentin (NEURONTIN) capsule 100 mg 100 mg Oral Given Hortensia Sagastume RN     07/10/2023 0830 EDT nicotine (NICODERM CQ) 21 mg/24 hr TD 24 hr patch 1 patch 1 patch Transdermal Not Given Hortensia Sagastume RN     07/10/2023 0435 EDT acetaminophen (TYLENOL) tablet 650 mg 650 mg Oral Given Raimundo Leone RN     07/09/2023 2050 EDT acetaminophen (TYLENOL) tablet 650 mg 650 mg Oral Given Cash Ford RN     07/10/2023 9638 EDT insulin lispro (HumaLOG) 100 units/mL subcutaneous injection 1-5 Units -- Subcutaneous Not Given Bandar Quintero RN     07/09/2023 2229 EDT insulin lispro (HumaLOG) 100 units/mL subcutaneous injection 1-5 Units 1 Units Subcutaneous Given Cash Ford RN     07/10/2023 1941 EDT heparin (porcine) subcutaneous injection 5,000 Units 5,000 Units Subcutaneous Given Cash Ford RN     07/09/2023 2107 EDT heparin (porcine) subcutaneous injection 5,000 Units 5,000 Units Subcutaneous Given Cash Ford RN     07/09/2023 2230 EDT sodium chloride 0.9 % bolus 1,000 mL 0 mL Intravenous Stopped Cash Ford RN     07/09/2023 2049 EDT sodium chloride 0.9 % bolus 1,000 mL 1,000 mL Intravenous New Bag Cash Ford RN            Lab, Imaging and other studies: I have personally reviewed pertinent reports.     VTE Pharmacologic Prophylaxis: Heparin  VTE Mechanical Prophylaxis: sequential compression device     Saida Moore MD  7/76/0793,0:47 AM

## 2023-07-10 NOTE — ASSESSMENT & PLAN NOTE
Sepsis secondary to acute complicated urinary tract infection, bilateral pyelonephritis with gram-negative cyndee bacteremia.     Repeat blood cultures obtained  Continue ceftriaxone  Consult ID   FeNa =  .7 likely pre-renal, start NS @ 100cc/hr  Follow up nephro input

## 2023-07-10 NOTE — CONSULTS
Consultation - Infectious Disease   Jesus Alberto Welch 60 y.o. female MRN: [id number]  Unit/Bed#: 405-01 Encounter: [id number]      Inpatient consult to Infectious Diseases  Consult performed by: Jarrett Cruz PA-C  Consult ordered by: Meghann Avila MD        REQUIRED DOCUMENTATION:   1. This service was provided via Telemedicine. 2. Provider located at [facility]  3. TeleMed provider:  Cristina Tompkins PA-C  4. Identify all parties in room with patient during tele consult: none   5. After connecting through [employer], patient was identified by name and date of birth and assistant checked wristband. Patient was then informed that this was a Telemedicine visit and that the exam was being conducted confidentially over secure lines. My office door was closed. No one else was in the room. Patient acknowledged consent and understanding of privacy and security of the Telemedicine visit, and gave us permission to have the assistant stay in the room in order to assist with the history and to conduct the exam.  I informed the patient that I have reviewed their record in Epic and presented the opportunity for them to ask any questions regarding the visit today. The patient agreed to participate. Assessment/Recommendations     1. E. Coli bacteremia. Both sets of blood cultures from recent ED visit 7/7 isolated E coli. Most likely due to urinary source. UA is benign and cx pending, however CT shows presumed bilateral pyelonephritis. Imaging also showed diverticulosis without diverticulitis and hepatic steatosis. She reports few days hx of diarrhea, thus consider GI source. So far 1 of 2 blood cultures drawn on admission still positive, which is not surprising given timing of abx after these were drawn. She is afebrile, hemodynamically stable, and WBC is 10.  She is tolerating IV abx thus far.   -continue IV ceftriaxone 2g q24h  -follow up initial blood cultures susceptibilities   -follow up blood cx drawn on this admission   -follow up urine cx   -monitor CBCd, temperature, and hemodynamics   -anticipate likely 10 day course of abx with transition to PO     2. Bilateral pyelonephritis. CT A/P shows bilateral perinephric soft tissue stranding without HN. UA on admission benign and is not having any urinary tract sx apart from right abdominal/flank pain. Add on culture is pending.   -continue abx as above   -follow up urine cx   -monitor urine output     3. INESSA. Present on admission, Cr 3.11. Baseline closer to 1.3. Nephrology following. Estimated Creatinine Clearance: 27 mL/min (A) (by C-G formula based on SCr of 2.93 mg/dL (H)). -renally dose abx as needed  -monitor BMP     4. DM2 with out long term insulin use. HA1C 7.3%. -recommend tight glycemic control   -management per primary team     5. Obesity. BMI 43.02. Discussed in detail with the primary service. History     Reason for Consult: sepsis     HPI: Graeme Evans is a 60y.o. year old female with DM2, obesity, who presented [facility] on 7[facility]9 with abnormal blood cultures. Patient was initially seen in ED 7/7 with 1-2 day hx of weakness, diarrhea, poor appetite, and R sided abdominal pain. She reports subjective fevers and chills and checked her temp, found to have 103.4*F at home. While in ED she had leukocytosis and INESSA. She was thought to have viral syndrome and d/c home with instructions to increase PO intake. Blood cx were drawn at that time and when they resulted positive, she was called back to ED. States her sx persistent while at home. She high mild tachycardia and leukocytosis on admission, but normal WBC and afebrile now. She denied dysuria urgency or frequency but reports a right sided abdominal pain radiating to the back. She denies recent travel. Was recently on PCN for dental ppx about 2 weeks ago. She denies prior hx or kidney stones. Infectious disease is being consulted for diagnostic work up and antibiotic management.     Review of Systems Constitutional: Positive for chills and fever. HENT: Negative for congestion. Respiratory: Negative for shortness of breath. Cardiovascular: Negative for chest pain. Gastrointestinal: Positive for abdominal pain. Genitourinary: Positive for flank pain. Negative for dysuria and urgency. Musculoskeletal: Positive for back pain (chronic ). Skin: Negative for rash and wound. Neurological: Positive for weakness. Psychiatric/Behavioral: Negative for confusion. Pertinent positives and negatives as noted in HPI. Rest complete 12 point system-based review of systems is otherwise negative. PAST MEDICAL HISTORY:  Past Medical History:   Diagnosis Date   • Diabetes mellitus ([address])    • Hypertension    • Migraine    • MRSA (methicillin resistant Staphylococcus aureus)      Past Surgical History:   Procedure Laterality Date   • ACHILLES TENDON REPAIR     • ANKLE SURGERY Left 2019    Excision of Lipoma   • CARPAL TUNNEL RELEASE Right    •  SECTION     • CHOLECYSTECTOMY     • GANGLION CYST EXCISION Left     Left wrist   • LUMBAR LAMINECTOMY  2018    x 3 disks   • NASAL SINUS SURGERY         FAMILY HISTORY:  Non-contributory    SOCIAL HISTORY:  Social History   Single  Social History     Substance and Sexual Activity   Alcohol Use Not Currently     Social History     Substance and Sexual Activity   Drug Use Never     Social History     Tobacco Use   Smoking Status Former   • Years: 50.00   • Types: Cigarettes   • Quit date:    • Years since quittin.5   Smokeless Tobacco Current   Tobacco Comments    Vapes - Daily - includes 3 mg nicotine       ALLERGIES:  Allergies   Allergen Reactions   • Pollen Extract Sneezing   • Vancomycin Rash       MEDICATIONS:  All current active medications have been reviewed.     Physical Exam     Temp:  [97.1 °F (36.2 °C)-99.6 °F (37.6 °C)] 99.6 °F (37.6 °C)  HR:  [] 107  Resp:  [17-20] 17  BP: (108-168)/(65-96) 111/66  SpO2:  [94 %-99 %] 95 %  Temp (24hrs), Av.1 °F (36.7 °C), Min:97.1 °F (36.2 °C), Max:99.6 °F (37.6 °C)  Current: Temperature: 99.6 °F (37.6 °C)    Intake/Output Summary (Last 24 hours) at 7/10/2023 0342  Last data filed at 7/10/2023 0845  Gross per 24 hour   Intake 770 ml   Output --   Net 770 ml         Physical exam findings reported by bedside and primary medical team staff    General Appearance:  Appearing well, nontoxic, and in no distress, appears stated age   Head:  Normocephalic, without obvious abnormality, atraumatic   Eyes:  PERRL, conjunctiva pink and sclera anicteric, both eyes   Nose: Nares normal, mucosa normal, no drainage   Throat: Oropharynx moist without lesions; lips, mucosa, and tongue normal; teeth and gums normal   Neck: Supple, symmetrical, trachea midline, no adenopathy, no tenderness/mass/nodules   Back:   Symmetric, no curvature, ROM normal, no CVA tenderness   Lungs:   Resp unlabored, on room air    Chest Wall:  No tenderness or deformity   Heart:  Regular rate and rhythm, S1, S2 normal, no murmur, rub or gallop   Abdomen:   Soft, non-tender, obese, non-distended, positive bowel sounds, no masses, no organomegaly    R CVA tenderness   Extremities: Extremities normal, atraumatic, no cyanosis, clubbing or edema   Skin: Skin color, texture, turgor normal, no rashes or lesions. No draining wounds noted. Neurologic: Alert and oriented times 3       Invasive Devices:   Peripheral IV 23 Left Antecubital (Active)   Site Assessment Clean;Dry; Intact 23 1658   Dressing Type Transparent 23 1658   Line Status Blood return noted; Flushed 23 165   Dressing Status Clean;Dry; Intact 23 1658       Labs, Imaging, & Other Studies     Lab Results:    I have personally reviewed pertinent labs.     Results from last 7 days   Lab Units 07/10/23  0432 23  2045 23  1731 23  1854   WBC Thousand/uL 11.09*  --  9.88 15.99*   HEMOGLOBIN g/dL 9.8*  --  9.7* 11.6   PLATELETS Thousands/uL 154 131* 135* 218     Results from last 7 days   Lab Units 07/10/23  0432 07/09/23  1702 07/07/23  1854   POTASSIUM mmol/L 4.0 4.0 4.2   CHLORIDE mmol/L 100 96 94*   CO2 mmol/L 21 20* 20*   BUN mg/dL 38* 37* 30*   CREATININE mg/dL 2.93* 3.11* 2.61*   EGFR ml/min/1.73sq m 16 15 18   CALCIUM mg/dL 8.9 9.3 9.8   AST U/L 79* 89* 57*   ALT U/L 53* 54* 23   ALK PHOS U/L 73 69 55     Results from last 7 days   Lab Units 07/09/23  1702 07/07/23  1854   BLOOD CULTURE  Received in Microbiology Lab. Culture in Progress. Received in Microbiology Lab. Culture in Progress. Escherichia coli*   GRAM STAIN RESULT   --  Gram negative rods*  Gram negative rods*       Imaging Studies:   I have personally reviewed pertinent imaging study reports and images in PACS. EKG, Pathology, and Other Studies:   I have personally reviewed pertinent reports and reviewed external records. Counseling/Coordination of care: Total 90 minutes communication with the patient via telehealth. Labs, medical tests and imaging studies were independently and extensively reviewed by me as noted above in HPI and old records were obtained and summarized as noted above in HPI. My recommendations were discussed with the patient in detail who verbalized understanding.

## 2023-07-11 ENCOUNTER — APPOINTMENT (INPATIENT)
Dept: RADIOLOGY | Facility: HOSPITAL | Age: 63
DRG: 871 | End: 2023-07-11
Payer: COMMERCIAL

## 2023-07-11 LAB
ANION GAP SERPL CALCULATED.3IONS-SCNC: 9 MMOL/L
BACTERIA UR CULT: NORMAL
BASOPHILS # BLD AUTO: 0.03 THOUSANDS/ÂΜL (ref 0–0.1)
BASOPHILS NFR BLD AUTO: 0 % (ref 0–1)
BUN SERPL-MCNC: 39 MG/DL (ref 5–25)
CALCIUM SERPL-MCNC: 9 MG/DL (ref 8.4–10.2)
CHLORIDE SERPL-SCNC: 99 MMOL/L (ref 96–108)
CO2 SERPL-SCNC: 22 MMOL/L (ref 21–32)
CREAT SERPL-MCNC: 2.5 MG/DL (ref 0.6–1.3)
E COLI DNA BLD POS QL NAA+NON-PROBE: DETECTED
EOSINOPHIL # BLD AUTO: 0.11 THOUSAND/ÂΜL (ref 0–0.61)
EOSINOPHIL NFR BLD AUTO: 1 % (ref 0–6)
ERYTHROCYTE [DISTWIDTH] IN BLOOD BY AUTOMATED COUNT: 14 % (ref 11.6–15.1)
GFR SERPL CREATININE-BSD FRML MDRD: 19 ML/MIN/1.73SQ M
GLUCOSE SERPL-MCNC: 109 MG/DL (ref 65–140)
GLUCOSE SERPL-MCNC: 121 MG/DL (ref 65–140)
GLUCOSE SERPL-MCNC: 133 MG/DL (ref 65–140)
GLUCOSE SERPL-MCNC: 137 MG/DL (ref 65–140)
GLUCOSE SERPL-MCNC: 168 MG/DL (ref 65–140)
HCT VFR BLD AUTO: 28 % (ref 34.8–46.1)
HGB BLD-MCNC: 9.3 G/DL (ref 11.5–15.4)
IMM GRANULOCYTES # BLD AUTO: 0.09 THOUSAND/UL (ref 0–0.2)
IMM GRANULOCYTES NFR BLD AUTO: 1 % (ref 0–2)
LYMPHOCYTES # BLD AUTO: 1.08 THOUSANDS/ÂΜL (ref 0.6–4.47)
LYMPHOCYTES NFR BLD AUTO: 11 % (ref 14–44)
MAGNESIUM SERPL-MCNC: 2.3 MG/DL (ref 1.9–2.7)
MCH RBC QN AUTO: 30.1 PG (ref 26.8–34.3)
MCHC RBC AUTO-ENTMCNC: 33.2 G/DL (ref 31.4–37.4)
MCV RBC AUTO: 91 FL (ref 82–98)
MONOCYTES # BLD AUTO: 0.55 THOUSAND/ÂΜL (ref 0.17–1.22)
MONOCYTES NFR BLD AUTO: 6 % (ref 4–12)
NEUTROPHILS # BLD AUTO: 7.86 THOUSANDS/ÂΜL (ref 1.85–7.62)
NEUTS SEG NFR BLD AUTO: 81 % (ref 43–75)
NRBC BLD AUTO-RTO: 0 /100 WBCS
PLATELET # BLD AUTO: 164 THOUSANDS/UL (ref 149–390)
PMV BLD AUTO: 9.8 FL (ref 8.9–12.7)
POTASSIUM SERPL-SCNC: 4.4 MMOL/L (ref 3.5–5.3)
RBC # BLD AUTO: 3.09 MILLION/UL (ref 3.81–5.12)
SODIUM SERPL-SCNC: 130 MMOL/L (ref 135–147)
WBC # BLD AUTO: 9.72 THOUSAND/UL (ref 4.31–10.16)

## 2023-07-11 PROCEDURE — 82948 REAGENT STRIP/BLOOD GLUCOSE: CPT

## 2023-07-11 PROCEDURE — 80048 BASIC METABOLIC PNL TOTAL CA: CPT | Performed by: FAMILY MEDICINE

## 2023-07-11 PROCEDURE — 99232 SBSQ HOSP IP/OBS MODERATE 35: CPT | Performed by: FAMILY MEDICINE

## 2023-07-11 PROCEDURE — 71045 X-RAY EXAM CHEST 1 VIEW: CPT

## 2023-07-11 PROCEDURE — 99232 SBSQ HOSP IP/OBS MODERATE 35: CPT | Performed by: INTERNAL MEDICINE

## 2023-07-11 PROCEDURE — 85025 COMPLETE CBC W/AUTO DIFF WBC: CPT | Performed by: FAMILY MEDICINE

## 2023-07-11 PROCEDURE — 99233 SBSQ HOSP IP/OBS HIGH 50: CPT | Performed by: STUDENT IN AN ORGANIZED HEALTH CARE EDUCATION/TRAINING PROGRAM

## 2023-07-11 PROCEDURE — 83735 ASSAY OF MAGNESIUM: CPT | Performed by: INTERNAL MEDICINE

## 2023-07-11 PROCEDURE — 82272 OCCULT BLD FECES 1-3 TESTS: CPT | Performed by: FAMILY MEDICINE

## 2023-07-11 RX ADMIN — HEPARIN SODIUM 5000 UNITS: 5000 INJECTION INTRAVENOUS; SUBCUTANEOUS at 21:21

## 2023-07-11 RX ADMIN — DULOXETINE HYDROCHLORIDE 60 MG: 30 CAPSULE, DELAYED RELEASE ORAL at 08:19

## 2023-07-11 RX ADMIN — ACETAMINOPHEN 325MG 650 MG: 325 TABLET ORAL at 08:19

## 2023-07-11 RX ADMIN — GABAPENTIN 100 MG: 100 CAPSULE ORAL at 08:19

## 2023-07-11 RX ADMIN — GABAPENTIN 100 MG: 100 CAPSULE ORAL at 15:02

## 2023-07-11 RX ADMIN — HEPARIN SODIUM 5000 UNITS: 5000 INJECTION INTRAVENOUS; SUBCUTANEOUS at 15:02

## 2023-07-11 RX ADMIN — CEFTRIAXONE 2000 MG: 2 INJECTION, SOLUTION INTRAVENOUS at 18:34

## 2023-07-11 RX ADMIN — INSULIN LISPRO 1 UNITS: 100 INJECTION, SOLUTION INTRAVENOUS; SUBCUTANEOUS at 11:54

## 2023-07-11 RX ADMIN — ACETAMINOPHEN 325MG 650 MG: 325 TABLET ORAL at 21:21

## 2023-07-11 RX ADMIN — ACETAMINOPHEN 325MG 650 MG: 325 TABLET ORAL at 15:06

## 2023-07-11 RX ADMIN — DULOXETINE HYDROCHLORIDE 60 MG: 30 CAPSULE, DELAYED RELEASE ORAL at 18:34

## 2023-07-11 RX ADMIN — HEPARIN SODIUM 5000 UNITS: 5000 INJECTION INTRAVENOUS; SUBCUTANEOUS at 05:38

## 2023-07-11 RX ADMIN — CLONAZEPAM 1 MG: 0.5 TABLET ORAL at 21:21

## 2023-07-11 RX ADMIN — ACETAMINOPHEN 325MG 650 MG: 325 TABLET ORAL at 00:51

## 2023-07-11 NOTE — ASSESSMENT & PLAN NOTE
Lab Results   Component Value Date    HGBA1C 7.3 (H) 04/21/2023       Recent Labs     07/10/23  1053 07/10/23  1544 07/10/23  2033 07/11/23  0720   POCGLU 100 203* 181* 109       Blood Sugar Average: Last 72 hrs:  (P) [id number]     Diabetic diet, Accu-Cheks before meals and at bedtime, sliding scale insulin.   Hold metformin

## 2023-07-11 NOTE — PROGRESS NOTES
[address]  Progress Note  Name: Cinthia Campbell  MRN: [id number]  Unit/Bed#: 929-98 I Date of Admission: 7/9/2023   Date of Service: 7/11/2023 I Hospital Day: 2    Assessment/Plan   * Sepsis due to Escherichia coli with acute renal failure [facility])  Assessment & Plan  Sepsis secondary to acute complicated urinary tract infection, bilateral pyelonephritis with gram-negative cyndee bacteremia. Repeat blood cultures obtained  Continue ceftriaxone  ID input noted  FeNa =  .7 likely pre-renal, start NS @ 100cc/hr  Renal function improving , off IVF   Had febrile episodes this morning, will need to consider Contrast enhanced imaging to rule out abscess but will await improvement of renal indices prior to obtaining     Hyponatremia  Assessment & Plan  mild    Type 2 diabetes mellitus with stage 3a chronic kidney disease, without long-term current use of insulin [facility])  Assessment & Plan  Lab Results   Component Value Date    HGBA1C 7.3 (H) 04/21/2023       Recent Labs     07/10/23  1053 07/10/23  1544 07/10/23  2033 07/11/23  0720   POCGLU 100 203* 181* 109       Blood Sugar Average: Last 72 hrs:  (P) 312.3288672148218148     Diabetic diet, Accu-Cheks before meals and at bedtime, sliding scale insulin. Hold metformin    Essential hypertension  Assessment & Plan  Hold lisinopril, monitor BP             Progress Note - Toshia Franco 60 y.o. female MRN: [id number]    Unit/Bed#: 405-01 Encounter: [id number]        Subjective:   Patient seen and examined, feels more tired today     Objective:     Vitals:   Vitals:    07/11/23 0718   BP: 146/72   Pulse: 102   Resp: 18   Temp: (!) 100.8 °F (38.2 °C)   SpO2: 95%     Body mass index is 43.58 kg/m².     Intake/Output Summary (Last 24 hours) at 7/11/2023 1033  Last data filed at 7/11/2023 0917  Gross per 24 hour   Intake 1310 ml   Output --   Net 1310 ml       Physical Exam:   /72   Pulse 102   Temp (!) 100.8 °F (38.2 °C)   Resp 18   Ht 5' 7" (1.702 m)   Wt 126 kg (278 lb 3.5 oz)   LMP 04/10/2017   SpO2 95%   BMI 43.58 kg/m²   General appearance: alert and oriented, in no acute distress  Head: Normocephalic, without obvious abnormality, atraumatic  Lungs: clear to auscultation bilaterally  Heart: regular rate and rhythm, S1, S2 normal, no murmur, click, rub or gallop  Abdomen: soft, non-tender; bowel sounds normal; no masses,  no organomegaly  Extremities: extremities normal, warm and well-perfused; no cyanosis, clubbing, or edema  Pulses: 2+ and symmetric  Neurologic: Grossly normal     Invasive Devices     Peripheral Intravenous Line  Duration           Peripheral IV 07/09/23 Left Antecubital 1 day                Results from last 7 days   Lab Units 07/11/23  0428 07/10/23  0432 07/09/23  2045 07/09/23  1731   WBC Thousand/uL 9.72 11.09*  --  9.88   HEMOGLOBIN g/dL 9.3* 9.8*  --  9.7*   HEMATOCRIT % 28.0* 29.2*  --  28.9*   PLATELETS Thousands/uL 164 154 131* 135*       Results from last 7 days   Lab Units 07/11/23  0428 07/10/23  0432 07/09/23  1702 07/07/23  1854   POTASSIUM mmol/L 4.4 4.0 4.0 4.2   CHLORIDE mmol/L 99 100 96 94*   CO2 mmol/L 22 21 20* 20*   BUN mg/dL 39* 38* 37* 30*   CREATININE mg/dL 2.50* 2.93* 3.11* 2.61*   CALCIUM mg/dL 9.0 8.9 9.3 9.8   ALK PHOS U/L  --  73 69 55   ALT U/L  --  53* 54* 23   AST U/L  --  79* 89* 57*       Medication Administration - last 24 hours from 07/10/2023 1033 to 07/11/2023 1033       Date/Time Order Dose Route Action Action by     07/10/2023 1723 EDT cefTRIAXone (ROCEPHIN) IVPB (premix in dextrose) 2,000 mg 50 mL 2,000 mg Intravenous [place] Leonor Dorman RN     07/10/2023 2118 EDT clonazePAM (KlonoPIN) tablet 1 mg 1 mg Oral Given Krishna Marchi [place], [place]     07/11/2023 9508 EDT DULoxetine (CYMBALTA) delayed release capsule 60 mg 60 mg Oral Given Adan Boone LPN     66/51/7378 1301 EDT DULoxetine (CYMBALTA) delayed release capsule 60 mg 60 mg Oral Given Ascencion Dorman RN     07/11/2023 0371 EDT gabapentin (NEURONTIN) capsule 100 mg 100 mg Oral Given Amparo Munoz LPN     55/86/9678 6017 EDT gabapentin (NEURONTIN) capsule 100 mg 100 mg Oral Given Bassam Garcia RN     07/11/2023 0815 EDT nicotine (NICODERM CQ) 21 mg/24 hr TD 24 hr patch 1 patch 1 patch Transdermal Not Given Amparo Munoz LPN     41/76/6991 4219 EDT acetaminophen (TYLENOL) tablet 650 mg 650 mg Oral Given Amparo Munoz LPN     60/08/5032 4217 EDT acetaminophen (TYLENOL) tablet 650 mg 650 mg Oral Given Brooke Malone, [address] [place] [address]     07/10/2023 1725 EDT acetaminophen (TYLENOL) tablet 650 mg 650 mg Oral Given Bassam Garcia RN     07/10/2023 1106 EDT acetaminophen (TYLENOL) tablet 650 mg 650 mg Oral Given Bassam Garcia RN     07/11/2023 0815 EDT insulin lispro (HumaLOG) 100 units/mL subcutaneous injection 1-5 Units -- Subcutaneous Not Given mAparo Munoz LPN     41/84/7469 1986 EDT insulin lispro (HumaLOG) 100 units/mL subcutaneous injection 1-5 Units -- Subcutaneous Not Given Bassam Garcia RN     07/10/2023 1103 EDT insulin lispro (HumaLOG) 100 units/mL subcutaneous injection 1-5 Units -- Subcutaneous Not Given Bassam Garcia RN     07/10/2023 2119 EDT insulin lispro (HumaLOG) 100 units/mL subcutaneous injection 1-5 Units 1 Units Subcutaneous Given Brooke Malone, [address] [place] [address]     07/11/2023 2697 EDT heparin (porcine) subcutaneous injection 5,000 Units 5,000 Units Subcutaneous Given Brooke Malone [address] [place] [address]     07/10/2023 2118 EDT heparin (porcine) subcutaneous injection 5,000 Units 5,000 Units Subcutaneous Given Brooke Malone [address] [place] [address]     07/10/2023 1314 EDT heparin (porcine) subcutaneous injection 5,000 Units 5,000 Units Subcutaneous Given Bassam Garcia RN     07/10/2023 1249 EDT magnesium sulfate 2 g/50 mL IVPB (premix) 2 g 0 g Intravenous Stopped Bassam Garcia RN     07/10/2023 1105 EDT magnesium sulfate 2 g/50 mL IVPB (premix) 2 g 2 g Intravenous [address] Bassam Garcia, [place]     07/11/2023 0744 EDT multi-electrolyte (PLASMALYTE-A/ISOLYTE-S PH 7.4) IV solution 0 mL/hr Intravenous Stopped Orly Rodriguez, LPN     62/12/0706 0265 EDT multi-electrolyte (PLASMALYTE-A/ISOLYTE-S PH 7.4) IV solution 0 mL/hr Intravenous Stopped Sade Ramon [facility], [address] [place] [address]     07/10/2023 2115 EDT multi-electrolyte (PLASMALYTE-A/ISOLYTE-S PH 7.4) IV solution 100 mL/hr Intravenous [facility] [address] [facility]            Lab, Imaging and other studies: I have personally reviewed pertinent reports.     VTE Pharmacologic Prophylaxis: Heparin  VTE Mechanical Prophylaxis: sequential compression device     Valarie Mueller MD  7/11/2023,10:33 AM

## 2023-07-11 NOTE — PLAN OF CARE
Problem: PAIN - ADULT  Goal: Verbalizes/displays adequate comfort level or baseline comfort level  Description: Interventions:  - Encourage patient to monitor pain and request assistance  - Assess pain using appropriate pain scale  - Administer analgesics based on type and severity of pain and evaluate response  - Implement non-pharmacological measures as appropriate and evaluate response  - Consider cultural and social influences on pain and pain management  - Notify physician/advanced practitioner if interventions unsuccessful or patient reports new pain  Outcome: Progressing     Problem: INFECTION - ADULT  Goal: Absence or prevention of progression during hospitalization  Description: INTERVENTIONS:  - Assess and monitor for signs and symptoms of infection  - Monitor lab/diagnostic results  - Monitor all insertion sites, i.e. indwelling lines, tubes, and drains  - Monitor endotracheal if appropriate and nasal secretions for changes in amount and color  - [place] appropriate cooling/warming therapies per order  - Administer medications as ordered  - Instruct and encourage patient and family to use good hand hygiene technique  - Identify and instruct in appropriate isolation precautions for identified infection/condition  Outcome: Progressing     Problem: SAFETY ADULT  Goal: Patient will remain free of falls  Description: INTERVENTIONS:  - Educate patient/family on patient safety including physical limitations  - Instruct patient to call for assistance with activity   - Consult OT/PT to assist with strengthening/mobility   - Keep Call bell within reach  - Keep bed low and locked with side rails adjusted as appropriate  - Keep care items and personal belongings within reach  - Initiate and maintain comfort rounds  - Make Fall Risk Sign visible to staff  - Offer Toileting every 2 Hours, in advance of need  - Initiate/Maintain bed/chair alarm  - Obtain necessary fall risk management equipment: non skid socks  - Apply yellow socks and bracelet for high fall risk patients  - Consider moving patient to room near nurses station  Outcome: Progressing  Goal: Maintain or return to baseline ADL function  Description: INTERVENTIONS:  -  Assess patient's ability to carry out ADLs; assess patient's baseline for ADL function and identify physical deficits which impact ability to perform ADLs (bathing, care of mouth/teeth, toileting, grooming, dressing, etc.)  - Assess/evaluate cause of self-care deficits   - Assess range of motion  - Assess patient's mobility; develop plan if impaired  - Assess patient's need for assistive devices and provide as appropriate  - Encourage maximum independence but intervene and supervise when necessary  - Involve family in performance of ADLs  - Assess for home care needs following discharge   - Consider OT consult to assist with ADL evaluation and planning for discharge  - Provide patient education as appropriate  Outcome: Progressing  Goal: Maintains/Returns to pre admission functional level  Description: INTERVENTIONS:  - Perform BMAT or MOVE assessment daily.   - Set and communicate daily mobility goal to care team and patient/family/caregiver. - Collaborate with rehabilitation services on mobility goals if consulted  - Perform Range of Motion 3 times a day. - Reposition patient every 3 hours.   - Dangle patient 3 times a day  - Stand patient 3 times a day  - Ambulate patient 3 times a day  - Out of bed to chair 3 times a day   - Out of bed for meals 3 times a day  - Out of bed for toileting  - Record patient progress and toleration of activity level   Outcome: Progressing     Problem: DISCHARGE PLANNING  Goal: Discharge to home or other facility with appropriate resources  Description: INTERVENTIONS:  - Identify barriers to discharge w/patient and caregiver  - Arrange for needed discharge resources and transportation as appropriate  - Identify discharge learning needs (meds, wound care, etc.)  - Arrange for interpretive services to assist at discharge as needed  - Refer to Case Management Department for coordinating discharge planning if the patient needs post-hospital services based on physician/advanced practitioner order or complex needs related to functional status, cognitive ability, or social support system  Outcome: Progressing     Problem: METABOLIC, FLUID AND ELECTROLYTES - ADULT  Goal: Electrolytes maintained within normal limits  Description: INTERVENTIONS:  - Monitor labs and assess patient for signs and symptoms of electrolyte imbalances  - Administer electrolyte replacement as ordered  - Monitor response to electrolyte replacements, including repeat lab results as appropriate  - Instruct patient on fluid and nutrition as appropriate  Outcome: Progressing  Goal: Fluid balance maintained  Description: INTERVENTIONS:  - Monitor labs   - Monitor I/O and WT  - Instruct patient on fluid and nutrition as appropriate  - Assess for signs & symptoms of volume excess or deficit  Outcome: Progressing  Goal: Glucose maintained within target range  Description: INTERVENTIONS:  - Monitor Blood Glucose as ordered  - Assess for signs and symptoms of hyperglycemia and hypoglycemia  - Administer ordered medications to maintain glucose within target range  - Assess nutritional intake and initiate nutrition service referral as needed  Outcome: Progressing     Problem: Nutrition/Hydration-ADULT  Goal: Nutrient/Hydration intake appropriate for improving, restoring or maintaining nutritional needs  Description: Monitor and assess patient's nutrition/hydration status for malnutrition. Collaborate with interdisciplinary team and initiate plan and interventions as ordered. Monitor patient's weight and dietary intake as ordered or per policy. Utilize nutrition screening tool and intervene as necessary. Determine patient's food preferences and provide high-protein, high-caloric foods as appropriate.      INTERVENTIONS:  - Monitor oral intake, urinary output, labs, and treatment plans  - Assess nutrition and hydration status and recommend course of action  - Evaluate amount of meals eaten  - Assist patient with eating if necessary   - Allow adequate time for meals  - Recommend/ encourage appropriate diets, oral nutritional supplements, and vitamin/mineral supplements  - Order, calculate, and assess calorie counts as needed  - Recommend, monitor, and adjust tube feedings and TPN/PPN based on assessed needs  - Assess need for intravenous fluids  - Provide specific nutrition/hydration education as appropriate  - Include patient/family/caregiver in decisions related to nutrition  Outcome: Progressing

## 2023-07-11 NOTE — PLAN OF CARE
Problem: PAIN - ADULT  Goal: Verbalizes/displays adequate comfort level or baseline comfort level  Description: Interventions:  - Encourage patient to monitor pain and request assistance  - Assess pain using appropriate pain scale  - Administer analgesics based on type and severity of pain and evaluate response  - Implement non-pharmacological measures as appropriate and evaluate response  - Consider cultural and social influences on pain and pain management  - Notify physician/advanced practitioner if interventions unsuccessful or patient reports new pain  Outcome: Progressing     Problem: INFECTION - ADULT  Goal: Absence or prevention of progression during hospitalization  Description: INTERVENTIONS:  - Assess and monitor for signs and symptoms of infection  - Monitor lab/diagnostic results  - Monitor all insertion sites, i.e. indwelling lines, tubes, and drains  - Monitor endotracheal if appropriate and nasal secretions for changes in amount and color  - [place] appropriate cooling/warming therapies per order  - Administer medications as ordered  - Instruct and encourage patient and family to use good hand hygiene technique  - Identify and instruct in appropriate isolation precautions for identified infection/condition  Outcome: Progressing     Problem: SAFETY ADULT  Goal: Patient will remain free of falls  Description: INTERVENTIONS:  - Educate patient/family on patient safety including physical limitations  - Instruct patient to call for assistance with activity   - Consult OT/PT to assist with strengthening/mobility   - Keep Call bell within reach  - Keep bed low and locked with side rails adjusted as appropriate  - Keep care items and personal belongings within reach  - Initiate and maintain comfort rounds  - Make Fall Risk Sign visible to staff  - Offer Toileting every 2 Hours, in advance of need  - Initiate/Maintain bed/chair alarm  - Obtain necessary fall risk management equipment: non skid socks  - Apply yellow socks and bracelet for high fall risk patients  - Consider moving patient to room near nurses station  Outcome: Progressing  Goal: Maintain or return to baseline ADL function  Description: INTERVENTIONS:  -  Assess patient's ability to carry out ADLs; assess patient's baseline for ADL function and identify physical deficits which impact ability to perform ADLs (bathing, care of mouth/teeth, toileting, grooming, dressing, etc.)  - Assess/evaluate cause of self-care deficits   - Assess range of motion  - Assess patient's mobility; develop plan if impaired  - Assess patient's need for assistive devices and provide as appropriate  - Encourage maximum independence but intervene and supervise when necessary  - Involve family in performance of ADLs  - Assess for home care needs following discharge   - Consider OT consult to assist with ADL evaluation and planning for discharge  - Provide patient education as appropriate  Outcome: Progressing  Goal: Maintains/Returns to pre admission functional level  Description: INTERVENTIONS:  - Perform BMAT or MOVE assessment daily.   - Set and communicate daily mobility goal to care team and patient/family/caregiver. - Collaborate with rehabilitation services on mobility goals if consulted  - Perform Range of Motion 3 times a day. - Reposition patient every 3 hours.   - Dangle patient 3 times a day  - Stand patient 3 times a day  - Ambulate patient 3 times a day  - Out of bed to chair 3 times a day   - Out of bed for meals 3 times a day  - Out of bed for toileting  - Record patient progress and toleration of activity level   Outcome: Progressing     Problem: DISCHARGE PLANNING  Goal: Discharge to home or other facility with appropriate resources  Description: INTERVENTIONS:  - Identify barriers to discharge w/patient and caregiver  - Arrange for needed discharge resources and transportation as appropriate  - Identify discharge learning needs (meds, wound care, etc.)  - Arrange for interpretive services to assist at discharge as needed  - Refer to Case Management Department for coordinating discharge planning if the patient needs post-hospital services based on physician/advanced practitioner order or complex needs related to functional status, cognitive ability, or social support system  Outcome: Progressing     Problem: METABOLIC, FLUID AND ELECTROLYTES - ADULT  Goal: Electrolytes maintained within normal limits  Description: INTERVENTIONS:  - Monitor labs and assess patient for signs and symptoms of electrolyte imbalances  - Administer electrolyte replacement as ordered  - Monitor response to electrolyte replacements, including repeat lab results as appropriate  - Instruct patient on fluid and nutrition as appropriate  Outcome: Progressing  Goal: Fluid balance maintained  Description: INTERVENTIONS:  - Monitor labs   - Monitor I/O and WT  - Instruct patient on fluid and nutrition as appropriate  - Assess for signs & symptoms of volume excess or deficit  Outcome: Progressing  Goal: Glucose maintained within target range  Description: INTERVENTIONS:  - Monitor Blood Glucose as ordered  - Assess for signs and symptoms of hyperglycemia and hypoglycemia  - Administer ordered medications to maintain glucose within target range  - Assess nutritional intake and initiate nutrition service referral as needed  Outcome: Progressing     Problem: Nutrition/Hydration-ADULT  Goal: Nutrient/Hydration intake appropriate for improving, restoring or maintaining nutritional needs  Description: Monitor and assess patient's nutrition/hydration status for malnutrition. Collaborate with interdisciplinary team and initiate plan and interventions as ordered. Monitor patient's weight and dietary intake as ordered or per policy. Utilize nutrition screening tool and intervene as necessary. Determine patient's food preferences and provide high-protein, high-caloric foods as appropriate.      INTERVENTIONS:  - Monitor oral intake, urinary output, labs, and treatment plans  - Assess nutrition and hydration status and recommend course of action  - Evaluate amount of meals eaten  - Assist patient with eating if necessary   - Allow adequate time for meals  - Recommend/ encourage appropriate diets, oral nutritional supplements, and vitamin/mineral supplements  - Order, calculate, and assess calorie counts as needed  - Recommend, monitor, and adjust tube feedings and TPN/PPN based on assessed needs  - Assess need for intravenous fluids  - Provide specific nutrition/hydration education as appropriate  - Include patient/family/caregiver in decisions related to nutrition  Outcome: Progressing

## 2023-07-11 NOTE — PROGRESS NOTES
Progress Note - Nephrology   Vale Catching 60 y.o. female MRN: [id number]  Unit/Bed#: 405-01 Encounter: [id number]    A/P:  1. Acute kidney injury, present on admission, on CKD 3. Creatinine 1.3 on 4/21. Her baseline appears to be in the 1.3-1.4 range. Creatinine was 2.6 on 7/7. Etiologies: prerenal vs septic physiology vs ATN. Creatinine peaked at 3.1 on 7/9. Creatinine improving 2.5-->2.9. Concern that IVF went too far and caused pulm edema. Official CXR report pending. IVF still continued yesterday with tachycardia/gi losses.      Suspect urine output nonoliguric, but not recorded.      Recommend:  FeNa can be unreliable in early ATN. [place] to be prerenal with gi losses+ tachycardia so IVF continued 7/10 and changed to balanced solution to avoid chloride load. Awaiting CXR read. Can diurese PRN if SOB/ resp distress/ hypoxia-lasix 40mg IV. Otherwise let volume status equilibrate. No emergent need for HD at present. Continue to hold lisinopril. Follow daily chemistry. Avoid contrast studies with INESSA.      2. Hyponatremia, hypovolemic, improving. Mild, asymptomatic. 132-->130,due to free water intake. If Na<130 may need moderate FR.      3.  Non-anion gap metabolic acidosis due to INESSA/CKD. TCO2 stable at 22, no need for bicarbonate.      4. CKD 3B, suspect due to diabetic nephropathy. Seen by Dr. Delmy Curtis in March. Baseline creatinine 1.3-1.4. Will need outpatient follow-up.     5. Hypomagnesemia, moderate due to GI losses. Recheck magnesium today. 6. Elevated LFT in setting of sepsis, defer to primary.     7.  Essential hypertension, hold lisinopril. Follow blood pressure trends. Would not be aggressive with treating.     8 sepsis due to E. Coli bacteremia, pyelonephritis  Management per primary. Follow-up repeat blood cultures. Infectious disease consultation  Continue Rocephin.   Persistent fever on abx, consider urology eval.   Avoid imaging with contrast as best we can.        Follow up reason for today's visit:     jordon    Subjective:   Patient seen and examined today. Her intravenous fluids were stopped yesterday due to increased respiratory effort. There was concern by RN that she had increased shortness of breath. Patient was really denying shortness of breath but he does appear to have increased respiratory effort on my exam.  Otherwise denies complaints. She had 2-3 loose stools yesterday. She is still febrile this a.m. and tachycardic. A complete 10 point review of systems was performed and is otherwise negative. Objective:     Vitals: Blood pressure 146/72, pulse 102, temperature (!) 100.8 °F (38.2 °C), resp. rate 18, height 5' 7" (1.702 m), weight 125 kg (274 lb 11.1 oz), last menstrual period 04/10/2017, SpO2 95 %. ,Body mass index is 43.02 kg/m². Weight (last 2 days)     Date/Time Weight    07/10/23 0536 125 (274.69)    07/09/23 19:01:53 124 (273.37)    07/09/23 1653 125 (275.58)            Intake/Output Summary (Last 24 hours) at 7/11/2023 0807  Last data filed at 7/10/2023 1221  Gross per 24 hour   Intake 1291.67 ml   Output --   Net 1291.67 ml     I/O last 3 completed shifts: In: 1771.7 [P.O.:1670;  I.V.:101.7]  Out: -          Physical Exam: /72   Pulse 102   Temp (!) 100.8 °F (38.2 °C)   Resp 18   Ht 5' 7" (1.702 m)   Wt 125 kg (274 lb 11.1 oz)   LMP 04/10/2017   SpO2 95%   BMI 43.02 kg/m²     General Appearance:    Alert, cooperative, no distress, appears stated age   Head:    Normocephalic, without obvious abnormality, atraumatic   Eyes:    Conjunctiva/corneas clear   Ears:    Normal external ears   Nose:   Nares normal, septum midline, mucosa normal, no drainage    or sinus tenderness   Throat:   Lips, mucosa, and tongue normal; teeth and gums normal   Neck:    Back:      Lungs:     Decrease BS, increased respiratory effort    Chest wall:    No tenderness or deformity   Heart:    Regular rate and rhythm, S1 and S2 normal, no murmur, rub   or gallop   Abdomen:     Soft, non-tender, bowel sounds active   Extremities:   Extremities normal, atraumatic, no cyanosis or edema   Skin:   Skin color, texture, turgor normal, no rashes or lesions   Lymph nodes:   Cervical normal   Neurologic:   CNII-XII intact            Lab, Imaging and other studies: I have personally reviewed pertinent labs. CBC:   Lab Results   Component Value Date    WBC 9.72 07/11/2023    HGB 9.3 (L) 07/11/2023    HCT 28.0 (L) 07/11/2023    MCV 91 07/11/2023     07/11/2023    RBC 3.09 (L) 07/11/2023    MCH 30.1 07/11/2023    MCHC 33.2 07/11/2023    RDW 14.0 07/11/2023    MPV 9.8 07/11/2023    NRBC 0 07/11/2023     CMP:   Lab Results   Component Value Date    K 4.4 07/11/2023    CL 99 07/11/2023    CO2 22 07/11/2023    BUN 39 (H) 07/11/2023    CREATININE 2.50 (H) 07/11/2023    CALCIUM 9.0 07/11/2023    EGFR 19 07/11/2023       .   Results from last 7 days   Lab Units 07/11/23  0428 07/10/23  0432 07/09/23  1702 07/07/23  1854   POTASSIUM mmol/L 4.4 4.0 4.0 4.2   CHLORIDE mmol/L 99 100 96 94*   CO2 mmol/L 22 21 20* 20*   BUN mg/dL 39* 38* 37* 30*   CREATININE mg/dL 2.50* 2.93* 3.11* 2.61*   CALCIUM mg/dL 9.0 8.9 9.3 9.8   ALK PHOS U/L  --  73 69 55   ALT U/L  --  53* 54* 23   AST U/L  --  79* 89* 57*         Phosphorus: No results found for: "PHOS"  Magnesium: No results found for: "MG"  Urinalysis: No results found for: "COLORU", "CLARITYU", "SPECGRAV", "PHUR", "LEUKOCYTESUR", "NITRITE", "PROTEINUA", "GLUCOSEU", "Silvino Tray", "BILIRUBINUR", "BLOODU"  Ionized Calcium: No results found for: "CAION"  Coagulation: No results found for: "PT", "INR", "APTT"  Troponin: No results found for: "TROPONINI"  ABG: No results found for: "PHART", "JMM3HRP", "PO2ART", "UNY4ANS", "W0RFHTYS", "BEART", "SOURCE"  Radiology review:     IMAGING  Procedure: CT abdomen pelvis wo contrast    Result Date: 7/9/2023  Narrative: CT ABDOMEN AND PELVIS WITHOUT IV CONTRAST INDICATION:   Right-sided flank/upper quadrant abdominal pain, bacteremia. COMPARISON:  None. TECHNIQUE:  CT examination of the abdomen and pelvis was performed without intravenous contrast. Multiplanar 2D reformatted images were created from the source data. This examination, like all CT scans performed in the [facility], was performed utilizing techniques to minimize radiation dose exposure, including the use of iterative reconstruction and automated exposure control. Radiation dose length product (DLP) for this visit:  1574.15 mGy-cm Enteric contrast was not administered. FINDINGS: ABDOMEN LOWER CHEST: Bibasilar subsegmental atelectasis. Small hiatal hernia. Small right effusion. LIVER/BILIARY TREE: Hepatomegaly measuring 21 cm. Hepatic steatosis. No focal abnormality detected on this unenhanced study. GALLBLADDER: Gallbladder is surgically absent. SPLEEN:  Unremarkable. PANCREAS:  Unremarkable. ADRENAL GLANDS:  Unremarkable. KIDNEYS/URETERS: Bilateral perinephric soft tissue stranding. Mildly distended upper collecting systems bilaterally. No calculus. Left kidney upper pole scarring. STOMACH AND BOWEL: Sigmoid diverticulosis. No bowel obstruction. No bowel inflammation. APPENDIX: Soft tissue stranding within the right lower quadrant, favored to be extending inferiorly from the right perinephric soft tissue stranding rather than primary right lower quadrant process. ABDOMINOPELVIC CAVITY:  No ascites. No pneumoperitoneum. No lymphadenopathy. VESSELS:  Unremarkable for patient's age. PELVIS REPRODUCTIVE ORGANS:  Unremarkable for patient's age. URINARY BLADDER:  Unremarkable. ABDOMINAL WALL/INGUINAL REGIONS:  Unremarkable. OSSEOUS STRUCTURES:  No acute fracture or destructive osseous lesion. Impression: 1. Bilateral perinephric soft tissue stranding with mild distention of the upper collecting systems, without evidence of renal calculus. Findings are most suspicious for pyelonephritis.  Nonspecific renal inflammatory disease also in the differential. 2. Small right-sided pleural effusion 3. Sigmoid diverticulosis and small hiatal hernia 4. Hepatomegaly and hepatic steatosis Workstation performed: [id number]       Current Facility-Administered Medications   Medication Dose Route Frequency   • acetaminophen (TYLENOL) tablet 650 mg  650 mg Oral Q6H PRN   • aluminum-magnesium hydroxide-simethicone (MAALOX) oral suspension 30 mL  30 mL Oral Q6H PRN   • cefTRIAXone (ROCEPHIN) IVPB (premix in dextrose) 2,000 mg 50 mL  2,000 mg Intravenous Q24H   • clonazePAM (KlonoPIN) tablet 1 mg  1 mg Oral HS   • docusate sodium (COLACE) capsule 100 mg  100 mg Oral Daily PRN   • DULoxetine (CYMBALTA) delayed release capsule 60 mg  60 mg Oral BID   • gabapentin (NEURONTIN) capsule 100 mg  100 mg Oral BID (AM & Afternoon)   • heparin (porcine) subcutaneous injection 5,000 Units  5,000 Units Subcutaneous Q8H [address]   • insulin lispro (HumaLOG) 100 units/mL subcutaneous injection 1-5 Units  1-5 Units Subcutaneous TID AC   • insulin lispro (HumaLOG) 100 units/mL subcutaneous injection 1-5 Units  1-5 Units Subcutaneous HS   • nicotine (NICODERM CQ) 21 mg/24 hr TD 24 hr patch 1 patch  1 patch Transdermal Daily   • ondansetron (ZOFRAN) injection 4 mg  4 mg Intravenous Q6H PRN   • tiZANidine (ZANAFLEX) tablet 2 mg  2 mg Oral Q8H PRN     Medications Discontinued During This Encounter   Medication Reason   • enoxaparin (LOVENOX) subcutaneous injection 40 mg Alternate therapy   • sodium chloride 0.9 % infusion    • multi-electrolyte (PLASMALYTE-A/ISOLYTE-S PH 7.4) IV solution        Brenda Kahne and Roberto, DO      This progress note was produced in part using a dictation device which may document imprecise wording from author's original intent.

## 2023-07-11 NOTE — PROGRESS NOTES
REQUIRED DOCUMENTATION:      1. This service was provided via Telemedicine. 2. Provider located at [address] [facility]  3. TeleMed provider: Vj Latham MD  4. Identify all parties in room with patient during tele consult:  Patient only  5. After connecting through [employer], patient was identified by name and date of birth and assistant checked wristband. Patient was then informed that this was a Telemedicine visit and that the exam was being conducted confidentially over secure lines. My office door was closed. No one else was in the room. Patient acknowledged consent and understanding of privacy and security of the Telemedicine visit, and gave us permission to have the assistant stay in the room in order to assist with the history and to conduct the exam.  I informed the patient that I have reviewed their record in Epic and presented the opportunity for them to ask any questions regarding the visit today. The patient agreed to participate. Progress Note - Infectious Disease   Isidro Norwood 60 y.o. female MRN: [id number]  Unit/Bed#: 405-01 Encounter: [id number]      Impression/Plan:    1.  E. coli bacteremia. Both sets blood cultures from ED visit 7/7 isolated E. coli. Repeat cultures collected on return to the ED 7/9/2023 are also positive. Suspect a urinary source. UA was relatively benign and urine cultures growing mixed contaminants, but CT shows presumed bilateral pyelonephritis. Imaging also shows diverticulosis without diverticulitis and hepatic steatosis. LFTs are mildly elevated, patient is status postcholecystectomy so does not have a gallbladder. She had a fever this morning but otherwise is hemodynamically stable.   -Now, continue IV ceftriaxone 2 g every 24 hours   -Follow-up blood culture susceptibilities   -repeat blood cultures tomorrow   -consider repeat CT A/P with contrast once renal function more improved   -monitor fever curve, WBC count    2. Bilateral pyelonephritis.  CT A/P shows bilateral perinephric soft tissue stranding without hydronephrosis. UA on admission benign, patient did have some right abdominal/flank pain on admission. Urine culture is growing mixed contaminants.   -antibiotics as above   -monitor urine output, monitor for retention    3. INESSA on CKD 3b. Present on admission with creatinine of 3.11. Baseline is around 1.3. Nephrology is following. Suspect prerenal vs ATN due to sepsis. Creatinine is improving with IVF. -monitor creatinine   -dose adjust antibiotics for CrCl    4. DM2 without long term insulin use. HA1C 7.3%. -recommend tight glycemic control   -management per primary team      5. Obesity. BMI 43.02    Above management plan discussed in detail with the primary team and patient. ID will follow. I spent 40 minutes in evaluation of the patient of which 25 minutes was in counseling/coordination of care    Antibiotics:  Ceftriaxone day 3    Subjective:  Patient is feeling more tired today and run down. She continues to have fever and chills. She denies abdominal pain today, no back pain or other new symptoms. Tmax 100.8. Objective:  Vitals:  Temp:  [97.8 °F (36.6 °C)-100.8 °F (38.2 °C)] 100.8 °F (38.2 °C)  HR:  [] 102  Resp:  [17-20] 18  BP: (137-146)/(72-74) 146/72  SpO2:  [93 %-97 %] 95 %  Temp (24hrs), Av.6 °F (37 °C), Min:97.8 °F (36.6 °C), Max:100.8 °F (38.2 °C)  Current: Temperature: (!) 100.8 °F (38.2 °C)    Physical Exam:     Documented physical exam has been primarily done by the patient's nurse and/or the primary service due to limited examination abilities on telemedicine     General Appearance:  Alert, interactive, nontoxic, no acute distress. Throat: Oropharynx moist without lesions. Lungs:   Clear to auscultation bilaterally; no wheezes, rhonchi or rales; respirations unlabored   Heart:  RRR; no murmur, rub or gallop   Abdomen:   Soft, non-tender, non-distended, positive bowel sounds.      Extremities: No clubbing, cyanosis or edema   Skin: No new rashes or lesions. No draining wounds noted. Labs: All pertinent labs and imaging studies were personally reviewed  Results from last 7 days   Lab Units 07/11/23  0428 07/10/23  0432 07/09/23  2045 07/09/23  1731   WBC Thousand/uL 9.72 11.09*  --  9.88   HEMOGLOBIN g/dL 9.3* 9.8*  --  9.7*   PLATELETS Thousands/uL 164 154 131* 135*     Results from last 7 days   Lab Units 07/11/23  0428 07/10/23  0432 07/09/23  1702 07/07/23  1854   SODIUM mmol/L 130* 132* 128* 126*   POTASSIUM mmol/L 4.4 4.0 4.0 4.2   CHLORIDE mmol/L 99 100 96 94*   CO2 mmol/L 22 21 20* 20*   BUN mg/dL 39* 38* 37* 30*   CREATININE mg/dL 2.50* 2.93* 3.11* 2.61*   EGFR ml/min/1.73sq m 19 16 15 18   CALCIUM mg/dL 9.0 8.9 9.3 9.8   AST U/L  --  79* 89* 57*   ALT U/L  --  53* 54* 23   ALK PHOS U/L  --  73 69 55     Results from last 7 days   Lab Units 07/10/23  0432 07/09/23  1702 07/07/23  1854   PROCALCITONIN ng/ml 84.35* 90.58* 191.87*                   Micro:  Results from last 7 days   Lab Units 07/09/23  1740 07/09/23 1702 07/07/23  1854   BLOOD CULTURE   --  Escherichia coli* Gram Negative Harris Enteric Like*  Escherichia coli*   GRAM STAIN RESULT   --  Gram negative rods*  Gram negative rods* Gram negative rods*  Gram negative rods*   URINE CULTURE  10,000-19,000 cfu/ml  --   --        Imaging:  Imaging in PACS personally reviewed.  Bilateral perinephric stranding

## 2023-07-11 NOTE — UTILIZATION REVIEW
NOTIFICATION OF INPATIENT ADMISSION   AUTHORIZATION REQUEST   SERVICING FACILITY:   [facility]  Address: [address] [place] [address], [address] [address]  Tax ID: [id number]  NPI: [id number] ATTENDING PROVIDER:  Attending Name and NPI#: William Garcia Md [[id number]]  Address: [address] [place] [address]  Phone: [phone]   ADMISSION INFORMATION:  [facility] [address] [facility]   Place of Service Code: 21  Inpatient Admission Date/Time: 7/9/23  6:33 PM  Discharge Date/Time: No discharge date for patient encounter. Admitting Diagnosis Code/Description:  Weakness [R53.1]  INESSA (acute kidney injury) ([address]) [N17.9]  Sepsis ([address]) [A41.9]     UTILIZATION REVIEW CONTACT:  Fariba Elkins Utilization   Network Utilization Review Department  Phone: [phone]  Fax: [phone]  Email: [email or web address]  Contact for approvals/pending authorizations, clinical reviews, and discharge. PHYSICIAN ADVISORY SERVICES:  Medical Necessity Denial & Hvqv-vv-Ohaa Review  Phone: [phone]  Fax: [phone]  Email: [email or web address]

## 2023-07-11 NOTE — ASSESSMENT & PLAN NOTE
Sepsis secondary to acute complicated urinary tract infection, bilateral pyelonephritis with gram-negative cyndee bacteremia.     Repeat blood cultures obtained  Continue ceftriaxone  ID input noted  FeNa =  .7 likely pre-renal, start NS @ 100cc/hr  Renal function improving , off IVF   Had febrile episodes this morning, will need to consider Contrast enhanced imaging to rule out abscess but will await improvement of renal indices prior to obtaining

## 2023-07-12 ENCOUNTER — APPOINTMENT (INPATIENT)
Dept: ULTRASOUND IMAGING | Facility: HOSPITAL | Age: 63
DRG: 871 | End: 2023-07-12
Payer: COMMERCIAL

## 2023-07-12 LAB
ANION GAP SERPL CALCULATED.3IONS-SCNC: 9 MMOL/L
ATRIAL RATE: 106 BPM
BACTERIA BLD CULT: ABNORMAL
BASOPHILS # BLD AUTO: 0.03 THOUSANDS/ÂΜL (ref 0–0.1)
BASOPHILS NFR BLD AUTO: 0 % (ref 0–1)
BUN SERPL-MCNC: 39 MG/DL (ref 5–25)
CALCIUM SERPL-MCNC: 8.9 MG/DL (ref 8.4–10.2)
CHLORIDE SERPL-SCNC: 101 MMOL/L (ref 96–108)
CO2 SERPL-SCNC: 22 MMOL/L (ref 21–32)
CREAT SERPL-MCNC: 2.28 MG/DL (ref 0.6–1.3)
E COLI DNA BLD POS QL NAA+NON-PROBE: DETECTED
EOSINOPHIL # BLD AUTO: 0.13 THOUSAND/ÂΜL (ref 0–0.61)
EOSINOPHIL NFR BLD AUTO: 1 % (ref 0–6)
ERYTHROCYTE [DISTWIDTH] IN BLOOD BY AUTOMATED COUNT: 14.2 % (ref 11.6–15.1)
GFR SERPL CREATININE-BSD FRML MDRD: 22 ML/MIN/1.73SQ M
GLUCOSE SERPL-MCNC: 104 MG/DL (ref 65–140)
GLUCOSE SERPL-MCNC: 105 MG/DL (ref 65–140)
GLUCOSE SERPL-MCNC: 131 MG/DL (ref 65–140)
GLUCOSE SERPL-MCNC: 150 MG/DL (ref 65–140)
GLUCOSE SERPL-MCNC: 164 MG/DL (ref 65–140)
GRAM STN SPEC: ABNORMAL
HCT VFR BLD AUTO: 27.3 % (ref 34.8–46.1)
HEMOCCULT STL QL: NEGATIVE
HGB BLD-MCNC: 9 G/DL (ref 11.5–15.4)
IMM GRANULOCYTES # BLD AUTO: 0.33 THOUSAND/UL (ref 0–0.2)
IMM GRANULOCYTES NFR BLD AUTO: 3 % (ref 0–2)
LYMPHOCYTES # BLD AUTO: 1.08 THOUSANDS/ÂΜL (ref 0.6–4.47)
LYMPHOCYTES NFR BLD AUTO: 10 % (ref 14–44)
MCH RBC QN AUTO: 30.2 PG (ref 26.8–34.3)
MCHC RBC AUTO-ENTMCNC: 33 G/DL (ref 31.4–37.4)
MCV RBC AUTO: 92 FL (ref 82–98)
MONOCYTES # BLD AUTO: 0.66 THOUSAND/ÂΜL (ref 0.17–1.22)
MONOCYTES NFR BLD AUTO: 6 % (ref 4–12)
NEUTROPHILS # BLD AUTO: 8.38 THOUSANDS/ÂΜL (ref 1.85–7.62)
NEUTS SEG NFR BLD AUTO: 80 % (ref 43–75)
NRBC BLD AUTO-RTO: 0 /100 WBCS
P AXIS: 29 DEGREES
PLATELET # BLD AUTO: 184 THOUSANDS/UL (ref 149–390)
PMV BLD AUTO: 10.2 FL (ref 8.9–12.7)
POTASSIUM SERPL-SCNC: 3.9 MMOL/L (ref 3.5–5.3)
PR INTERVAL: 134 MS
QRS AXIS: 32 DEGREES
QRSD INTERVAL: 80 MS
QT INTERVAL: 324 MS
QTC INTERVAL: 430 MS
RBC # BLD AUTO: 2.98 MILLION/UL (ref 3.81–5.12)
SODIUM SERPL-SCNC: 132 MMOL/L (ref 135–147)
T WAVE AXIS: 6 DEGREES
VENTRICULAR RATE: 106 BPM
WBC # BLD AUTO: 10.61 THOUSAND/UL (ref 4.31–10.16)

## 2023-07-12 PROCEDURE — 87040 BLOOD CULTURE FOR BACTERIA: CPT | Performed by: STUDENT IN AN ORGANIZED HEALTH CARE EDUCATION/TRAINING PROGRAM

## 2023-07-12 PROCEDURE — 85025 COMPLETE CBC W/AUTO DIFF WBC: CPT | Performed by: FAMILY MEDICINE

## 2023-07-12 PROCEDURE — 99232 SBSQ HOSP IP/OBS MODERATE 35: CPT | Performed by: INTERNAL MEDICINE

## 2023-07-12 PROCEDURE — 93010 ELECTROCARDIOGRAM REPORT: CPT | Performed by: INTERNAL MEDICINE

## 2023-07-12 PROCEDURE — 76775 US EXAM ABDO BACK WALL LIM: CPT

## 2023-07-12 PROCEDURE — 82948 REAGENT STRIP/BLOOD GLUCOSE: CPT

## 2023-07-12 PROCEDURE — 99232 SBSQ HOSP IP/OBS MODERATE 35: CPT | Performed by: STUDENT IN AN ORGANIZED HEALTH CARE EDUCATION/TRAINING PROGRAM

## 2023-07-12 PROCEDURE — 99232 SBSQ HOSP IP/OBS MODERATE 35: CPT | Performed by: FAMILY MEDICINE

## 2023-07-12 PROCEDURE — 80048 BASIC METABOLIC PNL TOTAL CA: CPT | Performed by: FAMILY MEDICINE

## 2023-07-12 RX ADMIN — INSULIN LISPRO 1 UNITS: 100 INJECTION, SOLUTION INTRAVENOUS; SUBCUTANEOUS at 08:22

## 2023-07-12 RX ADMIN — ACETAMINOPHEN 325MG 650 MG: 325 TABLET ORAL at 11:18

## 2023-07-12 RX ADMIN — HEPARIN SODIUM 5000 UNITS: 5000 INJECTION INTRAVENOUS; SUBCUTANEOUS at 22:01

## 2023-07-12 RX ADMIN — ACETAMINOPHEN 325MG 650 MG: 325 TABLET ORAL at 17:50

## 2023-07-12 RX ADMIN — INSULIN LISPRO 1 UNITS: 100 INJECTION, SOLUTION INTRAVENOUS; SUBCUTANEOUS at 22:01

## 2023-07-12 RX ADMIN — GABAPENTIN 100 MG: 100 CAPSULE ORAL at 14:23

## 2023-07-12 RX ADMIN — GABAPENTIN 100 MG: 100 CAPSULE ORAL at 08:21

## 2023-07-12 RX ADMIN — ACETAMINOPHEN 325MG 650 MG: 325 TABLET ORAL at 05:05

## 2023-07-12 RX ADMIN — DULOXETINE HYDROCHLORIDE 60 MG: 30 CAPSULE, DELAYED RELEASE ORAL at 17:48

## 2023-07-12 RX ADMIN — CLONAZEPAM 1 MG: 0.5 TABLET ORAL at 22:01

## 2023-07-12 RX ADMIN — HEPARIN SODIUM 5000 UNITS: 5000 INJECTION INTRAVENOUS; SUBCUTANEOUS at 05:06

## 2023-07-12 RX ADMIN — DULOXETINE HYDROCHLORIDE 60 MG: 30 CAPSULE, DELAYED RELEASE ORAL at 08:21

## 2023-07-12 RX ADMIN — HEPARIN SODIUM 5000 UNITS: 5000 INJECTION INTRAVENOUS; SUBCUTANEOUS at 14:23

## 2023-07-12 RX ADMIN — CEFTRIAXONE 2000 MG: 2 INJECTION, SOLUTION INTRAVENOUS at 16:14

## 2023-07-12 NOTE — ASSESSMENT & PLAN NOTE
Sepsis secondary to acute complicated urinary tract infection, bilateral pyelonephritis with gram-negative cyndee bacteremia.     Repeat blood cultures obtained  Continue ceftriaxone  ID input noted  Renal function continues to improve off of IV fluids  Fortunately she has been afebrile, however if were to have recurrent fevers will consider CT with contrast to rule out abscess

## 2023-07-12 NOTE — PLAN OF CARE
Problem: PAIN - ADULT  Goal: Verbalizes/displays adequate comfort level or baseline comfort level  Description: Interventions:  - Encourage patient to monitor pain and request assistance  - Assess pain using appropriate pain scale  - Administer analgesics based on type and severity of pain and evaluate response  - Implement non-pharmacological measures as appropriate and evaluate response  - Consider cultural and social influences on pain and pain management  - Notify physician/advanced practitioner if interventions unsuccessful or patient reports new pain  Outcome: Progressing     Problem: INFECTION - ADULT  Goal: Absence or prevention of progression during hospitalization  Description: INTERVENTIONS:  - Assess and monitor for signs and symptoms of infection  - Monitor lab/diagnostic results  - Monitor all insertion sites, i.e. indwelling lines, tubes, and drains  - Monitor endotracheal if appropriate and nasal secretions for changes in amount and color  - [place] appropriate cooling/warming therapies per order  - Administer medications as ordered  - Instruct and encourage patient and family to use good hand hygiene technique  - Identify and instruct in appropriate isolation precautions for identified infection/condition  Outcome: Progressing     Problem: SAFETY ADULT  Goal: Patient will remain free of falls  Description: INTERVENTIONS:  - Educate patient/family on patient safety including physical limitations  - Instruct patient to call for assistance with activity   - Consult OT/PT to assist with strengthening/mobility   - Keep Call bell within reach  - Keep bed low and locked with side rails adjusted as appropriate  - Keep care items and personal belongings within reach  - Initiate and maintain comfort rounds  - Make Fall Risk Sign visible to staff  - Offer Toileting every 2 Hours, in advance of need  - Initiate/Maintain bed/chair alarm  - Obtain necessary fall risk management equipment: non skid socks  - Apply yellow socks and bracelet for high fall risk patients  - Consider moving patient to room near nurses station  Outcome: Progressing  Goal: Maintain or return to baseline ADL function  Description: INTERVENTIONS:  -  Assess patient's ability to carry out ADLs; assess patient's baseline for ADL function and identify physical deficits which impact ability to perform ADLs (bathing, care of mouth/teeth, toileting, grooming, dressing, etc.)  - Assess/evaluate cause of self-care deficits   - Assess range of motion  - Assess patient's mobility; develop plan if impaired  - Assess patient's need for assistive devices and provide as appropriate  - Encourage maximum independence but intervene and supervise when necessary  - Involve family in performance of ADLs  - Assess for home care needs following discharge   - Consider OT consult to assist with ADL evaluation and planning for discharge  - Provide patient education as appropriate  Outcome: Progressing  Goal: Maintains/Returns to pre admission functional level  Description: INTERVENTIONS:  - Perform BMAT or MOVE assessment daily.   - Set and communicate daily mobility goal to care team and patient/family/caregiver. - Collaborate with rehabilitation services on mobility goals if consulted  - Perform Range of Motion 3 times a day. - Reposition patient every 3 hours.   - Dangle patient 3 times a day  - Stand patient 3 times a day  - Ambulate patient 3 times a day  - Out of bed to chair 3 times a day   - Out of bed for meals 3 times a day  - Out of bed for toileting  - Record patient progress and toleration of activity level   Outcome: Progressing     Problem: DISCHARGE PLANNING  Goal: Discharge to home or other facility with appropriate resources  Description: INTERVENTIONS:  - Identify barriers to discharge w/patient and caregiver  - Arrange for needed discharge resources and transportation as appropriate  - Identify discharge learning needs (meds, wound care, etc.)  - Arrange for interpretive services to assist at discharge as needed  - Refer to Case Management Department for coordinating discharge planning if the patient needs post-hospital services based on physician/advanced practitioner order or complex needs related to functional status, cognitive ability, or social support system  Outcome: Progressing     Problem: METABOLIC, FLUID AND ELECTROLYTES - ADULT  Goal: Electrolytes maintained within normal limits  Description: INTERVENTIONS:  - Monitor labs and assess patient for signs and symptoms of electrolyte imbalances  - Administer electrolyte replacement as ordered  - Monitor response to electrolyte replacements, including repeat lab results as appropriate  - Instruct patient on fluid and nutrition as appropriate  Outcome: Progressing  Goal: Fluid balance maintained  Description: INTERVENTIONS:  - Monitor labs   - Monitor I/O and WT  - Instruct patient on fluid and nutrition as appropriate  - Assess for signs & symptoms of volume excess or deficit  Outcome: Progressing  Goal: Glucose maintained within target range  Description: INTERVENTIONS:  - Monitor Blood Glucose as ordered  - Assess for signs and symptoms of hyperglycemia and hypoglycemia  - Administer ordered medications to maintain glucose within target range  - Assess nutritional intake and initiate nutrition service referral as needed  Outcome: Progressing     Problem: Nutrition/Hydration-ADULT  Goal: Nutrient/Hydration intake appropriate for improving, restoring or maintaining nutritional needs  Description: Monitor and assess patient's nutrition/hydration status for malnutrition. Collaborate with interdisciplinary team and initiate plan and interventions as ordered. Monitor patient's weight and dietary intake as ordered or per policy. Utilize nutrition screening tool and intervene as necessary. Determine patient's food preferences and provide high-protein, high-caloric foods as appropriate.      INTERVENTIONS:  - Monitor oral intake, urinary output, labs, and treatment plans  - Assess nutrition and hydration status and recommend course of action  - Evaluate amount of meals eaten  - Assist patient with eating if necessary   - Allow adequate time for meals  - Recommend/ encourage appropriate diets, oral nutritional supplements, and vitamin/mineral supplements  - Order, calculate, and assess calorie counts as needed  - Recommend, monitor, and adjust tube feedings and TPN/PPN based on assessed needs  - Assess need for intravenous fluids  - Provide specific nutrition/hydration education as appropriate  - Include patient/family/caregiver in decisions related to nutrition  Outcome: Progressing

## 2023-07-12 NOTE — PROGRESS NOTES
[address]  Progress Note  Name: Angeal Beard  MRN: [id number]  Unit/Bed#: 507-00 I Date of Admission: 7/9/2023   Date of Service: 7/12/2023 I Hospital Day: 3    Assessment/Plan   * Sepsis due to Escherichia coli with acute renal failure [facility])  Assessment & Plan  Sepsis secondary to acute complicated urinary tract infection, bilateral pyelonephritis with gram-negative cyndee bacteremia. Repeat blood cultures obtained  Continue ceftriaxone  ID input noted  Renal function continues to improve off of IV fluids  Fortunately she has been afebrile, however if were to have recurrent fevers will consider CT with contrast to rule out abscess    Hyponatremia  Assessment & Plan  mild    Type 2 diabetes mellitus with stage 3a chronic kidney disease, without long-term current use of insulin [facility])  Assessment & Plan  Lab Results   Component Value Date    HGBA1C 7.3 (H) 04/21/2023       Recent Labs     07/11/23  1043 07/11/23  1534 07/11/23  2104 07/12/23  0715   POCGLU 168* 121 133 150*       Blood Sugar Average: Last 72 hrs:  (P) 152.8     Diabetic diet, Accu-Cheks before meals and at bedtime, sliding scale insulin. Hold metformin    Essential hypertension  Assessment & Plan  Hold lisinopril, monitor BP             Progress Note - Dayanara Michaud 60 y.o. female MRN: [id number]    Unit/Bed#: 405-01 Encounter: [id number]        Subjective:   Patient seen and examined at bedside, feels well, no acute complaints     Objective:     Vitals:   Vitals:    07/12/23 0716   BP: 110/78   Pulse: 94   Resp: 18   Temp: 97.6 °F (36.4 °C)   SpO2: 98%     Body mass index is 43.26 kg/m².     Intake/Output Summary (Last 24 hours) at 7/12/2023 0927  Last data filed at 7/12/2023 0834  Gross per 24 hour   Intake 960 ml   Output --   Net 960 ml       Physical Exam:   /78   Pulse 94   Temp 97.6 °F (36.4 °C)   Resp 18   Ht 5' 7" (1.702 m)   Wt 125 kg (276 lb 3.8 oz)   LMP 04/10/2017   SpO2 98%   BMI 43.26 kg/m² General appearance: alert and oriented, in no acute distress  Lungs: clear to auscultation bilaterally  Heart: regular rate and rhythm, S1, S2 normal, no murmur, click, rub or gallop  Abdomen: soft, non-tender; bowel sounds normal; no masses,  no organomegaly  Extremities: extremities normal, warm and well-perfused; no cyanosis, clubbing, or edema  Pulses: 2+ and symmetric  Skin: Skin color, texture, turgor normal. No rashes or lesions  Neurologic: Grossly normal     Invasive Devices     Peripheral Intravenous Line  Duration           Peripheral IV 07/09/23 Left Antecubital 2 days                Results from last 7 days   Lab Units 07/12/23 0523 07/11/23 0428 07/10/23  0432   WBC Thousand/uL 10.61* 9.72 11.09*   HEMOGLOBIN g/dL 9.0* 9.3* 9.8*   HEMATOCRIT % 27.3* 28.0* 29.2*   PLATELETS Thousands/uL 184 164 154       Results from last 7 days   Lab Units 07/12/23  0523 07/11/23  0428 07/10/23  0432 07/09/23  1702 07/07/23  1854   POTASSIUM mmol/L 3.9 4.4 4.0 4.0 4.2   CHLORIDE mmol/L 101 99 100 96 94*   CO2 mmol/L 22 22 21 20* 20*   BUN mg/dL 39* 39* 38* 37* 30*   CREATININE mg/dL 2.28* 2.50* 2.93* 3.11* 2.61*   CALCIUM mg/dL 8.9 9.0 8.9 9.3 9.8   ALK PHOS U/L  --   --  73 69 55   ALT U/L  --   --  53* 54* 23   AST U/L  --   --  79* 89* 57*       Medication Administration - last 24 hours from 07/11/2023 0927 to 07/12/2023 6704       Date/Time Order Dose Route Action Action by     07/11/2023 1834 EDT cefTRIAXone (ROCEPHIN) IVPB (premix in dextrose) 2,000 mg 50 mL 2,000 mg Intravenous New Bag Nora Donnelly LPN     58/48/6414 9562 EDT clonazePAM (KlonoPIN) tablet 1 mg 1 mg Oral Given Mabel Treadwell RN     07/12/2023 0850 EDT DULoxetine (CYMBALTA) delayed release capsule 60 mg 60 mg Oral Given Nora Donnelly LPN     26/91/8546 0468 EDT DULoxetine (CYMBALTA) delayed release capsule 60 mg 60 mg Oral Given Nora Donnelly LPN     31/45/2616 8578 EDT gabapentin (NEURONTIN) capsule 100 mg 100 mg Oral Given [employer] Lily, MARQUEZ     49/93/9807 2990 EDT gabapentin (NEURONTIN) capsule 100 mg 100 mg Oral Given Mahi Nichole LPN     89/55/8176 9072 EDT nicotine (NICODERM CQ) 21 mg/24 hr TD 24 hr patch 1 patch 1 patch Transdermal Not Given Mahi Nichole LPN     70/33/7526 8873 EDT acetaminophen (TYLENOL) tablet 650 mg 650 mg Oral Given Dax Kirkpatrick RN     07/11/2023 2121 EDT acetaminophen (TYLENOL) tablet 650 mg 650 mg Oral Given Dax Kirkpatrick RN     07/11/2023 1506 EDT acetaminophen (TYLENOL) tablet 650 mg 650 mg Oral Given Mahi Nichole LPN     64/21/3679 6459 EDT insulin lispro (HumaLOG) 100 units/mL subcutaneous injection 1-5 Units 1 Units Subcutaneous Given Mahi Nichole LPN     29/26/5811 9134 EDT insulin lispro (HumaLOG) 100 units/mL subcutaneous injection 1-5 Units -- Subcutaneous Not Given Mahi Nichole LPN     30/85/7592 7000 EDT insulin lispro (HumaLOG) 100 units/mL subcutaneous injection 1-5 Units 1 Units Subcutaneous Given Mahi Nichole LPN     02/63/4027 1666 EDT insulin lispro (HumaLOG) 100 units/mL subcutaneous injection 1-5 Units -- Subcutaneous Not Given Dax Kirkpatrick RN     07/12/2023 6609 EDT heparin (porcine) subcutaneous injection 5,000 Units 5,000 Units Subcutaneous Given Dax Kirkpatrick RN     07/11/2023 2121 EDT heparin (porcine) subcutaneous injection 5,000 Units 5,000 Units Subcutaneous Given Dax Kirkpatrick RN     07/11/2023 1502 EDT heparin (porcine) subcutaneous injection 5,000 Units 5,000 Units Subcutaneous Given Mahi Nichole LPN            Lab, Imaging and other studies: I have personally reviewed pertinent reports.     VTE Pharmacologic Prophylaxis: Heparin  VTE Mechanical Prophylaxis: sequential compression device     Lennox Arias MD  7/12/2023,9:27 AM

## 2023-07-12 NOTE — PROGRESS NOTES
Progress Note - Nephrology   Diana Moore 60 y.o. female MRN: [id number]  Unit/Bed#: 405-01 Encounter: [id number]    A/P:  1. Acute kidney injury, present on admission, on CKD 3. Creatinine 1.3 on 4/21. Her baseline appears to be in the 1.3-1.4 range. Creatinine was 2.6 on 7/7. Etiologies: prerenal vs septic physiology vs ATN.     Creatinine peaked at 3.1 on 7/9.    Creatinine improving 2.5->2.28  UOP: suspect nonoliguric but not recorded   Volume status: Euvolemic, respiratory effort appears normal.     Recommend:  Allow volume status to equilibrate, creatinine trend improving. Encourage PO intake. No emergent need for HD at present. Continue to hold lisinopril. Follow daily chemistry. Avoid contrast studies with INESSA. Will need outpatient follow-up in 10 to 14 days at discharge.     2.  Hyponatremia, hypovolemic, improving. Mild, asymptomatic, stable at 132. If Na<130 may need moderate FR.      3.  Non-anion gap metabolic acidosis due to INESSA/CKD. TCO2 stable at 22, no need for bicarbonate.      4.  CKD 3B, suspect due to diabetic nephropathy.  Seen by Dr. Sandra Leroy in March.  Baseline creatinine 1.3-1.4. Will need outpatient follow-up.     5.  Hypomagnesemia, mag 2.3 on 2/11. 6. Elevated LFT in setting of sepsis, defer to primary.     7.  Essential hypertension, hold lisinopril.  Follow blood pressure trends.  Would not be aggressive with treating.     8 sepsis due to E. Coli bacteremia, pyelonephritis  Management per primary. Follow-up repeat blood cultures. Infectious disease consultation  Continue Rocephin. Persistent fever on abx, consider urology eval.   Avoid imaging with contrast as best we can.          Follow up reason for today's visit:     Sepsis due to Escherichia coli with acute renal failure ([address])      Subjective:   Patient seen and examined today. Denies chest pain, shortness of breath, nausea, vomiting, diarrhea. Reports doing well. She rested a lot yesterday. Afebrile since yesterday AM.  She is on room air. Her breathing appears more comfortable. Urine output not recorded. Weight down 278->276. A complete 10 point review of systems was performed and is otherwise negative. Objective:     Vitals: Blood pressure 110/78, pulse 94, temperature 97.6 °F (36.4 °C), resp. rate 18, height 5' 7" (1.702 m), weight 125 kg (276 lb 3.8 oz), last menstrual period 04/10/2017, SpO2 98 %. ,Body mass index is 43.26 kg/m². Weight (last 2 days)     Date/Time Weight    07/12/23 0600 125 (276.24)    07/11/23 0600 126 (278.22)    07/10/23 0536 125 (274.69)            Intake/Output Summary (Last 24 hours) at 7/12/2023 0936  Last data filed at 7/12/2023 0834  Gross per 24 hour   Intake 960 ml   Output --   Net 960 ml     I/O last 3 completed shifts: In: 1200 [P.O.:1200]  Out: -          Physical Exam: /78   Pulse 94   Temp 97.6 °F (36.4 °C)   Resp 18   Ht 5' 7" (1.702 m)   Wt 125 kg (276 lb 3.8 oz)   LMP 04/10/2017   SpO2 98%   BMI 43.26 kg/m²     General Appearance:    Alert, cooperative, no distress, appears stated age   Head:    Normocephalic, without obvious abnormality, atraumatic   Eyes:    Conjunctiva/corneas clear   Ears:    Normal external ears   Nose:   Nares normal, septum midline, mucosa normal, no drainage    or sinus tenderness   Throat:   Lips, mucosa, and tongue normal; teeth and gums normal   Neck:    Back:      Lungs:     Clear to auscultation bilaterally, respirations unlabored   Chest wall:    No tenderness or deformity   Heart:    Regular rate and rhythm, S1 and S2 normal, no murmur, rub   or gallop   Abdomen:     Soft, non-tender   Extremities:   Extremities normal, atraumatic, no cyanosis or edema   Skin:   Skin color, texture, turgor normal, no rashes or lesions   Lymph nodes:     Neurologic:   CNII-XII intact            Lab, Imaging and other studies: I have personally reviewed pertinent labs.   CBC:   Lab Results   Component Value Date    WBC 10.61 (H) 07/12/2023    HGB 9.0 (L) 07/12/2023    HCT 27.3 (L) 07/12/2023    MCV 92 07/12/2023     07/12/2023    RBC 2.98 (L) 07/12/2023    MCH 30.2 07/12/2023    MCHC 33.0 07/12/2023    RDW 14.2 07/12/2023    MPV 10.2 07/12/2023    NRBC 0 07/12/2023     CMP:   Lab Results   Component Value Date    K 3.9 07/12/2023     07/12/2023    CO2 22 07/12/2023    BUN 39 (H) 07/12/2023    CREATININE 2.28 (H) 07/12/2023    CALCIUM 8.9 07/12/2023    EGFR 22 07/12/2023       . Results from last 7 days   Lab Units 07/12/23  0523 07/11/23  0428 07/10/23  0432 07/09/23  1702 07/07/23  1854   POTASSIUM mmol/L 3.9 4.4 4.0 4.0 4.2   CHLORIDE mmol/L 101 99 100 96 94*   CO2 mmol/L 22 22 21 20* 20*   BUN mg/dL 39* 39* 38* 37* 30*   CREATININE mg/dL 2.28* 2.50* 2.93* 3.11* 2.61*   CALCIUM mg/dL 8.9 9.0 8.9 9.3 9.8   ALK PHOS U/L  --   --  73 69 55   ALT U/L  --   --  53* 54* 23   AST U/L  --   --  79* 89* 57*         Phosphorus: No results found for: "PHOS"  Magnesium: No results found for: "MG"  Urinalysis: No results found for: "COLORU", "CLARITYU", "SPECGRAV", "PHUR", "LEUKOCYTESUR", "NITRITE", "PROTEINUA", "GLUCOSEU", "Parish Feeler", "BILIRUBINUR", "BLOODU"  Ionized Calcium: No results found for: "CAION"  Coagulation: No results found for: "PT", "INR", "APTT"  Troponin: No results found for: "TROPONINI"  ABG: No results found for: "PHART", "UAV2RSY", "PO2ART", "FKI6ZYY", "H1RFNNHQ", "BEART", "SOURCE"  Radiology review:     IMAGING  Procedure: XR chest portable    Result Date: 7/11/2023  Narrative: CHEST INDICATION:   dyspnea. COMPARISON: CXR 7/7/2023 and 10/12/2012, abdomen CT 7/9/2023. EXAM PERFORMED/VIEWS:  XR CHEST PORTABLE. FINDINGS: Cardiomediastinal silhouette normal. Lungs clear. No effusion or pneumothorax. Upper abdomen normal. Bones normal for age. Impression: No acute cardiopulmonary disease.  Workstation performed: [id number]     Procedure: CT abdomen pelvis wo contrast    Result Date: 7/9/2023  Narrative: CT ABDOMEN AND PELVIS WITHOUT IV CONTRAST INDICATION:   Right-sided flank/upper quadrant abdominal pain, bacteremia. COMPARISON:  None. TECHNIQUE:  CT examination of the abdomen and pelvis was performed without intravenous contrast. Multiplanar 2D reformatted images were created from the source data. This examination, like all CT scans performed in the [facility], was performed utilizing techniques to minimize radiation dose exposure, including the use of iterative reconstruction and automated exposure control. Radiation dose length product (DLP) for this visit:  1574.15 mGy-cm Enteric contrast was not administered. FINDINGS: ABDOMEN LOWER CHEST: Bibasilar subsegmental atelectasis. Small hiatal hernia. Small right effusion. LIVER/BILIARY TREE: Hepatomegaly measuring 21 cm. Hepatic steatosis. No focal abnormality detected on this unenhanced study. GALLBLADDER: Gallbladder is surgically absent. SPLEEN:  Unremarkable. PANCREAS:  Unremarkable. ADRENAL GLANDS:  Unremarkable. KIDNEYS/URETERS: Bilateral perinephric soft tissue stranding. Mildly distended upper collecting systems bilaterally. No calculus. Left kidney upper pole scarring. STOMACH AND BOWEL: Sigmoid diverticulosis. No bowel obstruction. No bowel inflammation. APPENDIX: Soft tissue stranding within the right lower quadrant, favored to be extending inferiorly from the right perinephric soft tissue stranding rather than primary right lower quadrant process. ABDOMINOPELVIC CAVITY:  No ascites. No pneumoperitoneum. No lymphadenopathy. VESSELS:  Unremarkable for patient's age. PELVIS REPRODUCTIVE ORGANS:  Unremarkable for patient's age. URINARY BLADDER:  Unremarkable. ABDOMINAL WALL/INGUINAL REGIONS:  Unremarkable. OSSEOUS STRUCTURES:  No acute fracture or destructive osseous lesion. Impression: 1. Bilateral perinephric soft tissue stranding with mild distention of the upper collecting systems, without evidence of renal calculus.  Findings are most suspicious for pyelonephritis. Nonspecific renal inflammatory disease also in the differential. 2. Small right-sided pleural effusion 3. Sigmoid diverticulosis and small hiatal hernia 4. Hepatomegaly and hepatic steatosis Workstation performed: [id number]       Current Facility-Administered Medications   Medication Dose Route Frequency   • acetaminophen (TYLENOL) tablet 650 mg  650 mg Oral Q6H PRN   • aluminum-magnesium hydroxide-simethicone (MAALOX) oral suspension 30 mL  30 mL Oral Q6H PRN   • cefTRIAXone (ROCEPHIN) IVPB (premix in dextrose) 2,000 mg 50 mL  2,000 mg Intravenous Q24H   • clonazePAM (KlonoPIN) tablet 1 mg  1 mg Oral HS   • docusate sodium (COLACE) capsule 100 mg  100 mg Oral Daily PRN   • DULoxetine (CYMBALTA) delayed release capsule 60 mg  60 mg Oral BID   • gabapentin (NEURONTIN) capsule 100 mg  100 mg Oral BID (AM & Afternoon)   • heparin (porcine) subcutaneous injection 5,000 Units  5,000 Units Subcutaneous Q8H [facility]   • insulin lispro (HumaLOG) 100 units/mL subcutaneous injection 1-5 Units  1-5 Units Subcutaneous TID AC   • insulin lispro (HumaLOG) 100 units/mL subcutaneous injection 1-5 Units  1-5 Units Subcutaneous HS   • nicotine (NICODERM CQ) 21 mg/24 hr TD 24 hr patch 1 patch  1 patch Transdermal Daily   • ondansetron (ZOFRAN) injection 4 mg  4 mg Intravenous Q6H PRN   • tiZANidine (ZANAFLEX) tablet 2 mg  2 mg Oral Q8H PRN     Medications Discontinued During This Encounter   Medication Reason   • enoxaparin (LOVENOX) subcutaneous injection 40 mg Alternate therapy   • sodium chloride 0.9 % infusion    • multi-electrolyte (PLASMALYTE-A/ISOLYTE-S PH 7.4) IV solution        Brenda Kahne and Roberto, DO      This progress note was produced in part using a dictation device which may document imprecise wording from author's original intent.

## 2023-07-12 NOTE — PROGRESS NOTES
Progress Note - Infectious Disease   Eve Showers 60 y.o. female MRN: [id number]  Unit/Bed#: 405-01 Encounter: [id number]      Impression/Plan:    1.  E. coli bacteremia. Both sets blood cultures from ED visit 7/7 isolated E. coli. Repeat cultures collected on return to the ED 7/9/2023 are also positive. Suspect a urinary source. UA was relatively benign and urine cultures growing mixed contaminants, but CT shows presumed bilateral pyelonephritis. Imaging also shows diverticulosis without diverticulitis and hepatic steatosis. LFTs are mildly elevated, patient is status postcholecystectomy so does not have a gallbladder. She is clinically improving, afebrile, medically stable. -Continue IV ceftriaxone 2 g every 24 hours   -Follow-up repeat blood cultures    -Check renal ultrasound   -consider repeat CT A/P with contrast once renal function more improved if she develops another fever   -monitor fever curve, WBC count   -If patient continues to improve, anticipate discharge on p.o. cefpodoxime in 24 to 48 hours to complete a 10-day total antibiotic course    2. Bilateral pyelonephritis. CT A/P shows bilateral perinephric soft tissue stranding without hydronephrosis. UA on admission benign, patient did have some right abdominal/flank pain on admission. Urine culture is growing mixed contaminants.   -antibiotics as above   -monitor urine output, monitor for retention   -Check renal ultrasound    3. INESSA on CKD 3b. Present on admission with creatinine of 3.11. Baseline is around 1.3. Nephrology is following. Suspect prerenal vs ATN due to sepsis. Creatinine is improving    -monitor creatinine   -dose adjust antibiotics for CrCl    4. DM2 without long term insulin use. HA1C 7.3%. -recommend tight glycemic control   -management per primary team      5. Obesity. BMI 43.02    Above management plan discussed in detail with the primary team and patient. ID will follow.     Antibiotics:  Ceftriaxone day 4    Subjective:  Patient is feeling better today. She is a little tired due to increased activity. No fever, chills, abdominal pain, back pain, dysuria. Objective:  Vitals:  Temp:  [97.6 °F (36.4 °C)-99.3 °F (37.4 °C)] 99.3 °F (37.4 °C)  HR:  [88-94] 88  Resp:  [18] 18  BP: (110-127)/(59-78) 127/63  SpO2:  [94 %-99 %] 94 %  Temp (24hrs), Av.3 °F (36.8 °C), Min:97.6 °F (36.4 °C), Max:99.3 °F (37.4 °C)  Current: Temperature: 99.3 °F (37.4 °C)    Physical Exam:      General Appearance:  Alert, interactive, nontoxic, no acute distress. Throat: Oropharynx moist without lesions. Lungs:   Clear to auscultation bilaterally; no wheezes, rhonchi or rales; respirations unlabored   Heart:  RRR; no murmur, rub or gallop   Abdomen:   Soft, non-tender, non-distended, positive bowel sounds. Extremities: No clubbing, cyanosis or edema   Skin: No new rashes or lesions. No draining wounds noted. Labs:    All pertinent labs and imaging studies were personally reviewed  Results from last 7 days   Lab Units 23  05238 07/10/23  0432   WBC Thousand/uL 10.61* 9.72 11.09*   HEMOGLOBIN g/dL 9.0* 9.3* 9.8*   PLATELETS Thousands/uL 184 164 154     Results from last 7 days   Lab Units 23  0523 23  0428 07/10/23  0432 23  1702 23  1854   SODIUM mmol/L 132* 130* 132* 128* 126*   POTASSIUM mmol/L 3.9 4.4 4.0 4.0 4.2   CHLORIDE mmol/L 101 99 100 96 94*   CO2 mmol/L 22 22 21 20* 20*   BUN mg/dL 39* 39* 38* 37* 30*   CREATININE mg/dL 2.28* 2.50* 2.93* 3.11* 2.61*   EGFR ml/min/1.73sq m 22 19 16 15 18   CALCIUM mg/dL 8.9 9.0 8.9 9.3 9.8   AST U/L  --   --  79* 89* 57*   ALT U/L  --   --  53* 54* 23   ALK PHOS U/L  --   --  73 69 55     Results from last 7 days   Lab Units 07/10/23  0432 23  1702 23  1854   PROCALCITONIN ng/ml 84.35* 90.58* 191.87*                   Micro:  Results from last 7 days   Lab Units 23  0523 23  1740 23  1702 23  1854   BLOOD CULTURE  Received in Microbiology Lab. Culture in Progress. Received in Microbiology Lab. Culture in Progress. --  Escherichia coli*  Escherichia coli* Escherichia coli*  Escherichia coli*   GRAM STAIN RESULT   --   --  Gram negative rods*  Gram negative rods* Gram negative rods*  Gram negative rods*   URINE CULTURE   --  10,000-19,000 cfu/ml  --   --        Imaging:  Imaging in PACS personally reviewed.  Bilateral perinephric stranding

## 2023-07-12 NOTE — PLAN OF CARE
Problem: PAIN - ADULT  Goal: Verbalizes/displays adequate comfort level or baseline comfort level  Description: Interventions:  - Encourage patient to monitor pain and request assistance  - Assess pain using appropriate pain scale  - Administer analgesics based on type and severity of pain and evaluate response  - Implement non-pharmacological measures as appropriate and evaluate response  - Consider cultural and social influences on pain and pain management  - Notify physician/advanced practitioner if interventions unsuccessful or patient reports new pain  Outcome: Progressing     Problem: INFECTION - ADULT  Goal: Absence or prevention of progression during hospitalization  Description: INTERVENTIONS:  - Assess and monitor for signs and symptoms of infection  - Monitor lab/diagnostic results  - Monitor all insertion sites, i.e. indwelling lines, tubes, and drains  - Monitor endotracheal if appropriate and nasal secretions for changes in amount and color  - [place] appropriate cooling/warming therapies per order  - Administer medications as ordered  - Instruct and encourage patient and family to use good hand hygiene technique  - Identify and instruct in appropriate isolation precautions for identified infection/condition  Outcome: Progressing     Problem: SAFETY ADULT  Goal: Patient will remain free of falls  Description: INTERVENTIONS:  - Educate patient/family on patient safety including physical limitations  - Instruct patient to call for assistance with activity   - Consult OT/PT to assist with strengthening/mobility   - Keep Call bell within reach  - Keep bed low and locked with side rails adjusted as appropriate  - Keep care items and personal belongings within reach  - Initiate and maintain comfort rounds  - Make Fall Risk Sign visible to staff  - Offer Toileting every 2 Hours, in advance of need  - Initiate/Maintain bed/chair alarm  - Obtain necessary fall risk management equipment: non skid socks  - Apply yellow socks and bracelet for high fall risk patients  - Consider moving patient to room near nurses station  Outcome: Progressing  Goal: Maintain or return to baseline ADL function  Description: INTERVENTIONS:  -  Assess patient's ability to carry out ADLs; assess patient's baseline for ADL function and identify physical deficits which impact ability to perform ADLs (bathing, care of mouth/teeth, toileting, grooming, dressing, etc.)  - Assess/evaluate cause of self-care deficits   - Assess range of motion  - Assess patient's mobility; develop plan if impaired  - Assess patient's need for assistive devices and provide as appropriate  - Encourage maximum independence but intervene and supervise when necessary  - Involve family in performance of ADLs  - Assess for home care needs following discharge   - Consider OT consult to assist with ADL evaluation and planning for discharge  - Provide patient education as appropriate  Outcome: Progressing  Goal: Maintains/Returns to pre admission functional level  Description: INTERVENTIONS:  - Perform BMAT or MOVE assessment daily.   - Set and communicate daily mobility goal to care team and patient/family/caregiver. - Collaborate with rehabilitation services on mobility goals if consulted  - Perform Range of Motion 3 times a day. - Reposition patient every 3 hours.   - Dangle patient 3 times a day  - Stand patient 3 times a day  - Ambulate patient 3 times a day  - Out of bed to chair 3 times a day   - Out of bed for meals 3 times a day  - Out of bed for toileting  - Record patient progress and toleration of activity level   Outcome: Progressing     Problem: DISCHARGE PLANNING  Goal: Discharge to home or other facility with appropriate resources  Description: INTERVENTIONS:  - Identify barriers to discharge w/patient and caregiver  - Arrange for needed discharge resources and transportation as appropriate  - Identify discharge learning needs (meds, wound care, etc.)  - Arrange for interpretive services to assist at discharge as needed  - Refer to Case Management Department for coordinating discharge planning if the patient needs post-hospital services based on physician/advanced practitioner order or complex needs related to functional status, cognitive ability, or social support system  Outcome: Progressing     Problem: METABOLIC, FLUID AND ELECTROLYTES - ADULT  Goal: Electrolytes maintained within normal limits  Description: INTERVENTIONS:  - Monitor labs and assess patient for signs and symptoms of electrolyte imbalances  - Administer electrolyte replacement as ordered  - Monitor response to electrolyte replacements, including repeat lab results as appropriate  - Instruct patient on fluid and nutrition as appropriate  Outcome: Progressing  Goal: Fluid balance maintained  Description: INTERVENTIONS:  - Monitor labs   - Monitor I/O and WT  - Instruct patient on fluid and nutrition as appropriate  - Assess for signs & symptoms of volume excess or deficit  Outcome: Progressing  Goal: Glucose maintained within target range  Description: INTERVENTIONS:  - Monitor Blood Glucose as ordered  - Assess for signs and symptoms of hyperglycemia and hypoglycemia  - Administer ordered medications to maintain glucose within target range  - Assess nutritional intake and initiate nutrition service referral as needed  Outcome: Progressing     Problem: Nutrition/Hydration-ADULT  Goal: Nutrient/Hydration intake appropriate for improving, restoring or maintaining nutritional needs  Description: Monitor and assess patient's nutrition/hydration status for malnutrition. Collaborate with interdisciplinary team and initiate plan and interventions as ordered. Monitor patient's weight and dietary intake as ordered or per policy. Utilize nutrition screening tool and intervene as necessary. Determine patient's food preferences and provide high-protein, high-caloric foods as appropriate.      INTERVENTIONS:  - Monitor oral intake, urinary output, labs, and treatment plans  - Assess nutrition and hydration status and recommend course of action  - Evaluate amount of meals eaten  - Assist patient with eating if necessary   - Allow adequate time for meals  - Recommend/ encourage appropriate diets, oral nutritional supplements, and vitamin/mineral supplements  - Order, calculate, and assess calorie counts as needed  - Recommend, monitor, and adjust tube feedings and TPN/PPN based on assessed needs  - Assess need for intravenous fluids  - Provide specific nutrition/hydration education as appropriate  - Include patient/family/caregiver in decisions related to nutrition  Outcome: Progressing

## 2023-07-12 NOTE — ASSESSMENT & PLAN NOTE
Lab Results   Component Value Date    HGBA1C 7.3 (H) 04/21/2023       Recent Labs     07/11/23  1043 07/11/23  1534 07/11/23  2104 07/12/23  0715   POCGLU 168* 121 133 150*       Blood Sugar Average: Last 72 hrs:  (P) 152.8     Diabetic diet, Accu-Cheks before meals and at bedtime, sliding scale insulin.   Hold metformin

## 2023-07-13 LAB
ALBUMIN SERPL BCP-MCNC: 3.5 G/DL (ref 3.5–5)
ALP SERPL-CCNC: 137 U/L (ref 34–104)
ALT SERPL W P-5'-P-CCNC: 60 U/L (ref 7–52)
ANION GAP SERPL CALCULATED.3IONS-SCNC: 13 MMOL/L
AST SERPL W P-5'-P-CCNC: 39 U/L (ref 13–39)
BACTERIA BLD CULT: ABNORMAL
BASOPHILS # BLD MANUAL: 0 THOUSAND/UL (ref 0–0.1)
BASOPHILS NFR MAR MANUAL: 0 % (ref 0–1)
BILIRUB SERPL-MCNC: 0.51 MG/DL (ref 0.2–1)
BUN SERPL-MCNC: 38 MG/DL (ref 5–25)
CALCIUM SERPL-MCNC: 9.2 MG/DL (ref 8.4–10.2)
CHLORIDE SERPL-SCNC: 100 MMOL/L (ref 96–108)
CO2 SERPL-SCNC: 19 MMOL/L (ref 21–32)
CREAT SERPL-MCNC: 2.15 MG/DL (ref 0.6–1.3)
EOSINOPHIL # BLD MANUAL: 0 THOUSAND/UL (ref 0–0.4)
EOSINOPHIL NFR BLD MANUAL: 0 % (ref 0–6)
ERYTHROCYTE [DISTWIDTH] IN BLOOD BY AUTOMATED COUNT: 14.1 % (ref 11.6–15.1)
GFR SERPL CREATININE-BSD FRML MDRD: 23 ML/MIN/1.73SQ M
GLUCOSE SERPL-MCNC: 115 MG/DL (ref 65–140)
GLUCOSE SERPL-MCNC: 122 MG/DL (ref 65–140)
GLUCOSE SERPL-MCNC: 133 MG/DL (ref 65–140)
GLUCOSE SERPL-MCNC: 141 MG/DL (ref 65–140)
GLUCOSE SERPL-MCNC: 93 MG/DL (ref 65–140)
GRAM STN SPEC: ABNORMAL
HCT VFR BLD AUTO: 28.7 % (ref 34.8–46.1)
HGB BLD-MCNC: 9.5 G/DL (ref 11.5–15.4)
LYMPHOCYTES # BLD AUTO: 0.94 THOUSAND/UL (ref 0.6–4.47)
LYMPHOCYTES # BLD AUTO: 8 % (ref 14–44)
MCH RBC QN AUTO: 30.4 PG (ref 26.8–34.3)
MCHC RBC AUTO-ENTMCNC: 33.1 G/DL (ref 31.4–37.4)
MCV RBC AUTO: 92 FL (ref 82–98)
MONOCYTES # BLD AUTO: 0.7 THOUSAND/UL (ref 0–1.22)
MONOCYTES NFR BLD: 6 % (ref 4–12)
NEUTROPHILS # BLD MANUAL: 10.05 THOUSAND/UL (ref 1.85–7.62)
NEUTS BAND NFR BLD MANUAL: 8 % (ref 0–8)
NEUTS SEG NFR BLD AUTO: 78 % (ref 43–75)
PLATELET # BLD AUTO: 276 THOUSANDS/UL (ref 149–390)
PLATELET BLD QL SMEAR: ADEQUATE
PMV BLD AUTO: 9.9 FL (ref 8.9–12.7)
POTASSIUM SERPL-SCNC: 4 MMOL/L (ref 3.5–5.3)
PROT SERPL-MCNC: 7.5 G/DL (ref 6.4–8.4)
RBC # BLD AUTO: 3.12 MILLION/UL (ref 3.81–5.12)
SODIUM SERPL-SCNC: 132 MMOL/L (ref 135–147)
WBC # BLD AUTO: 11.69 THOUSAND/UL (ref 4.31–10.16)

## 2023-07-13 PROCEDURE — 82948 REAGENT STRIP/BLOOD GLUCOSE: CPT

## 2023-07-13 PROCEDURE — 99232 SBSQ HOSP IP/OBS MODERATE 35: CPT | Performed by: FAMILY MEDICINE

## 2023-07-13 PROCEDURE — G0408 INPT/TELE FOLLOW UP 35: HCPCS | Performed by: STUDENT IN AN ORGANIZED HEALTH CARE EDUCATION/TRAINING PROGRAM

## 2023-07-13 PROCEDURE — 80053 COMPREHEN METABOLIC PANEL: CPT | Performed by: STUDENT IN AN ORGANIZED HEALTH CARE EDUCATION/TRAINING PROGRAM

## 2023-07-13 PROCEDURE — 85027 COMPLETE CBC AUTOMATED: CPT | Performed by: STUDENT IN AN ORGANIZED HEALTH CARE EDUCATION/TRAINING PROGRAM

## 2023-07-13 PROCEDURE — 85007 BL SMEAR W/DIFF WBC COUNT: CPT | Performed by: STUDENT IN AN ORGANIZED HEALTH CARE EDUCATION/TRAINING PROGRAM

## 2023-07-13 PROCEDURE — 99232 SBSQ HOSP IP/OBS MODERATE 35: CPT | Performed by: INTERNAL MEDICINE

## 2023-07-13 RX ORDER — CEFPODOXIME PROXETIL 200 MG/1
400 TABLET, FILM COATED ORAL 2 TIMES DAILY WITH MEALS
Status: CANCELLED | OUTPATIENT
Start: 2023-07-13

## 2023-07-13 RX ADMIN — GABAPENTIN 100 MG: 100 CAPSULE ORAL at 08:46

## 2023-07-13 RX ADMIN — DULOXETINE HYDROCHLORIDE 60 MG: 30 CAPSULE, DELAYED RELEASE ORAL at 17:57

## 2023-07-13 RX ADMIN — HEPARIN SODIUM 5000 UNITS: 5000 INJECTION INTRAVENOUS; SUBCUTANEOUS at 05:00

## 2023-07-13 RX ADMIN — HEPARIN SODIUM 5000 UNITS: 5000 INJECTION INTRAVENOUS; SUBCUTANEOUS at 21:35

## 2023-07-13 RX ADMIN — CLONAZEPAM 1 MG: 0.5 TABLET ORAL at 21:35

## 2023-07-13 RX ADMIN — HEPARIN SODIUM 5000 UNITS: 5000 INJECTION INTRAVENOUS; SUBCUTANEOUS at 15:11

## 2023-07-13 RX ADMIN — CEFTRIAXONE 2000 MG: 2 INJECTION, SOLUTION INTRAVENOUS at 17:57

## 2023-07-13 RX ADMIN — ACETAMINOPHEN 325MG 650 MG: 325 TABLET ORAL at 21:35

## 2023-07-13 RX ADMIN — GABAPENTIN 100 MG: 100 CAPSULE ORAL at 15:10

## 2023-07-13 RX ADMIN — DULOXETINE HYDROCHLORIDE 60 MG: 30 CAPSULE, DELAYED RELEASE ORAL at 08:46

## 2023-07-13 RX ADMIN — ACETAMINOPHEN 325MG 650 MG: 325 TABLET ORAL at 15:10

## 2023-07-13 RX ADMIN — ACETAMINOPHEN 325MG 650 MG: 325 TABLET ORAL at 04:54

## 2023-07-13 NOTE — PLAN OF CARE
Problem: PAIN - ADULT  Goal: Verbalizes/displays adequate comfort level or baseline comfort level  Description: Interventions:  - Encourage patient to monitor pain and request assistance  - Assess pain using appropriate pain scale (0-10 pain scale)  - Administer analgesics based on type and severity of pain and evaluate response  - Implement non-pharmacological measures as appropriate and evaluate response  - Consider cultural and social influences on pain and pain management  - Notify physician/advanced practitioner if interventions unsuccessful or patient reports new pain  Outcome: Progressing     Problem: INFECTION - ADULT  Goal: Absence or prevention of progression during hospitalization  Description: INTERVENTIONS:  - Assess and monitor for signs and symptoms of infection  - Monitor lab/diagnostic results  - Monitor all insertion sites, i.e. indwelling lines  - Administer medications as ordered  - Instruct and encourage patient and family to use good hand hygiene technique  Outcome: Progressing     Problem: SAFETY ADULT  Goal: Patient will remain free of falls  Description: INTERVENTIONS:  - Educate patient/family on patient safety including physical limitations  - Instruct patient to call for assistance with activity (independent)  - Consult OT/PT to assist with strengthening/mobility   - Keep Call bell within reach  - Keep bed low and locked with side rails adjusted as appropriate  - Keep care items and personal belongings within reach  - Initiate and maintain comfort rounds  - Make Fall Risk Sign visible to staff (moderate fall risk)  - Offer Toileting every 1-2 Hours, in advance of need  - Obtain necessary fall risk management equipment: nonskid footwear  Outcome: Progressing  Goal: Maintain or return to baseline ADL function  Description: INTERVENTIONS:  -  Assess patient's ability to carry out ADLs; (independent)  - Assess/evaluate cause of self-care deficits   - Assess range of motion  - Assess patient's mobility; (independent)  - Assess patient's need for assistive devices and provide as appropriate  - Encourage maximum independence but intervene and supervise when necessary  - Involve family in performance of ADLs  - Assess for home care needs following discharge   - Consider OT consult to assist with ADL evaluation and planning for discharge  - Provide patient education as appropriate  Outcome: Progressing  Goal: Maintains/Returns to pre admission functional level  Description: INTERVENTIONS:  - Perform BMAT or MOVE assessment daily.   - Set and communicate daily mobility goal to care team and patient/family/caregiver.    - Collaborate with rehabilitation services on mobility goals if consulted  - Ambulate patient 3-5 times a day  - Out of bed to chair 3 times a day   - Out of bed for meals 3 times a day  - Out of bed for toileting  - Record patient progress and toleration of activity level   Outcome: Progressing     Problem: DISCHARGE PLANNING  Goal: Discharge to home or other facility with appropriate resources  Description: INTERVENTIONS:  - Identify barriers to discharge w/patient and caregiver  - Arrange for needed discharge resources and transportation as appropriate  - Identify discharge learning needs (meds, wound care, etc.)  - Refer to Case Management Department for coordinating discharge planning if the patient needs post-hospital services based on physician/advanced practitioner order or complex needs related to functional status, cognitive ability, or social support system  Outcome: Progressing     Problem: METABOLIC, FLUID AND ELECTROLYTES - ADULT  Goal: Electrolytes maintained within normal limits  Description: INTERVENTIONS:  - Monitor labs and assess patient for signs and symptoms of electrolyte imbalances  - Administer electrolyte replacement as ordered  - Monitor response to electrolyte replacements, including repeat lab results as appropriate  - Instruct patient on fluid and nutrition as appropriate  Outcome: Progressing  Goal: Fluid balance maintained  Description: INTERVENTIONS:  - Monitor labs   - Monitor I/O and WT  - Instruct patient on fluid and nutrition as appropriate  - Assess for signs & symptoms of volume excess or deficit  Outcome: Progressing  Goal: Glucose maintained within target range  Description: INTERVENTIONS:  - Monitor Blood Glucose as ordered  - Assess for signs and symptoms of hyperglycemia and hypoglycemia  - Administer ordered medications to maintain glucose within target range  - Assess nutritional intake and initiate nutrition service referral as needed  Outcome: Progressing     Problem: Nutrition/Hydration-ADULT  Goal: Nutrient/Hydration intake appropriate for improving, restoring or maintaining nutritional needs  Description: Monitor and assess patient's nutrition/hydration status for malnutrition. Collaborate with interdisciplinary team and initiate plan and interventions as ordered. Monitor patient's weight and dietary intake as ordered or per policy. Utilize nutrition screening tool and intervene as necessary. Determine patient's food preferences and provide high-protein, high-caloric foods as appropriate.      INTERVENTIONS:  - Monitor oral intake, urinary output, labs, and treatment plans  - Assess nutrition and hydration status and recommend course of action  - Evaluate amount of meals eaten  - Assist patient with eating if necessary   - Allow adequate time for meals  - Recommend/ encourage appropriate diets, oral nutritional supplements, and vitamin/mineral supplements  - Provide specific nutrition/hydration education as appropriate  - Include patient/family/caregiver in decisions related to nutrition  Outcome: Progressing     Problem: Knowledge Deficit  Goal: Patient/family/caregiver demonstrates understanding of disease process, treatment plan, medications, and discharge instructions  Description: Complete learning assessment and assess knowledge base.  Interventions:  - Provide teaching at level of understanding  - Provide teaching via preferred learning methods  Outcome: Progressing

## 2023-07-13 NOTE — ASSESSMENT & PLAN NOTE
Lab Results   Component Value Date    HGBA1C 7.3 (H) 04/21/2023       Recent Labs     07/12/23  1033 07/12/23  1527 07/12/23  2103 07/13/23  0655   POCGLU 105 104 164* 122       Blood Sugar Average: Last 72 hrs:  (P) 592.7615899915614375     Diabetic diet, Accu-Cheks before meals and at bedtime, sliding scale insulin.   Baseline creatinine around 1.3  Hold metformin

## 2023-07-13 NOTE — PROGRESS NOTES
REQUIRED DOCUMENTATION:      1. This service was provided via Telemedicine. 2. Provider located at [address]  3. TeleMed provider: Tisha Lemus MD  4. Identify all parties in room with patient during tele consult:  Patient only  5. After connecting through [employer], patient was identified by name and date of birth and assistant checked wristband. Patient was then informed that this was a Telemedicine visit and that the exam was being conducted confidentially over secure lines. My office door was closed. No one else was in the room. Patient acknowledged consent and understanding of privacy and security of the Telemedicine visit, and gave us permission to have the assistant stay in the room in order to assist with the history and to conduct the exam.  I informed the patient that I have reviewed their record in Epic and presented the opportunity for them to ask any questions regarding the visit today. The patient agreed to participate. Progress Note - Infectious Disease   Billie Kraft 60 y.o. female MRN: [id number]  Unit/Bed#: 405-01 Encounter: [id number]      Impression/Plan:    1.  E. coli bacteremia. Both sets blood cultures from ED visit 7/7 isolated E. coli. Repeat cultures collected on return to the ED 7/9/2023 are also positive. Suspect a urinary source. UA was relatively benign and urine cultures growing mixed contaminants, but CT shows presumed bilateral pyelonephritis. Imaging also shows diverticulosis without diverticulitis and hepatic steatosis. LFTs are mildly elevated, patient is status postcholecystectomy so does not have a gallbladder. She is clinically improving, afebrile, medically stable.  Renal U/S without abscess or hydronephrosis   -Continue IV ceftriaxone 2 g every 24 hours   -Follow-up repeat blood cultures    -Check repeat CT A/P if she develops another fever   -monitor fever curve, WBC count   -If patient continues to improve and repeat blood cultures show no growth at 48 hours, can discharge on p.o. cefpodoxime 400mg BID to complete a 10-day total antibiotic course through 23    2. Bilateral pyelonephritis. CT A/P shows bilateral perinephric soft tissue stranding without hydronephrosis. UA on admission benign, patient did have some right abdominal/flank pain on admission. Urine culture is growing mixed contaminants. Renal U/S without abscess or hydronephrosis   -antibiotics as above   -monitor urine output, monitor for retention    3. INESSA on CKD 3b. Present on admission with creatinine of 3.11. Baseline is around 1.3. Nephrology is following. Suspect prerenal vs ATN due to sepsis. Creatinine is improving    -monitor creatinine   -dose adjust antibiotics for CrCl    4. DM2 without long term insulin use. HA1C 7.3%. -recommend tight glycemic control   -management per primary team      5. Obesity. BMI 43.02    Above management plan discussed in detail with the primary team and patient. ID will follow. I spent 45 minutes in evaluation of the patient of which 30 minutes was in counseling/coordination of care    Antibiotics:  Ceftriaxone day 5    Subjective:  Patient is feeling better today. No complaints other than fatigue and being in the hospital. Denies fever, chills, abdominal pain, dysuria, flank pain. Objective:  Vitals:  Temp:  [98 °F (36.7 °C)-99.3 °F (37.4 °C)] 98.8 °F (37.1 °C)  HR:  [84-92] 92  Resp:  [15-18] 15  BP: (123-132)/(63-71) 132/71  SpO2:  [94 %-98 %] 98 %  Temp (24hrs), Av.7 °F (37.1 °C), Min:98 °F (36.7 °C), Max:99.3 °F (37.4 °C)  Current: Temperature: 98.8 °F (37.1 °C)    Physical Exam:      Documented physical exam has been primarily done by the patient's nurse and/or the primary service due to limited examination abilities on telemedicine    General Appearance:  Alert, interactive, nontoxic, no acute distress. Throat: Oropharynx moist without lesions.     Lungs:   Clear to auscultation bilaterally; no wheezes, rhonchi or rales; respirations unlabored   Heart:  RRR; no murmur, rub or gallop   Abdomen:   Soft, non-tender, non-distended, positive bowel sounds. Extremities: No clubbing, cyanosis or edema   Skin: No new rashes or lesions. No draining wounds noted. Labs: All pertinent labs and imaging studies were personally reviewed  Results from last 7 days   Lab Units 07/13/23 0542 07/12/23 0523 07/11/23  0428   WBC Thousand/uL 11.69* 10.61* 9.72   HEMOGLOBIN g/dL 9.5* 9.0* 9.3*   PLATELETS Thousands/uL 276 184 164     Results from last 7 days   Lab Units 07/13/23 0542 07/12/23 0523 07/11/23  0428 07/10/23  0432 07/09/23  1702   SODIUM mmol/L 132* 132* 130* 132* 128*   POTASSIUM mmol/L 4.0 3.9 4.4 4.0 4.0   CHLORIDE mmol/L 100 101 99 100 96   CO2 mmol/L 19* 22 22 21 20*   BUN mg/dL 38* 39* 39* 38* 37*   CREATININE mg/dL 2.15* 2.28* 2.50* 2.93* 3.11*   EGFR ml/min/1.73sq m 23 22 19 16 15   CALCIUM mg/dL 9.2 8.9 9.0 8.9 9.3   AST U/L 39  --   --  79* 89*   ALT U/L 60*  --   --  53* 54*   ALK PHOS U/L 137*  --   --  73 69     Results from last 7 days   Lab Units 07/10/23  0432 07/09/23  1702 07/07/23  1854   PROCALCITONIN ng/ml 84.35* 90.58* 191.87*                   Micro:  Results from last 7 days   Lab Units 07/12/23 0523 07/09/23  1740 07/09/23 1702 07/07/23  1854   BLOOD CULTURE  Received in Microbiology Lab. Culture in Progress. Received in Microbiology Lab. Culture in Progress. --  Escherichia coli*  Escherichia coli* Escherichia coli*  Escherichia coli*   GRAM STAIN RESULT   --   --  Gram negative rods*  Gram negative rods* Gram negative rods*  Gram negative rods*   URINE CULTURE   --  10,000-19,000 cfu/ml  --   --        Imaging:  Imaging in PACS personally reviewed. CT-Bilateral perinephric stranding.  Renal U/S no HN or abscess

## 2023-07-13 NOTE — PROGRESS NOTES
[address]  Progress Note  Name: Maya Lacy  MRN: [id number]  Unit/Bed#: 962-90 I Date of Admission: 7/9/2023   Date of Service: 7/13/2023 I Hospital Day: 4    Assessment/Plan   * Sepsis due to Escherichia coli with acute renal failure [facility])  Assessment & Plan  Sepsis secondary to acute complicated urinary tract infection, bilateral pyelonephritis with gram-negative cyndee bacteremia. Repeat blood cultures obtained  Continue ceftriaxone  ID input noted  Renal function continues to improve off of IV fluids  Renal ultrasound does not reveal an abscess, fortunately she has not had any febrile episodes, repeat blood cultures obtained yesterday if negative will discharge home tomorrow 7/13/23      INESSA (acute kidney injury) [facility])  Assessment & Plan  Baseline creatinine 1.3  Renal function continues to slowly improve off IVF     Type 2 diabetes mellitus with stage 3a chronic kidney disease, without long-term current use of insulin [facility])  Assessment & Plan  Lab Results   Component Value Date    HGBA1C 7.3 (H) 04/21/2023       Recent Labs     07/12/23  1033 07/12/23  1527 07/12/23  2103 07/13/23  0655   POCGLU 105 104 164* 122       Blood Sugar Average: Last 72 hrs:  (P) [id number]     Diabetic diet, Accu-Cheks before meals and at bedtime, sliding scale insulin. Baseline creatinine around 1.3  Hold metformin    Essential hypertension  Assessment & Plan  Hold lisinopril, BP appropriate              Progress Note - Gerardine Shipshewana 60 y.o. female MRN: [id number]    Unit/Bed#: 405-01 Encounter: [id number]        Subjective:   patient seen and examined , feels well  No acute complaints or events     Objective:     Vitals:   Vitals:    07/13/23 0657   BP: 132/71   Pulse: 92   Resp: 15   Temp: 98.8 °F (37.1 °C)   SpO2: 98%     Body mass index is 43.02 kg/m².     Intake/Output Summary (Last 24 hours) at 7/13/2023 0737  Last data filed at 7/12/2023 1656  Gross per 24 hour   Intake 600 ml Output --   Net 600 ml       Physical Exam:   /71   Pulse 92   Temp 98.8 °F (37.1 °C)   Resp 15   Ht 5' 7" (1.702 m)   Wt 125 kg (274 lb 11.1 oz)   LMP 04/10/2017   SpO2 98%   BMI 43.02 kg/m²   General appearance: alert and oriented, in no acute distress  Head: Normocephalic, without obvious abnormality, atraumatic  Lungs: clear to auscultation bilaterally  Heart: regular rate and rhythm, S1, S2 normal, no murmur, click, rub or gallop  Abdomen: soft, non-tender; bowel sounds normal; no masses,  no organomegaly  Extremities: extremities normal, warm and well-perfused; no cyanosis, clubbing, or edema  Pulses: 2+ and symmetric  Neurologic: Grossly normal     Invasive Devices     Peripheral Intravenous Line  Duration           Peripheral IV 07/13/23 Right Antecubital <1 day                Results from last 7 days   Lab Units 07/13/23  0542 07/12/23  0523 07/11/23  0428   WBC Thousand/uL 11.69* 10.61* 9.72   HEMOGLOBIN g/dL 9.5* 9.0* 9.3*   HEMATOCRIT % 28.7* 27.3* 28.0*   PLATELETS Thousands/uL 276 184 164       Results from last 7 days   Lab Units 07/13/23  0542 07/12/23  0523 07/11/23  0428 07/10/23  0432 07/09/23  1702   POTASSIUM mmol/L 4.0 3.9 4.4 4.0 4.0   CHLORIDE mmol/L 100 101 99 100 96   CO2 mmol/L 19* 22 22 21 20*   BUN mg/dL 38* 39* 39* 38* 37*   CREATININE mg/dL 2.15* 2.28* 2.50* 2.93* 3.11*   CALCIUM mg/dL 9.2 8.9 9.0 8.9 9.3   ALK PHOS U/L 137*  --   --  73 69   ALT U/L 60*  --   --  53* 54*   AST U/L 39  --   --  79* 89*       Medication Administration - last 24 hours from 07/12/2023 0737 to 07/13/2023 0737       Date/Time Order Dose Route Action Action by     07/12/2023 1614 EDT cefTRIAXone (ROCEPHIN) IVPB (premix in dextrose) 2,000 mg 50 mL 2,000 mg Intravenous New Bag Maurice Davis, MARQUEZ     73/21/6266 5854 EDT clonazePAM (KlonoPIN) tablet 1 mg 1 mg Oral Given Stuart Porter RN     07/12/2023 0859 EDT DULoxetine (CYMBALTA) delayed release capsule 60 mg 60 mg Oral Given Shreya Sanchez [place], LPN     22/32/0218 0939 EDT DULoxetine (CYMBALTA) delayed release capsule 60 mg 60 mg Oral Given Maurice Angiel, LPN     11/52/3568 6733 EDT gabapentin (NEURONTIN) capsule 100 mg 100 mg Oral Given Maurice Purl, LPN     84/81/5695 4642 EDT gabapentin (NEURONTIN) capsule 100 mg 100 mg Oral Given Maurice Purl, LPN     56/31/5365 5381 EDT nicotine (NICODERM CQ) 21 mg/24 hr TD 24 hr patch 1 patch 1 patch Transdermal Not Given Maurice Purl, LPN     54/80/6036 5288 EDT acetaminophen (TYLENOL) tablet 650 mg 650 mg Oral Given Stuart Porter RN     07/12/2023 1750 EDT acetaminophen (TYLENOL) tablet 650 mg 650 mg Oral Given Maurice Purjacinto, LPN     10/59/8480 1130 EDT acetaminophen (TYLENOL) tablet 650 mg 650 mg Oral Given Maurice Purl, LPN     30/06/9790 3092 EDT insulin lispro (HumaLOG) 100 units/mL subcutaneous injection 1-5 Units -- Subcutaneous Not Given Maurice Purjacinto, LPN     84/37/4748 4238 EDT insulin lispro (HumaLOG) 100 units/mL subcutaneous injection 1-5 Units -- Subcutaneous Not Given Maurice Susan, DELLAN     22/07/0924 6715 EDT insulin lispro (HumaLOG) 100 units/mL subcutaneous injection 1-5 Units 1 Units Subcutaneous Given Maurice Susan, LPN     43/26/2423 8913 EDT insulin lispro (HumaLOG) 100 units/mL subcutaneous injection 1-5 Units 1 Units Subcutaneous Given Stuart Porter RN     07/13/2023 0500 EDT heparin (porcine) subcutaneous injection 5,000 Units 5,000 Units Subcutaneous Given Stuart Porter RN     07/12/2023 2201 EDT heparin (porcine) subcutaneous injection 5,000 Units 5,000 Units Subcutaneous Given Stuart Porter RN     07/12/2023 1423 EDT heparin (porcine) subcutaneous injection 5,000 Units 5,000 Units Subcutaneous Given Maurice Purl, LPN            Lab, Imaging and other studies: I have personally reviewed pertinent reports.     VTE Pharmacologic Prophylaxis: Heparin  VTE Mechanical Prophylaxis: sequential compression device     Justino Reyes, MD  7/13/2023,7:37 AM

## 2023-07-13 NOTE — ASSESSMENT & PLAN NOTE
Sepsis secondary to acute complicated urinary tract infection, bilateral pyelonephritis with gram-negative cyndee bacteremia.     Repeat blood cultures obtained  Continue ceftriaxone  ID input noted  Renal function continues to improve off of IV fluids  Renal ultrasound does not reveal an abscess, fortunately she has not had any febrile episodes, repeat blood cultures obtained yesterday if negative will discharge home tomorrow 7/13/23

## 2023-07-13 NOTE — PROGRESS NOTES
Progress Note - Nephrology   Nadira Meals 60 y.o. female MRN: [id number]  Unit/Bed#: 405-01 Encounter: [id number]    A/P:  1.  Acute kidney injury, present on admission, on CKD 3.  Creatinine 1.3 on 4/21. Geovanna Mcclendon baseline appears to be in the 1.3-1.4 range.  Creatinine was 2.6 on 7/7.    Etiologies: prerenal vs septic physiology vs ATN.        Creatinine peaked at 3.1 on 7/9.    Creatinine improving 2.5->2.28->2.15   UOP: suspect nonoliguric but not recorded   Volume status: Euvolemic, respiratory effort appears normal.     Recommend:  Cr/Na trends stable. No need for HD. Maintain euvolemic. No ACE/ARB--hold at DC until op follow up. Renal US reviewed, no abscess. Avoiding contrast studies with INESSA as best we can. Stable from renal perspective. Will need outpatient follow-up in 10 to 14 days at discharge. FU BMP in 1 week at DC.      2.  Hyponatremia, hypovolemic, improving.   Mild, asymptomatic, stable at 132. If Na<130 may need moderate FR.      3.  Non-anion gap metabolic acidosis due to INESSA/CKD. TCO2 19, follow trends. If decline < 19 can consider sodium bicarb 650mg BID .     4.  CKD 3B, suspect due to diabetic nephropathy.  Seen by Dr. Matti Carrillo in March.  Baseline creatinine 1.3-1.4. Will need outpatient follow-up.     5.  Hypomagnesemia, mag 2.3 on 2/11.      6. Elevated LFT in setting of sepsis, defer to primary.     7.  Essential hypertension, hold lisinopril.  Follow blood pressure trends.  Would not be aggressive with treating.     8 sepsis due to E. Coli bacteremia, pyelonephritis  ID/primary following. Renal US no abscess. Awaiting input on ID for DC planning. Follow up reason for today's visit:     Sepsis due to Escherichia coli with acute renal failure ([address])      Subjective:   Patient seen and examined today. Denies chest pain,shortness of breath,nausea,vomiting, diarrhea,dysuria. Urinating without issue. Tolerating diet.   A complete 10 point review of systems was performed and is otherwise negative. Objective:     Vitals: Blood pressure 132/71, pulse 92, temperature 98.8 °F (37.1 °C), resp. rate 15, height 5' 7" (1.702 m), weight 125 kg (274 lb 11.1 oz), last menstrual period 04/10/2017, SpO2 98 %. ,Body mass index is 43.02 kg/m². Weight (last 2 days)     Date/Time Weight    07/13/23 0554 125 (274.69)    07/12/23 0600 125 (276.24)    07/11/23 0600 126 (278.22)            Intake/Output Summary (Last 24 hours) at 7/13/2023 0944  Last data filed at 7/13/2023 0820  Gross per 24 hour   Intake 1080 ml   Output --   Net 1080 ml     I/O last 3 completed shifts: In: 600 [P.O.:600]  Out: -          Physical Exam: /71   Pulse 92   Temp 98.8 °F (37.1 °C)   Resp 15   Ht 5' 7" (1.702 m)   Wt 125 kg (274 lb 11.1 oz)   LMP 04/10/2017   SpO2 98%   BMI 43.02 kg/m²     General Appearance:    Alert, cooperative, no distress, appears stated age   Head:    Normocephalic, without obvious abnormality, atraumatic   Eyes:    Conjunctiva/corneas clear   Ears:    Normal external ears   Nose:   Nares normal, septum midline, mucosa normal, no drainage    or sinus tenderness   Throat:   Lips, mucosa, and tongue normal; teeth and gums normal   Neck:    Back:      Lungs:     Clear to auscultation bilaterally, respirations unlabored   Chest wall:    No tenderness or deformity   Heart:    Regular rate and rhythm, S1 and S2 normal, no murmur, rub   or gallop   Abdomen:     Soft, non-tender, bowel sounds active   Extremities:   Extremities normal, atraumatic, no cyanosis or edema   Skin:   Skin color, texture, turgor normal, no rashes or lesions   Lymph nodes:   Cervical normal   Neurologic:   CNII-XII intact            Lab, Imaging and other studies: I have personally reviewed pertinent labs.   CBC:   Lab Results   Component Value Date    WBC 11.69 (H) 07/13/2023    HGB 9.5 (L) 07/13/2023    HCT 28.7 (L) 07/13/2023    MCV 92 07/13/2023     07/13/2023    RBC 3.12 (L) 07/13/2023    MCH 30.4 07/13/2023    MCHC 33.1 07/13/2023    RDW 14.1 07/13/2023    MPV 9.9 07/13/2023     CMP:   Lab Results   Component Value Date    K 4.0 07/13/2023     07/13/2023    CO2 19 (L) 07/13/2023    BUN 38 (H) 07/13/2023    CREATININE 2.15 (H) 07/13/2023    CALCIUM 9.2 07/13/2023    AST 39 07/13/2023    ALT 60 (H) 07/13/2023    ALKPHOS 137 (H) 07/13/2023    EGFR 23 07/13/2023       . Results from last 7 days   Lab Units 07/13/23  0542 07/12/23  0523 07/11/23  0428 07/10/23  0432 07/09/23  1702   POTASSIUM mmol/L 4.0 3.9 4.4 4.0 4.0   CHLORIDE mmol/L 100 101 99 100 96   CO2 mmol/L 19* 22 22 21 20*   BUN mg/dL 38* 39* 39* 38* 37*   CREATININE mg/dL 2.15* 2.28* 2.50* 2.93* 3.11*   CALCIUM mg/dL 9.2 8.9 9.0 8.9 9.3   ALK PHOS U/L 137*  --   --  73 69   ALT U/L 60*  --   --  53* 54*   AST U/L 39  --   --  79* 89*         Phosphorus: No results found for: "PHOS"  Magnesium: No results found for: "MG"  Urinalysis: No results found for: "COLORU", "CLARITYU", "SPECGRAV", "PHUR", "LEUKOCYTESUR", "NITRITE", "PROTEINUA", "GLUCOSEU", "Marzella Double", "BILIRUBINUR", "BLOODU"  Ionized Calcium: No results found for: "CAION"  Coagulation: No results found for: "PT", "INR", "APTT"  Troponin: No results found for: "TROPONINI"  ABG: No results found for: "PHART", "QXA9BVN", "PO2ART", "ITD1JAI", "K7CMQEUF", "BEART", "SOURCE"  Radiology review:     IMAGING  Procedure: US kidney and bladder    Result Date: 7/12/2023  Narrative: RENAL ULTRASOUND INDICATION:   pyelo, evaluate for abscess. COMPARISON: CT abdomen pelvis 7/9/2023. Renal ultrasound 2/8/2023. TECHNIQUE:   Ultrasound of the retroperitoneum was performed with a curvilinear transducer utilizing volumetric sweeps and still imaging techniques. FINDINGS: KIDNEYS: Symmetric and normal size. Right kidney:  11.7 x 7.1 x 6.0 cm. Volume 259.7 mL Left kidney:  11.2 x 6.3 x 6.1 cm. Volume 227.9 mL Right kidney Normal echogenicity and contour. No mass is identified. No hydronephrosis.  No shadowing calculi. No perinephric fluid collections. Left kidney Renal cortical scarring of the upper pole left kidney. No mass is identified. No hydronephrosis. No shadowing calculi. No perinephric fluid collections. URETERS: Nonvisualized. BLADDER: Partially distended. No focal thickening or mass lesions. Left ureteral jet detected. Right ureteral jet not detected. Impression: No renal abscess identified. Left upper pole renal cortical scarring. Workstation performed: [id number]     Procedure: XR chest portable    Result Date: 7/11/2023  Narrative: CHEST INDICATION:   dyspnea. COMPARISON: CXR 7/7/2023 and 10/12/2012, abdomen CT 7/9/2023. EXAM PERFORMED/VIEWS:  XR CHEST PORTABLE. FINDINGS: Cardiomediastinal silhouette normal. Lungs clear. No effusion or pneumothorax. Upper abdomen normal. Bones normal for age. Impression: No acute cardiopulmonary disease.  Workstation performed: [id number]       Current Facility-Administered Medications   Medication Dose Route Frequency   • acetaminophen (TYLENOL) tablet 650 mg  650 mg Oral Q6H PRN   • aluminum-magnesium hydroxide-simethicone (MAALOX) oral suspension 30 mL  30 mL Oral Q6H PRN   • cefTRIAXone (ROCEPHIN) IVPB (premix in dextrose) 2,000 mg 50 mL  2,000 mg Intravenous Q24H   • clonazePAM (KlonoPIN) tablet 1 mg  1 mg Oral HS   • docusate sodium (COLACE) capsule 100 mg  100 mg Oral Daily PRN   • DULoxetine (CYMBALTA) delayed release capsule 60 mg  60 mg Oral BID   • gabapentin (NEURONTIN) capsule 100 mg  100 mg Oral BID (AM & Afternoon)   • heparin (porcine) subcutaneous injection 5,000 Units  5,000 Units Subcutaneous Q8H [facility]   • insulin lispro (HumaLOG) 100 units/mL subcutaneous injection 1-5 Units  1-5 Units Subcutaneous TID AC   • insulin lispro (HumaLOG) 100 units/mL subcutaneous injection 1-5 Units  1-5 Units Subcutaneous HS   • nicotine (NICODERM CQ) 21 mg/24 hr TD 24 hr patch 1 patch  1 patch Transdermal Daily   • ondansetron (ZOFRAN) injection 4 mg  4 mg Intravenous Q6H PRN   • tiZANidine (ZANAFLEX) tablet 2 mg  2 mg Oral Q8H PRN     Medications Discontinued During This Encounter   Medication Reason   • enoxaparin (LOVENOX) subcutaneous injection 40 mg Alternate therapy   • sodium chloride 0.9 % infusion    • multi-electrolyte (PLASMALYTE-A/ISOLYTE-S PH 7.4) IV solution        Brenda Nassar, DO      This progress note was produced in part using a dictation device which may document imprecise wording from author's original intent.

## 2023-07-14 VITALS
HEIGHT: 67 IN | BODY MASS INDEX: 43.08 KG/M2 | SYSTOLIC BLOOD PRESSURE: 116 MMHG | OXYGEN SATURATION: 96 % | RESPIRATION RATE: 20 BRPM | WEIGHT: 274.47 LBS | HEART RATE: 95 BPM | DIASTOLIC BLOOD PRESSURE: 67 MMHG | TEMPERATURE: 97.4 F

## 2023-07-14 LAB
ANION GAP SERPL CALCULATED.3IONS-SCNC: 6 MMOL/L
ANION GAP SERPL CALCULATED.3IONS-SCNC: 9 MMOL/L
BACTERIA BLD CULT: ABNORMAL
BUN SERPL-MCNC: 36 MG/DL (ref 5–25)
BUN SERPL-MCNC: 38 MG/DL (ref 5–25)
CALCIUM SERPL-MCNC: 9.2 MG/DL (ref 8.4–10.2)
CALCIUM SERPL-MCNC: 9.3 MG/DL (ref 8.4–10.2)
CHLORIDE SERPL-SCNC: 102 MMOL/L (ref 96–108)
CHLORIDE SERPL-SCNC: 104 MMOL/L (ref 96–108)
CO2 SERPL-SCNC: 22 MMOL/L (ref 21–32)
CO2 SERPL-SCNC: 22 MMOL/L (ref 21–32)
CREAT SERPL-MCNC: 2.08 MG/DL (ref 0.6–1.3)
CREAT SERPL-MCNC: 2.1 MG/DL (ref 0.6–1.3)
ERYTHROCYTE [DISTWIDTH] IN BLOOD BY AUTOMATED COUNT: 14.6 % (ref 11.6–15.1)
GFR SERPL CREATININE-BSD FRML MDRD: 24 ML/MIN/1.73SQ M
GFR SERPL CREATININE-BSD FRML MDRD: 24 ML/MIN/1.73SQ M
GLUCOSE SERPL-MCNC: 115 MG/DL (ref 65–140)
GLUCOSE SERPL-MCNC: 124 MG/DL (ref 65–140)
GLUCOSE SERPL-MCNC: 125 MG/DL (ref 65–140)
GLUCOSE SERPL-MCNC: 181 MG/DL (ref 65–140)
GLUCOSE SERPL-MCNC: 93 MG/DL (ref 65–140)
GRAM STN SPEC: ABNORMAL
HCT VFR BLD AUTO: 28.5 % (ref 34.8–46.1)
HGB BLD-MCNC: 9.1 G/DL (ref 11.5–15.4)
MCH RBC QN AUTO: 30.4 PG (ref 26.8–34.3)
MCHC RBC AUTO-ENTMCNC: 31.9 G/DL (ref 31.4–37.4)
MCV RBC AUTO: 95 FL (ref 82–98)
NRBC BLD AUTO-RTO: 0 /100 WBCS
PLATELET # BLD AUTO: 246 THOUSANDS/UL (ref 149–390)
PMV BLD AUTO: 11.1 FL (ref 8.9–12.7)
POTASSIUM SERPL-SCNC: 5 MMOL/L (ref 3.5–5.3)
POTASSIUM SERPL-SCNC: 6.6 MMOL/L (ref 3.5–5.3)
RBC # BLD AUTO: 2.99 MILLION/UL (ref 3.81–5.12)
SODIUM SERPL-SCNC: 130 MMOL/L (ref 135–147)
SODIUM SERPL-SCNC: 135 MMOL/L (ref 135–147)
WBC # BLD AUTO: 10.66 THOUSAND/UL (ref 4.31–10.16)

## 2023-07-14 PROCEDURE — 80048 BASIC METABOLIC PNL TOTAL CA: CPT | Performed by: FAMILY MEDICINE

## 2023-07-14 PROCEDURE — 99239 HOSP IP/OBS DSCHRG MGMT >30: CPT | Performed by: FAMILY MEDICINE

## 2023-07-14 PROCEDURE — 85027 COMPLETE CBC AUTOMATED: CPT | Performed by: FAMILY MEDICINE

## 2023-07-14 PROCEDURE — 82948 REAGENT STRIP/BLOOD GLUCOSE: CPT

## 2023-07-14 PROCEDURE — 99232 SBSQ HOSP IP/OBS MODERATE 35: CPT | Performed by: NURSE PRACTITIONER

## 2023-07-14 PROCEDURE — G0408 INPT/TELE FOLLOW UP 35: HCPCS | Performed by: STUDENT IN AN ORGANIZED HEALTH CARE EDUCATION/TRAINING PROGRAM

## 2023-07-14 RX ORDER — CEFPODOXIME PROXETIL 200 MG/1
400 TABLET, FILM COATED ORAL 2 TIMES DAILY
Qty: 20 TABLET | Refills: 0 | Status: SHIPPED | OUTPATIENT
Start: 2023-07-14 | End: 2023-07-19

## 2023-07-14 RX ORDER — CEFTRIAXONE 2 G/50ML
2000 INJECTION, SOLUTION INTRAVENOUS EVERY 24 HOURS
Status: DISCONTINUED | OUTPATIENT
Start: 2023-07-14 | End: 2023-07-14 | Stop reason: HOSPADM

## 2023-07-14 RX ADMIN — DULOXETINE HYDROCHLORIDE 60 MG: 30 CAPSULE, DELAYED RELEASE ORAL at 08:30

## 2023-07-14 RX ADMIN — HEPARIN SODIUM 5000 UNITS: 5000 INJECTION INTRAVENOUS; SUBCUTANEOUS at 05:21

## 2023-07-14 RX ADMIN — GABAPENTIN 100 MG: 100 CAPSULE ORAL at 13:12

## 2023-07-14 RX ADMIN — CEFTRIAXONE 2000 MG: 2 INJECTION, SOLUTION INTRAVENOUS at 13:12

## 2023-07-14 RX ADMIN — GABAPENTIN 100 MG: 100 CAPSULE ORAL at 08:30

## 2023-07-14 RX ADMIN — ACETAMINOPHEN 325MG 650 MG: 325 TABLET ORAL at 13:12

## 2023-07-14 NOTE — PROGRESS NOTES
REQUIRED DOCUMENTATION:      1. This service was provided via Telemedicine. 2. Provider located at [address]  3. TeleMed provider: Balaji Melton MD  4. Identify all parties in room with patient during tele consult:  Patient only  5. After connecting through [employer], patient was identified by name and date of birth and assistant checked wristband. Patient was then informed that this was a Telemedicine visit and that the exam was being conducted confidentially over secure lines. My office door was closed. No one else was in the room. Patient acknowledged consent and understanding of privacy and security of the Telemedicine visit, and gave us permission to have the assistant stay in the room in order to assist with the history and to conduct the exam.  I informed the patient that I have reviewed their record in Epic and presented the opportunity for them to ask any questions regarding the visit today. The patient agreed to participate. Progress Note - Infectious Disease   Vale Catching 60 y.o. female MRN: [id number]  Unit/Bed#: 405-01 Encounter: [id number]      Impression/Plan:    1.  E. coli bacteremia. Both sets blood cultures from ED visit 7/7 isolated E. coli. Repeat cultures collected on return to the ED 7/9/2023 are also positive. Suspect a urinary source. UA was relatively benign and urine cultures growing mixed contaminants, but CT shows presumed bilateral pyelonephritis. Imaging also shows diverticulosis without diverticulitis and hepatic steatosis. LFTs are mildly elevated, patient is status postcholecystectomy so does not have a gallbladder. She is clinically improving, afebrile, medically stable. Renal U/S without abscess or hydronephrosis.  Repeat blood cultures no growth, patient clinically improving on IV Ceftriaxone    -Follow-up repeat blood cultures    -monitor fever curve, WBC count   -okay for discharge on p.o. cefpodoxime 400mg BID to complete a 10-day total antibiotic course through 23    2. Bilateral pyelonephritis. CT A/P shows bilateral perinephric soft tissue stranding without hydronephrosis. UA on admission benign, patient did have some right abdominal/flank pain on admission. Urine culture is growing mixed contaminants. Renal U/S without abscess or hydronephrosis   -antibiotics as above   -monitor urine output, monitor for retention    3. INESSA on CKD 3b. Present on admission with creatinine of 3.11. Baseline is around 1.3. Nephrology is following. Suspect prerenal vs ATN due to sepsis. Creatinine is improved and has now plateaued   -monitor creatinine   -dose adjust antibiotics for CrCl    4. DM2 without long term insulin use. HA1C 7.3%. -recommend tight glycemic control   -management per primary team      5. Obesity. BMI 43.02    Above management plan discussed in detail with the primary team and patient. Okay for discharge from ID perspective. I spent 45 minutes in evaluation of the patient of which 30 minutes was in counseling/coordination of care    Antibiotics:  Ceftriaxone day 6    Subjective:  Patient is feeling well today. Ready to go home. Denies fever, chills, abdominal pain, back pain. Energy improving. Objective:  Vitals:  Temp:  [97.4 °F (36.3 °C)-98.8 °F (37.1 °C)] 97.4 °F (36.3 °C)  HR:  [] 95  Resp:  [17-20] 20  BP: (116-128)/(62-67) 116/67  SpO2:  [95 %-96 %] 96 %  Temp (24hrs), Av.2 °F (36.8 °C), Min:97.4 °F (36.3 °C), Max:98.8 °F (37.1 °C)  Current: Temperature: (!) 97.4 °F (36.3 °C)    Physical Exam:      Documented physical exam has been primarily done by the patient's nurse and/or the primary service due to limited examination abilities on telemedicine    General Appearance:  Alert, interactive, nontoxic, no acute distress. Throat: Oropharynx moist without lesions.     Lungs:   Clear to auscultation bilaterally; no wheezes, rhonchi or rales; respirations unlabored   Heart:  RRR; no murmur, rub or gallop   Abdomen:   Soft, non-tender, non-distended, positive bowel sounds. Extremities: No clubbing, cyanosis or edema   Skin: No new rashes or lesions. No draining wounds noted. Labs: All pertinent labs and imaging studies were personally reviewed  Results from last 7 days   Lab Units 07/14/23  0535 07/13/23  0542 07/12/23  0523   WBC Thousand/uL 10.66* 11.69* 10.61*   HEMOGLOBIN g/dL 9.1* 9.5* 9.0*   PLATELETS Thousands/uL 246 276 184     Results from last 7 days   Lab Units 07/14/23  0732 07/14/23  0535 07/13/23  0542 07/11/23  0428 07/10/23  0432 07/09/23  1702   SODIUM mmol/L 135 130* 132*   < > 132* 128*   POTASSIUM mmol/L 5.0 6.6* 4.0   < > 4.0 4.0   CHLORIDE mmol/L 104 102 100   < > 100 96   CO2 mmol/L 22 22 19*   < > 21 20*   BUN mg/dL 36* 38* 38*   < > 38* 37*   CREATININE mg/dL 2.10* 2.08* 2.15*   < > 2.93* 3.11*   EGFR ml/min/1.73sq m 24 24 23   < > 16 15   CALCIUM mg/dL 9.3 9.2 9.2   < > 8.9 9.3   AST U/L  --   --  39  --  79* 89*   ALT U/L  --   --  60*  --  53* 54*   ALK PHOS U/L  --   --  137*  --  73 69    < > = values in this interval not displayed. Results from last 7 days   Lab Units 07/10/23  0432 07/09/23  1702 07/07/23  1854   PROCALCITONIN ng/ml 84.35* 90.58* 191.87*                   Micro:  Results from last 7 days   Lab Units 07/12/23  0523 07/09/23  1740 07/09/23  1702 07/07/23  1854   BLOOD CULTURE  No Growth at 24 hrs. No Growth at 24 hrs.  --  Escherichia coli*  Escherichia coli* Escherichia coli*  Escherichia coli*   GRAM STAIN RESULT   --   --  Gram negative rods*  Gram negative rods* Gram negative rods*  Gram negative rods*   URINE CULTURE   --  10,000-19,000 cfu/ml  --   --        Imaging:  Imaging in PACS personally reviewed. CT-Bilateral perinephric stranding.  Renal U/S no HN or abscess

## 2023-07-14 NOTE — PLAN OF CARE
Problem: PAIN - ADULT  Goal: Verbalizes/displays adequate comfort level or baseline comfort level  Description: Interventions:  - Encourage patient to monitor pain and request assistance  - Assess pain using appropriate pain scale (0-10 pain scale)  - Administer analgesics based on type and severity of pain and evaluate response  - Implement non-pharmacological measures as appropriate and evaluate response  - Consider cultural and social influences on pain and pain management  - Notify physician/advanced practitioner if interventions unsuccessful or patient reports new pain  Outcome: Progressing     Problem: INFECTION - ADULT  Goal: Absence or prevention of progression during hospitalization  Description: INTERVENTIONS:  - Assess and monitor for signs and symptoms of infection  - Monitor lab/diagnostic results  - Monitor all insertion sites, i.e. indwelling lines  - Administer medications as ordered  - Instruct and encourage patient and family to use good hand hygiene technique  Outcome: Progressing     Problem: SAFETY ADULT  Goal: Patient will remain free of falls  Description: INTERVENTIONS:  - Educate patient/family on patient safety including physical limitations  - Instruct patient to call for assistance with activity (independent)  - Consult OT/PT to assist with strengthening/mobility   - Keep Call bell within reach  - Keep bed low and locked with side rails adjusted as appropriate  - Keep care items and personal belongings within reach  - Initiate and maintain comfort rounds  - Make Fall Risk Sign visible to staff (moderate fall risk)  - Offer Toileting every 1-2 Hours, in advance of need  - Obtain necessary fall risk management equipment: nonskid footwear  Outcome: Progressing  Goal: Maintain or return to baseline ADL function  Description: INTERVENTIONS:  -  Assess patient's ability to carry out ADLs; (independent)  - Assess/evaluate cause of self-care deficits   - Assess range of motion  - Assess patient's mobility; (independent)  - Assess patient's need for assistive devices and provide as appropriate  - Encourage maximum independence but intervene and supervise when necessary  - Involve family in performance of ADLs  - Assess for home care needs following discharge   - Consider OT consult to assist with ADL evaluation and planning for discharge  - Provide patient education as appropriate  Outcome: Progressing  Goal: Maintains/Returns to pre admission functional level  Description: INTERVENTIONS:  - Perform BMAT or MOVE assessment daily.   - Set and communicate daily mobility goal to care team and patient/family/caregiver. - Collaborate with rehabilitation services on mobility goals if consulted  - Ambulate patient 3-5 times a day  - Out of bed to chair 3 times a day   - Out of bed for meals 3 times a day  - Out of bed for toileting  - Record patient progress and toleration of activity level   Outcome: Progressing     Problem: DISCHARGE PLANNING  Goal: Discharge to home or other facility with appropriate resources  Description: INTERVENTIONS:  - Identify barriers to discharge w/patient and caregiver  - Arrange for needed discharge resources and transportation as appropriate  - Identify discharge learning needs (meds, wound care, etc.)  - Refer to Case Management Department for coordinating discharge planning if the patient needs post-hospital services based on physician/advanced practitioner order or complex needs related to functional status, cognitive ability, or social support system  Outcome: Progressing     Problem: Knowledge Deficit  Goal: Patient/family/caregiver demonstrates understanding of disease process, treatment plan, medications, and discharge instructions  Description: Complete learning assessment and assess knowledge base.   Interventions:  - Provide teaching at level of understanding  - Provide teaching via preferred learning methods  Outcome: Progressing     Problem: METABOLIC, FLUID AND ELECTROLYTES - ADULT  Goal: Electrolytes maintained within normal limits  Description: INTERVENTIONS:  - Monitor labs and assess patient for signs and symptoms of electrolyte imbalances  - Administer electrolyte replacement as ordered  - Monitor response to electrolyte replacements, including repeat lab results as appropriate  - Instruct patient on fluid and nutrition as appropriate  Outcome: Progressing  Goal: Fluid balance maintained  Description: INTERVENTIONS:  - Monitor labs   - Monitor I/O and WT  - Instruct patient on fluid and nutrition as appropriate  - Assess for signs & symptoms of volume excess or deficit  Outcome: Progressing  Goal: Glucose maintained within target range  Description: INTERVENTIONS:  - Monitor Blood Glucose as ordered  - Assess for signs and symptoms of hyperglycemia and hypoglycemia  - Administer ordered medications to maintain glucose within target range  - Assess nutritional intake and initiate nutrition service referral as needed  Outcome: Progressing     Problem: Nutrition/Hydration-ADULT  Goal: Nutrient/Hydration intake appropriate for improving, restoring or maintaining nutritional needs  Description: Monitor and assess patient's nutrition/hydration status for malnutrition. Collaborate with interdisciplinary team and initiate plan and interventions as ordered. Monitor patient's weight and dietary intake as ordered or per policy. Utilize nutrition screening tool and intervene as necessary. Determine patient's food preferences and provide high-protein, high-caloric foods as appropriate.      INTERVENTIONS:  - Monitor oral intake, urinary output, labs, and treatment plans  - Assess nutrition and hydration status and recommend course of action  - Evaluate amount of meals eaten  - Assist patient with eating if necessary   - Allow adequate time for meals  - Recommend/ encourage appropriate diets, oral nutritional supplements, and vitamin/mineral supplements  - Provide specific nutrition/hydration education as appropriate  - Include patient/family/caregiver in decisions related to nutrition  Outcome: Progressing

## 2023-07-14 NOTE — DISCHARGE SUMMARY
Discharge Summary - Annalisa Saha 60 y.o. female MRN: [id number]    Unit/Bed#: 405-01 Encounter: [id number]    Admission Date:   Admission Orders (From admission, onward)     Ordered        07/09/23 1833  INPATIENT ADMISSION  Once                        Admitting Diagnosis: Weakness [R53.1]  INESSA (acute kidney injury) ([address]) [N17.9]  Sepsis ([address]) [A41.9]    HPI: Annalisa Saha is a 60 y.o. female with a PMH as outlined below who presents with febrile illness, fever at home, was seen in the emergency room 2 days ago, had blood cultures drawn, they turn positive, contacted to return.     Again was seen 2 days ago for generalized weakness, was diagnosed with acute kidney injury, patient opted for discharge home with continued p.o. intake. Patient did report some diarrhea, generalized right upper quadrant abdominal pain, shaking chills at home. Procedures Performed:   Orders Placed This Encounter   Procedures   • Critical Care       Summary of Hospital Course:     Sepsis due to to E. coli with acute renal failure      77-year-old female presented to the emergency room with complaint of fevers at home, found to meet sepsis criteria with evidence of renal failure. She was initiated on ceftriaxone after blood cultures were obtained and admitted to the medical floor. She was seen in consultation by nephrology and infectious disease. Blood cultures were positive with a gram-negative organism. She was initiated on IV fluids with improvement of renal function, although she did not return to baseline. She likely has an element of acute tubular necrosis and will follow-up with nephrology as an outpatient for repeat labs. Repeat blood cultures were negative, she will be discharged on cefpodoxime 400 mg p.o. twice daily to complete 10 days of total antibiotics per infectious disease recommendations. Significant Findings, Care, Treatment and Services Provided:     CT    1.  Bilateral perinephric soft tissue stranding with mild distention of the upper collecting systems, without evidence of renal calculus. Findings are most suspicious for pyelonephritis. Nonspecific renal inflammatory disease also in the differential.   2. Small right-sided pleural effusion   3. Sigmoid diverticulosis and small hiatal hernia   4. Hepatomegaly and hepatic steatosis     Complications: none    Discharge Diagnosis: see above    Medical Problems     Resolved Problems  Date Reviewed: 7/12/2023   None         Condition at Discharge: fair         Discharge instructions/Information to patient and family:   See after visit summary for information provided to patient and family. Provisions for Follow-Up Care:  See after visit summary for information related to follow-up care and any pertinent home health orders. PCP: Miguel A Alvarez DO    Disposition: Home    Planned Readmission: No      Discharge Statement   I spent 45 minutes discharging the patient. This time was spent on the day of discharge. I had direct contact with the patient on the day of discharge. Additional documentation is required if more than 30 minutes were spent on discharge. Discharge Medications:  See after visit summary for reconciled discharge medications provided to patient and family.

## 2023-07-14 NOTE — CASE MANAGEMENT
Case Management Discharge Planning Note    Patient name Michelle Allen  Location [address] [facility] [place] [id number] MRN [id number]  : 1960 Date 2023       Current Admission Date: 2023  Current Admission Diagnosis:Sepsis due to Escherichia coli with acute renal failure [facility])   Patient Active Problem List    Diagnosis Date Noted   • Sepsis due to Escherichia coli with acute renal failure ([address]) 2023   • INESSA (acute kidney injury) ([address]) 2023   • Hyponatremia 2023   • Lumbar radiculopathy 2023   • Iron deficiency anemia 2023   • Albuminuria 03/15/2023   • Recurrent major depressive disorder, in partial remission ([address]) 2023   • Chronic kidney disease (CKD) stage G3b/A3, moderately decreased glomerular filtration rate (GFR) between 30-44 mL/min/1.73 square meter and albuminuria creatinine ratio greater than 300 mg/g ([address]) 2022   • Obesity, morbid ([address]) 2021   • Type 2 diabetes mellitus with stage 3a chronic kidney disease, without long-term current use of insulin ([address])    • Generalized anxiety disorder 2020   • Restless legs 2018   • Neuropathic pain 2018   • Essential hypertension 2017   • History of MRSA infection 2017   • Low back pain 2017   • Obesity (BMI 35.0-39.9 without comorbidity) 2017   • Obstructive sleep apnea 2017   • Depression 2017   • Osteoarthritis of spine with radiculopathy, lumbosacral region 2017   • Neuropathy, peripheral axonal 2015      LOS (days): 5  Geometric Mean LOS (GMLOS) (days): 5.00  Days to GMLOS:0.3     OBJECTIVE:  Risk of Unplanned Readmission Score: 16.1         Current admission status: Inpatient   Preferred Pharmacy:   [address] [facility] [address] [address]  Phone: [phone] Fax: [phone]    Primary Care Provider: Loretta Gomez DO    Primary Insurance: [facility] REP  Secondary Insurance:     DISCHARGE DETAILS:patient discharging home today. No discharge needs noted. Nurse to review AVS, all follow up providers listed.

## 2023-07-14 NOTE — NURSING NOTE
AVS reviewed with pt, verbalized understanding and IV removed. Pt accompanied off the floor via wheelchair by PCA, son, and friend in stable condition.

## 2023-07-15 LAB
BACTERIA BLD CULT: ABNORMAL
GRAM STN SPEC: ABNORMAL

## 2023-07-16 LAB
BACTERIA BLD CULT: NORMAL
BACTERIA BLD CULT: NORMAL

## 2023-07-17 ENCOUNTER — TELEPHONE (OUTPATIENT)
Dept: NEPHROLOGY | Facility: CLINIC | Age: 63
End: 2023-07-17

## 2023-07-17 ENCOUNTER — TRANSITIONAL CARE MANAGEMENT (OUTPATIENT)
Dept: FAMILY MEDICINE CLINIC | Facility: CLINIC | Age: 63
End: 2023-07-17

## 2023-07-17 DIAGNOSIS — N18.32 CHRONIC KIDNEY DISEASE (CKD) STAGE G3B/A3, MODERATELY DECREASED GLOMERULAR FILTRATION RATE (GFR) BETWEEN 30-44 ML/MIN/1.73 SQUARE METER AND ALBUMINURIA CREATININE RATIO GREATER THAN 300 MG/G (HCC): Primary | ICD-10-CM

## 2023-07-17 DIAGNOSIS — Z71.89 COMPLEX CARE COORDINATION: Primary | ICD-10-CM

## 2023-07-17 LAB
BACTERIA BLD CULT: NORMAL
BACTERIA BLD CULT: NORMAL

## 2023-07-17 NOTE — UTILIZATION REVIEW
NOTIFICATION OF ADMISSION DISCHARGE   This is a Notification of Discharge from [address] [place] [address]. Please be advised that this patient has been discharge from our facility. Below you will find the admission and discharge date and time including the patient’s disposition. UTILIZATION REVIEW CONTACT:  Memo Vinson  Utilization   Network Utilization Review Department  Phone: [phone] x carefully listen to the prompts. All voicemails are confidential.  Email: [email or web address]     ADMISSION INFORMATION  PRESENTATION DATE: 7/9/2023  4:49 PM  OBERVATION ADMISSION DATE:   INPATIENT ADMISSION DATE: 7/9/23  6:33 PM   DISCHARGE DATE: 7/14/2023  4:32 PM   DISPOSITION:Home/Self Care    IMPORTANT INFORMATION:  Send all requests for admission clinical reviews, approved or denied determinations and any other requests to dedicated fax number below belonging to the campus where the patient is receiving treatment.  List of dedicated fax numbers:  [place] DENIALS (Administrative/Medical Necessity) [phone] [address] [facility] (Maternity/NICU/Pediatrics) [phone]   [facility] [phone]   [facility] [phone] [address] [facility] [address] [phone]   [address] [place] [address] [phone]   [facility] [phone]   [address] [place] [address] [address] [place] [address] [phone]   [address] [place] [address] [phone] [address] [facility] [phone] [address] [facility] [address] 32[facility] [phone]

## 2023-07-18 ENCOUNTER — PATIENT OUTREACH (OUTPATIENT)
Dept: FAMILY MEDICINE CLINIC | Facility: CLINIC | Age: 63
End: 2023-07-18

## 2023-07-18 NOTE — PROGRESS NOTES
Contacted patient for f/u post hospitalization for sepsis. Patient lives at home with her son who assists her as needed. She reports feeling tired with no energy but is sleeping well at night and takes rest periods during the day. She is running a low grade temp of 100.5. She denies chills, diarrhea or pain. Her appetite is returning to normal. She is staying well hydrated, drinking a lot of water and cranberry juice. She monitors FBS which is in the range of 140-170. She denies hypoglycemic episodes. We reviewed medications and she is taking all as prescribed. Follow up appointments are scheduled. She denies needing additional assistance at home. She is managing well at this time with no needs present.

## 2023-07-19 ENCOUNTER — APPOINTMENT (OUTPATIENT)
Dept: LAB | Facility: MEDICAL CENTER | Age: 63
End: 2023-07-19
Payer: COMMERCIAL

## 2023-07-19 DIAGNOSIS — N17.9 AKI (ACUTE KIDNEY INJURY) (HCC): ICD-10-CM

## 2023-07-19 DIAGNOSIS — N18.32 CHRONIC KIDNEY DISEASE (CKD) STAGE G3B/A3, MODERATELY DECREASED GLOMERULAR FILTRATION RATE (GFR) BETWEEN 30-44 ML/MIN/1.73 SQUARE METER AND ALBUMINURIA CREATININE RATIO GREATER THAN 300 MG/G (HCC): ICD-10-CM

## 2023-07-19 LAB
ALBUMIN SERPL BCP-MCNC: 3 G/DL (ref 3.5–5)
ALP SERPL-CCNC: 134 U/L (ref 46–116)
ALT SERPL W P-5'-P-CCNC: 126 U/L (ref 12–78)
ANION GAP SERPL CALCULATED.3IONS-SCNC: 3 MMOL/L
AST SERPL W P-5'-P-CCNC: 63 U/L (ref 5–45)
BILIRUB SERPL-MCNC: 0.34 MG/DL (ref 0.2–1)
BUN SERPL-MCNC: 35 MG/DL (ref 5–25)
CALCIUM ALBUM COR SERPL-MCNC: 10.8 MG/DL (ref 8.3–10.1)
CALCIUM SERPL-MCNC: 10 MG/DL (ref 8.3–10.1)
CHLORIDE SERPL-SCNC: 112 MMOL/L (ref 96–108)
CO2 SERPL-SCNC: 21 MMOL/L (ref 21–32)
CREAT SERPL-MCNC: 1.94 MG/DL (ref 0.6–1.3)
GFR SERPL CREATININE-BSD FRML MDRD: 26 ML/MIN/1.73SQ M
GLUCOSE P FAST SERPL-MCNC: 119 MG/DL (ref 65–99)
POTASSIUM SERPL-SCNC: 5.4 MMOL/L (ref 3.5–5.3)
PROT SERPL-MCNC: 8.8 G/DL (ref 6.4–8.4)
SODIUM SERPL-SCNC: 136 MMOL/L (ref 135–147)

## 2023-07-19 PROCEDURE — 36415 COLL VENOUS BLD VENIPUNCTURE: CPT

## 2023-07-19 PROCEDURE — 80053 COMPREHEN METABOLIC PANEL: CPT

## 2023-07-20 ENCOUNTER — OFFICE VISIT (OUTPATIENT)
Dept: NEPHROLOGY | Facility: CLINIC | Age: 63
End: 2023-07-20
Payer: COMMERCIAL

## 2023-07-20 VITALS
OXYGEN SATURATION: 95 % | DIASTOLIC BLOOD PRESSURE: 78 MMHG | WEIGHT: 259 LBS | HEIGHT: 67 IN | SYSTOLIC BLOOD PRESSURE: 128 MMHG | BODY MASS INDEX: 40.65 KG/M2 | HEART RATE: 92 BPM

## 2023-07-20 DIAGNOSIS — N18.31 TYPE 2 DIABETES MELLITUS WITH STAGE 3A CHRONIC KIDNEY DISEASE, WITHOUT LONG-TERM CURRENT USE OF INSULIN (HCC): ICD-10-CM

## 2023-07-20 DIAGNOSIS — E87.5 HYPERKALEMIA: ICD-10-CM

## 2023-07-20 DIAGNOSIS — A41.51: ICD-10-CM

## 2023-07-20 DIAGNOSIS — N17.9 AKI (ACUTE KIDNEY INJURY) (HCC): Primary | ICD-10-CM

## 2023-07-20 DIAGNOSIS — N17.0: ICD-10-CM

## 2023-07-20 DIAGNOSIS — R65.20: ICD-10-CM

## 2023-07-20 DIAGNOSIS — I10 ESSENTIAL HYPERTENSION: ICD-10-CM

## 2023-07-20 DIAGNOSIS — M17.0 PRIMARY OSTEOARTHRITIS OF BOTH KNEES: ICD-10-CM

## 2023-07-20 DIAGNOSIS — E11.22 TYPE 2 DIABETES MELLITUS WITH STAGE 3A CHRONIC KIDNEY DISEASE, WITHOUT LONG-TERM CURRENT USE OF INSULIN (HCC): ICD-10-CM

## 2023-07-20 DIAGNOSIS — N18.32 CHRONIC KIDNEY DISEASE (CKD) STAGE G3B/A3, MODERATELY DECREASED GLOMERULAR FILTRATION RATE (GFR) BETWEEN 30-44 ML/MIN/1.73 SQUARE METER AND ALBUMINURIA CREATININE RATIO GREATER THAN 300 MG/G (HCC): ICD-10-CM

## 2023-07-20 DIAGNOSIS — R79.89 ELEVATED LFTS: ICD-10-CM

## 2023-07-20 PROCEDURE — 99213 OFFICE O/P EST LOW 20 MIN: CPT | Performed by: NURSE PRACTITIONER

## 2023-07-20 NOTE — PATIENT INSTRUCTIONS
It was nice to see you today. Continue to hold lisinopril. Repeat lab work early next week. Avoid potassium gluttony (avoid potatoes, tomatoes, avocado). Increase sodium intake over the next day or so in addition to hydration.     No NSAIDs

## 2023-07-20 NOTE — PROGRESS NOTES
Nephrology   Office Follow-Up  Michelle Allen 60 y.o. female MRN: [id number]    Encounter: [id number]        Assessment & Plan    Michelle Allen was seen in the [facility] office today. All diagnoses and orders for visit:     1. INESSA (acute kidney injury) ([address])  · ATN INESSA with peak creatinine 3.1 mg/dL in the setting of sepsis due to E. coli bacteremia and severe volume depletion in addition to vasomotor dysfunction from ACE inhibitor. Remains in acute kidney injury and is mildly volume depleted. She will continue to hold ACE inhibitor, metformin. She will avoid NSAIDs. She will increase hydration and add sodium to her diet. Lab work will be repeated earlier next week. She will keep follow-up appointment with Dr. Senthil Mueller   -     Urinalysis with microscopic   -     Protein / creatinine ratio, urine   -     Albumin / creatinine urine ratio   -     Comprehensive metabolic panel; Future   -     CBC and differential; Future   -     Phosphorus; Future   -     Magnesium; Future  2. Sepsis due to Escherichia coli with acute renal failure and tubular necrosis, unspecified whether septic shock present ([address])  · Finish course of antibiotic yesterday  3. Chronic kidney disease (CKD) stage G3b/A3, moderately decreased glomerular filtration rate (GFR) between 30-44 mL/min/1.73 square meter and albuminuria creatinine ratio greater than 300 mg/g (HCC)  · Baseline creatinine 1.3-1.4 mg/dL. Etiology presumably due to diabetic nephropathy, hypertensive nephrosclerosis, obesity related FSGS. ACE inhibitor on hold. 4. Type 2 diabetes mellitus with stage 3a chronic kidney disease, without long-term current use of insulin (HCC)  · Continue to hold ACE inhibitor and metformin. Defer evaluation for SGLT2 inhibitor at this time due to pyelonephritis and volume depletion. 5. Essential hypertension  · Pressure appropriate in office 128/78 mmHg. Goal less than 130/80 mmHg. No change. Lisinopril on hold  6.  Primary osteoarthritis of both knees  · Avoid oral nsaids    -     Diclofenac Sodium (VOLTAREN) 1 %; Apply 2 g topically 4 (four) times a day  7. Elevated LFTs  · Potentially due to ischemic hepatopathy. Examines volume depleted. She will increase hydration plus sodium. She will discontinue Tylenol. Repeat CMP early next week  8. Hyperkalemia  · Due to INESSA and decreased distal delivery of sodium. She will avoid potassium gluttony and increase hydration. Continue to hold ACE inhibitor. Avoid NSAIDs        HPI: Cici Strong is a 60 y.o. female who is here for hospital follow-up    She was hospitalized at [address] [place] [address] earlier this month for sepsis due to E. coli bacteremia in the setting of presumed pyelonephritis treated with ceftriaxone switch to cefpodoxime upon discharge completed yesterday. Suffered acute tubular necrosis acute kidney injury with peak creatinine 3.1 mg/dL on 7/9 for which nephrology consulted    Pertinent medical problems include stage IIIa chronic kidney disease, type 2 diabetes mellitus, hypertension, osteoarthritis, cholecystectomy, albuminuria, history of NSAID use    Patient remains in acute kidney injury with concomitant mild hyperkalemia, hypercalcemia. Fevers have resolved. Still feels weak. Poor appetite. She will increase hydration in addition to sodium i.e. Pedialyte or salty snacks just not potato products. Repeat lab work early next week. Continue to hold lisinopril, metformin and NSAIDs. ROS:   Review of Systems   Constitutional: Positive for fatigue and fever. Negative for chills. HENT: Negative for ear pain and sore throat. Eyes: Negative for pain and visual disturbance. Respiratory: Negative for cough and shortness of breath. Cardiovascular: Negative for chest pain and palpitations. Gastrointestinal: Negative for abdominal pain and vomiting. Genitourinary: Negative for dysuria and hematuria. Musculoskeletal: Positive for arthralgias. Negative for back pain. Skin: Negative for color change and rash. Neurological: Positive for weakness. Negative for seizures and syncope. All other systems reviewed and are negative.       Allergies: Pollen extract and Vancomycin    Medications:   Current Outpatient Medications:   •  Alpha-Lipoic Acid 300 MG CAPS, Take 300 mg by mouth 2 (two) times a day, Disp: , Rfl:   •  clonazePAM (KlonoPIN) 0.5 mg tablet, Take 2 tablets (1 mg total) by mouth daily at bedtime as needed for anxiety, Disp: 60 tablet, Rfl: 0  •  Diclofenac Sodium (VOLTAREN) 1 %, Apply 2 g topically 4 (four) times a day, Disp: 20 g, Rfl: 2  •  DULoxetine (CYMBALTA) 60 mg delayed release capsule, Take 1 capsule (60 mg total) by mouth 2 (two) times a day, Disp: 180 capsule, Rfl: 0  •  famotidine (PEPCID) 20 mg tablet, Take 20 mg by mouth daily, Disp: , Rfl:   •  gabapentin (NEURONTIN) 100 mg capsule, One in am, one at lunch, and 3 at hs, Disp: 360 capsule, Rfl: 1  •  IRON HEME POLYPEPTIDE PO, Take 20 mg by mouth 3 (three) times a week, Disp: , Rfl:   •  Multiple Vitamins-Minerals (CENTRUM SILVER PO), Take 1 tablet by mouth daily, Disp: , Rfl:   •  tiZANidine (ZANAFLEX) 2 mg tablet, Take 1 tablet (2 mg total) by mouth every 8 (eight) hours as needed for muscle spasms, Disp: 45 tablet, Rfl: 3  •  clonazePAM (KlonoPIN) 1 mg tablet, Take 1 tablet (1 mg total) by mouth daily at bedtime, Disp: 30 tablet, Rfl: 0  •  docusate sodium (COLACE) 100 mg capsule, Take 100 mg by mouth daily as needed for constipation, Disp: , Rfl:     Past Medical History:   Diagnosis Date   • Diabetes mellitus ([address])    • Hypertension    • Migraine    • MRSA (methicillin resistant Staphylococcus aureus)      Past Surgical History:   Procedure Laterality Date   • ACHILLES TENDON REPAIR     • ANKLE SURGERY Left 2019    Excision of Lipoma   • CARPAL TUNNEL RELEASE Right    •  SECTION     • CHOLECYSTECTOMY     • GANGLION CYST EXCISION Left     Left wrist   • LUMBAR LAMINECTOMY  2018    x 3 disks   • NASAL SINUS SURGERY  2000     Family History   Problem Relation Age of Onset   • Hypertension Mother    • Hepatitis Mother         Hep C   • Stroke Father    • Arrhythmia Father    • Melanoma Father    • No Known Problems Sister    • No Known Problems Maternal Grandmother    • No Known Problems Maternal Grandfather    • No Known Problems Paternal Grandmother    • No Known Problems Paternal Grandfather    • No Known Problems Son    • No Known Problems Maternal Aunt    • No Known Problems Maternal Aunt    • No Known Problems Maternal Aunt    • No Known Problems Maternal Aunt    • No Known Problems Paternal Aunt       reports that she quit smoking about 8 years ago. Her smoking use included cigarettes. She uses smokeless tobacco. She reports that she does not currently use alcohol. She reports that she does not use drugs. Physical Exam:   Vitals:    07/20/23 1233   BP: 128/78   BP Location: Right arm   Patient Position: Sitting   Cuff Size: Large   Pulse: 92   SpO2: 95%   Weight: 117 kg (259 lb)   Height: 5' 7" (1.702 m)     Body mass index is 40.57 kg/m². General: conscious, cooperative, in no acute distress, appears stated age  Eyes: conjunctivae pale, anicteric sclerae  ENT: lips and mucous membranes moist  Neck: supple, no JVD, no masses  Chest:  essentially clear breath sounds bilaterally, no crackles, ronchus or wheezings  CVS: S1 & S2, normal rate, regular rhythm  Abdomen: soft, non-tender, non-distended, normoactive bowel sounds, rounded  Extremities: no edema of both legs  Skin: no rash   Neuro: awake, alert, oriented       Diagnostic Data:  Lab: I have personally reviewed pertinent lab results. ,   CBC:  Results from last 7 days   Lab Units 07/14/23  0535   WBC Thousand/uL 10.66*   HEMOGLOBIN g/dL 9.1*   HEMATOCRIT % 28.5*   PLATELETS Thousands/uL 246      CMP: No results found for: "SODIUM", "K", "CL", "CO2", "ANIONGAP", "BUN", "CREATININE", "GLUCOSE", "CALCIUM", "AST", "ALT", "ALKPHOS", "PROT", "BILITOT", "EGFR",   PT/INR: No results found for: "PT", "INR",   Magnesium: No components found for: "MAG",  Phosphorous: No results found for: "PHOS"    Patient Instructions   It was nice to see you today. Continue to hold lisinopril. Repeat lab work early next week. Avoid potassium gluttony (avoid potatoes, tomatoes, avocado). Increase sodium intake over the next day or so in addition to hydration. No NSAIDs      Portions of the record may have been created with voice recognition software. Occasional wrong word or "sound a like" substitutions may have occurred due to the inherent limitations of voice recognition software. Read the chart carefully and recognize, using context, where substitutions have occurred. If you have any questions, please contact the dictating provider.

## 2023-07-24 DIAGNOSIS — F41.1 GENERALIZED ANXIETY DISORDER: ICD-10-CM

## 2023-07-25 ENCOUNTER — TELEPHONE (OUTPATIENT)
Dept: NEPHROLOGY | Facility: CLINIC | Age: 63
End: 2023-07-25

## 2023-07-25 ENCOUNTER — APPOINTMENT (OUTPATIENT)
Dept: LAB | Facility: MEDICAL CENTER | Age: 63
End: 2023-07-25
Payer: COMMERCIAL

## 2023-07-25 DIAGNOSIS — N17.9 AKI (ACUTE KIDNEY INJURY) (HCC): ICD-10-CM

## 2023-07-25 LAB
ALBUMIN SERPL BCP-MCNC: 3 G/DL (ref 3.5–5)
ALP SERPL-CCNC: 100 U/L (ref 46–116)
ALT SERPL W P-5'-P-CCNC: 67 U/L (ref 12–78)
ANION GAP SERPL CALCULATED.3IONS-SCNC: 6 MMOL/L
AST SERPL W P-5'-P-CCNC: 24 U/L (ref 5–45)
BACTERIA UR QL AUTO: ABNORMAL /HPF
BASOPHILS # BLD AUTO: 0.07 THOUSANDS/ÂΜL (ref 0–0.1)
BASOPHILS NFR BLD AUTO: 1 % (ref 0–1)
BILIRUB SERPL-MCNC: 0.33 MG/DL (ref 0.2–1)
BILIRUB UR QL STRIP: NEGATIVE
BUN SERPL-MCNC: 22 MG/DL (ref 5–25)
CALCIUM ALBUM COR SERPL-MCNC: 10.4 MG/DL (ref 8.3–10.1)
CALCIUM SERPL-MCNC: 9.6 MG/DL (ref 8.3–10.1)
CHLORIDE SERPL-SCNC: 106 MMOL/L (ref 96–108)
CLARITY UR: CLEAR
CO2 SERPL-SCNC: 24 MMOL/L (ref 21–32)
COLOR UR: COLORLESS
CREAT SERPL-MCNC: 1.92 MG/DL (ref 0.6–1.3)
CREAT UR-MCNC: 32.8 MG/DL
CREAT UR-MCNC: 32.8 MG/DL
EOSINOPHIL # BLD AUTO: 0.07 THOUSAND/ÂΜL (ref 0–0.61)
EOSINOPHIL NFR BLD AUTO: 1 % (ref 0–6)
ERYTHROCYTE [DISTWIDTH] IN BLOOD BY AUTOMATED COUNT: 12.6 % (ref 11.6–15.1)
GFR SERPL CREATININE-BSD FRML MDRD: 27 ML/MIN/1.73SQ M
GLUCOSE P FAST SERPL-MCNC: 140 MG/DL (ref 65–99)
GLUCOSE UR STRIP-MCNC: NEGATIVE MG/DL
HCT VFR BLD AUTO: 28.7 % (ref 34.8–46.1)
HGB BLD-MCNC: 9.1 G/DL (ref 11.5–15.4)
HGB UR QL STRIP.AUTO: NEGATIVE
IMM GRANULOCYTES # BLD AUTO: 0.03 THOUSAND/UL (ref 0–0.2)
IMM GRANULOCYTES NFR BLD AUTO: 0 % (ref 0–2)
KETONES UR STRIP-MCNC: NEGATIVE MG/DL
LEUKOCYTE ESTERASE UR QL STRIP: ABNORMAL
LYMPHOCYTES # BLD AUTO: 1.56 THOUSANDS/ÂΜL (ref 0.6–4.47)
LYMPHOCYTES NFR BLD AUTO: 22 % (ref 14–44)
MAGNESIUM SERPL-MCNC: 2.2 MG/DL (ref 1.6–2.6)
MCH RBC QN AUTO: 30 PG (ref 26.8–34.3)
MCHC RBC AUTO-ENTMCNC: 31.7 G/DL (ref 31.4–37.4)
MCV RBC AUTO: 95 FL (ref 82–98)
MICROALBUMIN UR-MCNC: 17.5 MG/L (ref 0–20)
MICROALBUMIN/CREAT 24H UR: 53 MG/G CREATININE (ref 0–30)
MONOCYTES # BLD AUTO: 0.37 THOUSAND/ÂΜL (ref 0.17–1.22)
MONOCYTES NFR BLD AUTO: 5 % (ref 4–12)
NEUTROPHILS # BLD AUTO: 5 THOUSANDS/ÂΜL (ref 1.85–7.62)
NEUTS SEG NFR BLD AUTO: 71 % (ref 43–75)
NITRITE UR QL STRIP: NEGATIVE
NON-SQ EPI CELLS URNS QL MICRO: ABNORMAL /HPF
NRBC BLD AUTO-RTO: 0 /100 WBCS
PH UR STRIP.AUTO: 6 [PH]
PHOSPHATE SERPL-MCNC: 3.5 MG/DL (ref 2.3–4.1)
PLATELET # BLD AUTO: 540 THOUSANDS/UL (ref 149–390)
PMV BLD AUTO: 9 FL (ref 8.9–12.7)
POTASSIUM SERPL-SCNC: 4.6 MMOL/L (ref 3.5–5.3)
PROT SERPL-MCNC: 8.7 G/DL (ref 6.4–8.4)
PROT UR STRIP-MCNC: NEGATIVE MG/DL
PROT UR-MCNC: 8 MG/DL
PROT/CREAT UR: 0.24 MG/G{CREAT} (ref 0–0.1)
RBC # BLD AUTO: 3.03 MILLION/UL (ref 3.81–5.12)
RBC #/AREA URNS AUTO: ABNORMAL /HPF
SODIUM SERPL-SCNC: 136 MMOL/L (ref 135–147)
SP GR UR STRIP.AUTO: 1.01 (ref 1–1.03)
UROBILINOGEN UR STRIP-ACNC: <2 MG/DL
WBC # BLD AUTO: 7.1 THOUSAND/UL (ref 4.31–10.16)
WBC #/AREA URNS AUTO: ABNORMAL /HPF

## 2023-07-25 PROCEDURE — 85025 COMPLETE CBC W/AUTO DIFF WBC: CPT

## 2023-07-25 PROCEDURE — 82570 ASSAY OF URINE CREATININE: CPT | Performed by: NURSE PRACTITIONER

## 2023-07-25 PROCEDURE — 81001 URINALYSIS AUTO W/SCOPE: CPT | Performed by: NURSE PRACTITIONER

## 2023-07-25 PROCEDURE — 84100 ASSAY OF PHOSPHORUS: CPT

## 2023-07-25 PROCEDURE — 83735 ASSAY OF MAGNESIUM: CPT

## 2023-07-25 PROCEDURE — 80053 COMPREHEN METABOLIC PANEL: CPT

## 2023-07-25 PROCEDURE — 82043 UR ALBUMIN QUANTITATIVE: CPT | Performed by: NURSE PRACTITIONER

## 2023-07-25 PROCEDURE — 84156 ASSAY OF PROTEIN URINE: CPT | Performed by: NURSE PRACTITIONER

## 2023-07-25 PROCEDURE — 36415 COLL VENOUS BLD VENIPUNCTURE: CPT

## 2023-07-25 RX ORDER — CLONAZEPAM 0.5 MG/1
1 TABLET ORAL
Qty: 60 TABLET | Refills: 0 | Status: SHIPPED | OUTPATIENT
Start: 2023-07-25

## 2023-07-25 NOTE — TELEPHONE ENCOUNTER
She can drink regular water, the damage done to the kidneys was mostly from the infection she had. So proper hydration will allow her kidneys to work normally without added stress.

## 2023-07-25 NOTE — TELEPHONE ENCOUNTER
Patient called asking what she can drink to hydrate? Patient stated she does not want to cause further damage to her kidneys since being in the hospital. Please advise.

## 2023-07-28 ENCOUNTER — APPOINTMENT (OUTPATIENT)
Dept: LAB | Facility: MEDICAL CENTER | Age: 63
End: 2023-07-28
Payer: COMMERCIAL

## 2023-07-28 ENCOUNTER — OFFICE VISIT (OUTPATIENT)
Dept: FAMILY MEDICINE CLINIC | Facility: CLINIC | Age: 63
End: 2023-07-28
Payer: COMMERCIAL

## 2023-07-28 VITALS
SYSTOLIC BLOOD PRESSURE: 122 MMHG | DIASTOLIC BLOOD PRESSURE: 78 MMHG | OXYGEN SATURATION: 67 % | TEMPERATURE: 96.5 F | BODY MASS INDEX: 41.44 KG/M2 | WEIGHT: 264 LBS | HEART RATE: 102 BPM | HEIGHT: 67 IN

## 2023-07-28 DIAGNOSIS — N18.32 CHRONIC KIDNEY DISEASE (CKD) STAGE G3B/A3, MODERATELY DECREASED GLOMERULAR FILTRATION RATE (GFR) BETWEEN 30-44 ML/MIN/1.73 SQUARE METER AND ALBUMINURIA CREATININE RATIO GREATER THAN 300 MG/G (HCC): ICD-10-CM

## 2023-07-28 DIAGNOSIS — I10 ESSENTIAL HYPERTENSION: ICD-10-CM

## 2023-07-28 DIAGNOSIS — N18.31 TYPE 2 DIABETES MELLITUS WITH STAGE 3A CHRONIC KIDNEY DISEASE, WITHOUT LONG-TERM CURRENT USE OF INSULIN (HCC): ICD-10-CM

## 2023-07-28 DIAGNOSIS — D63.1 ANEMIA DUE TO CHRONIC KIDNEY DISEASE, UNSPECIFIED CKD STAGE: ICD-10-CM

## 2023-07-28 DIAGNOSIS — N17.9 AKI (ACUTE KIDNEY INJURY) (HCC): ICD-10-CM

## 2023-07-28 DIAGNOSIS — Z76.89 ENCOUNTER FOR SUPPORT AND COORDINATION OF TRANSITION OF CARE: Primary | ICD-10-CM

## 2023-07-28 DIAGNOSIS — N18.9 ANEMIA DUE TO CHRONIC KIDNEY DISEASE, UNSPECIFIED CKD STAGE: ICD-10-CM

## 2023-07-28 DIAGNOSIS — A41.51: ICD-10-CM

## 2023-07-28 DIAGNOSIS — R65.21: ICD-10-CM

## 2023-07-28 DIAGNOSIS — E11.22 TYPE 2 DIABETES MELLITUS WITH STAGE 3A CHRONIC KIDNEY DISEASE, WITHOUT LONG-TERM CURRENT USE OF INSULIN (HCC): ICD-10-CM

## 2023-07-28 LAB
ANION GAP SERPL CALCULATED.3IONS-SCNC: 7 MMOL/L
BACTERIA UR QL AUTO: ABNORMAL /HPF
BILIRUB UR QL STRIP: NEGATIVE
BUN SERPL-MCNC: 21 MG/DL (ref 5–25)
CALCIUM SERPL-MCNC: 9.8 MG/DL (ref 8.3–10.1)
CHLORIDE SERPL-SCNC: 107 MMOL/L (ref 96–108)
CLARITY UR: CLEAR
CO2 SERPL-SCNC: 21 MMOL/L (ref 21–32)
COLOR UR: ABNORMAL
CREAT SERPL-MCNC: 2.04 MG/DL (ref 0.6–1.3)
FERRITIN SERPL-MCNC: 260 NG/ML (ref 11–307)
GFR SERPL CREATININE-BSD FRML MDRD: 25 ML/MIN/1.73SQ M
GLUCOSE P FAST SERPL-MCNC: 176 MG/DL (ref 65–99)
GLUCOSE UR STRIP-MCNC: NEGATIVE MG/DL
HGB UR QL STRIP.AUTO: NEGATIVE
IRON SATN MFR SERPL: 15 % (ref 15–50)
IRON SERPL-MCNC: 38 UG/DL (ref 50–170)
KETONES UR STRIP-MCNC: NEGATIVE MG/DL
LEUKOCYTE ESTERASE UR QL STRIP: ABNORMAL
NITRITE UR QL STRIP: NEGATIVE
NON-SQ EPI CELLS URNS QL MICRO: ABNORMAL /HPF
PH UR STRIP.AUTO: 6 [PH]
POTASSIUM SERPL-SCNC: 4.6 MMOL/L (ref 3.5–5.3)
PROT UR STRIP-MCNC: ABNORMAL MG/DL
RBC #/AREA URNS AUTO: ABNORMAL /HPF
SODIUM SERPL-SCNC: 135 MMOL/L (ref 135–147)
SP GR UR STRIP.AUTO: 1.01 (ref 1–1.03)
TIBC SERPL-MCNC: 254 UG/DL (ref 250–450)
UROBILINOGEN UR STRIP-ACNC: <2 MG/DL
WBC #/AREA URNS AUTO: ABNORMAL /HPF
WBC CLUMPS # UR AUTO: PRESENT /UL

## 2023-07-28 PROCEDURE — 99495 TRANSJ CARE MGMT MOD F2F 14D: CPT | Performed by: FAMILY MEDICINE

## 2023-07-28 PROCEDURE — 82728 ASSAY OF FERRITIN: CPT

## 2023-07-28 PROCEDURE — 81001 URINALYSIS AUTO W/SCOPE: CPT | Performed by: FAMILY MEDICINE

## 2023-07-28 PROCEDURE — 87186 SC STD MICRODIL/AGAR DIL: CPT | Performed by: FAMILY MEDICINE

## 2023-07-28 PROCEDURE — 87086 URINE CULTURE/COLONY COUNT: CPT | Performed by: FAMILY MEDICINE

## 2023-07-28 PROCEDURE — 80048 BASIC METABOLIC PNL TOTAL CA: CPT

## 2023-07-28 PROCEDURE — 83540 ASSAY OF IRON: CPT

## 2023-07-28 PROCEDURE — 36415 COLL VENOUS BLD VENIPUNCTURE: CPT

## 2023-07-28 PROCEDURE — 83550 IRON BINDING TEST: CPT

## 2023-07-28 NOTE — PROGRESS NOTES
Assessment & Plan     1. Encounter for support and coordination of transition of care    2. Sepsis due to Escherichia coli with acute organ dysfunction and septic shock, unspecified type ([address])  -     Urinalysis with microscopic  -     Urine culture    3. INESSA (acute kidney injury) ([address])  -     Urinalysis with microscopic    4. Chronic kidney disease (CKD) stage G3b/A3, moderately decreased glomerular filtration rate (GFR) between 30-44 mL/min/1.73 square meter and albuminuria creatinine ratio greater than 300 mg/g ([facility])  -     Urine culture    5. Type 2 diabetes mellitus with stage 3a chronic kidney disease, without long-term current use of insulin ([address])    6. Essential hypertension    7. Anemia due to chronic kidney disease, unspecified CKD stage  -     Iron Panel (Includes Ferritin, Iron Sat%, Iron, and TIBC); Future         Subjective     Transitional Care Management Review:   Graeme Evans is a 60 y.o. female here for TCM follow up.      During the TCM phone call patient stated:  TCM Call     Date and time call was made  7/17/2023  9:32 AM    Hospital care reviewed  Records reviewed    Patient was hospitialized at  [address] [place] [address]    Date of Admission  07/09/23    Date of discharge  07/14/23    Diagnosis  sepsis due to escherichia coli w renal failure, acute    Disposition  Home    Were the patients medications reviewed and updated  No    Current Symptoms  None      TCM Call     Post hospital issues  None    Patients specialists  Nephrologist  infectious disease    Did you obtain your prescribed medications  Yes    Do you need help managing your prescriptions or medications  No    I have advised the patient to call PCP with any new or worsening symptoms  Hoang Maximus,     Counseling  Patient        This is a transition of care on Mrs. Raines Plan ago she was hospitalized with sepsis secondary to E. coli pyelonephritis and she also had acute kidney injury with acute tubular necrosis her she did have a spike in her creatinine patient was hospitalized treated with intravenous antibiotics and then discharged on cefepime orally which she has finished she is back to her baseline no fevers or chills    Review of Systems   Constitutional: Positive for activity change and fatigue. Negative for appetite change, diaphoresis and fever. HENT: Negative. Eyes: Positive for visual disturbance. Wears glasses   Respiratory: Negative for apnea, cough, chest tightness, shortness of breath and wheezing. Cardiovascular: Positive for leg swelling. Negative for chest pain and palpitations. Gastrointestinal: Negative for abdominal distention, abdominal pain, anal bleeding, constipation, diarrhea, nausea and vomiting. Endocrine: Negative for cold intolerance, heat intolerance, polydipsia, polyphagia and polyuria. Genitourinary: Negative for difficulty urinating, dysuria, flank pain, hematuria and urgency. Musculoskeletal: Positive for arthralgias. Negative for back pain, gait problem, joint swelling and myalgias. Skin: Negative for color change, rash and wound. Allergic/Immunologic: Negative for environmental allergies, food allergies and immunocompromised state. Neurological: Negative for dizziness, seizures, syncope, speech difficulty, numbness and headaches. Hematological: Negative for adenopathy. Does not bruise/bleed easily. Psychiatric/Behavioral: Negative for agitation, behavioral problems, hallucinations, sleep disturbance and suicidal ideas. Objective     /78 (BP Location: Left arm, Patient Position: Sitting, Cuff Size: Standard)   Pulse 102   Temp (!) 96.5 °F (35.8 °C) (Temporal)   Ht 5' 7" (1.702 m)   Wt 120 kg (264 lb)   LMP 04/10/2017   SpO2 (!) 67%   BMI 41.35 kg/m²      Physical Exam  Constitutional:       Appearance: She is well-developed. HENT:      Head: Normocephalic and atraumatic.       Right Ear: External ear normal.      Left Ear: External ear normal. Nose: Nose normal.   Eyes:      Conjunctiva/sclera: Conjunctivae normal.      Pupils: Pupils are equal, round, and reactive to light. Cardiovascular:      Rate and Rhythm: Normal rate and regular rhythm. Heart sounds: Normal heart sounds. No murmur heard. No friction rub. Pulmonary:      Effort: Pulmonary effort is normal. No respiratory distress. Breath sounds: Normal breath sounds. No wheezing or rales. Chest:      Chest wall: No tenderness. Abdominal:      General: Bowel sounds are normal.      Palpations: Abdomen is soft. Musculoskeletal:         General: Normal range of motion. Cervical back: Normal range of motion and neck supple. Skin:     General: Skin is warm and dry. Capillary Refill: Capillary refill takes 2 to 3 seconds. Neurological:      Mental Status: She is alert and oriented to person, place, and time. Psychiatric:         Behavior: Behavior normal.         Thought Content:  Thought content normal.         Judgment: Judgment normal.       Medications have been reviewed by provider in current encounter    Mecca Mcduffie DO

## 2023-07-30 LAB
BACTERIA UR CULT: ABNORMAL
BACTERIA UR CULT: ABNORMAL

## 2023-07-31 ENCOUNTER — TELEPHONE (OUTPATIENT)
Dept: OTHER | Facility: OTHER | Age: 63
End: 2023-07-31

## 2023-07-31 DIAGNOSIS — N30.00 ACUTE CYSTITIS WITHOUT HEMATURIA: Primary | ICD-10-CM

## 2023-07-31 NOTE — RESULT ENCOUNTER NOTE
Please call the patient regarding her abnormal result.   Patient has a urinary tract infection with Pseudomonas we will give her a prescription for levofloxacin at a reduced dose due to her kidney function also patient cannot take tizanidine which is her muscle relaxer or any of her multiple vitamins or iron supplements until she is finished with the Cipro they do not mix together and can cause side effect

## 2023-07-31 NOTE — RESULT ENCOUNTER NOTE
Please call the patient regarding her abnormal result.   Her kidney function has returned to its baseline but her GFR is 29 so she has a GFR chronic kidney disease stage of stage IV which is not good at all make sure that she has follow-up appointments with her nephrologist and he should avoid any nephrotoxic drugs such as ibuprofen or Aleve

## 2023-08-01 ENCOUNTER — TELEPHONE (OUTPATIENT)
Dept: FAMILY MEDICINE CLINIC | Facility: CLINIC | Age: 63
End: 2023-08-01

## 2023-08-01 DIAGNOSIS — N30.00 ACUTE CYSTITIS WITHOUT HEMATURIA: Primary | ICD-10-CM

## 2023-08-01 RX ORDER — CIPROFLOXACIN 250 MG/1
250 TABLET, FILM COATED ORAL EVERY 12 HOURS SCHEDULED
Qty: 10 TABLET | Refills: 0 | Status: SHIPPED | OUTPATIENT
Start: 2023-08-01 | End: 2023-08-06

## 2023-08-06 NOTE — PROGRESS NOTES
NEPHROLOGY PROGRESS NOTE   Jatin Solares 60 y.o. female MRN: [id number]  Unit/Bed#: 405-01 Encounter: [id number]    Assessment/Plan:    59 yo female with CKD 3B, presumed diabetic nephropathy who follows with Dr. Gloria Baldwin being treated for E. coli bacteremia in the setting of presumed pyelonephritis. Tentative discharge today on cefpodoxime per ID through 7/18. Nephrology following along for INESSA on CKD    1. Acute kidney injury (POA) atop chronic kidney disease  · Peak creatinine 3.1 mg/dL-> 2.1 mg/dL today attributed to ATN from sepsis and failure to autoregulate from ACE inhibitor. Patient may be discharged today which is fine from a nephrology perspective with the following recommendations: Continue to hold lisinopril. Repeat lab work early next week and then follow-up with nephrology in [facility] office  2. Stage IIIb chronic kidney disease with baseline creatinine around 1.3-1.4 mg/dL  · Follows with Dr. Gloria Baldwin. Potentially due to diabetic nephropathy, hypertensive nephrosclerosis, obesity  3. Proteinuria  · Likely due to diabetic nephropathy. Lisinopril is on hold. Not a candidate for SGLT2 inhibitor at this time given pyelonephritis  4. Presumed diabetic nephropathy  · Glucose management per primary team  5.  E. coli bacteremia in the setting of presumed pyelonephritis  · Management per infectious disease and primary team  6. Hypertension in the setting of CKD 3  · Blood pressure is appropriate. Continue to hold lisinopril until steady stable state and reevaluated by nephrology as an outpatient  7. Hypovolemic hyponatremia-resolved  8. Pseudohyperkalemia    ROS  No physical complaints. States she wants to go home. A complete 10 point review of systems have been performed and are otherwise negative.        Historical Information   Past Medical History:   Diagnosis Date   • Diabetes mellitus ([address])    • Hypertension    • Migraine    • MRSA (methicillin resistant Staphylococcus aureus) Past Surgical History:   Procedure Laterality Date   • ACHILLES TENDON REPAIR     • ANKLE SURGERY Left 2019    Excision of Lipoma   • CARPAL TUNNEL RELEASE Right    •  SECTION     • CHOLECYSTECTOMY     • GANGLION CYST EXCISION Left     Left wrist   • LUMBAR LAMINECTOMY  2018    x 3 disks   • NASAL SINUS SURGERY       Social History   Social History     Substance and Sexual Activity   Alcohol Use Not Currently     Social History     Substance and Sexual Activity   Drug Use Never     Social History     Tobacco Use   Smoking Status Former   • Years: 50.00   • Types: Cigarettes   • Quit date:    • Years since quittin.5   Smokeless Tobacco Current   Tobacco Comments    Vapes - Daily - includes 3 mg nicotine       Family History:   Family History   Problem Relation Age of Onset   • Hypertension Mother    • Hepatitis Mother         Hep C   • Stroke Father    • Arrhythmia Father    • Melanoma Father    • No Known Problems Sister    • No Known Problems Maternal Grandmother    • No Known Problems Maternal Grandfather    • No Known Problems Paternal Grandmother    • No Known Problems Paternal Grandfather    • No Known Problems Son    • No Known Problems Maternal Aunt    • No Known Problems Maternal Aunt    • No Known Problems Maternal Aunt    • No Known Problems Maternal Aunt    • No Known Problems Paternal Aunt        Medications:  Pertinent medications were reviewed  Current Facility-Administered Medications   Medication Dose Route Frequency Provider Last Rate   • acetaminophen  650 mg Oral Q6H PRN Real Furth Nilsa, DO     • aluminum-magnesium hydroxide-simethicone  30 mL Oral Q6H PRN Real Furth Nilsa, DO     • cefTRIAXone  2,000 mg Intravenous Q24H Real Furth Nilsa, DO 2,000 mg (23 [phone])   • clonazePAM  1 mg Oral HS Real Furth Nilsa, DO     • docusate sodium  100 mg Oral Daily PRN Real Furth Nilsa, DO     • DULoxetine  60 mg Oral BID Real Furth Nilsa, DO     • gabapentin  100 mg Oral BID (AM & Afternoon) Radha Vazquez, DO     • heparin (porcine)  5,000 Units Subcutaneous [place], [place]     • insulin lispro  1-5 Units Subcutaneous TID [facility] [place], DO     • insulin lispro  1-5 Units Subcutaneous HS [facility], DO     • nicotine  1 patch Transdermal Daily [place], [place]     • ondansetron  4 mg Intravenous Q6H PRN [facility] Nilsa, DO     • tiZANidine  2 mg Oral Q8H PRN [facility] Nilsa, DO           Allergies   Allergen Reactions   • Pollen Extract Sneezing   • Vancomycin Rash         Vitals:   /67 (BP Location: Left arm)   Pulse 95   Temp (!) 97.4 °F (36.3 °C) (Oral)   Resp 20   Ht 5' 7" (1.702 m)   Wt 124 kg (274 lb 7.6 oz)   LMP 04/10/2017   SpO2 96%   BMI 42.99 kg/m²   Body mass index is 42.99 kg/m². SpO2: 96 %,   SpO2 Activity: At Rest,   O2 Device: None (Room air)      Intake/Output Summary (Last 24 hours) at 7/14/2023 0933  Last data filed at 7/14/2023 1742  Gross per 24 hour   Intake 840 ml   Output --   Net 840 ml     Invasive Devices     Peripheral Intravenous Line  Duration           Peripheral IV 07/13/23 Right Antecubital 1 day                Physical Exam  General: conscious, cooperative, in no acute distress  Eyes: conjunctivae pink, anicteric sclerae  ENT: lips and mucous membranes moist  Neck: supple, no JVD, no masses  Chest: clear breath sounds bilaterally, no crackles, ronchus or wheezings  CVS: S1 & S2, normal rate, regular rhythm  Abdomen: soft, non-tender, non-distended, normoactive bowel sounds obese  Extremities: Trace edema of both legs  Skin: no rash  Neuro: awake, alert, oriented      Diagnostic Data:  Lab: I have personally reviewed pertinent lab results. ,   CBC:  Results from last 7 days   Lab Units 07/14/23  0535   WBC Thousand/uL 10.66*   HEMOGLOBIN g/dL 9.1*   HEMATOCRIT % 28.5*   PLATELETS Thousands/uL 246      CMP:   Lab Results   Component Value Date    SODIUM 135 07/14/2023    K 5.0 07/14/2023     07/14/2023    CO2 22 07/14/2023    BUN 36 (H) 07/14/2023    CREATININE 2.10 (H) 07/14/2023    CALCIUM 9.3 07/14/2023    EGFR 24 07/14/2023   ,   PT/INR: No results found for: "PT", "INR",   Magnesium: No components found for: "MAG",  Phosphorous: No results found for: "PHOS"    Microbiology:  @[facility],(urinecx:7)@        COL[facility]    Portions of the record may have been created with voice recognition software. Occasional wrong word or "sound a like" substitutions may have occurred due to the inherent limitations of voice recognition software. Read the chart carefully and recognize, using context, where substitutions have occurred. [Purulent Effusion] : purulent effusion [Mucoid Discharge] : mucoid discharge [Inflamed Nasal Mucosa] : inflamed nasal mucosa [Hypertrophied Nasal Mucosa] : hypertrophied nasal mucosa [Clear] : right tympanic membrane clear [Erythema] : erythema [Bulging] : bulging [NL] : clear to auscultation bilaterally [Regular Rate and Rhythm] : regular rate and rhythm [Normal S1, S2 audible] : normal S1, S2 audible [Soft] : soft [Normal Bowel Sounds] : normal bowel sounds [Conjuctival Injection] : no conjunctival injection [Erythematous Oropharynx] : nonerythematous oropharynx [Enlarged Tonsils] : tonsils not enlarged [Wheezing] : no wheezing [Crackles] : no crackles [Rhonchi] : no rhonchi [Tender] : nontender [Distended] : nondistended [Warm, Well Perfused x4] : warm, well perfused x4 [FreeTextEntry3] : mild erythema [FreeTextEntry7] : no tachypnea or retractions [de-identified] : comedonal acne on face

## 2023-08-14 ENCOUNTER — OFFICE VISIT (OUTPATIENT)
Dept: NEPHROLOGY | Facility: CLINIC | Age: 63
End: 2023-08-14

## 2023-08-14 VITALS
WEIGHT: 248 LBS | DIASTOLIC BLOOD PRESSURE: 72 MMHG | OXYGEN SATURATION: 95 % | SYSTOLIC BLOOD PRESSURE: 118 MMHG | HEIGHT: 67 IN | BODY MASS INDEX: 38.92 KG/M2 | HEART RATE: 101 BPM

## 2023-08-14 DIAGNOSIS — N39.0 RECURRENT UTI: ICD-10-CM

## 2023-08-14 DIAGNOSIS — D50.0 IRON DEFICIENCY ANEMIA DUE TO CHRONIC BLOOD LOSS: ICD-10-CM

## 2023-08-14 DIAGNOSIS — N18.31 TYPE 2 DIABETES MELLITUS WITH STAGE 3A CHRONIC KIDNEY DISEASE, WITHOUT LONG-TERM CURRENT USE OF INSULIN (HCC): ICD-10-CM

## 2023-08-14 DIAGNOSIS — N18.32 CHRONIC KIDNEY DISEASE (CKD) STAGE G3B/A3, MODERATELY DECREASED GLOMERULAR FILTRATION RATE (GFR) BETWEEN 30-44 ML/MIN/1.73 SQUARE METER AND ALBUMINURIA CREATININE RATIO GREATER THAN 300 MG/G (HCC): ICD-10-CM

## 2023-08-14 DIAGNOSIS — I10 ESSENTIAL HYPERTENSION: ICD-10-CM

## 2023-08-14 DIAGNOSIS — N17.9 AKI (ACUTE KIDNEY INJURY) (HCC): Primary | ICD-10-CM

## 2023-08-14 DIAGNOSIS — E11.22 TYPE 2 DIABETES MELLITUS WITH STAGE 3A CHRONIC KIDNEY DISEASE, WITHOUT LONG-TERM CURRENT USE OF INSULIN (HCC): ICD-10-CM

## 2023-08-14 RX ORDER — GLIMEPIRIDE 1 MG/1
1 TABLET ORAL
Qty: 30 TABLET | Refills: 3 | Status: SHIPPED | OUTPATIENT
Start: 2023-08-14 | End: 2023-08-15

## 2023-08-14 RX ORDER — METHENAMINE HIPPURATE 1000 MG/1
1 TABLET ORAL 2 TIMES DAILY WITH MEALS
Qty: 60 TABLET | Refills: 5 | Status: SHIPPED | OUTPATIENT
Start: 2023-08-14

## 2023-08-14 NOTE — PATIENT INSTRUCTIONS
Increase the iron supplement to 1 tab twice daily. Continue to hold metformin and lisinopril for now.

## 2023-08-14 NOTE — ASSESSMENT & PLAN NOTE
Blood pressures currently well controlled, she is off of lisinopril. Continue with low-sodium diet, currently not on antihypertensive medications.

## 2023-08-14 NOTE — ASSESSMENT & PLAN NOTE
Patient remains off of metformin and lisinopril which is appropriate. Creatinine is also above typical baseline, will continue to monitor blood work on a regular basis with next of labs in about 2 weeks. Continue avoid NSAIDs and other potential nephrotoxins at this time.

## 2023-08-14 NOTE — ASSESSMENT & PLAN NOTE
Patient restarting heme iron supplement, recommend 1 tablet twice daily for now. Reassess iron stores in the next few months. In the meantime we will reassess hemoglobin levels with next set of labs.

## 2023-08-14 NOTE — ASSESSMENT & PLAN NOTE
Patient's blood sugars have been averaging around 160 mg/dL, currently not on metformin. We will initiate low-dose glimepiride 1 mg daily, patient will also improve and adjust her diet to help reduce refined sugars as well as other starches. We will look to discontinue sulfonylurea once she has recovered kidney function and go back on her metformin. Alternatively, from a long-term standpoint, if she cannot be placed back on metformin due to reduced kidney function, recommended DDP-4 or GLP-1 going forward. She will be seeing her primary care provider in the near future.     Lab Results   Component Value Date    HGBA1C 7.3 (H) 04/21/2023

## 2023-08-14 NOTE — PROGRESS NOTES
[facility] of [address] [place] [address]    Name: Magalie Alfonso  YOB: 1960      Assessment/Plan:    IENSSA (acute kidney injury) [facility])  Patient remains off of metformin and lisinopril which is appropriate. Creatinine is also above typical baseline, will continue to monitor blood work on a regular basis with next of labs in about 2 weeks. Continue avoid NSAIDs and other potential nephrotoxins at this time. Chronic kidney disease (CKD) stage G3b/A3, moderately decreased glomerular filtration rate (GFR) between 30-44 mL/min/1.73 square meter and albuminuria creatinine ratio greater than 300 mg/g [facility])  Lab Results   Component Value Date    EGFR 25 07/28/2023    EGFR 27 07/25/2023    EGFR 26 07/19/2023    CREATININE 2.04 (H) 07/28/2023    CREATININE 1.92 (H) 07/25/2023    CREATININE 1.94 (H) 07/19/2023     Previously, patient's baseline creatinine was around 1.5 mg/dL. We will look to see her creatinine improved back to this baseline, continue to monitor blood work at this time and avoid nephrotoxins. Essential hypertension  Blood pressures currently well controlled, she is off of lisinopril. Continue with low-sodium diet, currently not on antihypertensive medications. Type 2 diabetes mellitus with stage 3a chronic kidney disease, without long-term current use of insulin (HCC)  Patient's blood sugars have been averaging around 160 mg/dL, currently not on metformin. We will initiate low-dose glimepiride 1 mg daily, patient will also improve and adjust her diet to help reduce refined sugars as well as other starches. We will look to discontinue sulfonylurea once she has recovered kidney function and go back on her metformin. Alternatively, from a long-term standpoint, if she cannot be placed back on metformin due to reduced kidney function, recommended DDP-4 or GLP-1 going forward. She will be seeing her primary care provider in the near future.     Lab Results   Component Value Date    HGBA1C 7.3 (H) 04/21/2023       Iron deficiency anemia  Patient restarting heme iron supplement, recommend 1 tablet twice daily for now. Reassess iron stores in the next few months. In the meantime we will reassess hemoglobin levels with next set of labs. Recurrent UTI  Given the patient has had back-to-back urinary tract infections, will initiate methenamine hippurate at this time. Goal is to suppress additional urinary tract infections for the next 6-12 months, and then consider discontinuation depending on she progresses from a clinical standpoint. If the patient has another urinary tract infection, she will benefit from seeing urology or Uro/Gyn  to rule out anatomic issues. Problem List Items Addressed This Visit        Endocrine    Type 2 diabetes mellitus with stage 3a chronic kidney disease, without long-term current use of insulin ([address])     Patient's blood sugars have been averaging around 160 mg/dL, currently not on metformin. We will initiate low-dose glimepiride 1 mg daily, patient will also improve and adjust her diet to help reduce refined sugars as well as other starches. We will look to discontinue sulfonylurea once she has recovered kidney function and go back on her metformin. Alternatively, from a long-term standpoint, if she cannot be placed back on metformin due to reduced kidney function, recommended DDP-4 or GLP-1 going forward. She will be seeing her primary care provider in the near future. Lab Results   Component Value Date    HGBA1C 7.3 (H) 04/21/2023            Relevant Medications    glimepiride (AMARYL) 1 mg tablet       Cardiovascular and Mediastinum    Essential hypertension     Blood pressures currently well controlled, she is off of lisinopril. Continue with low-sodium diet, currently not on antihypertensive medications.             Genitourinary    Chronic kidney disease (CKD) stage G3b/A3, moderately decreased glomerular filtration rate (GFR) between 30-44 mL/min/1.73 square meter and albuminuria creatinine ratio greater than 300 mg/g [facility])     Lab Results   Component Value Date    EGFR 25 07/28/2023    EGFR 27 07/25/2023    EGFR 26 07/19/2023    CREATININE 2.04 (H) 07/28/2023    CREATININE 1.92 (H) 07/25/2023    CREATININE 1.94 (H) 07/19/2023     Previously, patient's baseline creatinine was around 1.5 mg/dL. We will look to see her creatinine improved back to this baseline, continue to monitor blood work at this time and avoid nephrotoxins. Relevant Orders    Albumin / creatinine urine ratio    Basic metabolic panel    Urinalysis with microscopic    Hemoglobin A1C    CBC and differential    Magnesium    Phosphorus    INESSA (acute kidney injury) ([address]) - Primary     Patient remains off of metformin and lisinopril which is appropriate. Creatinine is also above typical baseline, will continue to monitor blood work on a regular basis with next of labs in about 2 weeks. Continue avoid NSAIDs and other potential nephrotoxins at this time. Recurrent UTI     Given the patient has had back-to-back urinary tract infections, will initiate methenamine hippurate at this time. Goal is to suppress additional urinary tract infections for the next 6-12 months, and then consider discontinuation depending on she progresses from a clinical standpoint. If the patient has another urinary tract infection, she will benefit from seeing urology or Uro/Gyn  to rule out anatomic issues. Relevant Medications    methenamine hippurate (HIPREX) 1 g tablet       Other    Iron deficiency anemia     Patient restarting heme iron supplement, recommend 1 tablet twice daily for now. Reassess iron stores in the next few months. In the meantime we will reassess hemoglobin levels with next set of labs. Relevant Medications    Polysaccharide Iron Complex (HEMATEX IRON COMPLEX PO)       Patient remains in acute kidney injury.   We will see her back for regular appointment in approximately 2-3 months. We will have repeat labs in 2 weeks. Additional labs or imaging may be indicated depending she progresses from a clinical standpoint. Subjective:      Patient ID: Samson Gotti is a 60 y.o. female. Patient presents for follow-up appointment. Reviewed the patient's labs in detail, most recent creatinine noted to be 2 mg/dL, estimated GFR 25 mL/min. Prior, patient's baseline creatinine was closer to 1.4-1.5 mg/dL. In the meantime however, the patient had an episode of sepsis, with significant acute kidney injury creatinine up to around 3 mg/dL. It has improved back down to 1.9-2 mg/dL, however still not improved better than those numbers in the meantime. Patient's sepsis was due to an E. coli infection during hospitalization, repeat urine culture after that noted Pseudomonas aeruginosa, pan susceptible and treated with ciprofloxacin in the outpatient setting by her primary care provider. Patient currently denies symptoms consistent with a urinary tract infection. Prior to these 2 episodes, patient denies a history of urinary tract infections. Since discharge from the hospital, the patient has been off of both her metformin and lisinopril. She denies use of nonsteroid anti-inflammatory medications. She is taking all medications as prescribed with no specific side effects at this time. The following portions of the patient's history were reviewed and updated as appropriate: allergies, current medications, past family history, past medical history, past social history, past surgical history and problem list.    Review of Systems   All other systems reviewed and are negative.         Social History     Socioeconomic History   • Marital status: Single     Spouse name: None   • Number of children: None   • Years of education: None   • Highest education level: None   Occupational History   • None   Tobacco Use   • Smoking status: Former     Years: 50.00     Types: Cigarettes     Quit date:      Years since quittin.6   • Smokeless tobacco: Current   • Tobacco comments:     Vapes - Daily - includes 3 mg nicotine   Vaping Use   • Vaping Use: Every day   • Start date: 2016   • Substances: Nicotine, Flavoring   Substance and Sexual Activity   • Alcohol use: Not Currently   • Drug use: Never   • Sexual activity: Not Currently   Other Topics Concern   • None   Social History Narrative   • None     Social Determinants of Health     Financial Resource Strain: Low Risk  (2023)    Overall Financial Resource Strain (CARDIA)    • Difficulty of Paying Living Expenses: Not hard at all   Food Insecurity: No Food Insecurity (7/10/2023)    Hunger Vital Sign    • Worried About Running Out of Food in the Last Year: Never true    • Ran Out of Food in the Last Year: Never true   Transportation Needs: No Transportation Needs (7/10/2023)    PRAPARE - Transportation    • Lack of Transportation (Medical): No    • Lack of Transportation (Non-Medical):  No   Physical Activity: Not on file   Stress: Not on file   Social Connections: Not on file   Intimate Partner Violence: Not on file   Housing Stability: Low Risk  (7/10/2023)    Housing Stability Vital Sign    • Unable to Pay for Housing in the Last Year: No    • Number of Places Lived in the Last Year: 1    • Unstable Housing in the Last Year: No     Past Medical History:   Diagnosis Date   • Diabetes mellitus ([address])    • Hypertension    • Migraine    • MRSA (methicillin resistant Staphylococcus aureus)      Past Surgical History:   Procedure Laterality Date   • ACHILLES TENDON REPAIR     • ANKLE SURGERY Left 2019    Excision of Lipoma   • CARPAL TUNNEL RELEASE Right    •  SECTION     • CHOLECYSTECTOMY     • GANGLION CYST EXCISION Left     Left wrist   • LUMBAR LAMINECTOMY  2018    x 3 disks   • NASAL SINUS SURGERY         Current Outpatient Medications:   •  Alpha-Lipoic Acid 300 MG CAPS, Take 300 mg by mouth 2 (two) times a day, Disp: , Rfl:   •  clonazePAM (KlonoPIN) 0.5 mg tablet, Take 2 tablets (1 mg total) by mouth daily at bedtime as needed for anxiety, Disp: 60 tablet, Rfl: 0  •  docusate sodium (COLACE) 100 mg capsule, Take 100 mg by mouth daily as needed for constipation, Disp: , Rfl:   •  DULoxetine (CYMBALTA) 60 mg delayed release capsule, Take 1 capsule (60 mg total) by mouth 2 (two) times a day, Disp: 180 capsule, Rfl: 0  •  famotidine (PEPCID) 20 mg tablet, Take 20 mg by mouth daily, Disp: , Rfl:   •  gabapentin (NEURONTIN) 100 mg capsule, One in am, one at lunch, and 3 at hs, Disp: 360 capsule, Rfl: 1  •  glimepiride (AMARYL) 1 mg tablet, Take 1 tablet (1 mg total) by mouth daily with breakfast, Disp: 30 tablet, Rfl: 3  •  methenamine hippurate (HIPREX) 1 g tablet, Take 1 tablet (1 g total) by mouth 2 (two) times a day with meals, Disp: 60 tablet, Rfl: 5  •  Polysaccharide Iron Complex (HEMATEX IRON COMPLEX PO), Take by mouth, Disp: , Rfl:     Lab Results   Component Value Date    SODIUM 135 07/28/2023    K 4.6 07/28/2023     07/28/2023    CO2 21 07/28/2023    AGAP 7 07/28/2023    BUN 21 07/28/2023    CREATININE 2.04 (H) 07/28/2023    GLUC 125 07/14/2023    GLUF 176 (H) 07/28/2023    CALCIUM 9.8 07/28/2023    AST 24 07/25/2023    ALT 67 07/25/2023    ALKPHOS 100 07/25/2023    TP 8.7 (H) 07/25/2023    TBILI 0.33 07/25/2023    EGFR 25 07/28/2023     Lab Results   Component Value Date    WBC 7.10 07/25/2023    HGB 9.1 (L) 07/25/2023    HCT 28.7 (L) 07/25/2023    MCV 95 07/25/2023     (H) 07/25/2023     Lab Results   Component Value Date    CHOLESTEROL 166 09/15/2022    CHOLESTEROL 191 02/24/2021     Lab Results   Component Value Date    HDL 42 (L) 09/15/2022    HDL 41 02/24/2021     Lab Results   Component Value Date    LDLCALC 96 09/15/2022    LDLCALC 99 02/24/2021     Lab Results   Component Value Date    TRIG 139 09/15/2022    TRIG 255 (H) 02/24/2021     No results found for: "CHOLHDL"  Lab Results   Component Value Date    OLB8UURRDMBD 3.300 01/10/2023     Lab Results   Component Value Date    CALCIUM 9.8 07/28/2023    PHOS 3.5 07/25/2023     Lab Results   Component Value Date    SPEP See Comment 04/21/2023    UPEP See Comment 04/21/2023     No results found for: "Thelda Brianna", "[id number]"        Objective:      /72 (BP Location: Left arm, Patient Position: Sitting, Cuff Size: Large)   Pulse 101   Ht 5' 7" (1.702 m)   Wt 112 kg (248 lb)   LMP 04/10/2017   SpO2 95%   BMI 38.84 kg/m²          Physical Exam  Vitals reviewed. Constitutional:       General: She is not in acute distress. Appearance: She is well-developed. HENT:      Head: Normocephalic and atraumatic. Eyes:      Conjunctiva/sclera: Conjunctivae normal.   Cardiovascular:      Rate and Rhythm: Normal rate and regular rhythm. Pulmonary:      Effort: Pulmonary effort is normal.      Breath sounds: Normal breath sounds. Abdominal:      Palpations: Abdomen is soft. Musculoskeletal:      Cervical back: Neck supple. Skin:     General: Skin is warm. Findings: No rash. Neurological:      Mental Status: She is alert and oriented to person, place, and time. Cranial Nerves: No cranial nerve deficit.    Psychiatric:         Behavior: Behavior normal.

## 2023-08-14 NOTE — ASSESSMENT & PLAN NOTE
Lab Results   Component Value Date    EGFR 25 07/28/2023    EGFR 27 07/25/2023    EGFR 26 07/19/2023    CREATININE 2.04 (H) 07/28/2023    CREATININE 1.92 (H) 07/25/2023    CREATININE 1.94 (H) 07/19/2023     Previously, patient's baseline creatinine was around 1.5 mg/dL. We will look to see her creatinine improved back to this baseline, continue to monitor blood work at this time and avoid nephrotoxins.

## 2023-08-14 NOTE — ASSESSMENT & PLAN NOTE
Given the patient has had back-to-back urinary tract infections, will initiate methenamine hippurate at this time. Goal is to suppress additional urinary tract infections for the next 6-12 months, and then consider discontinuation depending on she progresses from a clinical standpoint. If the patient has another urinary tract infection, she will benefit from seeing urology or Uro/Gyn  to rule out anatomic issues.

## 2023-08-15 ENCOUNTER — TELEPHONE (OUTPATIENT)
Dept: NEPHROLOGY | Facility: CLINIC | Age: 63
End: 2023-08-15

## 2023-08-15 DIAGNOSIS — E11.22 TYPE 2 DIABETES MELLITUS WITH STAGE 3A CHRONIC KIDNEY DISEASE, WITHOUT LONG-TERM CURRENT USE OF INSULIN (HCC): ICD-10-CM

## 2023-08-15 DIAGNOSIS — N18.31 TYPE 2 DIABETES MELLITUS WITH STAGE 3A CHRONIC KIDNEY DISEASE, WITHOUT LONG-TERM CURRENT USE OF INSULIN (HCC): ICD-10-CM

## 2023-08-15 RX ORDER — GLIMEPIRIDE 1 MG/1
1 TABLET ORAL
Qty: 90 TABLET | Refills: 3 | Status: SHIPPED | OUTPATIENT
Start: 2023-08-15

## 2023-08-16 DIAGNOSIS — M47.27 OSTEOARTHRITIS OF SPINE WITH RADICULOPATHY, LUMBOSACRAL REGION: ICD-10-CM

## 2023-08-16 RX ORDER — DULOXETIN HYDROCHLORIDE 60 MG/1
60 CAPSULE, DELAYED RELEASE ORAL 2 TIMES DAILY
Qty: 180 CAPSULE | Refills: 0 | Status: SHIPPED | OUTPATIENT
Start: 2023-08-16

## 2023-08-17 RX ORDER — TIZANIDINE HYDROCHLORIDE 2 MG/1
CAPSULE, GELATIN COATED ORAL
Qty: 30 CAPSULE | Refills: 0 | OUTPATIENT
Start: 2023-08-17

## 2023-08-17 NOTE — TELEPHONE ENCOUNTER
I am not comfortable refilling Zanaflex when the patient is already on clonazepam they have an additive sedative effect and she does have sleep apnea so I am concerned about these this combination of medications

## 2023-08-19 ENCOUNTER — RA CDI HCC (OUTPATIENT)
Dept: OTHER | Facility: HOSPITAL | Age: 63
End: 2023-08-19

## 2023-08-19 NOTE — PROGRESS NOTES
[address] coding opportunities       Chart reviewed, no opportunity found: [address] Review     Patients Insurance     Medicare Insurance: [employer]

## 2023-08-24 ENCOUNTER — RA CDI HCC (OUTPATIENT)
Dept: OTHER | Facility: HOSPITAL | Age: 63
End: 2023-08-24

## 2023-08-24 NOTE — PROGRESS NOTES
[address] coding opportunities     E11.40     Chart Reviewed number of suggestions sent to Provider: 1     GR    I12.9 is not an [address]- Did not suggest- [facility]    Patients Insurance     Medicare Insurance: [employer]

## 2023-08-28 ENCOUNTER — APPOINTMENT (OUTPATIENT)
Dept: LAB | Facility: MEDICAL CENTER | Age: 63
End: 2023-08-28
Payer: COMMERCIAL

## 2023-08-28 DIAGNOSIS — N18.32 CHRONIC KIDNEY DISEASE (CKD) STAGE G3B/A3, MODERATELY DECREASED GLOMERULAR FILTRATION RATE (GFR) BETWEEN 30-44 ML/MIN/1.73 SQUARE METER AND ALBUMINURIA CREATININE RATIO GREATER THAN 300 MG/G (HCC): ICD-10-CM

## 2023-08-28 LAB
BACTERIA UR QL AUTO: ABNORMAL /HPF
BASOPHILS # BLD AUTO: 0.06 THOUSANDS/ÂΜL (ref 0–0.1)
BASOPHILS NFR BLD AUTO: 1 % (ref 0–1)
BILIRUB UR QL STRIP: NEGATIVE
CLARITY UR: CLEAR
COLOR UR: ABNORMAL
EOSINOPHIL # BLD AUTO: 0.15 THOUSAND/ÂΜL (ref 0–0.61)
EOSINOPHIL NFR BLD AUTO: 3 % (ref 0–6)
ERYTHROCYTE [DISTWIDTH] IN BLOOD BY AUTOMATED COUNT: 13.2 % (ref 11.6–15.1)
GLUCOSE UR STRIP-MCNC: NEGATIVE MG/DL
HCT VFR BLD AUTO: 34.4 % (ref 34.8–46.1)
HGB BLD-MCNC: 10.9 G/DL (ref 11.5–15.4)
HGB UR QL STRIP.AUTO: NEGATIVE
IMM GRANULOCYTES # BLD AUTO: 0.01 THOUSAND/UL (ref 0–0.2)
IMM GRANULOCYTES NFR BLD AUTO: 0 % (ref 0–2)
KETONES UR STRIP-MCNC: NEGATIVE MG/DL
LEUKOCYTE ESTERASE UR QL STRIP: ABNORMAL
LYMPHOCYTES # BLD AUTO: 1.54 THOUSANDS/ÂΜL (ref 0.6–4.47)
LYMPHOCYTES NFR BLD AUTO: 30 % (ref 14–44)
MCH RBC QN AUTO: 29.6 PG (ref 26.8–34.3)
MCHC RBC AUTO-ENTMCNC: 31.7 G/DL (ref 31.4–37.4)
MCV RBC AUTO: 94 FL (ref 82–98)
MONOCYTES # BLD AUTO: 0.26 THOUSAND/ÂΜL (ref 0.17–1.22)
MONOCYTES NFR BLD AUTO: 5 % (ref 4–12)
NEUTROPHILS # BLD AUTO: 3.04 THOUSANDS/ÂΜL (ref 1.85–7.62)
NEUTS SEG NFR BLD AUTO: 61 % (ref 43–75)
NITRITE UR QL STRIP: NEGATIVE
NON-SQ EPI CELLS URNS QL MICRO: ABNORMAL /HPF
NRBC BLD AUTO-RTO: 0 /100 WBCS
PH UR STRIP.AUTO: 6 [PH]
PLATELET # BLD AUTO: 324 THOUSANDS/UL (ref 149–390)
PMV BLD AUTO: 10 FL (ref 8.9–12.7)
PROT UR STRIP-MCNC: ABNORMAL MG/DL
RBC # BLD AUTO: 3.68 MILLION/UL (ref 3.81–5.12)
RBC #/AREA URNS AUTO: ABNORMAL /HPF
SP GR UR STRIP.AUTO: 1.01 (ref 1–1.03)
UROBILINOGEN UR STRIP-ACNC: <2 MG/DL
WBC # BLD AUTO: 5.06 THOUSAND/UL (ref 4.31–10.16)
WBC #/AREA URNS AUTO: ABNORMAL /HPF

## 2023-08-28 PROCEDURE — 81001 URINALYSIS AUTO W/SCOPE: CPT

## 2023-08-28 PROCEDURE — 82043 UR ALBUMIN QUANTITATIVE: CPT

## 2023-08-28 PROCEDURE — 80048 BASIC METABOLIC PNL TOTAL CA: CPT

## 2023-08-28 PROCEDURE — 83735 ASSAY OF MAGNESIUM: CPT

## 2023-08-28 PROCEDURE — 83036 HEMOGLOBIN GLYCOSYLATED A1C: CPT

## 2023-08-28 PROCEDURE — 84100 ASSAY OF PHOSPHORUS: CPT

## 2023-08-28 PROCEDURE — 36415 COLL VENOUS BLD VENIPUNCTURE: CPT

## 2023-08-28 PROCEDURE — 85025 COMPLETE CBC W/AUTO DIFF WBC: CPT

## 2023-08-28 PROCEDURE — 82570 ASSAY OF URINE CREATININE: CPT

## 2023-08-29 ENCOUNTER — VBI (OUTPATIENT)
Dept: ADMINISTRATIVE | Facility: OTHER | Age: 63
End: 2023-08-29

## 2023-08-29 LAB
ANION GAP SERPL CALCULATED.3IONS-SCNC: 18 MMOL/L
BUN SERPL-MCNC: 31 MG/DL (ref 5–25)
CALCIUM SERPL-MCNC: 10.2 MG/DL (ref 8.4–10.2)
CHLORIDE SERPL-SCNC: 105 MMOL/L (ref 96–108)
CO2 SERPL-SCNC: 15 MMOL/L (ref 21–32)
CREAT SERPL-MCNC: 1.96 MG/DL (ref 0.6–1.3)
CREAT UR-MCNC: 70.4 MG/DL
EST. AVERAGE GLUCOSE BLD GHB EST-MCNC: 154 MG/DL
GFR SERPL CREATININE-BSD FRML MDRD: 26 ML/MIN/1.73SQ M
GLUCOSE P FAST SERPL-MCNC: 128 MG/DL (ref 65–99)
HBA1C MFR BLD: 7 %
MAGNESIUM SERPL-MCNC: 1.9 MG/DL (ref 1.9–2.7)
MICROALBUMIN UR-MCNC: 50.2 MG/L
MICROALBUMIN/CREAT 24H UR: 71 MG/G CREATININE (ref 0–30)
PHOSPHATE SERPL-MCNC: 4.1 MG/DL (ref 2.3–4.1)
POTASSIUM SERPL-SCNC: 4.9 MMOL/L (ref 3.5–5.3)
SODIUM SERPL-SCNC: 138 MMOL/L (ref 135–147)

## 2023-08-29 PROCEDURE — 3051F HG A1C>EQUAL 7.0%<8.0%: CPT | Performed by: FAMILY MEDICINE

## 2023-08-30 ENCOUNTER — OFFICE VISIT (OUTPATIENT)
Dept: FAMILY MEDICINE CLINIC | Facility: CLINIC | Age: 63
End: 2023-08-30
Payer: COMMERCIAL

## 2023-08-30 VITALS
OXYGEN SATURATION: 96 % | HEART RATE: 102 BPM | BODY MASS INDEX: 41.59 KG/M2 | HEIGHT: 67 IN | DIASTOLIC BLOOD PRESSURE: 84 MMHG | WEIGHT: 265 LBS | SYSTOLIC BLOOD PRESSURE: 128 MMHG | TEMPERATURE: 96.8 F

## 2023-08-30 DIAGNOSIS — E11.22 TYPE 2 DIABETES MELLITUS WITH STAGE 3A CHRONIC KIDNEY DISEASE, WITHOUT LONG-TERM CURRENT USE OF INSULIN (HCC): Primary | ICD-10-CM

## 2023-08-30 DIAGNOSIS — N18.31 TYPE 2 DIABETES MELLITUS WITH STAGE 3A CHRONIC KIDNEY DISEASE, WITHOUT LONG-TERM CURRENT USE OF INSULIN (HCC): Primary | ICD-10-CM

## 2023-08-30 DIAGNOSIS — D50.9 IRON DEFICIENCY ANEMIA, UNSPECIFIED IRON DEFICIENCY ANEMIA TYPE: ICD-10-CM

## 2023-08-30 DIAGNOSIS — G47.33 OBSTRUCTIVE SLEEP APNEA: ICD-10-CM

## 2023-08-30 DIAGNOSIS — I10 ESSENTIAL HYPERTENSION: ICD-10-CM

## 2023-08-30 DIAGNOSIS — N18.4 CHRONIC KIDNEY DISEASE, STAGE IV (SEVERE) (HCC): ICD-10-CM

## 2023-08-30 PROCEDURE — 99214 OFFICE O/P EST MOD 30 MIN: CPT | Performed by: FAMILY MEDICINE

## 2023-08-30 NOTE — PROGRESS NOTES
Assessment/Plan: Patient will have lab work done in about 2 months we will see her in about 3 to 4 months she will follow-up with on with nephrology    Problem List Items Addressed This Visit        Endocrine    Type 2 diabetes mellitus with stage 3a chronic kidney disease, without long-term current use of insulin ([address]) - Primary    Relevant Orders    HEMOGLOBIN A1C W/ EAG ESTIMATION       Respiratory    Obstructive sleep apnea       Cardiovascular and Mediastinum    Essential hypertension       Other    Iron deficiency anemia    Relevant Orders    Iron Panel (Includes Ferritin, Iron Sat%, Iron, and TIBC)   Other Visit Diagnoses     Chronic kidney disease, stage IV (severe) (HCC)        Relevant Orders    Basic metabolic panel           Diagnoses and all orders for this visit:    Type 2 diabetes mellitus with stage 3a chronic kidney disease, without long-term current use of insulin ([address])  -     HEMOGLOBIN A1C W/ EAG ESTIMATION; Future    Essential hypertension    Obstructive sleep apnea    Chronic kidney disease, stage IV (severe) (HCC)  -     Basic metabolic panel; Future    Iron deficiency anemia, unspecified iron deficiency anemia type  -     Iron Panel (Includes Ferritin, Iron Sat%, Iron, and TIBC); Future        No problem-specific Assessment & Plan notes found for this encounter. Subjective:      Patient ID: Meghna Sheikh is a 60 y.o. female.     Patient presents today for a follow-up regarding her diabetes and also her chronic kidney disease stage IV seems to be recovering with a slow upward progression of her GFR patient had some labs done which are current region her blood sugar log was reviewed she still has slightly high blood sugars but at the present time we will continue with Amaryl which was given to her by her nephrologist      The following portions of the patient's history were reviewed and updated as appropriate:   She has a past medical history of Diabetes mellitus ([address]), Hypertension, Migraine, and MRSA (methicillin resistant Staphylococcus aureus). ,  does not have any pertinent problems on file. ,   has a past surgical history that includes Achilles tendon repair; Carpal tunnel release (Right);  section; Cholecystectomy; Ankle surgery (Left, ); Nasal sinus surgery (); Lumbar laminectomy (2018); Ganglion cyst excision (Left, ); and Mouth surgery (2023). ,  family history includes Arrhythmia in her father; Hepatitis in her mother; Hypertension in her mother; Melanoma in her father; No Known Problems in her maternal aunt, maternal aunt, maternal aunt, maternal aunt, maternal grandfather, maternal grandmother, paternal aunt, paternal grandfather, paternal grandmother, sister, and son; Stroke in her father. ,   reports that she quit smoking about 8 years ago. Her smoking use included cigarettes. She uses smokeless tobacco. She reports that she does not currently use alcohol. She reports that she does not use drugs. ,  is allergic to pollen extract and vancomycin. .  Current Outpatient Medications   Medication Sig Dispense Refill   • Alpha-Lipoic Acid 300 MG CAPS Take 300 mg by mouth 2 (two) times a day     • clonazePAM (KlonoPIN) 0.5 mg tablet Take 2 tablets (1 mg total) by mouth daily at bedtime as needed for anxiety 60 tablet 0   • docusate sodium (COLACE) 100 mg capsule Take 100 mg by mouth daily as needed for constipation     • DULoxetine (CYMBALTA) 60 mg delayed release capsule Take 1 capsule (60 mg total) by mouth 2 (two) times a day 180 capsule 0   • famotidine (PEPCID) 20 mg tablet Take 20 mg by mouth daily     • gabapentin (NEURONTIN) 100 mg capsule One in am, one at lunch, and 3 at hs 360 capsule 1   • glimepiride (AMARYL) 1 mg tablet TAKE 1 TABLET BY MOUTH DAILY WITH BREAKFAST 90 tablet 3   • methenamine hippurate (HIPREX) 1 g tablet Take 1 tablet (1 g total) by mouth 2 (two) times a day with meals 60 tablet 5   • Polysaccharide Iron Complex (HEMATEX IRON COMPLEX PO) Take by mouth       No current facility-administered medications for this visit. Review of Systems   Constitutional: Negative for activity change, appetite change, diaphoresis, fatigue and fever. HENT: Negative. Eyes: Negative. Respiratory: Negative for apnea, cough, chest tightness, shortness of breath and wheezing. Cardiovascular: Negative for chest pain, palpitations and leg swelling. Gastrointestinal: Negative for abdominal distention, abdominal pain, anal bleeding, constipation, diarrhea, nausea and vomiting. Endocrine: Negative for cold intolerance, heat intolerance, polydipsia, polyphagia and polyuria. Genitourinary: Negative for difficulty urinating, dysuria, flank pain, hematuria and urgency. Musculoskeletal: Negative for arthralgias, back pain, gait problem, joint swelling and myalgias. Skin: Negative for color change, rash and wound. Allergic/Immunologic: Negative for environmental allergies, food allergies and immunocompromised state. Neurological: Negative for dizziness, seizures, syncope, speech difficulty, numbness and headaches. Hematological: Negative for adenopathy. Does not bruise/bleed easily. Psychiatric/Behavioral: Negative for agitation, behavioral problems, hallucinations, sleep disturbance and suicidal ideas. Objective:  Vitals:    08/30/23 1334   BP: 128/84   BP Location: Left arm   Patient Position: Sitting   Cuff Size: Large   Pulse: 102   Temp: (!) 96.8 °F (36 °C)   TempSrc: Temporal   SpO2: 96%   Weight: 120 kg (265 lb)   Height: 5' 7" (1.702 m)     Body mass index is 41.5 kg/m². Physical Exam  Constitutional:       General: She is not in acute distress. Appearance: She is well-developed. She is obese. She is not diaphoretic. HENT:      Head: Normocephalic. Right Ear: External ear normal.      Left Ear: External ear normal.      Nose: Nose normal.   Eyes:      General: No scleral icterus. Right eye: No discharge. Left eye: No discharge. Conjunctiva/sclera: Conjunctivae normal.      Pupils: Pupils are equal, round, and reactive to light. Neck:      Thyroid: No thyromegaly. Trachea: No tracheal deviation. Cardiovascular:      Rate and Rhythm: Normal rate and regular rhythm. Heart sounds: Normal heart sounds. No murmur heard. No friction rub. No gallop. Pulmonary:      Effort: Pulmonary effort is normal. No respiratory distress. Breath sounds: Normal breath sounds. No wheezing. Abdominal:      General: Bowel sounds are normal.      Palpations: Abdomen is soft. There is no mass. Tenderness: There is no abdominal tenderness. There is no guarding. Musculoskeletal:         General: No deformity. Cervical back: Normal range of motion. Lymphadenopathy:      Cervical: No cervical adenopathy. Skin:     General: Skin is warm and dry. Findings: No erythema or rash. Neurological:      Mental Status: She is alert and oriented to person, place, and time. Cranial Nerves: No cranial nerve deficit. Psychiatric:         Thought Content:  Thought content normal.

## 2023-09-05 ENCOUNTER — TELEPHONE (OUTPATIENT)
Dept: FAMILY MEDICINE CLINIC | Facility: CLINIC | Age: 63
End: 2023-09-05

## 2023-09-05 NOTE — PROGRESS NOTES
I would purchase a TENS unit off of [employer] and start wearing that that will probably work as well as the tizanidine

## 2023-09-05 NOTE — TELEPHONE ENCOUNTER
Pt states she is very frustrated because you refused to renew the prescription for Zanaflex in fear pt would experience too much drowsiness. Pt states since seeing you last Wednesday, her back has been in spasms 3 days to the point she could hardly move. 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 Asking for you to give her a refill of Zanaflex?

## 2023-09-08 ENCOUNTER — TELEPHONE (OUTPATIENT)
Dept: FAMILY MEDICINE CLINIC | Facility: CLINIC | Age: 63
End: 2023-09-08

## 2023-09-08 DIAGNOSIS — M62.830 BACK SPASM: Primary | ICD-10-CM

## 2023-09-08 DIAGNOSIS — M47.27 OSTEOARTHRITIS OF SPINE WITH RADICULOPATHY, LUMBOSACRAL REGION: ICD-10-CM

## 2023-09-08 DIAGNOSIS — F41.1 GENERALIZED ANXIETY DISORDER: ICD-10-CM

## 2023-09-08 PROBLEM — A41.51 SEPSIS DUE TO ESCHERICHIA COLI WITH ACUTE RENAL FAILURE (HCC): Status: RESOLVED | Noted: 2023-07-09 | Resolved: 2023-09-08

## 2023-09-08 PROBLEM — N17.9 SEPSIS DUE TO ESCHERICHIA COLI WITH ACUTE RENAL FAILURE (HCC): Status: RESOLVED | Noted: 2023-07-09 | Resolved: 2023-09-08

## 2023-09-08 PROBLEM — R65.20 SEPSIS DUE TO ESCHERICHIA COLI WITH ACUTE RENAL FAILURE (HCC): Status: RESOLVED | Noted: 2023-07-09 | Resolved: 2023-09-08

## 2023-09-08 RX ORDER — TIZANIDINE HYDROCHLORIDE 2 MG/1
2 CAPSULE, GELATIN COATED ORAL DAILY PRN
Qty: 15 CAPSULE | Refills: 0 | Status: SHIPPED | OUTPATIENT
Start: 2023-09-08

## 2023-09-08 RX ORDER — CLONAZEPAM 0.5 MG/1
1 TABLET ORAL
Qty: 60 TABLET | Refills: 0 | Status: SHIPPED | OUTPATIENT
Start: 2023-09-08

## 2023-09-08 RX ORDER — GABAPENTIN 100 MG/1
CAPSULE ORAL
Qty: 360 CAPSULE | Refills: 0 | Status: SHIPPED | OUTPATIENT
Start: 2023-09-08

## 2023-09-08 NOTE — TELEPHONE ENCOUNTER
Was here for appt, asked if you would fill zanaflex for her. You refused because you said it would make her tired. She wants to know if you would reconsider. She is having back spasms, laying around, and does not want to be laying around. 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 If you won't can you rx something, she does not feel it is fair to be hurting like this when the muscle relaxer helps.

## 2023-09-25 ENCOUNTER — TELEPHONE (OUTPATIENT)
Dept: NEPHROLOGY | Facility: CLINIC | Age: 63
End: 2023-09-25

## 2023-09-25 NOTE — TELEPHONE ENCOUNTER
Has she been experiencing this for a few days or has it been a few weeks?   Sometimes after a few days this goes away, however, if she's been experiencing it for more than 1 week, then we should stop the hipprex

## 2023-09-25 NOTE — TELEPHONE ENCOUNTER
Pt called stated since she started hiprex she has discomfort and burning after voiding. Please advise.

## 2023-09-26 NOTE — TELEPHONE ENCOUNTER
Okay then we will have to stop this medication, unfortunately. She is not able to tolerate due to bladder sensitivity. There is one option which I do not like to do which she is to have people take over-the-counter Azo along with this medication which will help reduce symptoms she is experiencing. She can try this if she wishes, or stop altogether.   Either way we will discuss at our next appointment in about 3 weeks

## 2023-10-11 ENCOUNTER — OFFICE VISIT (OUTPATIENT)
Dept: NEPHROLOGY | Facility: CLINIC | Age: 63
End: 2023-10-11

## 2023-10-11 VITALS
HEIGHT: 67 IN | WEIGHT: 264.6 LBS | HEART RATE: 86 BPM | SYSTOLIC BLOOD PRESSURE: 122 MMHG | BODY MASS INDEX: 41.53 KG/M2 | OXYGEN SATURATION: 99 % | DIASTOLIC BLOOD PRESSURE: 82 MMHG

## 2023-10-11 DIAGNOSIS — N39.0 RECURRENT UTI: ICD-10-CM

## 2023-10-11 DIAGNOSIS — N18.4 CKD (CHRONIC KIDNEY DISEASE) STAGE 4, GFR 15-29 ML/MIN (HCC): Primary | ICD-10-CM

## 2023-10-11 DIAGNOSIS — N18.31 TYPE 2 DIABETES MELLITUS WITH STAGE 3A CHRONIC KIDNEY DISEASE, WITHOUT LONG-TERM CURRENT USE OF INSULIN (HCC): ICD-10-CM

## 2023-10-11 DIAGNOSIS — E11.22 TYPE 2 DIABETES MELLITUS WITH STAGE 3A CHRONIC KIDNEY DISEASE, WITHOUT LONG-TERM CURRENT USE OF INSULIN (HCC): ICD-10-CM

## 2023-10-11 DIAGNOSIS — F41.1 GENERALIZED ANXIETY DISORDER: ICD-10-CM

## 2023-10-11 DIAGNOSIS — M17.0 PRIMARY OSTEOARTHRITIS OF BOTH KNEES: ICD-10-CM

## 2023-10-11 DIAGNOSIS — I10 ESSENTIAL HYPERTENSION: ICD-10-CM

## 2023-10-11 RX ORDER — METHENAMINE HIPPURATE 1000 MG/1
1 TABLET ORAL 2 TIMES DAILY WITH MEALS
Qty: 60 TABLET | Refills: 5
Start: 2023-10-11

## 2023-10-11 NOTE — PROGRESS NOTES
[facility] [address] [place] [address]    Name: March Few  YOB: 1960      Assessment/Plan:    CKD (chronic kidney disease) stage 4, GFR 15-29 ml/min [facility])  Lab Results   Component Value Date    EGFR 26 08/28/2023    EGFR 25 07/28/2023    EGFR 27 07/25/2023    CREATININE 1.96 (H) 08/28/2023    CREATININE 2.04 (H) 07/28/2023    CREATININE 1.92 (H) 07/25/2023     Patient most likely had chronic kidney disease stage IV with a baseline creatinine between 1.9-2.1 mg/dL. Continue to optimize care and avoid potential for toxins. Continue to monitor and follow labs at this time. Essential hypertension  Blood pressures well controlled at this time, continue with current medications. Continue to encourage low sodium diet. Type 2 diabetes mellitus with stage 3a chronic kidney disease, without long-term current use of insulin (HCC)  A1c appropriate, however patient has been experiencing hypoglycemic events which she is managing with snacks. Given the patient's repeat episodes of hypoglycemia,  I asked her to cut the glimepiride in half and take that in the morning with breakfast.  Further evaluation and treatment as indicated according to primary care provider and endocrinology as indicated. Lab Results   Component Value Date    HGBA1C 7.0 (H) 08/28/2023       Primary osteoarthritis of both knees  Continue to avoid NSAIDs, patient is currently on Tylenol which is helping to relieve her pain. Recurrent urinary tract infections  Continue with methenamine hippurate, patient continues to be well controlled with no recurrence of urinary tract infections since since medication. Problem List Items Addressed This Visit          Endocrine    Type 2 diabetes mellitus with stage 3a chronic kidney disease, without long-term current use of insulin (HCC)     A1c appropriate, however patient has been experiencing hypoglycemic events which she is managing with snacks. Given the patient's repeat episodes of hypoglycemia,  I asked her to cut the glimepiride in half and take that in the morning with breakfast.  Further evaluation and treatment as indicated according to primary care provider and endocrinology as indicated. Lab Results   Component Value Date    HGBA1C 7.0 (H) 08/28/2023             Cardiovascular and Mediastinum    Essential hypertension     Blood pressures well controlled at this time, continue with current medications. Continue to encourage low sodium diet. Musculoskeletal and Integument    Primary osteoarthritis of both knees     Continue to avoid NSAIDs, patient is currently on Tylenol which is helping to relieve her pain. Genitourinary    CKD (chronic kidney disease) stage 4, GFR 15-29 ml/min ([facility]) - Primary     Lab Results   Component Value Date    EGFR 26 08/28/2023    EGFR 25 07/28/2023    EGFR 27 07/25/2023    CREATININE 1.96 (H) 08/28/2023    CREATININE 2.04 (H) 07/28/2023    CREATININE 1.92 (H) 07/25/2023   Patient most likely had chronic kidney disease stage IV with a baseline creatinine between 1.9-2.1 mg/dL. Continue to optimize care and avoid potential for toxins. Continue to monitor and follow labs at this time. Relevant Orders    Albumin / creatinine urine ratio    Urinalysis with microscopic    Phosphorus    Magnesium    PTH, intact    RESOLVED: Recurrent UTI    Relevant Medications    methenamine hippurate (HIPREX) 1 g tablet       Patient stable renal standpoint, we will see her back for regular appointment in 3 to 4-month. Follow-up labs, additional labs or sooner appointment as indicated. Subjective:      Patient ID: Mandeep Garcia is a 60 y.o. female. Patient presents for follow up. We reviewed patient's labs in detail, most recent creatinine is 1.96 mg/dL, which places estimated GFR at 26 mL/min.   She had no significant electrolyte abnormalities, specifically serum potassium level was 4.9 mmol/L, however patient's bicarbonate was 15 mmol/L. She has been taking all medications as prescribed. She denies use of nonsteroidal inflammatory medication. Patient has noted episodes of hypoglycemia with the glimepiride. She has resolved this by having snacks    Patient's bilateral knee pain is better, she has been taking Tylenol        The following portions of the patient's history were reviewed and updated as appropriate: allergies, current medications, past family history, past medical history, past social history, past surgical history and problem list.    Review of Systems   All other systems reviewed and are negative.         Social History     Socioeconomic History    Marital status: Single     Spouse name: None    Number of children: None    Years of education: None    Highest education level: None   Occupational History    None   Tobacco Use    Smoking status: Former     Packs/day: 1.50     Years: 35.00     Total pack years: 52.50     Types: Cigarettes     Quit date:      Years since quittin.7    Smokeless tobacco: Never    Tobacco comments:     Vapes - Daily - includes 3 mg nicotine   Vaping Use    Vaping Use: Every day    Start date: 2016    Substances: Nicotine, Flavoring   Substance and Sexual Activity    Alcohol use: Not Currently    Drug use: No    Sexual activity: Not Currently     Partners: Male   Other Topics Concern    None   Social History Narrative    None     Social Determinants of Health     Financial Resource Strain: Low Risk  (2023)    Overall Financial Resource Strain (CARDIA)     Difficulty of Paying Living Expenses: Not hard at all   Food Insecurity: No Food Insecurity (7/10/2023)    Hunger Vital Sign     Worried About Running Out of Food in the Last Year: Never true     Ran Out of Food in the Last Year: Never true   Transportation Needs: No Transportation Needs (7/10/2023)    PRAPARE - Transportation     Lack of Transportation (Medical): No     Lack of Transportation (Non-Medical):  No   Physical Activity: Not on file   Stress: Not on file   Social Connections: Not on file   Intimate Partner Violence: Not on file   Housing Stability: Low Risk  (7/10/2023)    Housing Stability Vital Sign     Unable to Pay for Housing in the Last Year: No     Number of Places Lived in the Last Year: 1     Unstable Housing in the Last Year: No     Past Medical History:   Diagnosis Date    INESSA (acute kidney injury) ([address]) 2023    Anemia     Intermittent    Arthritis     Diabetes mellitus (HCC)     Hypertension     Migraine     MRSA (methicillin resistant Staphylococcus aureus)      Past Surgical History:   Procedure Laterality Date    ACHILLES TENDON REPAIR      ANKLE SURGERY Left 2019    Excision of Lipoma    CARPAL TUNNEL RELEASE Right      SECTION      CHOLECYSTECTOMY      GANGLION CYST EXCISION Left 1999    Left wrist    LUMBAR LAMINECTOMY  2018    x 3 disks    MOUTH SURGERY  2023    Oral tooth extraction x 3 with a retained root    NASAL SINUS SURGERY         Current Outpatient Medications:     Alpha-Lipoic Acid 300 MG CAPS, Take 300 mg by mouth 2 (two) times a day, Disp: , Rfl:     docusate sodium (COLACE) 100 mg capsule, Take 100 mg by mouth daily as needed for constipation, Disp: , Rfl:     DULoxetine (CYMBALTA) 60 mg delayed release capsule, Take 1 capsule (60 mg total) by mouth 2 (two) times a day, Disp: 180 capsule, Rfl: 0    famotidine (PEPCID) 20 mg tablet, Take 20 mg by mouth daily, Disp: , Rfl:     gabapentin (NEURONTIN) 100 mg capsule, One in am, one at lunch, and 3 at hs, Disp: 360 capsule, Rfl: 0    glimepiride (AMARYL) 1 mg tablet, TAKE 1 TABLET BY MOUTH DAILY WITH BREAKFAST, Disp: 90 tablet, Rfl: 3    methenamine hippurate (HIPREX) 1 g tablet, Take 1 tablet (1 g total) by mouth 2 (two) times a day with meals, Disp: 60 tablet, Rfl: 5    Polysaccharide Iron Complex (HEMATEX IRON COMPLEX PO), Take by mouth, Disp: , Rfl:     TiZANidine (ZANAFLEX) 2 MG capsule, Take 1 capsule (2 mg total) by mouth daily as needed for muscle spasms 1 tablet once daily if needed for muscle spasm, Disp: 15 capsule, Rfl: 0    clonazePAM (KlonoPIN) 0.5 mg tablet, Take 2 tablets (1 mg total) by mouth daily at bedtime as needed for anxiety, Disp: 60 tablet, Rfl: 0    Lab Results   Component Value Date    SODIUM 138 08/28/2023    K 4.9 08/28/2023     08/28/2023    CO2 15 (L) 08/28/2023    AGAP 18 08/28/2023    BUN 31 (H) 08/28/2023    CREATININE 1.96 (H) 08/28/2023    GLUC 125 07/14/2023    GLUF 128 (H) 08/28/2023    CALCIUM 10.2 08/28/2023    AST 24 07/25/2023    ALT 67 07/25/2023    ALKPHOS 100 07/25/2023    TP 8.7 (H) 07/25/2023    TBILI 0.33 07/25/2023    EGFR 26 08/28/2023     Lab Results   Component Value Date    WBC 5.06 08/28/2023    HGB 10.9 (L) 08/28/2023    HCT 34.4 (L) 08/28/2023    MCV 94 08/28/2023     08/28/2023     Lab Results   Component Value Date    CHOLESTEROL 166 09/15/2022    CHOLESTEROL 191 02/24/2021     Lab Results   Component Value Date    HDL 42 (L) 09/15/2022    HDL 41 02/24/2021     Lab Results   Component Value Date    LDLCALC 96 09/15/2022    LDLCALC 99 02/24/2021     Lab Results   Component Value Date    TRIG 139 09/15/2022    TRIG 255 (H) 02/24/2021     No results found for: "CHOLHDL"  Lab Results   Component Value Date    LZB6CFZYPDWY 3.300 01/10/2023     Lab Results   Component Value Date    CALCIUM 10.2 08/28/2023    PHOS 4.1 08/28/2023     Lab Results   Component Value Date    SPEP See Comment 04/21/2023    UPEP See Comment 04/21/2023     No results found for: "Littie Menew", "[id number]"        Objective:      /82 (BP Location: Left arm, Patient Position: Sitting)   Pulse 86   Ht 5' 7" (1.702 m)   Wt 120 kg (264 lb 9.6 oz)   LMP 04/10/2017   SpO2 99%   BMI 41.44 kg/m²          Physical Exam  Vitals reviewed. Constitutional:       General: She is not in acute distress. Appearance: She is well-developed.    HENT:      Head: Normocephalic and atraumatic. Eyes:      Conjunctiva/sclera: Conjunctivae normal.   Cardiovascular:      Rate and Rhythm: Normal rate and regular rhythm. Pulmonary:      Effort: Pulmonary effort is normal.      Breath sounds: Normal breath sounds. Abdominal:      Palpations: Abdomen is soft. Musculoskeletal:      Cervical back: Neck supple. Skin:     General: Skin is warm. Findings: No rash. Neurological:      Mental Status: She is alert and oriented to person, place, and time. Cranial Nerves: No cranial nerve deficit.    Psychiatric:         Behavior: Behavior normal.

## 2023-10-12 RX ORDER — CLONAZEPAM 0.5 MG/1
1 TABLET ORAL
Qty: 60 TABLET | Refills: 0 | Status: SHIPPED | OUTPATIENT
Start: 2023-10-12

## 2023-10-13 DIAGNOSIS — D64.9 ANEMIA, UNSPECIFIED TYPE: Primary | ICD-10-CM

## 2023-10-13 PROBLEM — N39.0 RECURRENT UTI: Status: RESOLVED | Noted: 2023-08-14 | Resolved: 2023-10-13

## 2023-10-16 PROBLEM — N17.9 AKI (ACUTE KIDNEY INJURY) (HCC): Status: RESOLVED | Noted: 2023-07-09 | Resolved: 2023-10-16

## 2023-10-16 PROBLEM — N18.4 CKD (CHRONIC KIDNEY DISEASE) STAGE 4, GFR 15-29 ML/MIN (HCC): Status: ACTIVE | Noted: 2022-03-02

## 2023-10-17 NOTE — ASSESSMENT & PLAN NOTE
Blood pressures well controlled at this time, continue with current medications. Continue to encourage low sodium diet.

## 2023-10-17 NOTE — ASSESSMENT & PLAN NOTE
A1c appropriate, however patient has been experiencing hypoglycemic events which she is managing with snacks. Given the patient's repeat episodes of hypoglycemia,  I asked her to cut the glimepiride in half and take that in the morning with breakfast.  Further evaluation and treatment as indicated according to primary care provider and endocrinology as indicated.     Lab Results   Component Value Date    HGBA1C 7.0 (H) 08/28/2023

## 2023-10-17 NOTE — ASSESSMENT & PLAN NOTE
Lab Results   Component Value Date    EGFR 26 08/28/2023    EGFR 25 07/28/2023    EGFR 27 07/25/2023    CREATININE 1.96 (H) 08/28/2023    CREATININE 2.04 (H) 07/28/2023    CREATININE 1.92 (H) 07/25/2023     Patient most likely had chronic kidney disease stage IV with a baseline creatinine between 1.9-2.1 mg/dL. Continue to optimize care and avoid potential for toxins. Continue to monitor and follow labs at this time.

## 2023-10-30 DIAGNOSIS — N39.0 RECURRENT UTI: ICD-10-CM

## 2023-10-30 RX ORDER — METHENAMINE HIPPURATE 1000 MG/1
1 TABLET ORAL 2 TIMES DAILY WITH MEALS
Qty: 60 TABLET | Refills: 5 | Status: SHIPPED | OUTPATIENT
Start: 2023-10-30

## 2023-11-08 DIAGNOSIS — M47.27 OSTEOARTHRITIS OF SPINE WITH RADICULOPATHY, LUMBOSACRAL REGION: ICD-10-CM

## 2023-11-08 RX ORDER — DULOXETIN HYDROCHLORIDE 60 MG/1
60 CAPSULE, DELAYED RELEASE ORAL 2 TIMES DAILY
Qty: 180 CAPSULE | Refills: 0 | Status: SHIPPED | OUTPATIENT
Start: 2023-11-08

## 2023-11-13 ENCOUNTER — TELEPHONE (OUTPATIENT)
Dept: NEPHROLOGY | Facility: CLINIC | Age: 63
End: 2023-11-13

## 2023-11-13 NOTE — TELEPHONE ENCOUNTER
Patient called stating she went back on hiprex but is now having irritation upon wiping and some blood. Patient stated she cut the pill in half and the 500 mg did not bother her. Please advise.

## 2023-11-14 NOTE — TELEPHONE ENCOUNTER
If half a tab taken twice a day is able to be taken without side effects described above, then that the dose will go with for now. Something is better than nothing at this time with respect to prevention of urinary tract infection.     Thank you for update

## 2023-11-22 DIAGNOSIS — F41.1 GENERALIZED ANXIETY DISORDER: ICD-10-CM

## 2023-11-22 DIAGNOSIS — M47.27 OSTEOARTHRITIS OF SPINE WITH RADICULOPATHY, LUMBOSACRAL REGION: ICD-10-CM

## 2023-11-22 RX ORDER — CLONAZEPAM 0.5 MG/1
1 TABLET ORAL
Qty: 60 TABLET | Refills: 0 | Status: SHIPPED | OUTPATIENT
Start: 2023-11-22

## 2023-11-22 RX ORDER — GABAPENTIN 100 MG/1
CAPSULE ORAL
Qty: 360 CAPSULE | Refills: 0 | Status: SHIPPED | OUTPATIENT
Start: 2023-11-22

## 2023-11-27 ENCOUNTER — RA CDI HCC (OUTPATIENT)
Dept: OTHER | Facility: HOSPITAL | Age: 63
End: 2023-11-27

## 2023-11-27 NOTE — PROGRESS NOTES
[address] coding opportunities       Chart reviewed, no opportunity found: [address] Review     Patients Insurance     Medicare Insurance: [employer]

## 2023-11-28 ENCOUNTER — LAB (OUTPATIENT)
Dept: LAB | Facility: MEDICAL CENTER | Age: 63
End: 2023-11-28
Payer: COMMERCIAL

## 2023-11-28 ENCOUNTER — TELEPHONE (OUTPATIENT)
Dept: OTHER | Facility: OTHER | Age: 63
End: 2023-11-28

## 2023-11-28 DIAGNOSIS — N18.31 TYPE 2 DIABETES MELLITUS WITH STAGE 3A CHRONIC KIDNEY DISEASE, WITHOUT LONG-TERM CURRENT USE OF INSULIN (HCC): ICD-10-CM

## 2023-11-28 DIAGNOSIS — N18.4 CHRONIC KIDNEY DISEASE, STAGE IV (SEVERE) (HCC): ICD-10-CM

## 2023-11-28 DIAGNOSIS — D50.9 IRON DEFICIENCY ANEMIA, UNSPECIFIED IRON DEFICIENCY ANEMIA TYPE: ICD-10-CM

## 2023-11-28 DIAGNOSIS — N18.4 CKD (CHRONIC KIDNEY DISEASE) STAGE 4, GFR 15-29 ML/MIN (HCC): ICD-10-CM

## 2023-11-28 DIAGNOSIS — D64.9 ANEMIA, UNSPECIFIED TYPE: ICD-10-CM

## 2023-11-28 DIAGNOSIS — E11.22 TYPE 2 DIABETES MELLITUS WITH STAGE 3A CHRONIC KIDNEY DISEASE, WITHOUT LONG-TERM CURRENT USE OF INSULIN (HCC): ICD-10-CM

## 2023-11-28 LAB
ANION GAP SERPL CALCULATED.3IONS-SCNC: 10 MMOL/L
BACTERIA UR QL AUTO: ABNORMAL /HPF
BASOPHILS # BLD AUTO: 0.03 THOUSANDS/ÂΜL (ref 0–0.1)
BASOPHILS NFR BLD AUTO: 0 % (ref 0–1)
BILIRUB UR QL STRIP: NEGATIVE
BUN SERPL-MCNC: 36 MG/DL (ref 5–25)
CALCIUM SERPL-MCNC: 9.2 MG/DL (ref 8.4–10.2)
CHLORIDE SERPL-SCNC: 105 MMOL/L (ref 96–108)
CLARITY UR: ABNORMAL
CO2 SERPL-SCNC: 22 MMOL/L (ref 21–32)
COLOR UR: ABNORMAL
CREAT SERPL-MCNC: 1.87 MG/DL (ref 0.6–1.3)
CREAT UR-MCNC: 53 MG/DL
EOSINOPHIL # BLD AUTO: 0.16 THOUSAND/ÂΜL (ref 0–0.61)
EOSINOPHIL NFR BLD AUTO: 2 % (ref 0–6)
ERYTHROCYTE [DISTWIDTH] IN BLOOD BY AUTOMATED COUNT: 13.3 % (ref 11.6–15.1)
FERRITIN SERPL-MCNC: 114 NG/ML (ref 11–307)
GFR SERPL CREATININE-BSD FRML MDRD: 28 ML/MIN/1.73SQ M
GLUCOSE P FAST SERPL-MCNC: 130 MG/DL (ref 65–99)
GLUCOSE UR STRIP-MCNC: NEGATIVE MG/DL
HCT VFR BLD AUTO: 38.9 % (ref 34.8–46.1)
HGB BLD-MCNC: 12.2 G/DL (ref 11.5–15.4)
HGB UR QL STRIP.AUTO: ABNORMAL
IMM GRANULOCYTES # BLD AUTO: 0.02 THOUSAND/UL (ref 0–0.2)
IMM GRANULOCYTES NFR BLD AUTO: 0 % (ref 0–2)
IRON SATN MFR SERPL: 23 % (ref 15–50)
IRON SERPL-MCNC: 69 UG/DL (ref 50–212)
KETONES UR STRIP-MCNC: NEGATIVE MG/DL
LEUKOCYTE ESTERASE UR QL STRIP: ABNORMAL
LYMPHOCYTES # BLD AUTO: 1.46 THOUSANDS/ÂΜL (ref 0.6–4.47)
LYMPHOCYTES NFR BLD AUTO: 21 % (ref 14–44)
MAGNESIUM SERPL-MCNC: 2 MG/DL (ref 1.9–2.7)
MCH RBC QN AUTO: 28.8 PG (ref 26.8–34.3)
MCHC RBC AUTO-ENTMCNC: 31.4 G/DL (ref 31.4–37.4)
MCV RBC AUTO: 92 FL (ref 82–98)
MICROALBUMIN UR-MCNC: 98.2 MG/L
MICROALBUMIN/CREAT 24H UR: 185 MG/G CREATININE (ref 0–30)
MONOCYTES # BLD AUTO: 0.43 THOUSAND/ÂΜL (ref 0.17–1.22)
MONOCYTES NFR BLD AUTO: 6 % (ref 4–12)
NEUTROPHILS # BLD AUTO: 4.71 THOUSANDS/ÂΜL (ref 1.85–7.62)
NEUTS SEG NFR BLD AUTO: 71 % (ref 43–75)
NITRITE UR QL STRIP: NEGATIVE
NON-SQ EPI CELLS URNS QL MICRO: ABNORMAL /HPF
NRBC BLD AUTO-RTO: 0 /100 WBCS
PH UR STRIP.AUTO: 6 [PH]
PHOSPHATE SERPL-MCNC: 4.3 MG/DL (ref 2.3–4.1)
PLATELET # BLD AUTO: 321 THOUSANDS/UL (ref 149–390)
PMV BLD AUTO: 9.9 FL (ref 8.9–12.7)
POTASSIUM SERPL-SCNC: 4.4 MMOL/L (ref 3.5–5.3)
PROT UR STRIP-MCNC: ABNORMAL MG/DL
PTH-INTACT SERPL-MCNC: 126.9 PG/ML (ref 12–88)
RBC # BLD AUTO: 4.24 MILLION/UL (ref 3.81–5.12)
RBC #/AREA URNS AUTO: ABNORMAL /HPF
SODIUM SERPL-SCNC: 137 MMOL/L (ref 135–147)
SP GR UR STRIP.AUTO: 1.01 (ref 1–1.03)
TIBC SERPL-MCNC: 305 UG/DL (ref 250–450)
UIBC SERPL-MCNC: 236 UG/DL (ref 155–355)
UROBILINOGEN UR STRIP-ACNC: <2 MG/DL
WBC # BLD AUTO: 6.81 THOUSAND/UL (ref 4.31–10.16)
WBC #/AREA URNS AUTO: ABNORMAL /HPF
WBC CLUMPS # UR AUTO: PRESENT /UL

## 2023-11-28 PROCEDURE — 83970 ASSAY OF PARATHORMONE: CPT

## 2023-11-28 PROCEDURE — 36415 COLL VENOUS BLD VENIPUNCTURE: CPT

## 2023-11-28 PROCEDURE — 80048 BASIC METABOLIC PNL TOTAL CA: CPT

## 2023-11-28 PROCEDURE — 83550 IRON BINDING TEST: CPT

## 2023-11-28 PROCEDURE — 83036 HEMOGLOBIN GLYCOSYLATED A1C: CPT

## 2023-11-28 PROCEDURE — 82728 ASSAY OF FERRITIN: CPT

## 2023-11-28 PROCEDURE — 83735 ASSAY OF MAGNESIUM: CPT

## 2023-11-28 PROCEDURE — 85025 COMPLETE CBC W/AUTO DIFF WBC: CPT

## 2023-11-28 PROCEDURE — 81001 URINALYSIS AUTO W/SCOPE: CPT

## 2023-11-28 PROCEDURE — 83540 ASSAY OF IRON: CPT

## 2023-11-28 PROCEDURE — 82570 ASSAY OF URINE CREATININE: CPT

## 2023-11-28 PROCEDURE — 84100 ASSAY OF PHOSPHORUS: CPT

## 2023-11-28 PROCEDURE — 82043 UR ALBUMIN QUANTITATIVE: CPT

## 2023-11-29 DIAGNOSIS — E11.69 DIABETES MELLITUS TYPE 2 IN OBESE: Primary | ICD-10-CM

## 2023-11-29 DIAGNOSIS — N39.0 RECURRENT UTI: Primary | ICD-10-CM

## 2023-11-29 DIAGNOSIS — E66.9 DIABETES MELLITUS TYPE 2 IN OBESE: Primary | ICD-10-CM

## 2023-11-29 LAB
EST. AVERAGE GLUCOSE BLD GHB EST-MCNC: 163 MG/DL
HBA1C MFR BLD: 7.3 %

## 2023-11-29 NOTE — TELEPHONE ENCOUNTER
Pt is concerned about her recent lab results, stating it looks like she has a UTI again and would like to be put on medication as soon as possible. Please give her a call back to discuss.

## 2023-11-29 NOTE — RESULT ENCOUNTER NOTE
Please call the patient regarding her abnormal result.   Hemoglobin A1c is good kidney function is holding stable no worse no better make sure to avoid anything that can be a nephrotoxic medicine such as Advil Naprosyn Aleve any of those things lots of water eat labs in 3 months

## 2023-12-01 ENCOUNTER — APPOINTMENT (OUTPATIENT)
Dept: LAB | Facility: MEDICAL CENTER | Age: 63
End: 2023-12-01
Payer: COMMERCIAL

## 2023-12-01 ENCOUNTER — RA CDI HCC (OUTPATIENT)
Dept: OTHER | Facility: HOSPITAL | Age: 63
End: 2023-12-01

## 2023-12-01 DIAGNOSIS — N39.0 RECURRENT UTI: ICD-10-CM

## 2023-12-01 PROCEDURE — 87086 URINE CULTURE/COLONY COUNT: CPT

## 2023-12-01 PROCEDURE — 87186 SC STD MICRODIL/AGAR DIL: CPT

## 2023-12-01 PROCEDURE — 87077 CULTURE AEROBIC IDENTIFY: CPT

## 2023-12-02 NOTE — PROGRESS NOTES
[address] [facility] coding opportunities     E11.40     Chart Reviewed number of suggestions sent to Provider: 1   GR    Patients Insurance     Medicare Insurance: [facility]

## 2023-12-04 DIAGNOSIS — N30.00 ACUTE CYSTITIS WITHOUT HEMATURIA: Primary | ICD-10-CM

## 2023-12-04 LAB — BACTERIA UR CULT: ABNORMAL

## 2023-12-04 RX ORDER — CEPHALEXIN 250 MG/1
250 CAPSULE ORAL EVERY 6 HOURS SCHEDULED
Qty: 28 CAPSULE | Refills: 0 | Status: SHIPPED | OUTPATIENT
Start: 2023-12-04 | End: 2023-12-11

## 2023-12-05 ENCOUNTER — TELEPHONE (OUTPATIENT)
Dept: FAMILY MEDICINE CLINIC | Facility: CLINIC | Age: 63
End: 2023-12-05

## 2023-12-05 NOTE — TELEPHONE ENCOUNTER
Question - Son tested COVID Positive (+)  Pt is now feeling sick herself - wants to let it run it's course    Currently taking Keflex 250 mg q 6 hrs for E-coli Urinary infection    Is there any issue with her being on the Keflex for E-coli & having COVID?      Please L/M when calling back

## 2023-12-18 ENCOUNTER — TELEPHONE (OUTPATIENT)
Dept: NEPHROLOGY | Facility: CLINIC | Age: 63
End: 2023-12-18

## 2023-12-18 DIAGNOSIS — N39.0 RECURRENT UTI: Primary | ICD-10-CM

## 2023-12-18 NOTE — TELEPHONE ENCOUNTER
Spoke to patient she stated she been having frequent UTI her last urine was abnormal she finish medication last Monday and is still having systems.Do you want her to repeat culture or repeat medication. She also wanted to know what your opinion is on over the counter supplements for UTI's

## 2023-12-18 NOTE — TELEPHONE ENCOUNTER
Lets repeat the urine culture, continue with methenamine hippurate which she is currently taking.  Unfortunately all of the other over-the-counter supplements to be much weaker, although perhaps as add on therapy on top of them with imaging, we may get some benefit?  I am not sure

## 2023-12-19 ENCOUNTER — TELEPHONE (OUTPATIENT)
Dept: NEPHROLOGY | Facility: CLINIC | Age: 63
End: 2023-12-19

## 2023-12-19 NOTE — TELEPHONE ENCOUNTER
Patient returned our call and is aware of   Lets repeat the urine culture, continue with methenamine hippurate which she is currently taking.  Unfortunately all of the other over-the-counter supplements to be much weaker, although perhaps as add on therapy on top of them with imaging, we may get some benefit?  I am not sure

## 2023-12-20 ENCOUNTER — APPOINTMENT (OUTPATIENT)
Dept: LAB | Facility: MEDICAL CENTER | Age: 63
End: 2023-12-20
Payer: COMMERCIAL

## 2023-12-20 ENCOUNTER — TELEPHONE (OUTPATIENT)
Dept: PSYCHIATRY | Facility: CLINIC | Age: 63
End: 2023-12-20

## 2023-12-20 DIAGNOSIS — N39.0 RECURRENT UTI: ICD-10-CM

## 2023-12-20 LAB
AMORPH URATE CRY URNS QL MICRO: ABNORMAL
BACTERIA UR QL AUTO: ABNORMAL /HPF
BILIRUB UR QL STRIP: NEGATIVE
CLARITY UR: ABNORMAL
COLOR UR: ABNORMAL
GLUCOSE UR STRIP-MCNC: NEGATIVE MG/DL
HGB UR QL STRIP.AUTO: ABNORMAL
KETONES UR STRIP-MCNC: NEGATIVE MG/DL
LEUKOCYTE ESTERASE UR QL STRIP: ABNORMAL
MUCOUS THREADS UR QL AUTO: ABNORMAL
NITRITE UR QL STRIP: NEGATIVE
NON-SQ EPI CELLS URNS QL MICRO: ABNORMAL /HPF
PH UR STRIP.AUTO: 6 [PH]
PROT UR STRIP-MCNC: ABNORMAL MG/DL
RBC #/AREA URNS AUTO: ABNORMAL /HPF
SP GR UR STRIP.AUTO: 1.01 (ref 1–1.03)
UROBILINOGEN UR STRIP-ACNC: <2 MG/DL
WBC #/AREA URNS AUTO: ABNORMAL /HPF
WBC CLUMPS # UR AUTO: PRESENT /UL

## 2023-12-20 PROCEDURE — 87086 URINE CULTURE/COLONY COUNT: CPT

## 2023-12-20 PROCEDURE — 81001 URINALYSIS AUTO W/SCOPE: CPT

## 2023-12-22 LAB — BACTERIA UR CULT: NORMAL

## 2024-01-04 ENCOUNTER — OFFICE VISIT (OUTPATIENT)
Dept: FAMILY MEDICINE CLINIC | Facility: CLINIC | Age: 64
End: 2024-01-04
Payer: COMMERCIAL

## 2024-01-04 VITALS
OXYGEN SATURATION: 98 % | SYSTOLIC BLOOD PRESSURE: 148 MMHG | HEIGHT: 67 IN | BODY MASS INDEX: 43.47 KG/M2 | WEIGHT: 277 LBS | TEMPERATURE: 96.1 F | HEART RATE: 106 BPM | DIASTOLIC BLOOD PRESSURE: 80 MMHG

## 2024-01-04 DIAGNOSIS — I10 ESSENTIAL HYPERTENSION: ICD-10-CM

## 2024-01-04 DIAGNOSIS — R32 URINARY INCONTINENCE, UNSPECIFIED TYPE: ICD-10-CM

## 2024-01-04 DIAGNOSIS — N18.4 CKD (CHRONIC KIDNEY DISEASE) STAGE 4, GFR 15-29 ML/MIN (HCC): ICD-10-CM

## 2024-01-04 DIAGNOSIS — E11.22 TYPE 2 DIABETES MELLITUS WITH STAGE 3A CHRONIC KIDNEY DISEASE, WITHOUT LONG-TERM CURRENT USE OF INSULIN (HCC): Primary | ICD-10-CM

## 2024-01-04 DIAGNOSIS — G47.33 OBSTRUCTIVE SLEEP APNEA: ICD-10-CM

## 2024-01-04 DIAGNOSIS — Z12.31 ENCOUNTER FOR SCREENING MAMMOGRAM FOR MALIGNANT NEOPLASM OF BREAST: ICD-10-CM

## 2024-01-04 DIAGNOSIS — Z11.59 ENCOUNTER FOR HEPATITIS C SCREENING TEST FOR LOW RISK PATIENT: ICD-10-CM

## 2024-01-04 DIAGNOSIS — Z11.4 ENCOUNTER FOR SCREENING FOR HIV: ICD-10-CM

## 2024-01-04 DIAGNOSIS — N18.31 TYPE 2 DIABETES MELLITUS WITH STAGE 3A CHRONIC KIDNEY DISEASE, WITHOUT LONG-TERM CURRENT USE OF INSULIN (HCC): Primary | ICD-10-CM

## 2024-01-04 DIAGNOSIS — F32.2 CURRENT SEVERE EPISODE OF MAJOR DEPRESSIVE DISORDER WITHOUT PSYCHOTIC FEATURES, UNSPECIFIED WHETHER RECURRENT (HCC): ICD-10-CM

## 2024-01-04 DIAGNOSIS — M54.16 LUMBAR RADICULOPATHY: ICD-10-CM

## 2024-01-04 PROCEDURE — 99214 OFFICE O/P EST MOD 30 MIN: CPT | Performed by: FAMILY MEDICINE

## 2024-01-04 RX ORDER — GLIMEPIRIDE 1 MG/1
0.5 TABLET ORAL
COMMUNITY
Start: 2023-10-05

## 2024-01-04 NOTE — PROGRESS NOTES
Assessment/Plan:    Problem List Items Addressed This Visit          Endocrine    Type 2 diabetes mellitus with stage 3a chronic kidney disease, without long-term current use of insulin ([facility]) - Primary    Relevant Medications    glimepiride (AMARYL) 1 mg tablet    Other Relevant Orders    HEMOGLOBIN A1C W/ EAG ESTIMATION    Albumin / creatinine urine ratio    Basic metabolic panel       Respiratory    Obstructive sleep apnea       Cardiovascular and Mediastinum    Essential hypertension       Nervous and Auditory    Lumbar radiculopathy       Genitourinary    CKD (chronic kidney disease) stage 4, GFR 15-29 ml/min ([facility])       Other    Depression    Relevant Medications    cariprazine (Vraylar) 1.5 MG capsule     Other Visit Diagnoses       Urinary incontinence, unspecified type        Relevant Orders    Ambulatory Referral to Urogynecology    Encounter for screening mammogram for malignant neoplasm of breast        Relevant Orders    Mammo screening bilateral w 3d & cad    Body mass index (BMI) 40.0-44.9, adult ([facility])                 Diagnoses and all orders for this visit:    Type 2 diabetes mellitus with stage 3a chronic kidney disease, without long-term current use of insulin ([facility])  -     HEMOGLOBIN A1C W/ EAG ESTIMATION; Future  -     Albumin / creatinine urine ratio; Future  -     Basic metabolic panel; Future    Obstructive sleep apnea    Essential hypertension    Lumbar radiculopathy    Current severe episode of major depressive disorder without psychotic features, unspecified whether recurrent ([facility])  -     cariprazine (Vraylar) 1.5 MG capsule; Take 1 capsule (1.5 mg total) by mouth daily    Urinary incontinence, unspecified type  -     Ambulatory Referral to Urogynecology; Future    Encounter for screening mammogram for malignant neoplasm of breast  -     Mammo screening bilateral w 3d & cad; Future    CKD (chronic kidney disease) stage 4, GFR 15-29 ml/min ([facility])    Body mass index (BMI) 40.0-44.9, adult  ([facility])    Other orders  -     glimepiride (AMARYL) 1 mg tablet; Take 0.5 mg by mouth daily with breakfast        No problem-specific Assessment & Plan notes found for this encounter.        Subjective:      Patient ID: Loretta Lewis is a 63 y.o. female.    Shereen is here today for a follow-up visit she is still experiencing pain where she had a lumbar been done with the cadaver bones she is gets chronic pain there she usually takes tizanidine for that the patient also is experiencing worsening of her depression symptoms over her her physical health she has developed urinary urgency and incontinence this has her quite upset she has been treated for several UTIs the most recent 1 by Dr. Ballard she has never seen a GYN urologist no is in need of colorectal cancer screening mammography I gave her slips for the mammography she has the Cologuard box at home to refer her to Dr. Torres Wise for a urogynecology referral consult        The following portions of the patient's history were reviewed and updated as appropriate:   She has a past medical history of INESSA (acute kidney injury) ([facility]) (2023), Anemia, Arthritis, Diabetes mellitus ([facility]), Hypertension, Migraine, and MRSA (methicillin resistant Staphylococcus aureus).,  does not have any pertinent problems on file.,   has a past surgical history that includes Achilles tendon repair; Carpal tunnel release (Right);  section; Cholecystectomy; Ankle surgery (Left, ); Nasal sinus surgery (); Lumbar laminectomy (); Ganglion cyst excision (Left, ); and Mouth surgery (2023).,  family history includes Arrhythmia in her father; Cancer in her father; Hepatitis in her mother; Hypertension in her mother; Melanoma in her father; No Known Problems in her maternal aunt, maternal aunt, maternal aunt, maternal aunt, maternal grandfather, maternal grandmother, paternal aunt, paternal grandfather, paternal grandmother, sister, and son; Stroke in her  father.,   reports that she quit smoking about 9 years ago. Her smoking use included cigarettes. She started smoking about 44 years ago. She has a 52.5 pack-year smoking history. She has never used smokeless tobacco. She reports that she does not currently use alcohol. She reports that she does not use drugs.,  is allergic to pollen extract and vancomycin..  Current Outpatient Medications   Medication Sig Dispense Refill    Alpha-Lipoic Acid 300 MG CAPS Take 300 mg by mouth 2 (two) times a day      cariprazine (Vraylar) 1.5 MG capsule Take 1 capsule (1.5 mg total) by mouth daily 15 capsule 1    clonazePAM (KlonoPIN) 0.5 mg tablet Take 2 tablets (1 mg total) by mouth daily at bedtime as needed for anxiety 60 tablet 0    docusate sodium (COLACE) 100 mg capsule Take 100 mg by mouth daily as needed for constipation      DULoxetine (CYMBALTA) 60 mg delayed release capsule Take 1 capsule (60 mg total) by mouth 2 (two) times a day 180 capsule 0    famotidine (PEPCID) 20 mg tablet Take 20 mg by mouth daily      gabapentin (NEURONTIN) 100 mg capsule One in am, one at lunch, and 3 at hs 360 capsule 0    glimepiride (AMARYL) 1 mg tablet Take 0.5 mg by mouth daily with breakfast      methenamine hippurate (HIPREX) 1 g tablet Take 1 tablet (1 g total) by mouth 2 (two) times a day with meals 60 tablet 5    Polysaccharide Iron Complex (HEMATEX IRON COMPLEX PO) Take by mouth      TiZANidine (ZANAFLEX) 2 MG capsule Take 1 capsule (2 mg total) by mouth daily as needed for muscle spasms 1 tablet once daily if needed for muscle spasm 15 capsule 0     No current facility-administered medications for this visit.       Review of Systems   Constitutional:  Negative for activity change, appetite change, diaphoresis, fatigue and fever.   HENT: Negative.     Eyes: Negative.    Respiratory:  Negative for apnea, cough, chest tightness, shortness of breath and wheezing.    Cardiovascular:  Negative for chest pain, palpitations and leg swelling.  "  Gastrointestinal:  Negative for abdominal distention, abdominal pain, anal bleeding, constipation, diarrhea, nausea and vomiting.   Endocrine: Negative for cold intolerance, heat intolerance, polydipsia, polyphagia and polyuria.   Genitourinary:  Negative for difficulty urinating, dysuria, flank pain, hematuria and urgency.   Musculoskeletal:  Negative for arthralgias, back pain, gait problem, joint swelling and myalgias.   Skin:  Negative for color change, rash and wound.   Allergic/Immunologic: Negative for environmental allergies, food allergies and immunocompromised state.   Neurological:  Negative for dizziness, seizures, syncope, speech difficulty, numbness and headaches.   Hematological:  Negative for adenopathy. Does not bruise/bleed easily.   Psychiatric/Behavioral:  Negative for agitation, behavioral problems, hallucinations, sleep disturbance and suicidal ideas.          Objective:  Vitals:    01/04/24 1323   BP: 148/80   BP Location: Left arm   Patient Position: Sitting   Cuff Size: Standard   Pulse: (!) 106   Temp: (!) 96.1 °F (35.6 °C)   TempSrc: Temporal   SpO2: 98%   Weight: 126 kg (277 lb)   Height: 5' 7\" (1.702 m)     Body mass index is 43.38 kg/m².     Physical Exam  Constitutional:       General: She is not in acute distress.     Appearance: She is well-developed. She is not diaphoretic.   HENT:      Head: Normocephalic.      Right Ear: External ear normal.      Left Ear: External ear normal.      Nose: Nose normal.   Eyes:      General: No scleral icterus.        Right eye: No discharge.         Left eye: No discharge.      Conjunctiva/sclera: Conjunctivae normal.      Pupils: Pupils are equal, round, and reactive to light.   Neck:      Thyroid: No thyromegaly.      Trachea: No tracheal deviation.   Cardiovascular:      Rate and Rhythm: Normal rate and regular rhythm.      Heart sounds: Normal heart sounds. No murmur heard.     No friction rub. No gallop.   Pulmonary:      Effort: Pulmonary " effort is normal. No respiratory distress.      Breath sounds: Normal breath sounds. No wheezing.   Abdominal:      General: Bowel sounds are normal.      Palpations: Abdomen is soft. There is no mass.      Tenderness: There is no abdominal tenderness. There is no guarding.   Musculoskeletal:         General: No deformity.      Cervical back: Normal range of motion.   Lymphadenopathy:      Cervical: No cervical adenopathy.   Skin:     General: Skin is warm and dry.      Findings: No erythema or rash.   Neurological:      Mental Status: She is alert and oriented to person, place, and time.      Cranial Nerves: No cranial nerve deficit.   Psychiatric:         Thought Content: Thought content normal.

## 2024-01-05 ENCOUNTER — TELEPHONE (OUTPATIENT)
Dept: FAMILY MEDICINE CLINIC | Facility: CLINIC | Age: 64
End: 2024-01-05

## 2024-01-05 DIAGNOSIS — F33.2 SEVERE EPISODE OF RECURRENT MAJOR DEPRESSIVE DISORDER, WITHOUT PSYCHOTIC FEATURES (HCC): Primary | ICD-10-CM

## 2024-01-05 NOTE — TELEPHONE ENCOUNTER
Pt seen yesterday and discussed changes in her anti-depressant medication.  Said you added Vraylar 1.5 mg daily and she already is taking Duloxetine 60 mg BID.  Pt spoke with her friend out of [place] who is a psychiatric nurse practitioner and also did some research on her own and now is concerned because it is not recommended for kidney disease which she has. Asking if you think it would be best for her to find a psychiatrist to do med management, or how do you want to proceed?  Her phychiatric nurse practitioner friend suggested Wellbutrin  mg daily. Please advise

## 2024-01-08 DIAGNOSIS — F41.1 GENERALIZED ANXIETY DISORDER: ICD-10-CM

## 2024-01-08 DIAGNOSIS — M62.830 BACK SPASM: ICD-10-CM

## 2024-01-08 RX ORDER — TIZANIDINE HYDROCHLORIDE 2 MG/1
2 CAPSULE, GELATIN COATED ORAL DAILY PRN
Qty: 15 CAPSULE | Refills: 0 | Status: SHIPPED | OUTPATIENT
Start: 2024-01-08

## 2024-01-08 RX ORDER — CLONAZEPAM 0.5 MG/1
1 TABLET ORAL
Qty: 60 TABLET | Refills: 0 | Status: SHIPPED | OUTPATIENT
Start: 2024-01-08

## 2024-01-23 ENCOUNTER — HOSPITAL ENCOUNTER (OUTPATIENT)
Dept: MAMMOGRAPHY | Facility: HOSPITAL | Age: 64
Discharge: HOME | End: 2024-01-23
Attending: FAMILY MEDICINE
Payer: COMMERCIAL

## 2024-01-23 VITALS — HEIGHT: 67 IN | WEIGHT: 277 LBS | BODY MASS INDEX: 43.47 KG/M2

## 2024-01-23 DIAGNOSIS — Z12.31 ENCOUNTER FOR SCREENING MAMMOGRAM FOR MALIGNANT NEOPLASM OF BREAST: ICD-10-CM

## 2024-01-23 PROCEDURE — 77067 SCR MAMMO BI INCL CAD: CPT

## 2024-01-23 PROCEDURE — 77063 BREAST TOMOSYNTHESIS BI: CPT

## 2024-02-06 DIAGNOSIS — M47.27 OSTEOARTHRITIS OF SPINE WITH RADICULOPATHY, LUMBOSACRAL REGION: ICD-10-CM

## 2024-02-07 RX ORDER — DULOXETIN HYDROCHLORIDE 60 MG/1
60 CAPSULE, DELAYED RELEASE ORAL 2 TIMES DAILY
Qty: 180 CAPSULE | Refills: 0 | Status: SHIPPED | OUTPATIENT
Start: 2024-02-07

## 2024-02-12 DIAGNOSIS — M47.27 OSTEOARTHRITIS OF SPINE WITH RADICULOPATHY, LUMBOSACRAL REGION: ICD-10-CM

## 2024-02-12 RX ORDER — GABAPENTIN 100 MG/1
CAPSULE ORAL
Qty: 360 CAPSULE | Refills: 0 | Status: SHIPPED | OUTPATIENT
Start: 2024-02-12

## 2024-02-12 NOTE — TELEPHONE ENCOUNTER
CC: sinus infection - ears blocked, Post nasal drip, with low grade fever.    Asking for Augmentin 875 BID x 10 days

## 2024-02-13 ENCOUNTER — TELEPHONE (OUTPATIENT)
Dept: FAMILY MEDICINE CLINIC | Facility: CLINIC | Age: 64
End: 2024-02-13

## 2024-02-13 DIAGNOSIS — J22 ACUTE LOWER RESPIRATORY INFECTION: Primary | ICD-10-CM

## 2024-02-13 RX ORDER — AMOXICILLIN AND CLAVULANATE POTASSIUM 875; 125 MG/1; MG/1
1 TABLET, FILM COATED ORAL EVERY 12 HOURS SCHEDULED
Qty: 20 TABLET | Refills: 0 | Status: SHIPPED | OUTPATIENT
Start: 2024-02-13 | End: 2024-02-23

## 2024-03-07 DIAGNOSIS — F41.1 GENERALIZED ANXIETY DISORDER: ICD-10-CM

## 2024-03-08 RX ORDER — CLONAZEPAM 0.5 MG/1
1 TABLET ORAL
Qty: 60 TABLET | Refills: 0 | Status: SHIPPED | OUTPATIENT
Start: 2024-03-08

## 2024-03-20 DIAGNOSIS — E11.9 TYPE 2 DIABETES MELLITUS WITHOUT COMPLICATION, WITHOUT LONG-TERM CURRENT USE OF INSULIN (HCC): ICD-10-CM

## 2024-03-20 DIAGNOSIS — E78.2 MIXED HYPERLIPIDEMIA: Primary | ICD-10-CM

## 2024-03-27 ENCOUNTER — RA CDI HCC (OUTPATIENT)
Dept: OTHER | Facility: HOSPITAL | Age: 64
End: 2024-03-27

## 2024-03-28 ENCOUNTER — RA CDI HCC (OUTPATIENT)
Dept: OTHER | Facility: HOSPITAL | Age: 64
End: 2024-03-28

## 2024-03-28 PROBLEM — E66.01 MORBID OBESITY WITH BMI OF 40.0-44.9, ADULT (HCC): Status: ACTIVE | Noted: 2024-03-28

## 2024-03-28 PROBLEM — I12.9 HYPERTENSIVE KIDNEY DISEASE WITH CKD (CHRONIC KIDNEY DISEASE), STAGE 1-4 OR UNSPECIFIED CHRONIC KIDNEY DISEASE: Status: ACTIVE | Noted: 2024-03-28

## 2024-03-28 PROBLEM — E66.01 MORBID OBESITY WITH BMI OF 40.0-44.9, ADULT (HCC): Status: ACTIVE | Noted: 2021-03-02

## 2024-03-28 PROBLEM — E66.9 OBESITY (BMI 35.0-39.9 WITHOUT COMORBIDITY): Status: RESOLVED | Noted: 2017-07-25 | Resolved: 2024-03-28

## 2024-03-28 NOTE — PROGRESS NOTES
HCC coding opportunities     E11.40, E66.01, I12.9     Chart Reviewed number of suggestions sent to Provider: 3   GR    Patients Insurance     Medicare Insurance: [facility] [employer] Advantage

## 2024-04-01 ENCOUNTER — LAB (OUTPATIENT)
Dept: LAB | Facility: MEDICAL CENTER | Age: 64
End: 2024-04-01
Payer: COMMERCIAL

## 2024-04-01 DIAGNOSIS — E66.9 TYPE 2 DIABETES MELLITUS WITH OBESITY  (HCC): ICD-10-CM

## 2024-04-01 DIAGNOSIS — R82.90 ABNORMAL URINE FINDINGS: Primary | ICD-10-CM

## 2024-04-01 DIAGNOSIS — E11.22 TYPE 2 DIABETES MELLITUS WITH STAGE 3A CHRONIC KIDNEY DISEASE, WITHOUT LONG-TERM CURRENT USE OF INSULIN (HCC): ICD-10-CM

## 2024-04-01 DIAGNOSIS — N18.31 TYPE 2 DIABETES MELLITUS WITH STAGE 3A CHRONIC KIDNEY DISEASE, WITHOUT LONG-TERM CURRENT USE OF INSULIN (HCC): ICD-10-CM

## 2024-04-01 DIAGNOSIS — E11.9 TYPE 2 DIABETES MELLITUS WITHOUT COMPLICATION, WITHOUT LONG-TERM CURRENT USE OF INSULIN (HCC): ICD-10-CM

## 2024-04-01 DIAGNOSIS — R82.90 ABNORMAL URINE FINDINGS: ICD-10-CM

## 2024-04-01 DIAGNOSIS — Z11.59 ENCOUNTER FOR HEPATITIS C SCREENING TEST FOR LOW RISK PATIENT: ICD-10-CM

## 2024-04-01 DIAGNOSIS — Z11.4 ENCOUNTER FOR SCREENING FOR HIV: ICD-10-CM

## 2024-04-01 DIAGNOSIS — E11.69 TYPE 2 DIABETES MELLITUS WITH OBESITY  (HCC): ICD-10-CM

## 2024-04-01 LAB
ALBUMIN SERPL BCP-MCNC: 4.1 G/DL (ref 3.5–5)
ALP SERPL-CCNC: 69 U/L (ref 34–104)
ALT SERPL W P-5'-P-CCNC: 40 U/L (ref 7–52)
ANION GAP SERPL CALCULATED.3IONS-SCNC: 10 MMOL/L (ref 4–13)
AST SERPL W P-5'-P-CCNC: 31 U/L (ref 13–39)
BACTERIA UR QL AUTO: ABNORMAL /HPF
BILIRUB SERPL-MCNC: 0.34 MG/DL (ref 0.2–1)
BILIRUB UR QL STRIP: NEGATIVE
BUN SERPL-MCNC: 29 MG/DL (ref 5–25)
CALCIUM SERPL-MCNC: 8.9 MG/DL (ref 8.4–10.2)
CHLORIDE SERPL-SCNC: 103 MMOL/L (ref 96–108)
CHOLEST SERPL-MCNC: 166 MG/DL
CLARITY UR: ABNORMAL
CO2 SERPL-SCNC: 22 MMOL/L (ref 21–32)
COLOR UR: ABNORMAL
CREAT SERPL-MCNC: 2.13 MG/DL (ref 0.6–1.3)
CREAT UR-MCNC: 62.6 MG/DL
EST. AVERAGE GLUCOSE BLD GHB EST-MCNC: 157 MG/DL
GFR SERPL CREATININE-BSD FRML MDRD: 23 ML/MIN/1.73SQ M
GLUCOSE P FAST SERPL-MCNC: 188 MG/DL (ref 65–99)
GLUCOSE UR STRIP-MCNC: NEGATIVE MG/DL
HBA1C MFR BLD: 7.1 %
HCV AB SER QL: NORMAL
HDLC SERPL-MCNC: 42 MG/DL
HGB UR QL STRIP.AUTO: NEGATIVE
HIV 1+2 AB+HIV1 P24 AG SERPL QL IA: NORMAL
HIV 2 AB SERPL QL IA: NORMAL
HIV1 AB SERPL QL IA: NORMAL
HIV1 P24 AG SERPL QL IA: NORMAL
KETONES UR STRIP-MCNC: NEGATIVE MG/DL
LDLC SERPL CALC-MCNC: 87 MG/DL (ref 0–100)
LEUKOCYTE ESTERASE UR QL STRIP: ABNORMAL
MICROALBUMIN UR-MCNC: 54.6 MG/L
MICROALBUMIN/CREAT 24H UR: 87 MG/G CREATININE (ref 0–30)
NITRITE UR QL STRIP: NEGATIVE
NON-SQ EPI CELLS URNS QL MICRO: ABNORMAL /HPF
PH UR STRIP.AUTO: 6 [PH]
POTASSIUM SERPL-SCNC: 4.3 MMOL/L (ref 3.5–5.3)
PROT SERPL-MCNC: 7.6 G/DL (ref 6.4–8.4)
PROT UR STRIP-MCNC: ABNORMAL MG/DL
RBC #/AREA URNS AUTO: ABNORMAL /HPF
SODIUM SERPL-SCNC: 135 MMOL/L (ref 135–147)
SP GR UR STRIP.AUTO: 1.01 (ref 1–1.03)
T4 FREE SERPL-MCNC: 0.4 NG/DL (ref 0.61–1.12)
TRIGL SERPL-MCNC: 186 MG/DL
TSH SERPL DL<=0.05 MIU/L-ACNC: 4.79 UIU/ML (ref 0.45–4.5)
UROBILINOGEN UR STRIP-ACNC: <2 MG/DL
WBC #/AREA URNS AUTO: ABNORMAL /HPF

## 2024-04-01 PROCEDURE — 84439 ASSAY OF FREE THYROXINE: CPT

## 2024-04-01 PROCEDURE — 87389 HIV-1 AG W/HIV-1&-2 AB AG IA: CPT

## 2024-04-01 PROCEDURE — 86803 HEPATITIS C AB TEST: CPT

## 2024-04-01 PROCEDURE — 80061 LIPID PANEL: CPT

## 2024-04-01 PROCEDURE — 80053 COMPREHEN METABOLIC PANEL: CPT

## 2024-04-01 PROCEDURE — 82043 UR ALBUMIN QUANTITATIVE: CPT

## 2024-04-01 PROCEDURE — 82570 ASSAY OF URINE CREATININE: CPT

## 2024-04-01 PROCEDURE — 81001 URINALYSIS AUTO W/SCOPE: CPT

## 2024-04-01 PROCEDURE — 84443 ASSAY THYROID STIM HORMONE: CPT

## 2024-04-01 PROCEDURE — 83036 HEMOGLOBIN GLYCOSYLATED A1C: CPT

## 2024-04-01 PROCEDURE — 36415 COLL VENOUS BLD VENIPUNCTURE: CPT

## 2024-04-02 DIAGNOSIS — R82.81 PYURIA: Primary | ICD-10-CM

## 2024-04-02 DIAGNOSIS — R94.6 ABNORMAL THYROID FUNCTION TEST: ICD-10-CM

## 2024-04-02 NOTE — RESULT ENCOUNTER NOTE
Please call the patient regarding her abnormal result.  She continues to spill protein in her urine and her kidney function on her chemistry panel has gone down so she needs to keep a very close relationship with her nephrologist and venously but consume any nephrotoxic medications such as ibuprofen or Aleve needs to keep her fluid intake her water intake particularly up she also had some bacteria in her urine before treating her as a urinary infection I would suggest we do a urine culture I will put an order in for that it has to be a very good clean-catch specimen her thyroid function is slightly down do not see that she takes any thyroid medication so it is not terribly low and it certainly could be a lab error so I think we should also repeat her thyroid to confirm that that is an accurate report so I will put an order in for a urine culture and also for a thyroid function make sure she is not taking any vitamins with biotin in them because they can affect the thyroid test adversely

## 2024-04-04 ENCOUNTER — LAB (OUTPATIENT)
Dept: LAB | Facility: MEDICAL CENTER | Age: 64
End: 2024-04-04
Payer: COMMERCIAL

## 2024-04-04 ENCOUNTER — OFFICE VISIT (OUTPATIENT)
Dept: FAMILY MEDICINE CLINIC | Facility: CLINIC | Age: 64
End: 2024-04-04
Payer: COMMERCIAL

## 2024-04-04 VITALS
DIASTOLIC BLOOD PRESSURE: 86 MMHG | TEMPERATURE: 96.2 F | BODY MASS INDEX: 45.67 KG/M2 | OXYGEN SATURATION: 98 % | WEIGHT: 291 LBS | HEIGHT: 67 IN | HEART RATE: 88 BPM | SYSTOLIC BLOOD PRESSURE: 146 MMHG

## 2024-04-04 DIAGNOSIS — I10 ESSENTIAL HYPERTENSION: Primary | ICD-10-CM

## 2024-04-04 DIAGNOSIS — F41.1 GENERALIZED ANXIETY DISORDER: ICD-10-CM

## 2024-04-04 DIAGNOSIS — R94.6 ABNORMAL THYROID FUNCTION TEST: ICD-10-CM

## 2024-04-04 LAB — TSH SERPL DL<=0.05 MIU/L-ACNC: 6.66 UIU/ML (ref 0.45–4.5)

## 2024-04-04 PROCEDURE — 99214 OFFICE O/P EST MOD 30 MIN: CPT | Performed by: FAMILY MEDICINE

## 2024-04-04 PROCEDURE — 87186 SC STD MICRODIL/AGAR DIL: CPT | Performed by: FAMILY MEDICINE

## 2024-04-04 PROCEDURE — G2211 COMPLEX E/M VISIT ADD ON: HCPCS | Performed by: FAMILY MEDICINE

## 2024-04-04 PROCEDURE — 87086 URINE CULTURE/COLONY COUNT: CPT | Performed by: FAMILY MEDICINE

## 2024-04-04 PROCEDURE — 87077 CULTURE AEROBIC IDENTIFY: CPT | Performed by: FAMILY MEDICINE

## 2024-04-04 PROCEDURE — 36415 COLL VENOUS BLD VENIPUNCTURE: CPT

## 2024-04-04 PROCEDURE — 84443 ASSAY THYROID STIM HORMONE: CPT

## 2024-04-04 RX ORDER — MIRTAZAPINE 7.5 MG/1
7.5 TABLET, FILM COATED ORAL
COMMUNITY

## 2024-04-04 NOTE — PROGRESS NOTES
Assessment/Plan:    Problem List Items Addressed This Visit    None       There are no diagnoses linked to this encounter.    No problem-specific Assessment & Plan notes found for this encounter.        Subjective:      Patient ID: Loretta Lewis is a 63 y.o. female.    Patient is here for follow-up of visit regarding her medications has completed a urogynecology consult and is being treated for overactive bladder also is following up with Dr. Melani early for a year BM neuropathy and has made great strides in that regard        The following portions of the patient's history were reviewed and updated as appropriate:   She has a past medical history of INESSA (acute kidney injury) (HCC) (2023), Anemia, Arthritis, Diabetes mellitus (HCC), Hypertension, Migraine, MRSA (methicillin resistant Staphylococcus aureus), and Obesity (BMI 35.0-39.9 without comorbidity) (2017).,  does not have any pertinent problems on file.,   has a past surgical history that includes Achilles tendon repair; Carpal tunnel release (Right);  section; Cholecystectomy; Ankle surgery (Left, ); Nasal sinus surgery (); Lumbar laminectomy (); Ganglion cyst excision (Left, ); and Mouth surgery (2023).,  family history includes Arrhythmia in her father; Cancer in her father; Hepatitis in her mother; Hypertension in her mother; Melanoma in her father; No Known Problems in her maternal aunt, maternal aunt, maternal aunt, maternal aunt, maternal grandfather, maternal grandmother, paternal aunt, paternal grandfather, paternal grandmother, sister, and son; Stroke in her father.,   reports that she quit smoking about 9 years ago. Her smoking use included cigarettes. She started smoking about 44 years ago. She has a 52.5 pack-year smoking history. She has never used smokeless tobacco. She reports that she does not currently use alcohol. She reports that she does not use drugs.,  is allergic to pollen extract and  vancomycin..  Current Outpatient Medications   Medication Sig Dispense Refill    clonazePAM (KlonoPIN) 0.5 mg tablet Take 2 tablets (1 mg total) by mouth daily at bedtime as needed for anxiety 60 tablet 0    docusate sodium (COLACE) 100 mg capsule Take 100 mg by mouth daily as needed for constipation      DULoxetine (CYMBALTA) 60 mg delayed release capsule Take 1 capsule (60 mg total) by mouth 2 (two) times a day 180 capsule 0    famotidine (PEPCID) 20 mg tablet Take 20 mg by mouth daily      gabapentin (NEURONTIN) 100 mg capsule One in am, one at lunch, and 3 at hs 360 capsule 0    glimepiride (AMARYL) 1 mg tablet Take 0.5 mg by mouth daily with breakfast      methenamine hippurate (HIPREX) 1 g tablet Take 1 tablet (1 g total) by mouth 2 (two) times a day with meals 60 tablet 5    Mirabegron ER 50 MG TB24       mirtazapine (REMERON) 7.5 MG tablet Take 7.5 mg by mouth daily at bedtime      Polysaccharide Iron Complex (HEMATEX IRON COMPLEX PO) Take by mouth      TiZANidine (ZANAFLEX) 2 MG capsule Take 1 capsule (2 mg total) by mouth daily as needed for muscle spasms 1 tablet once daily if needed for muscle spasm 15 capsule 0    Alpha-Lipoic Acid 300 MG CAPS Take 300 mg by mouth 2 (two) times a day       No current facility-administered medications for this visit.       Review of Systems   Constitutional:  Negative for activity change, appetite change, diaphoresis, fatigue and fever.   HENT: Negative.  Negative for dental problem.    Eyes: Negative.  Negative for visual disturbance.   Respiratory:  Negative for apnea, cough, chest tightness, shortness of breath and wheezing.    Cardiovascular:  Positive for leg swelling. Negative for chest pain and palpitations.   Gastrointestinal:  Negative for abdominal distention, abdominal pain, anal bleeding, constipation, diarrhea, nausea and vomiting.   Endocrine: Negative for cold intolerance, heat intolerance, polydipsia, polyphagia and polyuria.   Genitourinary:  Negative  "for difficulty urinating, dysuria, flank pain, hematuria and urgency.        Overactive bladder   Musculoskeletal:  Positive for back pain. Negative for arthralgias, gait problem, joint swelling and myalgias.   Skin:  Negative for color change, rash and wound.   Allergic/Immunologic: Negative for environmental allergies, food allergies and immunocompromised state.   Neurological:  Negative for dizziness, seizures, syncope, speech difficulty, numbness and headaches.   Hematological:  Negative for adenopathy. Does not bruise/bleed easily.   Psychiatric/Behavioral:  Negative for agitation, behavioral problems, hallucinations, sleep disturbance and suicidal ideas.          Objective:  Vitals:    04/04/24 1347 04/04/24 1402   BP: 170/80 146/86   BP Location: Left arm    Patient Position: Sitting    Cuff Size: Standard    Pulse: 88    Temp: (!) 96.2 °F (35.7 °C)    TempSrc: Temporal    SpO2: 98%    Weight: 132 kg (291 lb)    Height: 5' 7\" (1.702 m)      Body mass index is 45.58 kg/m².     Physical Exam  Constitutional:       General: She is not in acute distress.     Appearance: She is well-developed. She is obese. She is not diaphoretic.   HENT:      Head: Normocephalic.      Right Ear: External ear normal.      Left Ear: External ear normal.      Nose: Nose normal.   Eyes:      General: No scleral icterus.        Right eye: No discharge.         Left eye: No discharge.      Conjunctiva/sclera: Conjunctivae normal.      Pupils: Pupils are equal, round, and reactive to light.   Neck:      Thyroid: No thyromegaly.      Trachea: No tracheal deviation.   Cardiovascular:      Rate and Rhythm: Normal rate and regular rhythm.      Heart sounds: Normal heart sounds. No murmur heard.     No friction rub. No gallop.   Pulmonary:      Effort: Pulmonary effort is normal. No respiratory distress.      Breath sounds: Normal breath sounds. No wheezing.   Abdominal:      General: Bowel sounds are normal.      Palpations: Abdomen is " soft. There is no mass.      Tenderness: There is no abdominal tenderness. There is no guarding.   Musculoskeletal:         General: No deformity.      Cervical back: Normal range of motion.   Lymphadenopathy:      Cervical: No cervical adenopathy.   Skin:     General: Skin is warm and dry.      Findings: No erythema or rash.   Neurological:      Mental Status: She is alert and oriented to person, place, and time.      Cranial Nerves: No cranial nerve deficit.   Psychiatric:         Thought Content: Thought content normal.

## 2024-04-05 NOTE — RESULT ENCOUNTER NOTE
Please call the patient regarding her abnormal result.  TSH is elevated meaning thyroid function may be low however make sure she does not take biotin for her hair or her nails as if she takes biotin it elevates the TSH level falsely and if that is the case she needs to stop her biotin for 2 weeks and then repeat the TSH otherwise she may need thyroid medicine

## 2024-04-06 ENCOUNTER — NURSE TRIAGE (OUTPATIENT)
Dept: OTHER | Facility: OTHER | Age: 64
End: 2024-04-06

## 2024-04-06 DIAGNOSIS — N39.0 URINARY TRACT INFECTION WITHOUT HEMATURIA, SITE UNSPECIFIED: Primary | ICD-10-CM

## 2024-04-06 LAB
BACTERIA UR CULT: ABNORMAL
BACTERIA UR CULT: ABNORMAL

## 2024-04-06 RX ORDER — CEPHALEXIN 250 MG/1
250 CAPSULE ORAL EVERY 6 HOURS SCHEDULED
Qty: 28 CAPSULE | Refills: 0 | Status: SHIPPED | OUTPATIENT
Start: 2024-04-06 | End: 2024-04-13

## 2024-04-07 NOTE — TELEPHONE ENCOUNTER
"Regarding: UTI  ----- Message from Corine Tarango sent at 4/6/2024  8:53 PM EDT -----  \" I just received my lab results and it's positive for UTI. I would like to get started on antibiotics\"    "

## 2024-04-07 NOTE — TELEPHONE ENCOUNTER
"Reason for Disposition  • [1] Caller has URGENT question (includes prescribed medication questions) AND [2] triager unable to answer question    Answer Assessment - Initial Assessment Questions  1. MAIN CONCERN OR SYMPTOM:  \"What is your main concern right now?\" \"What question do you have?\" \"What's the main symptom you're worried about?\" (e.g., breathing difficulty, cough, fever. pain)      Continued cloudy urine and fatigue  2. ONSET: \"When did the  symptoms  start?\"      Several days ago  3. BETTER-SAME-WORSE: \"Are you getting better, staying the same, or getting worse compared to how you felt at your last visit to the doctor (most recent medical visit)?\"      same  4. VISIT DATE: \"When were you seen?\" (Date)      4/4  5. VISIT DOCTOR: \"What is the name of the doctor taking care of you now?\"      Dr. Us  9. PAIN: \"Is there any pain?\" If Yes, ask: \"How bad is it?\"  (Scale 1-10; or mild, moderate, severe)      denies  10. FEVER: \"Do you have a fever?\" If Yes, ask: \"What is it, how was it measured  and when did it start?\"        denies  11. OTHER SYMPTOMS: \"Do you have any other symptoms?\"        denies    Protocols used: Recent Medical Visit for Illness Follow-up Call-ADULT-    "

## 2024-04-08 DIAGNOSIS — N30.00 ACUTE CYSTITIS WITHOUT HEMATURIA: Primary | ICD-10-CM

## 2024-04-08 DIAGNOSIS — E03.9 HYPOTHYROIDISM, UNSPECIFIED TYPE: Primary | ICD-10-CM

## 2024-04-08 RX ORDER — SULFAMETHOXAZOLE AND TRIMETHOPRIM 800; 160 MG/1; MG/1
1 TABLET ORAL EVERY 12 HOURS SCHEDULED
Qty: 20 TABLET | Refills: 0 | Status: SHIPPED | OUTPATIENT
Start: 2024-04-08 | End: 2024-04-18

## 2024-04-08 NOTE — RESULT ENCOUNTER NOTE
Please call the patient regarding her abnormal result.  Looks like patient has an antibiotic or has a bacterial infection of the urine I will put in a prescription for Bactrim

## 2024-04-12 ENCOUNTER — TELEPHONE (OUTPATIENT)
Age: 64
End: 2024-04-12

## 2024-04-12 NOTE — TELEPHONE ENCOUNTER
Pt called in stating that she will not have co-pay available for her upcoming appt 04/15/2024. I informed pt that we can bill her for it and she can pay when she is able to. Pt is aware and confirmed

## 2024-04-15 ENCOUNTER — OFFICE VISIT (OUTPATIENT)
Dept: NEPHROLOGY | Facility: CLINIC | Age: 64
End: 2024-04-15
Payer: COMMERCIAL

## 2024-04-15 ENCOUNTER — LAB (OUTPATIENT)
Dept: LAB | Facility: MEDICAL CENTER | Age: 64
End: 2024-04-15
Payer: COMMERCIAL

## 2024-04-15 VITALS
HEIGHT: 67 IN | OXYGEN SATURATION: 98 % | SYSTOLIC BLOOD PRESSURE: 142 MMHG | WEIGHT: 288.4 LBS | DIASTOLIC BLOOD PRESSURE: 78 MMHG | BODY MASS INDEX: 45.27 KG/M2 | HEART RATE: 100 BPM

## 2024-04-15 DIAGNOSIS — E03.9 HYPOTHYROIDISM, UNSPECIFIED TYPE: ICD-10-CM

## 2024-04-15 DIAGNOSIS — I10 ESSENTIAL HYPERTENSION: ICD-10-CM

## 2024-04-15 DIAGNOSIS — N18.4 CKD (CHRONIC KIDNEY DISEASE) STAGE 4, GFR 15-29 ML/MIN (HCC): Primary | ICD-10-CM

## 2024-04-15 DIAGNOSIS — N25.81 SECONDARY HYPERPARATHYROIDISM OF RENAL ORIGIN (HCC): ICD-10-CM

## 2024-04-15 DIAGNOSIS — I12.9 HYPERTENSIVE KIDNEY DISEASE WITH CKD (CHRONIC KIDNEY DISEASE), STAGE 1-4 OR UNSPECIFIED CHRONIC KIDNEY DISEASE: ICD-10-CM

## 2024-04-15 DIAGNOSIS — N39.0 RECURRENT UTI: ICD-10-CM

## 2024-04-15 DIAGNOSIS — N18.4 CKD (CHRONIC KIDNEY DISEASE) STAGE 4, GFR 15-29 ML/MIN (HCC): ICD-10-CM

## 2024-04-15 LAB
ANION GAP SERPL CALCULATED.3IONS-SCNC: 8 MMOL/L (ref 4–13)
BUN SERPL-MCNC: 29 MG/DL (ref 5–25)
CALCIUM SERPL-MCNC: 9.5 MG/DL (ref 8.4–10.2)
CHLORIDE SERPL-SCNC: 105 MMOL/L (ref 96–108)
CO2 SERPL-SCNC: 25 MMOL/L (ref 21–32)
CREAT SERPL-MCNC: 2.04 MG/DL (ref 0.6–1.3)
GFR SERPL CREATININE-BSD FRML MDRD: 25 ML/MIN/1.73SQ M
GLUCOSE P FAST SERPL-MCNC: 177 MG/DL (ref 65–99)
POTASSIUM SERPL-SCNC: 4.8 MMOL/L (ref 3.5–5.3)
PTH-INTACT SERPL-MCNC: 98.4 PG/ML (ref 12–88)
SODIUM SERPL-SCNC: 138 MMOL/L (ref 135–147)
TSH SERPL DL<=0.05 MIU/L-ACNC: 3.69 UIU/ML (ref 0.45–4.5)

## 2024-04-15 PROCEDURE — 80048 BASIC METABOLIC PNL TOTAL CA: CPT

## 2024-04-15 PROCEDURE — G2211 COMPLEX E/M VISIT ADD ON: HCPCS | Performed by: INTERNAL MEDICINE

## 2024-04-15 PROCEDURE — 36415 COLL VENOUS BLD VENIPUNCTURE: CPT

## 2024-04-15 PROCEDURE — 83970 ASSAY OF PARATHORMONE: CPT

## 2024-04-15 PROCEDURE — 84443 ASSAY THYROID STIM HORMONE: CPT

## 2024-04-15 PROCEDURE — 99214 OFFICE O/P EST MOD 30 MIN: CPT | Performed by: INTERNAL MEDICINE

## 2024-04-15 NOTE — ASSESSMENT & PLAN NOTE
Lab Results   Component Value Date    EGFR 23 04/01/2024    EGFR 28 11/28/2023    EGFR 26 08/28/2023    CREATININE 2.13 (H) 04/01/2024    CREATININE 1.87 (H) 11/28/2023    CREATININE 1.96 (H) 08/28/2023     Kidney function is slightly lower than previous check.  However, she has had low estimated GFR's previously which have come back up to typical levels.  Will reassess with blood work later today.

## 2024-04-15 NOTE — ASSESSMENT & PLAN NOTE
Continue with methenamine hippurate twice daily.  Patient was recently treated with Keflex for a positive urine culture, however, the patient most likely does not have an active urinary tract infection.  She is aware of symptoms that she typically gets which are consistent with a urinary tract infection, typically she does not have dysuria or increased urinary frequency, but she does experience fevers.  Will try to avoid antibiotic therapy unless she is experiencing a true urinary tract infection.  Patient high risk for antibiotic resistance as well as allergies and will try to avoid antibiotics.

## 2024-04-15 NOTE — ASSESSMENT & PLAN NOTE
Reassess PTH levels, previous level was slightly elevated, however, this was in the setting of active biotin intake, which is known to interact with the assay for this test abnormally reducing the PTH levels.  Will initiate an activated vitamin D agent if clinically indicated.

## 2024-04-15 NOTE — ASSESSMENT & PLAN NOTE
Blood pressure is controlled at this time, continue low-sodium diet, follow blood pressures closely.

## 2024-04-15 NOTE — PROGRESS NOTES
[facility]  Loco Ballard DO    Name: Loretta Lewis  YOB: 1960      Assessment/Plan:    CKD (chronic kidney disease) stage 4, GFR 15-29 ml/min ([facility])  Lab Results   Component Value Date    EGFR 23 04/01/2024    EGFR 28 11/28/2023    EGFR 26 08/28/2023    CREATININE 2.13 (H) 04/01/2024    CREATININE 1.87 (H) 11/28/2023    CREATININE 1.96 (H) 08/28/2023     Kidney function is slightly lower than previous check.  However, she has had low estimated GFR's previously which have come back up to typical levels.  Will reassess with blood work later today.    Essential hypertension  Blood pressure is controlled at this time, continue low-sodium diet, follow blood pressures closely.    Recurrent UTI  Continue with methenamine hippurate twice daily.  Patient was recently treated with Keflex for a positive urine culture, however, the patient most likely does not have an active urinary tract infection.  She is aware of symptoms that she typically gets which are consistent with a urinary tract infection, typically she does not have dysuria or increased urinary frequency, but she does experience fevers.  Will try to avoid antibiotic therapy unless she is experiencing a true urinary tract infection.  Patient high risk for antibiotic resistance as well as allergies and will try to avoid antibiotics.    Secondary hyperparathyroidism of renal origin ([facility])  Reassess PTH levels, previous level was slightly elevated, however, this was in the setting of active biotin intake, which is known to interact with the assay for this test abnormally reducing the PTH levels.  Will initiate an activated vitamin D agent if clinically indicated.         Problem List Items Addressed This Visit          Cardiovascular and Mediastinum    Essential hypertension     Blood pressure is controlled at this time, continue low-sodium diet, follow blood pressures closely.            Endocrine     Secondary hyperparathyroidism of renal origin ([facility])     Reassess PTH levels, previous level was slightly elevated, however, this was in the setting of active biotin intake, which is known to interact with the assay for this test abnormally reducing the PTH levels.  Will initiate an activated vitamin D agent if clinically indicated.         Relevant Orders    PTH, intact       Genitourinary    CKD (chronic kidney disease) stage 4, GFR 15-29 ml/min ([facility]) - Primary     Lab Results   Component Value Date    EGFR 23 04/01/2024    EGFR 28 11/28/2023    EGFR 26 08/28/2023    CREATININE 2.13 (H) 04/01/2024    CREATININE 1.87 (H) 11/28/2023    CREATININE 1.96 (H) 08/28/2023     Kidney function is slightly lower than previous check.  However, she has had low estimated GFR's previously which have come back up to typical levels.  Will reassess with blood work later today.         Relevant Orders    Basic metabolic panel    Recurrent UTI     Continue with methenamine hippurate twice daily.  Patient was recently treated with Keflex for a positive urine culture, however, the patient most likely does not have an active urinary tract infection.  She is aware of symptoms that she typically gets which are consistent with a urinary tract infection, typically she does not have dysuria or increased urinary frequency, but she does experience fevers.  Will try to avoid antibiotic therapy unless she is experiencing a true urinary tract infection.  Patient high risk for antibiotic resistance as well as allergies and will try to avoid antibiotics.         Hypertensive kidney disease with CKD (chronic kidney disease), stage 1-4 or unspecified chronic kidney disease       Will reassess the patient's kidney function and PTH levels with blood work today.  She is to have repeat TSH/free T4 to further evaluate slightly abnormal thyroid function test.  We will see the patient back for regular appointment in approximately 4 months, with the following  visit 4 months after that, which is about 8 months from now.    Subjective:      Patient ID: Loretta Lewis is a 63 y.o. female.    Patient presents for follow up.    We reviewed labs in detail, most recent creatinine noted to be 2.13 mg/dL, places estimate GFR 23 mL/min.  This places kidney function is slightly below previous check with baseline creatinine closer to 1.8-2 mg/dL.    There were no significant electrolyte abnormalities noted.  Patient is taking all medications as prescribed with no specific side effects and denies use of nonsteroid anti-inflammatory medications.              The following portions of the patient's history were reviewed and updated as appropriate: allergies, current medications, past family history, past medical history, past social history, past surgical history and problem list.    Review of Systems   All other systems reviewed and are negative.        Social History     Socioeconomic History    Marital status: Single     Spouse name: None    Number of children: None    Years of education: None    Highest education level: None   Occupational History    None   Tobacco Use    Smoking status: Former     Current packs/day: 0.00     Average packs/day: 1.5 packs/day for 35.0 years (52.5 ttl pk-yrs)     Types: Cigarettes     Start date:      Quit date:      Years since quittin.2    Smokeless tobacco: Never    Tobacco comments:     Vapes - Daily - includes 3 mg nicotine   Vaping Use    Vaping status: Every Day    Start date: 2016    Substances: Nicotine, Flavoring   Substance and Sexual Activity    Alcohol use: Not Currently    Drug use: No    Sexual activity: Not Currently     Partners: Male   Other Topics Concern    None   Social History Narrative    None     Social Determinants of Health     Financial Resource Strain: Low Risk  (2023)    Overall Financial Resource Strain (CARDIA)     Difficulty of Paying Living Expenses: Not hard at all   Food Insecurity: Food  Insecurity Present (2024)    Received from [facility]    Hunger Vital Sign     Worried About Running Out of Food in the Last Year: Sometimes true     Ran Out of Food in the Last Year: Never true   Transportation Needs: No Transportation Needs (7/10/2023)    PRAPARE - Transportation     Lack of Transportation (Medical): No     Lack of Transportation (Non-Medical): No   Physical Activity: Not on file   Stress: Not on file   Social Connections: Not on file   Intimate Partner Violence: Not on file   Housing Stability: Low Risk  (7/10/2023)    Housing Stability Vital Sign     Unable to Pay for Housing in the Last Year: No     Number of Places Lived in the Last Year: 1     Unstable Housing in the Last Year: No     Past Medical History:   Diagnosis Date    INESSA (acute kidney injury) (HCC) 2023    Anemia     Intermittent    Arthritis     Diabetes mellitus (HCC)     Hypertension     Migraine     MRSA (methicillin resistant Staphylococcus aureus)     Obesity (BMI 35.0-39.9 without comorbidity) 2017     Past Surgical History:   Procedure Laterality Date    ACHILLES TENDON REPAIR      ANKLE SURGERY Left 2019    Excision of Lipoma    CARPAL TUNNEL RELEASE Right      SECTION      CHOLECYSTECTOMY      GANGLION CYST EXCISION Left     Left wrist    LUMBAR LAMINECTOMY  2018    x 3 disks    MOUTH SURGERY  2023    Oral tooth extraction x 3 with a retained root    NASAL SINUS SURGERY         Current Outpatient Medications:     Alpha-Lipoic Acid 300 MG CAPS, Take 300 mg by mouth 2 (two) times a day, Disp: , Rfl:     clonazePAM (KlonoPIN) 0.5 mg tablet, Take 2 tablets (1 mg total) by mouth daily at bedtime as needed for anxiety, Disp: 60 tablet, Rfl: 0    docusate sodium (COLACE) 100 mg capsule, Take 100 mg by mouth daily as needed for constipation, Disp: , Rfl:     DULoxetine (CYMBALTA) 60 mg delayed release capsule, Take 1 capsule (60 mg total) by mouth 2 (two) times a day, Disp: 180 capsule, Rfl:  "0    famotidine (PEPCID) 20 mg tablet, Take 20 mg by mouth daily, Disp: , Rfl:     gabapentin (NEURONTIN) 100 mg capsule, One in am, one at lunch, and 3 at hs, Disp: 360 capsule, Rfl: 0    glimepiride (AMARYL) 1 mg tablet, Take 0.5 mg by mouth daily with breakfast, Disp: , Rfl:     methenamine hippurate (HIPREX) 1 g tablet, Take 1 tablet (1 g total) by mouth 2 (two) times a day with meals, Disp: 60 tablet, Rfl: 5    Mirabegron ER 50 MG TB24, , Disp: , Rfl:     mirtazapine (REMERON) 7.5 MG tablet, Take 7.5 mg by mouth daily at bedtime, Disp: , Rfl:     Polysaccharide Iron Complex (HEMATEX IRON COMPLEX PO), Take by mouth, Disp: , Rfl:     TiZANidine (ZANAFLEX) 2 MG capsule, Take 1 capsule (2 mg total) by mouth daily as needed for muscle spasms 1 tablet once daily if needed for muscle spasm, Disp: 15 capsule, Rfl: 0    Lab Results   Component Value Date    SODIUM 135 04/01/2024    K 4.3 04/01/2024     04/01/2024    CO2 22 04/01/2024    AGAP 10 04/01/2024    BUN 29 (H) 04/01/2024    CREATININE 2.13 (H) 04/01/2024    GLUC 125 07/14/2023    GLUF 188 (H) 04/01/2024    CALCIUM 8.9 04/01/2024    AST 31 04/01/2024    ALT 40 04/01/2024    ALKPHOS 69 04/01/2024    TP 7.6 04/01/2024    TBILI 0.34 04/01/2024    EGFR 23 04/01/2024     Lab Results   Component Value Date    WBC 6.81 11/28/2023    HGB 12.2 11/28/2023    HCT 38.9 11/28/2023    MCV 92 11/28/2023     11/28/2023     Lab Results   Component Value Date    CHOLESTEROL 166 04/01/2024    CHOLESTEROL 166 09/15/2022    CHOLESTEROL 191 02/24/2021     Lab Results   Component Value Date    HDL 42 (L) 04/01/2024    HDL 42 (L) 09/15/2022    HDL 41 02/24/2021     Lab Results   Component Value Date    LDLCALC 87 04/01/2024    LDLCALC 96 09/15/2022    LDLCALC 99 02/24/2021     Lab Results   Component Value Date    TRIG 186 (H) 04/01/2024    TRIG 139 09/15/2022    TRIG 255 (H) 02/24/2021     No results found for: \"CHOLHDL\"  Lab Results   Component Value Date    ZMD9GAAQHEDI " "6.659 (H) 04/04/2024     Lab Results   Component Value Date    .9 (H) 11/28/2023    CALCIUM 8.9 04/01/2024    PHOS 4.3 (H) 11/28/2023     Lab Results   Component Value Date    SPEP See Comment 04/21/2023    UPEP See Comment 04/21/2023     No results found for: \"MICROALBUR\", \"[id number]\"        Objective:      /78 (BP Location: Left arm, Patient Position: Sitting)   Pulse 100   Ht 5' 7\" (1.702 m)   Wt 131 kg (288 lb 6.4 oz)   LMP 04/10/2017   SpO2 98%   BMI 45.17 kg/m²          Physical Exam  Vitals reviewed.   Constitutional:       General: She is not in acute distress.     Appearance: Normal appearance.   HENT:      Head: Normocephalic and atraumatic.      Right Ear: External ear normal.      Left Ear: External ear normal.   Eyes:      Conjunctiva/sclera: Conjunctivae normal.   Cardiovascular:      Rate and Rhythm: Normal rate and regular rhythm.      Heart sounds: Normal heart sounds.   Pulmonary:      Effort: No respiratory distress.      Breath sounds: No wheezing.   Abdominal:      Palpations: Abdomen is soft.   Skin:     General: Skin is warm and dry.   Neurological:      General: No focal deficit present.      Mental Status: She is alert and oriented to person, place, and time.   Psychiatric:         Mood and Affect: Mood normal.         Behavior: Behavior normal.         "

## 2024-04-16 ENCOUNTER — TELEPHONE (OUTPATIENT)
Dept: NEPHROLOGY | Facility: CLINIC | Age: 64
End: 2024-04-16

## 2024-04-16 NOTE — TELEPHONE ENCOUNTER
Patient states she received results of lab work. Patient states she stopped taking MVI due to biotin causing thyroid labs to be elevated. Patient asking if it is ok to resume or should she discontinue indefinitely.   Please advise, thank you.

## 2024-04-16 NOTE — TELEPHONE ENCOUNTER
I called and left a message for patient to call back to obtain lab results.   ----- Message from COLIN Saeed sent at 4/16/2024  2:31 PM EDT -----  Please advise patient lab work is stable.  Caro Morel will review with patient at upcoming appointment in July.  Thank you.

## 2024-04-17 NOTE — TELEPHONE ENCOUNTER
I have not seen this patient since 2021 when I met her once. Please refer her to providers with whom she follows. Thank you.

## 2024-04-18 ENCOUNTER — TELEPHONE (OUTPATIENT)
Dept: FAMILY MEDICINE CLINIC | Facility: CLINIC | Age: 64
End: 2024-04-18

## 2024-04-18 DIAGNOSIS — K04.7 DENTAL ABSCESS: Primary | ICD-10-CM

## 2024-04-18 RX ORDER — AMOXICILLIN AND CLAVULANATE POTASSIUM 875; 125 MG/1; MG/1
1 TABLET, FILM COATED ORAL EVERY 12 HOURS SCHEDULED
Qty: 20 TABLET | Refills: 0 | Status: SHIPPED | OUTPATIENT
Start: 2024-04-18 | End: 2024-04-28

## 2024-04-18 NOTE — TELEPHONE ENCOUNTER
CC: Tooth Ache  ASKING FOR RX (Amoxil 875 mg BID) Can't get into dentist due to financial issues @ this time -  Asking Pretty Please    Pharm: [facility]    Allergy: Vancomycin & Pollen

## 2024-04-22 DIAGNOSIS — M47.27 OSTEOARTHRITIS OF SPINE WITH RADICULOPATHY, LUMBOSACRAL REGION: ICD-10-CM

## 2024-04-22 DIAGNOSIS — F41.1 GENERALIZED ANXIETY DISORDER: ICD-10-CM

## 2024-04-22 RX ORDER — CLONAZEPAM 0.5 MG/1
1 TABLET ORAL
Qty: 60 TABLET | Refills: 0 | Status: SHIPPED | OUTPATIENT
Start: 2024-04-22

## 2024-04-22 RX ORDER — GABAPENTIN 100 MG/1
CAPSULE ORAL
Qty: 360 CAPSULE | Refills: 0 | Status: SHIPPED | OUTPATIENT
Start: 2024-04-22

## 2024-05-06 ENCOUNTER — TELEPHONE (OUTPATIENT)
Age: 64
End: 2024-05-06

## 2024-05-06 NOTE — TELEPHONE ENCOUNTER
Patient called back- she is already taking the 0.5 mg, and her sugars are still rising. She called us because Dr. Santizo is the one who prescribed it. Please advise, she was put on an antidepressant which increases hunger so she is eating more.

## 2024-05-06 NOTE — TELEPHONE ENCOUNTER
Patient called because she told the previous nurse the wrong medication. She was taking the glimepiride.

## 2024-05-06 NOTE — TELEPHONE ENCOUNTER
Patient calling states that she takes Glyburide 0.5 mg daily.  Was taking 1 mg prior but it was reduced due to having some episodes of low blood sugar. Patient states she recently started Remeron and she thinks this is making her blood sugar elevated again.  Does finder stick and has been having some readings in the 200s, highest 242 today.  She would like to know if provider would like her to go back on the 1 mg of Glyburide?  Please advise and call patient.

## 2024-05-07 NOTE — TELEPHONE ENCOUNTER
Patient was updated to increase her medication to the 1mg, and to watch that her sugars don't go <70. Patient stated that she has 3 refills left on her bottle. She is okay for now.

## 2024-05-14 DIAGNOSIS — E11.22 TYPE 2 DIABETES MELLITUS WITH STAGE 3A CHRONIC KIDNEY DISEASE, WITHOUT LONG-TERM CURRENT USE OF INSULIN (HCC): Primary | ICD-10-CM

## 2024-05-14 DIAGNOSIS — N18.31 TYPE 2 DIABETES MELLITUS WITH STAGE 3A CHRONIC KIDNEY DISEASE, WITHOUT LONG-TERM CURRENT USE OF INSULIN (HCC): Primary | ICD-10-CM

## 2024-05-14 DIAGNOSIS — M62.830 BACK SPASM: ICD-10-CM

## 2024-05-14 DIAGNOSIS — N39.0 RECURRENT UTI: ICD-10-CM

## 2024-05-14 RX ORDER — METHENAMINE HIPPURATE 1000 MG/1
1 TABLET ORAL 2 TIMES DAILY WITH MEALS
Qty: 60 TABLET | Refills: 5 | Status: SHIPPED | OUTPATIENT
Start: 2024-05-14

## 2024-05-14 RX ORDER — GLIMEPIRIDE 1 MG/1
0.5 TABLET ORAL
Refills: 0 | Status: CANCELLED | OUTPATIENT
Start: 2024-05-14

## 2024-05-15 PROBLEM — N39.0 RECURRENT UTI: Status: RESOLVED | Noted: 2023-08-14 | Resolved: 2024-05-15

## 2024-05-15 RX ORDER — TIZANIDINE HYDROCHLORIDE 2 MG/1
2 CAPSULE, GELATIN COATED ORAL DAILY PRN
Qty: 15 CAPSULE | Refills: 0 | Status: SHIPPED | OUTPATIENT
Start: 2024-05-15

## 2024-05-16 RX ORDER — GLIMEPIRIDE 1 MG/1
0.5 TABLET ORAL
Qty: 45 TABLET | Refills: 1 | Status: SHIPPED | OUTPATIENT
Start: 2024-05-16

## 2024-06-10 DIAGNOSIS — F41.1 GENERALIZED ANXIETY DISORDER: ICD-10-CM

## 2024-06-11 RX ORDER — CLONAZEPAM 0.5 MG/1
1 TABLET ORAL
Qty: 60 TABLET | Refills: 0 | Status: SHIPPED | OUTPATIENT
Start: 2024-06-11

## 2024-06-27 ENCOUNTER — RA CDI HCC (OUTPATIENT)
Dept: OTHER | Facility: HOSPITAL | Age: 64
End: 2024-06-27

## 2024-06-27 ENCOUNTER — VBI (OUTPATIENT)
Dept: ADMINISTRATIVE | Facility: OTHER | Age: 64
End: 2024-06-27

## 2024-06-27 NOTE — TELEPHONE ENCOUNTER
06/27/24 12:36 PM     Chart reviewed for Diabetic Eye Exam was/were not submitted to the patient's insurance.     Nelda Saha MA   [facility]

## 2024-06-30 DIAGNOSIS — M47.27 OSTEOARTHRITIS OF SPINE WITH RADICULOPATHY, LUMBOSACRAL REGION: ICD-10-CM

## 2024-07-01 ENCOUNTER — RA CDI HCC (OUTPATIENT)
Dept: OTHER | Facility: HOSPITAL | Age: 64
End: 2024-07-01

## 2024-07-01 DIAGNOSIS — N18.4 CKD (CHRONIC KIDNEY DISEASE) STAGE 4, GFR 15-29 ML/MIN (HCC): Primary | ICD-10-CM

## 2024-07-01 PROBLEM — E11.22 TYPE 2 DIABETES MELLITUS WITH STAGE 4 CHRONIC KIDNEY DISEASE, WITHOUT LONG-TERM CURRENT USE OF INSULIN (HCC): Status: ACTIVE | Noted: 2024-07-01

## 2024-07-01 RX ORDER — GABAPENTIN 100 MG/1
CAPSULE ORAL
Qty: 360 CAPSULE | Refills: 1 | Status: SHIPPED | OUTPATIENT
Start: 2024-07-01

## 2024-07-02 NOTE — PROGRESS NOTES
HCC coding opportunities     E11.40, E66.01     Chart Reviewed number of suggestions sent to Provider: 2   GR    Patients Insurance     Medicare Insurance: [employer]

## 2024-07-09 ENCOUNTER — OFFICE VISIT (OUTPATIENT)
Dept: FAMILY MEDICINE CLINIC | Facility: CLINIC | Age: 64
End: 2024-07-09
Payer: COMMERCIAL

## 2024-07-09 VITALS
HEART RATE: 100 BPM | DIASTOLIC BLOOD PRESSURE: 80 MMHG | OXYGEN SATURATION: 97 % | WEIGHT: 293 LBS | HEIGHT: 67 IN | BODY MASS INDEX: 45.99 KG/M2 | TEMPERATURE: 97.1 F | SYSTOLIC BLOOD PRESSURE: 134 MMHG

## 2024-07-09 DIAGNOSIS — F17.211 CIGARETTE NICOTINE DEPENDENCE IN REMISSION: Primary | ICD-10-CM

## 2024-07-09 DIAGNOSIS — Z59.12 INADEQUATE HOUSING UTILITIES: ICD-10-CM

## 2024-07-09 DIAGNOSIS — Z59.41 FOOD INSECURITY: ICD-10-CM

## 2024-07-09 DIAGNOSIS — Z59.82 INABILITY TO ACQUIRE TRANSPORTATION: ICD-10-CM

## 2024-07-09 DIAGNOSIS — Z59.819 HOUSING INSTABILITY: ICD-10-CM

## 2024-07-09 DIAGNOSIS — E66.01 CLASS 3 SEVERE OBESITY WITH SERIOUS COMORBIDITY AND BODY MASS INDEX (BMI) OF 45.0 TO 49.9 IN ADULT, UNSPECIFIED OBESITY TYPE (HCC): ICD-10-CM

## 2024-07-09 DIAGNOSIS — E11.22 TYPE 2 DIABETES MELLITUS WITH STAGE 4 CHRONIC KIDNEY DISEASE, WITHOUT LONG-TERM CURRENT USE OF INSULIN (HCC): ICD-10-CM

## 2024-07-09 DIAGNOSIS — Z13.820 ENCOUNTER FOR SCREENING FOR OSTEOPOROSIS: ICD-10-CM

## 2024-07-09 DIAGNOSIS — Z00.00 MEDICARE ANNUAL WELLNESS VISIT, SUBSEQUENT: ICD-10-CM

## 2024-07-09 DIAGNOSIS — M51.36 DEGENERATIVE DISC DISEASE, LUMBAR: ICD-10-CM

## 2024-07-09 DIAGNOSIS — N18.4 TYPE 2 DIABETES MELLITUS WITH STAGE 4 CHRONIC KIDNEY DISEASE, WITHOUT LONG-TERM CURRENT USE OF INSULIN (HCC): ICD-10-CM

## 2024-07-09 LAB
LEFT EYE DIABETIC RETINOPATHY: NORMAL
LEFT EYE IMAGE QUALITY: NORMAL
LEFT EYE MACULAR EDEMA: NORMAL
LEFT EYE OTHER RETINOPATHY: NORMAL
RIGHT EYE DIABETIC RETINOPATHY: NORMAL
RIGHT EYE IMAGE QUALITY: NORMAL
RIGHT EYE MACULAR EDEMA: NORMAL
RIGHT EYE OTHER RETINOPATHY: NORMAL
SEVERITY (EYE EXAM): NORMAL

## 2024-07-09 PROCEDURE — G0439 PPPS, SUBSEQ VISIT: HCPCS | Performed by: FAMILY MEDICINE

## 2024-07-09 PROCEDURE — 92250 FUNDUS PHOTOGRAPHY W/I&R: CPT | Performed by: FAMILY MEDICINE

## 2024-07-09 SDOH — ECONOMIC STABILITY - HOUSING INSECURITY: HOUSING INSTABILITY UNSPECIFIED: Z59.819

## 2024-07-09 SDOH — ECONOMIC STABILITY - FOOD INSECURITY: FOOD INSECURITY: Z59.41

## 2024-07-09 SDOH — ECONOMIC STABILITY - TRANSPORTATION SECURITY: TRANSPORTATION INSECURITY: Z59.82

## 2024-07-09 SDOH — ECONOMIC STABILITY - HOUSING INSECURITY: INADEQUATE HOUSING UTILITIES: Z59.12

## 2024-07-09 NOTE — PROGRESS NOTES
Ambulatory Visit  Name: Loretta Lewis      : 1960      MRN: [id number]  Encounter Provider: Preet Us DO  Encounter Date: 2024   Encounter department: [facility]    Assessment & Plan   1. Cigarette nicotine dependence in remission  2. Type 2 diabetes mellitus with stage 4 chronic kidney disease, without long-term current use of insulin (HCC)  3. Encounter for screening for osteoporosis  4. Food insecurity  5. Housing instability  6. Inability to acquire transportation  7. Inadequate housing utilities       Preventive health issues were discussed with patient, and age appropriate screening tests were ordered as noted in patient's After Visit Summary. Personalized health advice and appropriate referrals for health education or preventive services given if needed, as noted in patient's After Visit Summary.    History of Present Illness     Mrs. Mccormack is here for the purposes of a Medicare subsequent well visit any acute problems that are identified will be addressed       Patient Care Team:  Preet Us DO as PCP - General (Family Medicine)  Doni Grover DPM as PCP - Surgeon (Podiatry)  Loco Ballard DO (Nephrology)  COLIN Douglass as Nurse Practitioner (Nephrology)    Review of Systems   Constitutional:  Negative for activity change, appetite change, diaphoresis, fatigue and fever.   HENT: Negative.  Negative for dental problem and hearing loss.    Eyes: Negative.  Negative for visual disturbance.   Respiratory:  Positive for shortness of breath. Negative for apnea, cough, chest tightness and wheezing.    Cardiovascular:  Positive for leg swelling. Negative for chest pain and palpitations.   Gastrointestinal:  Negative for abdominal distention, abdominal pain, anal bleeding, constipation, diarrhea, nausea and vomiting.        Waiting for a replacement Cologuard her unit at home is outdated   Endocrine: Negative for cold intolerance, heat intolerance, polydipsia,  polyphagia and polyuria.        Blood sugars are slightly elevated patient has occasional dietary misbehaviors   Genitourinary:  Negative for difficulty urinating, dysuria, flank pain, hematuria and urgency.   Musculoskeletal:  Positive for back pain. Negative for arthralgias, gait problem, joint swelling and myalgias.   Skin:  Negative for color change, rash and wound.   Allergic/Immunologic: Negative for environmental allergies, food allergies and immunocompromised state.   Neurological:  Negative for dizziness, seizures, syncope, speech difficulty, numbness and headaches.   Hematological:  Negative for adenopathy. Does not bruise/bleed easily.   Psychiatric/Behavioral:  Negative for agitation, behavioral problems, hallucinations, sleep disturbance and suicidal ideas.      Medical History Reviewed by provider this encounter:       Annual Wellness Visit Questionnaire       Health Risk Assessment:   Patient rates overall health as fair. Patient feels that their physical health rating is slightly worse. Patient is dissatisfied with their life. Eyesight was rated as same. Hearing was rated as same. Patient feels that their emotional and mental health rating is slightly worse. Patients states they are sometimes angry. Patient states they are sometimes unusually tired/fatigued. Pain experienced in the last 7 days has been a lot. Patient's pain rating has been 7/10. Patient states that she has experienced weight loss or gain in last 6 months.     Fall Risk Screening:   In the past year, patient has experienced: no history of falling in past year      Urinary Incontinence Screening:   Patient has leaked urine accidently in the last six months. Diagnosed with overactive bladder    Home Safety:  Patient does not have trouble with stairs inside or outside of their home. Patient has no working smoke alarms and has no working carbon monoxide detector. Home safety hazards include: none.     Nutrition:   Current diet is Diabetic  and Limited junk food.     Medications:   Patient is currently taking over-the-counter supplements. OTC medications include: see medication list. Patient is able to manage medications.     Activities of Daily Living (ADLs)/Instrumental Activities of Daily Living (IADLs):   Walk and transfer into and out of bed and chair?: Yes  Dress and groom yourself?: Yes    Bathe or shower yourself?: Yes    Feed yourself? Yes  Do your laundry/housekeeping?: No  Manage your money, pay your bills and track your expenses?: Yes  Make your own meals?: Yes    Do your own shopping?: Yes    Previous Hospitalizations:   Any hospitalizations or ED visits within the last 12 months?: Yes    How many hospitalizations have you had in the last year?: 1-2    Advance Care Planning:   Living will: No    Durable POA for healthcare: No    Advanced directive: No    Advanced directive counseling given: No    Five wishes given: No    Patient declined ACP directive: No    End of Life Decisions reviewed with patient: Yes    Provider agrees with end of life decisions: Yes      Cognitive Screening:   Provider or family/friend/caregiver concerned regarding cognition?: No    PREVENTIVE SCREENINGS      Cardiovascular Screening:    General: Screening Not Indicated and History Lipid Disorder      Diabetes Screening:     General: Screening Not Indicated and History Diabetes      Colorectal Cancer Screening:     General: Screening Current      Breast Cancer Screening:     General: Screening Current      Cervical Cancer Screening:    General: Patient Declines      Osteoporosis Screening:    General: Screening Not Indicated      Hepatitis C Screening:    General: Screening Current    Screening, Brief Intervention, and Referral to Treatment (SBIRT)    Screening  Typical number of drinks in a day: 0  Typical number of drinks in a week: 0  Interpretation: Low risk drinking behavior.    AUDIT-C Screenin) How often did you have a drink containing alcohol in the  "past year? monthly or less  2) How many drinks did you have on a typical day when you were drinking in the past year? 1 to 2  3) How often did you have 6 or more drinks on one occasion in the past year? never    AUDIT-C Score: 1  Interpretation: Score 0-2 (female): Negative screen for alcohol misuse    Single Item Drug Screening:  How often have you used an illegal drug (including marijuana) or a prescription medication for non-medical reasons in the past year? less than monthly    Single Item Drug Screen Score: 1  Interpretation: POSITIVE screen for possible drug use disorder    Drug Abuse Screening Test (DAST-10):  1) Have you used drugs other than those required for medical reasons? No  2) Do you abuse more than one drug at a time? No  3) Are you always able to stop using drugs when you want to? No  4) Have you had \"blackouts\" or \"flashbacks\" as a result of drug use? No  5) Do you ever feel bad or guilty about your drug use? No  6) Does your spouse (or parents) ever complain about your involvement with drugs? No  7) Have you neglected your family because of your use of drugs? No  8) Have you engaged in illegal activities in order to obtain drugs? No  9) Have you ever experienced withdrawal symptoms (felt sick) when you stopped taking drugs? No  10) Have you had medical problems as a result of your drug use (e.g., memory loss, hepatitis, convulsions, bleeding, etc.)? No    DAST-10 Score: 1  Interpretation: Low level problems related to drug abuse    SDOH Risk Assessment  Social determinants of health (SDOH) risk assesment tool was completed. The tool at a minimum covered housing stability, food insecurity, transportation needs, and utility difficulty. Patient had at risk responses for the following SDOH domains: food insecurity, transportation needs, housing stability and utilities.     Social Determinants of Health     Financial Resource Strain: Low Risk  (1/12/2024)    Received from [facility]" "Resource Strain     Do you have any trouble paying for your medications, or do you think you might in the future?: No   Food Insecurity: Food Insecurity Present (7/7/2024)    Hunger Vital Sign     Worried About Running Out of Food in the Last Year: Sometimes true     Ran Out of Food in the Last Year: Never true   Transportation Needs: Unmet Transportation Needs (7/7/2024)    PRAPARE - Transportation     Lack of Transportation (Medical): No     Lack of Transportation (Non-Medical): Yes   Housing Stability: High Risk (7/7/2024)    Housing Stability Vital Sign     Unable to Pay for Housing in the Last Year: Yes     Number of Times Moved in the Last Year: 0     Homeless in the Last Year: No   Utilities: At Risk (7/7/2024)    [facility] Utilities     Threatened with loss of utilities: Yes     No results found.    Objective     /80 (BP Location: Left arm, Patient Position: Sitting, Cuff Size: Standard)   Pulse 100   Temp (!) 97.1 °F (36.2 °C) (Temporal)   Ht 5' 7\" (1.702 m)   Wt 133 kg (293 lb 12.8 oz)   LMP 04/10/2017   SpO2 97%   BMI 46.02 kg/m²     Physical Exam  Constitutional:       Appearance: She is obese. She is ill-appearing.   HENT:      Head: Normocephalic and atraumatic.      Right Ear: External ear normal.      Left Ear: External ear normal.      Nose: Congestion and rhinorrhea present.   Eyes:      Extraocular Movements: Extraocular movements intact.      Conjunctiva/sclera: Conjunctivae normal.      Pupils: Pupils are equal, round, and reactive to light.   Cardiovascular:      Rate and Rhythm: Normal rate and regular rhythm.   Pulmonary:      Effort: Pulmonary effort is normal.      Breath sounds: Normal breath sounds.   Abdominal:      General: There is no distension.      Palpations: There is no mass.      Tenderness: There is no abdominal tenderness. There is no right CVA tenderness or left CVA tenderness.      Hernia: No hernia is present.   Musculoskeletal:      Right lower leg: Edema present.    "   Left lower leg: Edema present.   Skin:     Coloration: Skin is not jaundiced.      Findings: No rash.   Neurological:      General: No focal deficit present.      Mental Status: She is alert and oriented to person, place, and time.   Psychiatric:         Mood and Affect: Mood normal.         Thought Content: Thought content normal.         Judgment: Judgment normal.

## 2024-07-09 NOTE — PATIENT INSTRUCTIONS
Medicare Preventive Visit Patient Instructions  Thank you for completing your Welcome to Medicare Visit or Medicare Annual Wellness Visit today. Your next wellness visit will be due in one year (7/10/2025).  The screening/preventive services that you may require over the next 5-10 years are detailed below. Some tests may not apply to you based off risk factors and/or age. Screening tests ordered at today's visit but not completed yet may show as past due. Also, please note that scanned in results may not display below.  Preventive Screenings:  Service Recommendations Previous Testing/Comments   Colorectal Cancer Screening  * Colonoscopy    * Fecal Occult Blood Test (FOBT)/Fecal Immunochemical Test (FIT)  * Fecal DNA/Cologuard Test  * Flexible Sigmoidoscopy Age: 45-75 years old   Colonoscopy: every 10 years (may be performed more frequently if at higher risk)  OR  FOBT/FIT: every 1 year  OR  Cologuard: every 3 years  OR  Sigmoidoscopy: every 5 years  Screening may be recommended earlier than age 45 if at higher risk for colorectal cancer. Also, an individualized decision between you and your healthcare provider will decide whether screening between the ages of 76-85 would be appropriate. Colonoscopy: Not on file  FOBT/FIT: 07/11/2023  Cologuard: Not on file  Sigmoidoscopy: Not on file    Screening Current     Breast Cancer Screening Age: 40+ years old  Frequency: every 1-2 years  Not required if history of left and right mastectomy Mammogram: 01/23/2024    Screening Current   Cervical Cancer Screening Between the ages of 21-29, pap smear recommended once every 3 years.   Between the ages of 30-65, can perform pap smear with HPV co-testing every 5 years.   Recommendations may differ for women with a history of total hysterectomy, cervical cancer, or abnormal pap smears in past. Pap Smear: Not on file        Hepatitis C Screening Once for adults born between 1945 and 1965  More frequently in patients at high risk for  Hepatitis C Hep C Antibody: 04/01/2024    Screening Current   Diabetes Screening 1-2 times per year if you're at risk for diabetes or have pre-diabetes Fasting glucose: 177 mg/dL (4/15/2024)  A1C: 7.1 % (4/1/2024)  Screening Not Indicated  History Diabetes   Cholesterol Screening Once every 5 years if you don't have a lipid disorder. May order more often based on risk factors. Lipid panel: 04/01/2024    Screening Not Indicated  History Lipid Disorder     Other Preventive Screenings Covered by Medicare:  Abdominal Aortic Aneurysm (AAA) Screening: covered once if your at risk. You're considered to be at risk if you have a family history of AAA.  Lung Cancer Screening: covers low dose CT scan once per year if you meet all of the following conditions: (1) Age 55-77; (2) No signs or symptoms of lung cancer; (3) Current smoker or have quit smoking within the last 15 years; (4) You have a tobacco smoking history of at least 20 pack years (packs per day multiplied by number of years you smoked); (5) You get a written order from a healthcare provider.  Glaucoma Screening: covered annually if you're considered high risk: (1) You have diabetes OR (2) Family history of glaucoma OR (3)  aged 50 and older OR (4)  American aged 65 and older  Osteoporosis Screening: covered every 2 years if you meet one of the following conditions: (1) You're estrogen deficient and at risk for osteoporosis based off medical history and other findings; (2) Have a vertebral abnormality; (3) On glucocorticoid therapy for more than 3 months; (4) Have primary hyperparathyroidism; (5) On osteoporosis medications and need to assess response to drug therapy.   Last bone density test (DXA Scan): Not on file.  HIV Screening: covered annually if you're between the age of 15-65. Also covered annually if you are younger than 15 and older than 65 with risk factors for HIV infection. For pregnant patients, it is covered up to 3 times per  pregnancy.    Immunizations:  Immunization Recommendations   Influenza Vaccine Annual influenza vaccination during flu season is recommended for all persons aged >= 6 months who do not have contraindications   Pneumococcal Vaccine   * Pneumococcal conjugate vaccine = PCV13 (Prevnar 13), PCV15 (Vaxneuvance), PCV20 (Prevnar 20)  * Pneumococcal polysaccharide vaccine = PPSV23 (Pneumovax) Adults 19-63 yo with certain risk factors or if 65+ yo  If never received any pneumonia vaccine: recommend Prevnar 20 (PCV20)  Give PCV20 if previously received 1 dose of PCV13 or PPSV23   Hepatitis B Vaccine 3 dose series if at intermediate or high risk (ex: diabetes, end stage renal disease, liver disease)   Respiratory syncytial virus (RSV) Vaccine - COVERED BY MEDICARE PART D  * RSVPreF3 (Arexvy) CDC recommends that adults 60 years of age and older may receive a single dose of RSV vaccine using shared clinical decision-making (SCDM)   Tetanus (Td) Vaccine - COST NOT COVERED BY MEDICARE PART B Following completion of primary series, a booster dose should be given every 10 years to maintain immunity against tetanus. Td may also be given as tetanus wound prophylaxis.   Tdap Vaccine - COST NOT COVERED BY MEDICARE PART B Recommended at least once for all adults. For pregnant patients, recommended with each pregnancy.   Shingles Vaccine (Shingrix) - COST NOT COVERED BY MEDICARE PART B  2 shot series recommended in those 19 years and older who have or will have weakened immune systems or those 50 years and older     Health Maintenance Due:      Topic Date Due   • Cervical Cancer Screening  Never done   • Lung Cancer Screening  Never done   • Colorectal Cancer Screening  07/11/2024   • Breast Cancer Screening: Mammogram  01/23/2025   • HIV Screening  Completed   • Hepatitis C Screening  Completed     Immunizations Due:      Topic Date Due   • Pneumococcal Vaccine: Pediatrics (0 to 5 Years) and At-Risk Patients (6 to 64 Years) (1 of 2 -  PCV) Never done   • COVID-19 Vaccine (3 - 2023-24 season) 09/01/2023   • Influenza Vaccine (1) 09/01/2024     Advance Directives   What are advance directives?  Advance directives are legal documents that state your wishes and plans for medical care. These plans are made ahead of time in case you lose your ability to make decisions for yourself. Advance directives can apply to any medical decision, such as the treatments you want, and if you want to donate organs.   What are the types of advance directives?  There are many types of advance directives, and each state has rules about how to use them. You may choose a combination of any of the following:  Living will:  This is a written record of the treatment you want. You can also choose which treatments you do not want, which to limit, and which to stop at a certain time. This includes surgery, medicine, IV fluid, and tube feedings.   Durable power of  for healthcare (DPAHC):  This is a written record that states who you want to make healthcare choices for you when you are unable to make them for yourself. This person, called a proxy, is usually a family member or a friend. You may choose more than 1 proxy.  Do not resuscitate (DNR) order:  A DNR order is used in case your heart stops beating or you stop breathing. It is a request not to have certain forms of treatment, such as CPR. A DNR order may be included in other types of advance directives.  Medical directive:  This covers the care that you want if you are in a coma, near death, or unable to make decisions for yourself. You can list the treatments you want for each condition. Treatment may include pain medicine, surgery, blood transfusions, dialysis, IV or tube feedings, and a ventilator (breathing machine).  Values history:  This document has questions about your views, beliefs, and how you feel and think about life. This information can help others choose the care that you would choose.  Why are  advance directives important?  An advance directive helps you control your care. Although spoken wishes may be used, it is better to have your wishes written down. Spoken wishes can be misunderstood, or not followed. Treatments may be given even if you do not want them. An advance directive may make it easier for your family to make difficult choices about your care.   Urinary Incontinence   Urinary incontinence (UI)  is when you lose control of your bladder. UI develops because your bladder cannot store or empty urine properly. The 3 most common types of UI are stress incontinence, urge incontinence, or both.  Medicines:   May be given to help strengthen your bladder control. Report any side effects of medication to your healthcare provider.  Do pelvic muscle exercises often:  Your pelvic muscles help you stop urinating. Squeeze these muscles tight for 5 seconds, then relax for 5 seconds. Gradually work up to squeezing for 10 seconds. Do 3 sets of 15 repetitions a day, or as directed. This will help strengthen your pelvic muscles and improve bladder control.  Train your bladder:  Go to the bathroom at set times, such as every 2 hours, even if you do not feel the urge to go. You can also try to hold your urine when you feel the urge to go. For example, hold your urine for 5 minutes when you feel the urge to go. As that becomes easier, hold your urine for 10 minutes.   Self-care:   Keep a UI record.  Write down how often you leak urine and how much you leak. Make a note of what you were doing when you leaked urine.  Drink liquids as directed. You may need to limit the amount of liquid you drink to help control your urine leakage. Do not drink any liquid right before you go to bed. Limit or do not have drinks that contain caffeine or alcohol.   Prevent constipation.  Eat a variety of high-fiber foods. Good examples are high-fiber cereals, beans, vegetables, and whole-grain breads. Walking is the best way to trigger  your intestines to have a bowel movement.  Exercise regularly and maintain a healthy weight.  Weight loss and exercise will decrease pressure on your bladder and help you control your leakage.   Use a catheter as directed  to help empty your bladder. A catheter is a tiny, plastic tube that is put into your bladder to drain your urine.   Go to behavior therapy as directed.  Behavior therapy may be used to help you learn to control your urge to urinate.    Weight Management   Why it is important to manage your weight:  Being overweight increases your risk of health conditions such as heart disease, high blood pressure, type 2 diabetes, and certain types of cancer. It can also increase your risk for osteoarthritis, sleep apnea, and other respiratory problems. Aim for a slow, steady weight loss. Even a small amount of weight loss can lower your risk of health problems.  How to lose weight safely:  A safe and healthy way to lose weight is to eat fewer calories and get regular exercise. You can lose up about 1 pound a week by decreasing the number of calories you eat by 500 calories each day.   Healthy meal plan for weight management:  A healthy meal plan includes a variety of foods, contains fewer calories, and helps you stay healthy. A healthy meal plan includes the following:  Eat whole-grain foods more often.  A healthy meal plan should contain fiber. Fiber is the part of grains, fruits, and vegetables that is not broken down by your body. Whole-grain foods are healthy and provide extra fiber in your diet. Some examples of whole-grain foods are whole-wheat breads and pastas, oatmeal, brown rice, and bulgur.  Eat a variety of vegetables every day.  Include dark, leafy greens such as spinach, kale, dominick greens, and mustard greens. Eat yellow and orange vegetables such as carrots, sweet potatoes, and winter squash.   Eat a variety of fruits every day.  Choose fresh or canned fruit (canned in its own juice or light  syrup) instead of juice. Fruit juice has very little or no fiber.  Eat low-fat dairy foods.  Drink fat-free (skim) milk or 1% milk. Eat fat-free yogurt and low-fat cottage cheese. Try low-fat cheeses such as mozzarella and other reduced-fat cheeses.  Choose meat and other protein foods that are low in fat.  Choose beans or other legumes such as split peas or lentils. Choose fish, skinless poultry (chicken or turkey), or lean cuts of red meat (beef or pork). Before you cook meat or poultry, cut off any visible fat.   Use less fat and oil.  Try baking foods instead of frying them. Add less fat, such as margarine, sour cream, regular salad dressing and mayonnaise to foods. Eat fewer high-fat foods. Some examples of high-fat foods include french fries, doughnuts, ice cream, and cakes.  Eat fewer sweets.  Limit foods and drinks that are high in sugar. This includes candy, cookies, regular soda, and sweetened drinks.  Exercise:  Exercise at least 30 minutes per day on most days of the week. Some examples of exercise include walking, biking, dancing, and swimming. You can also fit in more physical activity by taking the stairs instead of the elevator or parking farther away from stores. Ask your healthcare provider about the best exercise plan for you.      © Copyright [employer] 2018 Information is for End User's use only and may not be sold, redistributed or otherwise used for commercial purposes. All illustrations and images included in CareNotes® are the copyrighted property of [employer].A[employer]M., Inc. or [employer]

## 2024-07-10 ENCOUNTER — PATIENT OUTREACH (OUTPATIENT)
Dept: FAMILY MEDICINE CLINIC | Facility: CLINIC | Age: 64
End: 2024-07-10

## 2024-07-10 DIAGNOSIS — Z59.82 INABILITY TO ACQUIRE TRANSPORTATION: Primary | ICD-10-CM

## 2024-07-10 SDOH — ECONOMIC STABILITY - TRANSPORTATION SECURITY: TRANSPORTATION INSECURITY: Z59.82

## 2024-07-10 NOTE — PROGRESS NOTES
SEVEN MONTANO reviewed chart d/t receiving referral d/t SDOH needs. Pt reported housing, transportation and financial needs.    SEVEN MONTANO attempted to reach pt via phone to discuss needs. Patient did not answer. SEVEN MONTANO left message asking patient to return call. Will attempt another outreach within one week if call is not returned.    SEVEN MONTANO received vm from pt and returned call. SEVEN MONTANO introduced self and explained role and reason for calling. Pt shared she is on disability and has expenses she is having some trouble affording.     Pt interested in [employer], but does not qualify. Pt is on a budget plan with electric company. SEVEN MONTANO offered phone numbers for [employer] and [employer] so pt may discuss her needs and see if she has any options for programs she may be eligible for. Pt asked that phone numbers are emailed to her at [email or web address].    Pt stated she does not need information on food pantries at this time. Educated pt on using findhelp via [employer].    Pt interested in applying for disability transportation with [employer]. SEVEN MONTANO placed referral to  OC for assistance with application.    Pt stated she has no other needs at this time. SEVEN MONTANO added self to care team.

## 2024-07-11 ENCOUNTER — TELEPHONE (OUTPATIENT)
Dept: NEPHROLOGY | Facility: CLINIC | Age: 64
End: 2024-07-11

## 2024-07-11 NOTE — TELEPHONE ENCOUNTER
I called and spoke with Shereen regarding completing lab work prior to her upcoming appointment on July 26th 2024.

## 2024-07-15 ENCOUNTER — PATIENT OUTREACH (OUTPATIENT)
Dept: FAMILY MEDICINE CLINIC | Facility: CLINIC | Age: 64
End: 2024-07-15

## 2024-07-15 DIAGNOSIS — N18.31 TYPE 2 DIABETES MELLITUS WITH STAGE 3A CHRONIC KIDNEY DISEASE, WITHOUT LONG-TERM CURRENT USE OF INSULIN (HCC): ICD-10-CM

## 2024-07-15 DIAGNOSIS — E11.22 TYPE 2 DIABETES MELLITUS WITH STAGE 3A CHRONIC KIDNEY DISEASE, WITHOUT LONG-TERM CURRENT USE OF INSULIN (HCC): ICD-10-CM

## 2024-07-15 RX ORDER — GLIMEPIRIDE 1 MG/1
1 TABLET ORAL
Qty: 30 TABLET | Refills: 5 | Status: SHIPPED | OUTPATIENT
Start: 2024-07-15 | End: 2024-07-17

## 2024-07-15 NOTE — PROGRESS NOTES
[facility] received a referral from DUSTY Perales to assist patient with applying for transportation services.     [facility]OC contacted Shereen to discuss the referral. She's agreeable to services and feels she meets criteria for disability transportation so a paper application was completed and the certification of disability form was faxed to the PCP for review and completion. Will email the completed application to [employer] once form is returned.    No other questions or needs at this time.     Will outreach in 1-2 weeks with a status update.

## 2024-07-17 DIAGNOSIS — E11.22 TYPE 2 DIABETES MELLITUS WITH STAGE 3A CHRONIC KIDNEY DISEASE, WITHOUT LONG-TERM CURRENT USE OF INSULIN (HCC): ICD-10-CM

## 2024-07-17 DIAGNOSIS — N18.31 TYPE 2 DIABETES MELLITUS WITH STAGE 3A CHRONIC KIDNEY DISEASE, WITHOUT LONG-TERM CURRENT USE OF INSULIN (HCC): ICD-10-CM

## 2024-07-17 RX ORDER — GLIMEPIRIDE 1 MG/1
1 TABLET ORAL
Qty: 90 TABLET | Refills: 1 | Status: SHIPPED | OUTPATIENT
Start: 2024-07-17

## 2024-07-19 ENCOUNTER — APPOINTMENT (OUTPATIENT)
Dept: LAB | Facility: MEDICAL CENTER | Age: 64
End: 2024-07-19
Payer: COMMERCIAL

## 2024-07-19 DIAGNOSIS — N18.4 CKD (CHRONIC KIDNEY DISEASE) STAGE 4, GFR 15-29 ML/MIN (HCC): ICD-10-CM

## 2024-07-19 DIAGNOSIS — N18.4 TYPE 2 DIABETES MELLITUS WITH STAGE 4 CHRONIC KIDNEY DISEASE, WITHOUT LONG-TERM CURRENT USE OF INSULIN (HCC): ICD-10-CM

## 2024-07-19 DIAGNOSIS — E11.22 TYPE 2 DIABETES MELLITUS WITH STAGE 4 CHRONIC KIDNEY DISEASE, WITHOUT LONG-TERM CURRENT USE OF INSULIN (HCC): ICD-10-CM

## 2024-07-19 LAB
ALBUMIN SERPL BCG-MCNC: 4.2 G/DL (ref 3.5–5)
ALP SERPL-CCNC: 96 U/L (ref 34–104)
ALT SERPL W P-5'-P-CCNC: 35 U/L (ref 7–52)
ANION GAP SERPL CALCULATED.3IONS-SCNC: 12 MMOL/L (ref 4–13)
AST SERPL W P-5'-P-CCNC: 26 U/L (ref 13–39)
BILIRUB SERPL-MCNC: 0.26 MG/DL (ref 0.2–1)
BUN SERPL-MCNC: 24 MG/DL (ref 5–25)
CALCIUM SERPL-MCNC: 9.7 MG/DL (ref 8.4–10.2)
CHLORIDE SERPL-SCNC: 104 MMOL/L (ref 96–108)
CHOLEST SERPL-MCNC: 157 MG/DL
CO2 SERPL-SCNC: 21 MMOL/L (ref 21–32)
CREAT SERPL-MCNC: 2.03 MG/DL (ref 0.6–1.3)
CREAT UR-MCNC: 128.2 MG/DL
EST. AVERAGE GLUCOSE BLD GHB EST-MCNC: 183 MG/DL
GFR SERPL CREATININE-BSD FRML MDRD: 25 ML/MIN/1.73SQ M
GLUCOSE P FAST SERPL-MCNC: 177 MG/DL (ref 65–99)
HBA1C MFR BLD: 8 %
HDLC SERPL-MCNC: 41 MG/DL
LDLC SERPL CALC-MCNC: 77 MG/DL (ref 0–100)
MICROALBUMIN UR-MCNC: 186.9 MG/L
MICROALBUMIN/CREAT 24H UR: 146 MG/G CREATININE (ref 0–30)
NONHDLC SERPL-MCNC: 116 MG/DL
POTASSIUM SERPL-SCNC: 4.2 MMOL/L (ref 3.5–5.3)
PROT SERPL-MCNC: 8 G/DL (ref 6.4–8.4)
SODIUM SERPL-SCNC: 137 MMOL/L (ref 135–147)
TRIGL SERPL-MCNC: 196 MG/DL

## 2024-07-19 PROCEDURE — 80061 LIPID PANEL: CPT | Performed by: FAMILY MEDICINE

## 2024-07-19 PROCEDURE — 83036 HEMOGLOBIN GLYCOSYLATED A1C: CPT | Performed by: FAMILY MEDICINE

## 2024-07-19 PROCEDURE — 80053 COMPREHEN METABOLIC PANEL: CPT | Performed by: FAMILY MEDICINE

## 2024-07-19 PROCEDURE — 36415 COLL VENOUS BLD VENIPUNCTURE: CPT | Performed by: FAMILY MEDICINE

## 2024-07-19 PROCEDURE — 82043 UR ALBUMIN QUANTITATIVE: CPT

## 2024-07-19 PROCEDURE — 82570 ASSAY OF URINE CREATININE: CPT

## 2024-07-22 ENCOUNTER — VBI (OUTPATIENT)
Dept: ADMINISTRATIVE | Facility: OTHER | Age: 64
End: 2024-07-22

## 2024-07-22 NOTE — RESULT ENCOUNTER NOTE
Please call the patient regarding her abnormal result.  Albumin creatinine ratio is high at 146 should not be any higher than 30 make sure that she follows up with her nephrologist as scheduled

## 2024-07-22 NOTE — TELEPHONE ENCOUNTER
07/22/24 10:56 AM     Chart reviewed for Diabetic Eye Exam was/were submitted to the patient's insurance.     Nelda Saha MA   [facility]

## 2024-07-23 ENCOUNTER — PATIENT OUTREACH (OUTPATIENT)
Dept: FAMILY MEDICINE CLINIC | Facility: CLINIC | Age: 64
End: 2024-07-23

## 2024-07-23 NOTE — PROGRESS NOTES
[facility]OC completed a chart review and notes the blank certification of disability form was scanned to the chart yesterday. Completed form not yet received.     Did not contact patient on this outreach as there are no updates at this time.     Will check for completed form at the end of the week and contact PCP office if not available.    Will outreach patient for a status update once application is submitted for processing.

## 2024-07-26 ENCOUNTER — OFFICE VISIT (OUTPATIENT)
Dept: NEPHROLOGY | Facility: CLINIC | Age: 64
End: 2024-07-26

## 2024-07-26 VITALS
WEIGHT: 291 LBS | SYSTOLIC BLOOD PRESSURE: 138 MMHG | BODY MASS INDEX: 45.67 KG/M2 | DIASTOLIC BLOOD PRESSURE: 74 MMHG | HEIGHT: 67 IN

## 2024-07-26 DIAGNOSIS — N18.4 CKD (CHRONIC KIDNEY DISEASE) STAGE 4, GFR 15-29 ML/MIN (HCC): Primary | ICD-10-CM

## 2024-07-26 DIAGNOSIS — E11.22 TYPE 2 DIABETES MELLITUS WITH STAGE 4 CHRONIC KIDNEY DISEASE, WITHOUT LONG-TERM CURRENT USE OF INSULIN (HCC): ICD-10-CM

## 2024-07-26 DIAGNOSIS — I12.9 HYPERTENSIVE KIDNEY DISEASE WITH CKD (CHRONIC KIDNEY DISEASE), STAGE 1-4 OR UNSPECIFIED CHRONIC KIDNEY DISEASE: ICD-10-CM

## 2024-07-26 DIAGNOSIS — N39.0 RECURRENT UTI: ICD-10-CM

## 2024-07-26 DIAGNOSIS — E61.1 IRON DEFICIENCY: ICD-10-CM

## 2024-07-26 DIAGNOSIS — E55.9 VITAMIN D DEFICIENCY: ICD-10-CM

## 2024-07-26 DIAGNOSIS — N18.4 TYPE 2 DIABETES MELLITUS WITH STAGE 4 CHRONIC KIDNEY DISEASE, WITHOUT LONG-TERM CURRENT USE OF INSULIN (HCC): ICD-10-CM

## 2024-07-26 DIAGNOSIS — N25.81 SECONDARY HYPERPARATHYROIDISM OF RENAL ORIGIN (HCC): ICD-10-CM

## 2024-07-26 NOTE — PATIENT INSTRUCTIONS
It was nice to see you today. Please make appt with weight loss specialist provider and talk about options.  I will reach out to Dr. Us about increasing your Amaryl  in the interim   Try to discern what your urinary symptom triggers are. Is it a food or drink? Are you urinating frequently enough? Etc  Repeat labs prior to seeing Dr. Santizo- I have ordered it for you

## 2024-07-26 NOTE — PROGRESS NOTES
Nephrology   Office Follow-Up  Loretta Lewis 63 y.o. female MRN: [id number]    Encounter: [id number]        Assessment & Plan    Loretta Lewis was seen in the [place] office today. All diagnoses and orders for visit:     1. CKD (chronic kidney disease) stage 4, GFR 15-29 ml/min ([facility])  CKD4A2 baseline creatinine 1.9-2.1 mg/dL. Most recent creatinine 2.03 mg/dL. Etiology presumed DKD, HTN but not currently on antihypertensives, obesity related, age related, hx INESSA, TIN  A1C 8% (7.1%). grade a2 albuminuria. Have messaged PCP to see if okay to increase Amaryl. Pending weight management appt. She is interested in GLP. Would avoid SGLT2i given recurrent UTI/IC. Previously on lisinopril but discontinued during episode of INESSA/hyperkalemia   BP goal <130/80 mmHg. Slightly above goal today. Does not appear to be on antihypertensives at present. Recommend ongoing monitoring, watch for masked HTN  BMI 45- is willing to see weight management provider. Is interested in GLP1.   Recurrent UTI/IC- will avoid triggering foods, empty bladder more frequently, hydrate. Continue methenamine   -     Comprehensive metabolic panel; Future; Expected date: 11/04/2024  -     CBC and differential; Future; Expected date: 11/04/2024  -     Magnesium; Future; Expected date: 11/04/2024  -     Urinalysis with microscopic; Future; Expected date: 11/04/2024  -     Phosphorus; Future; Expected date: 11/04/2024  -     Albumin / creatinine urine ratio; Future; Expected date: 11/04/2024  2. Hypertensive kidney disease with CKD (chronic kidney disease), stage 1-4 or unspecified chronic kidney disease  Blood pressure is mildly elevated today. Continue monitoring and add agent if indicated for goal <130/80 mmHg  3. Type 2 diabetes mellitus with stage 4 chronic kidney disease, without long-term current use of insulin ([facility])  A1C 8% (7.1%). grade a2 albuminuria. Have messaged PCP to see if okay to increase Amaryl. Pending weight management appt. She is  interested in GLP. Would avoid SGLT2i given recurrent UTI/IC. Previously on lisinopril but discontinued during episode of INESSA/hyperkalemia   4. Recurrent UTI  Recurrent UTI/IC- will avoid triggering foods, empty bladder more frequently, hydrate. Continue methenamine   5. Vitamin D deficiency  -     Vitamin D 25 hydroxy; Future; Expected date: 11/04/2024  6. Secondary hyperparathyroidism of renal origin (HCC)  -     PTH, intact; Future; Expected date: 11/04/2024  7. Iron deficiency  -     Iron Panel (Includes Ferritin, Iron Sat%, Iron, and TIBC); Future; Expected date: 11/04/2024        HPI: Loretta Lewis is a 63 y.o. female who is here for scheduled follow-up     Pertinent problems include CKD 4, T2DM, obesity, recurrent UTI, history of sepsis due to ecoli with ATN INESSA plus hyperkalemia    Endorses fear of recurrent UTI. She is aware inappropriately treated with abx when not necessary may result in resistance or other side effect. It seems she is potentially having interstitial cystitis. She will look for triggers. Her A1C is up to 8%. I have reached out to Dr. Us to see if okay with increasing Amaryl. She will schedule visit with weight loss provider. She is interested in GLP1. Repeat labs in 4 months      ROS:   Review of Systems   Constitutional:  Negative for chills and fever.   HENT:  Negative for ear pain and sore throat.    Eyes:  Negative for pain and visual disturbance.   Respiratory:  Negative for cough and shortness of breath.    Cardiovascular:  Negative for chest pain and palpitations.   Gastrointestinal:  Negative for abdominal pain and vomiting.   Genitourinary:  Positive for dysuria. Negative for hematuria.   Musculoskeletal:  Negative for arthralgias and back pain.   Skin:  Negative for color change and rash.   Neurological:  Negative for seizures and syncope.   All other systems reviewed and are negative.      Allergies: Pollen extract and Vancomycin    Medications:   Current Outpatient  Medications:     Alpha-Lipoic Acid 300 MG CAPS, Take 300 mg by mouth 2 (two) times a day, Disp: , Rfl:     clonazePAM (KlonoPIN) 0.5 mg tablet, Take 2 tablets (1 mg total) by mouth daily at bedtime as needed for anxiety, Disp: 60 tablet, Rfl: 0    docusate sodium (COLACE) 100 mg capsule, Take 100 mg by mouth daily as needed for constipation, Disp: , Rfl:     DULoxetine (CYMBALTA) 60 mg delayed release capsule, Take 1 capsule (60 mg total) by mouth 2 (two) times a day, Disp: 180 capsule, Rfl: 0    famotidine (PEPCID) 20 mg tablet, Take 20 mg by mouth daily, Disp: , Rfl:     gabapentin (NEURONTIN) 100 mg capsule, One in am, one at lunch, and 3 at hs, Disp: 360 capsule, Rfl: 1    glimepiride (AMARYL) 1 mg tablet, take 1 tablet by mouth daily with breakfast, Disp: 90 tablet, Rfl: 1    methenamine hippurate (HIPREX) 1 g tablet, Take 1 tablet (1 g total) by mouth 2 (two) times a day with meals, Disp: 60 tablet, Rfl: 5    Mirabegron ER 50 MG TB24, , Disp: , Rfl:     mirtazapine (REMERON) 7.5 MG tablet, Take 7.5 mg by mouth daily at bedtime, Disp: , Rfl:     Polysaccharide Iron Complex (HEMATEX IRON COMPLEX PO), Take by mouth, Disp: , Rfl:     TiZANidine (ZANAFLEX) 2 MG capsule, Take 1 capsule (2 mg total) by mouth daily as needed for muscle spasms 1 tablet once daily if needed for muscle spasm, Disp: 15 capsule, Rfl: 0    Past Medical History:   Diagnosis Date    INESSA (acute kidney injury) (HCC) 2023    Anemia     Intermittent    Arthritis     Diabetes mellitus (HCC)     Hypertension     Migraine     MRSA (methicillin resistant Staphylococcus aureus)     Obesity (BMI 35.0-39.9 without comorbidity) 2017    Type 2 diabetes mellitus with stage 3a chronic kidney disease, without long-term current use of insulin (HCC)      Past Surgical History:   Procedure Laterality Date    ACHILLES TENDON REPAIR      ANKLE SURGERY Left 2019    Excision of Lipoma    CARPAL TUNNEL RELEASE Right      SECTION       "CHOLECYSTECTOMY      GANGLION CYST EXCISION Left 1999    Left wrist    LUMBAR LAMINECTOMY  2018    x 3 disks    MOUTH SURGERY  08/16/2023    Oral tooth extraction x 3 with a retained root    NASAL SINUS SURGERY  2000     Family History   Problem Relation Age of Onset    Hypertension Mother     Hepatitis Mother         Hep C    Stroke Father     Arrhythmia Father     Melanoma Father     Cancer Father     No Known Problems Sister     No Known Problems Maternal Grandmother     No Known Problems Maternal Grandfather     No Known Problems Paternal Grandmother     No Known Problems Paternal Grandfather     No Known Problems Son     No Known Problems Maternal Aunt     No Known Problems Maternal Aunt     No Known Problems Maternal Aunt     No Known Problems Maternal Aunt     No Known Problems Paternal Aunt       reports that she quit smoking about 9 years ago. Her smoking use included cigarettes. She started smoking about 44 years ago. She has a 52.5 pack-year smoking history. She has never used smokeless tobacco. She reports that she does not currently use alcohol. She reports that she does not use drugs.      Physical Exam:   Vitals:    07/26/24 1305   BP: 138/74   BP Location: Left arm   Patient Position: Sitting   Cuff Size: Large   Weight: 132 kg (291 lb)   Height: 5' 7\" (1.702 m)     Body mass index is 45.58 kg/m².    General: conscious, cooperative, in no acute distress, appears stated age  Eyes: conjunctivae pale, anicteric sclerae  ENT: lips and mucous membranes moist  Neck: supple, no JVD, no masses  Chest:  essentially clear breath sounds bilaterally, no crackles, ronchus or wheezings  CVS: S1 & S2, normal rate, regular rhythm  Abdomen: soft, non-tender, non-distended, normoactive bowel sounds, rounded obese  Extremities: no edema of both legs  Skin: no rash   Neuro: awake, alert, oriented       Diagnostic Data:  Lab: I have personally reviewed pertinent lab results.,   CBC:       CMP: No results found for: " "\"SODIUM\", \"K\", \"CL\", \"CO2\", \"ANIONGAP\", \"BUN\", \"CREATININE\", \"GLUCOSE\", \"CALCIUM\", \"AST\", \"ALT\", \"ALKPHOS\", \"PROT\", \"BILITOT\", \"EGFR\",   PT/INR: No results found for: \"PT\", \"INR\",   Magnesium: No components found for: \"MAG\",  Phosphorous: No results found for: \"PHOS\"    Patient Instructions   It was nice to see you today. Please make appt with weight loss specialist provider and talk about options.  I will reach out to Dr. Us about increasing your Amaryl  in the interim   Try to discern what your urinary symptom triggers are. Is it a food or drink? Are you urinating frequently enough? Etc  Repeat labs prior to seeing Dr. Santizo- I have ordered it for you       Portions of the record may have been created with voice recognition software. Occasional wrong word or \"sound a like\" substitutions may have occurred due to the inherent limitations of voice recognition software. Read the chart carefully and recognize, using context, where substitutions have occurred.    If you have any questions, please contact the dictating provider.  "

## 2024-07-29 ENCOUNTER — PATIENT OUTREACH (OUTPATIENT)
Dept: CASE MANAGEMENT | Facility: OTHER | Age: 64
End: 2024-07-29

## 2024-07-29 NOTE — PROGRESS NOTES
CMOC completed a chart review and notes the certification of disability form has not yet been completed by the PCP office. A blank form was scanned to the chart previously.   OC faxed a second request for form completion to [facility].    [facility] attempted to contact Shereen with the above information and to assess for any other needs.     No answer. Left message on voicemail with reason for call, this writer's contact information, and a request for a call back to discuss if needed.     Will check for form at the end of the week and outreach for a status update in 1-2 weeks.

## 2024-07-30 ENCOUNTER — TELEPHONE (OUTPATIENT)
Age: 64
End: 2024-07-30

## 2024-07-30 DIAGNOSIS — N18.31 TYPE 2 DIABETES MELLITUS WITH STAGE 3A CHRONIC KIDNEY DISEASE, WITHOUT LONG-TERM CURRENT USE OF INSULIN (HCC): ICD-10-CM

## 2024-07-30 DIAGNOSIS — E11.22 TYPE 2 DIABETES MELLITUS WITH STAGE 3A CHRONIC KIDNEY DISEASE, WITHOUT LONG-TERM CURRENT USE OF INSULIN (HCC): ICD-10-CM

## 2024-07-30 RX ORDER — GLIMEPIRIDE 1 MG/1
2 TABLET ORAL
Qty: 90 TABLET | Refills: 1 | Status: SHIPPED | OUTPATIENT
Start: 2024-07-30

## 2024-07-30 NOTE — TELEPHONE ENCOUNTER
Patient just saw tomas and it was mentioned about increasing amaryl from 1mg to 2 mg. Patient is still taking 1 mg.     What dose would you like her to take? Please follow up with patient

## 2024-07-30 NOTE — TELEPHONE ENCOUNTER
I called and spoke with Shereen. She is aware that it is okay to take Amaryl 1 mg twice a day. She states she sleeps until at least noon on a daily basis. She takes her daily medications around 1 pm.  She was inquiring how far apart in time should she be taking the medication twice a day?     Please advise.

## 2024-07-31 NOTE — TELEPHONE ENCOUNTER
I called and spoke with Shereen. She is aware of Caro's recommendations. She states she spoke with the pharmacist and was advised to take the medication 8 hours apart. She states she will try that and keep an eye on her sugars.

## 2024-08-01 ENCOUNTER — PATIENT OUTREACH (OUTPATIENT)
Dept: CASE MANAGEMENT | Facility: OTHER | Age: 64
End: 2024-08-01

## 2024-08-01 NOTE — PROGRESS NOTES
CMOC received the certification of disability form for [employer]'s transportation application. It was attached to the rest of the application and emailed to [facility] for processing.     Will check with [facility] for a status update in one week and outreach patient.

## 2024-08-09 ENCOUNTER — PATIENT OUTREACH (OUTPATIENT)
Dept: CASE MANAGEMENT | Facility: OTHER | Age: 64
End: 2024-08-09

## 2024-08-09 NOTE — PROGRESS NOTES
OC contacted [facility] for a status update on Shereen's transportation application. She was determined not eligible for any transportation programs at this time but her application was filed to be automatically processed for senior transportation when she turns 64 in October of this year. Call ended.    [facility] attempted to contact Shereen with the above information.   No answer. Left message on voicemail with reason for call, this writer's contact information, and a request for a call back to discuss if needed.     Will outreach next week to assess for any further needs.

## 2024-08-12 DIAGNOSIS — F41.1 GENERALIZED ANXIETY DISORDER: ICD-10-CM

## 2024-08-13 RX ORDER — CLONAZEPAM 0.5 MG/1
1 TABLET ORAL
Qty: 60 TABLET | Refills: 0 | Status: SHIPPED | OUTPATIENT
Start: 2024-08-13

## 2024-08-15 ENCOUNTER — PATIENT OUTREACH (OUTPATIENT)
Dept: CASE MANAGEMENT | Facility: OTHER | Age: 64
End: 2024-08-15

## 2024-08-15 NOTE — PROGRESS NOTES
Care management  Liliana outreached patient to inform her that she was determined not eligible for any transportation programs at this time but her application was filed to be automatically processed for senior transportation when she turns 64 in October of this year.     Patient was ok with outcome thanked cmoc for our help. No other outreach or concerns are needed at this time.

## 2024-08-19 ENCOUNTER — CONSULT (OUTPATIENT)
Dept: PAIN MEDICINE | Facility: CLINIC | Age: 64
End: 2024-08-19
Payer: COMMERCIAL

## 2024-08-19 ENCOUNTER — APPOINTMENT (OUTPATIENT)
Dept: RADIOLOGY | Facility: MEDICAL CENTER | Age: 64
End: 2024-08-19
Payer: COMMERCIAL

## 2024-08-19 VITALS
HEIGHT: 67 IN | BODY MASS INDEX: 45.58 KG/M2 | OXYGEN SATURATION: 97 % | SYSTOLIC BLOOD PRESSURE: 122 MMHG | DIASTOLIC BLOOD PRESSURE: 75 MMHG | RESPIRATION RATE: 16 BRPM | HEART RATE: 98 BPM | WEIGHT: 290.4 LBS | TEMPERATURE: 98 F

## 2024-08-19 DIAGNOSIS — M25.561 CHRONIC PAIN OF RIGHT KNEE: ICD-10-CM

## 2024-08-19 DIAGNOSIS — M51.36 DEGENERATIVE DISC DISEASE, LUMBAR: ICD-10-CM

## 2024-08-19 DIAGNOSIS — M79.18 MYOFASCIAL PAIN SYNDROME: ICD-10-CM

## 2024-08-19 DIAGNOSIS — M54.16 LUMBAR RADICULOPATHY: ICD-10-CM

## 2024-08-19 DIAGNOSIS — M62.830 BACK SPASM: ICD-10-CM

## 2024-08-19 DIAGNOSIS — G89.29 CHRONIC PAIN OF RIGHT KNEE: ICD-10-CM

## 2024-08-19 DIAGNOSIS — M54.16 LUMBAR RADICULOPATHY: Primary | ICD-10-CM

## 2024-08-19 DIAGNOSIS — M47.816 LUMBAR SPONDYLOSIS: ICD-10-CM

## 2024-08-19 PROBLEM — M51.369 DEGENERATIVE DISC DISEASE, LUMBAR: Status: ACTIVE | Noted: 2024-08-19

## 2024-08-19 PROCEDURE — 99204 OFFICE O/P NEW MOD 45 MIN: CPT | Performed by: ANESTHESIOLOGY

## 2024-08-19 PROCEDURE — G2211 COMPLEX E/M VISIT ADD ON: HCPCS | Performed by: ANESTHESIOLOGY

## 2024-08-19 PROCEDURE — 72110 X-RAY EXAM L-2 SPINE 4/>VWS: CPT

## 2024-08-19 PROCEDURE — 73562 X-RAY EXAM OF KNEE 3: CPT

## 2024-08-19 RX ORDER — TIZANIDINE HYDROCHLORIDE 2 MG/1
2 CAPSULE, GELATIN COATED ORAL DAILY PRN
Qty: 90 CAPSULE | Refills: 1 | Status: SHIPPED | OUTPATIENT
Start: 2024-08-19 | End: 2024-11-17

## 2024-08-19 NOTE — PATIENT INSTRUCTIONS
Patient Education     Core Strengthening Exercises on Back or on Hands and Knees   About this topic   Your core muscles are in your chest, back, buttock, and stomach area. They are your abdominal, back, and pelvis muscles. These muscles help keep your body stable when using your arms or legs. They also help with balance and posture. There are many exercises you can do to keep these muscles strong.  If you have back problems like a compression fracture or a ruptured disc, doing some of these exercises could make your problem worse. Some of these exercises may cause lower back pain.  General   Before starting with a program, ask your doctor if you are healthy enough to do these exercises. Your doctor may have you work with a , chiropractor, or physical therapist to make a safe exercise program to meet your needs.  Strengthening Exercises   Strengthening exercises keep your muscles firm and strong. Start by repeating each exercise 2 to 3 times. Work up to doing each exercise 10 times. Try to do the exercises 2 to 3 times each day. Hold each exercise for 3 to 5 seconds. Do all exercises slowly.  Hip lifts ? Lie on your back with your knees bent and feet flat on the floor. Tighten your stomach muscles and push your heels into the floor to lift your buttocks off the floor. Relax.  Pelvic tilts ? Lie on your back with your knees bent and feet flat on the floor. Tighten your stomach muscles and press your lower back down to the floor. Relax.  Straight leg raises lying down ? Lie on your back with one leg straight. Bend your other knee so the foot is flat on the bed. Keeping your leg straight, lift the leg up to the level of your other knee. Lower it back down. Repeat with the other leg.  Knee flex lying down ? Lie on your back with both knees bent and your feet flat on the floor. Tighten your belly muscles. Raise one leg up and back down as if you are marching in slow motion. Keep belly muscles tight while you move  your leg. Switch legs. To make this exercise harder, raise both arms straight up in the air. Tighten your belly muscles. When you raise one leg up, reach the opposite arm over your head. Switch, moving the opposite arm and leg until you have done 10 repetitions on each side.  Abdominal crunches ? Lie on your back with both knees bent. Keep your feet flat on the floor. Place your hands in one of these positions. Try starting with the first position since it is the easiest. As you get better, use the other positions to make it harder.  Crunches with arms at sides.  Crunches with arms across chest.  Crunches with arms behind head. Be careful not to interlock your fingers behind your neck or head while doing crunches. This may add tension to your neck and cause strain.  Look at the ceiling. Tighten your belly muscles and lift your shoulders and upper back off the floor. Breathe out while you are doing this. Lower your shoulders to the floor. Breathe in while you are doing this. Relax your belly muscles all the way before starting another crunch.  Arm and leg lifts on hands and knees ? Start on your hands and knees. With all of these exercises, keep your back as level as possible. If you are having trouble with this, you may want to put a small object on your back such as a book. If it falls off, you are not keeping your back level enough during the exercise.  Lift one arm up to shoulder level and hold. Lower it back down. Now, lift up the other arm and hold.  Lift one leg up and kick it straight out until it is in line with your back and hold. Lower it back down. Now, lift up the other leg and hold.  Lift one arm and the OPPOSITE leg up at the same time and hold. Lower them down. Now, repeat using the other arm and leg. This is a very hard exercise. It may take time to be able to do this.               What will the results be?   Stronger core  Better balance  More toned belly and back muscles  Easier to do daily  activities  Better sports performance  Less low back pain  Helpful tips   Stay active and work out to keep your muscles strong and flexible.  Keep a healthy weight to avoid putting too much stress on your spine. Eat a healthy diet to keep your muscles healthy.  Be sure you do not hold your breath when exercising. This can raise your blood pressure. If you tend to hold your breath, try counting out loud when exercising. If any exercise bothers you, stop right away.  Try walking or cycling at an easy pace for a few minutes to warm up your muscles. Do this again after exercising.  Exercise may be slightly uncomfortable, but you should not have sharp pains. If you do get sharp pains, stop what you are doing. If the sharp pains continue, call your doctor.  Last Reviewed Date   2021-03-18  Consumer Information Use and Disclaimer   This generalized information is a limited summary of diagnosis, treatment, and/or medication information. It is not meant to be comprehensive and should be used as a tool to help the user understand and/or assess potential diagnostic and treatment options. It does NOT include all information about conditions, treatments, medications, side effects, or risks that may apply to a specific patient. It is not intended to be medical advice or a substitute for the medical advice, diagnosis, or treatment of a health care provider based on the health care provider's examination and assessment of a patient’s specific and unique circumstances. Patients must speak with a health care provider for complete information about their health, medical questions, and treatment options, including any risks or benefits regarding use of medications. This information does not endorse any treatments or medications as safe, effective, or approved for treating a specific patient. [employer] and its affiliates disclaim any warranty or liability relating to this information or the use thereof. The use of this information  is governed by the Terms of Use, available at https://www.[employer]olters[employer] Domain Holdingsuwer.com/en/know/clinical-effectiveness-terms   Copyright   Copyright © 2024 [employer] and its affiliates and/or licensors. All rights reserved.

## 2024-08-19 NOTE — PROGRESS NOTES
Assessment:  1. Lumbar radiculopathy    2. Degenerative disc disease, lumbar    3. Lumbar spondylosis    4. Myofascial pain syndrome    5. Chronic pain of right knee    6. Back spasm        Plan:  Patient is a 63-year-old female with complaints of low back pain and right knee pain with chronic pain some secondary to lumbar degenerative disease, radiculopathy presents to office for initial consultation.  Pain was managed with tizanidine.  At this time we feel it is pertinent to in addition to obtaining diagnostic imaging and also started conservative therapy.  Patient does use a cane for gait assistance.  1.  We will order an x-ray of the lumbar spine and the right knee to better assess the degenerative change and correlate patient current presentation.  2.  We will provide patient with physical therapy lumbar core strengthening exercises  3.  We will provide patient with medical marijuana consultants  4.  We will refill tizanidine 1 mg p.o. daily  5.  Follow-up in 1 month     History of Present Illness:    The patient is a 63 y.o. female who presents for consultation in regards to Back Pain.  Symptoms have been present for 8 years. Symptoms began following a non work related injury. Pain is reported to be 6 on the numeric rating scale.  Symptoms are felt intermittently and worst in the no typical pattern.  Symptoms are characterized as sharp, dull/aching, and throbbing.  Symptoms are associated with no weakness.  Aggravating factors include standing and exercise.  Relieving factors include lying down and relaxation.  No change in symptoms with kneeling, bending, leaning forward, leaning bckward, sitting, coughing/sneezing, and bowel movements.  Treatments that have been helpful include surgery , physical therapy, and heat/ice. home exercise and TENS unit have provided no relief.  Medications to relieve symptoms include marijuana, Cymbalta, gabapentin.    Review of Systems:    Review of Systems   Respiratory:   Positive for shortness of breath.    Cardiovascular:  Positive for leg swelling.   Endocrine:        Frequent urination   Musculoskeletal:  Positive for back pain, gait problem and myalgias.        Joint pain   Psychiatric/Behavioral:          Depression   All other systems reviewed and are negative.          Past Medical History:   Diagnosis Date    INESSA (acute kidney injury) (HCC) 2023    Anemia     Intermittent    Arthritis     Diabetes mellitus (HCC)     Hypertension     Migraine     MRSA (methicillin resistant Staphylococcus aureus)     Obesity (BMI 35.0-39.9 without comorbidity) 2017    Type 2 diabetes mellitus with stage 3a chronic kidney disease, without long-term current use of insulin ([facility])        Past Surgical History:   Procedure Laterality Date    ACHILLES TENDON REPAIR      ANKLE SURGERY Left 2019    Excision of Lipoma    CARPAL TUNNEL RELEASE Right      SECTION      CHOLECYSTECTOMY      GANGLION CYST EXCISION Left     Left wrist    LUMBAR LAMINECTOMY  2018    x 3 disks    MOUTH SURGERY  2023    Oral tooth extraction x 3 with a retained root    NASAL SINUS SURGERY         Family History   Problem Relation Age of Onset    Hypertension Mother     Hepatitis Mother         Hep C    Stroke Father     Arrhythmia Father     Melanoma Father     Cancer Father     No Known Problems Sister     No Known Problems Maternal Grandmother     No Known Problems Maternal Grandfather     No Known Problems Paternal Grandmother     No Known Problems Paternal Grandfather     No Known Problems Son     No Known Problems Maternal Aunt     No Known Problems Maternal Aunt     No Known Problems Maternal Aunt     No Known Problems Maternal Aunt     No Known Problems Paternal Aunt        Social History     Occupational History    Not on file   Tobacco Use    Smoking status: Former     Current packs/day: 0.00     Average packs/day: 1.5 packs/day for 35.0 years (52.5 ttl pk-yrs)     Types: Cigarettes     " Start date:      Quit date:      Years since quittin.6    Smokeless tobacco: Never    Tobacco comments:     Vapes - Daily - includes 3 mg nicotine   Vaping Use    Vaping status: Every Day    Start date: 2016    Substances: Nicotine, Flavoring   Substance and Sexual Activity    Alcohol use: Not Currently    Drug use: No    Sexual activity: Not Currently     Partners: Male         Current Outpatient Medications:     Alpha-Lipoic Acid 300 MG CAPS, Take 300 mg by mouth 2 (two) times a day, Disp: , Rfl:     clonazePAM (KlonoPIN) 0.5 mg tablet, Take 2 tablets (1 mg total) by mouth daily at bedtime as needed for anxiety, Disp: 60 tablet, Rfl: 0    docusate sodium (COLACE) 100 mg capsule, Take 100 mg by mouth daily as needed for constipation, Disp: , Rfl:     DULoxetine (CYMBALTA) 60 mg delayed release capsule, Take 1 capsule (60 mg total) by mouth 2 (two) times a day, Disp: 180 capsule, Rfl: 0    famotidine (PEPCID) 20 mg tablet, Take 20 mg by mouth daily, Disp: , Rfl:     gabapentin (NEURONTIN) 100 mg capsule, One in am, one at lunch, and 3 at hs, Disp: 360 capsule, Rfl: 1    glimepiride (AMARYL) 1 mg tablet, Take 2 tablets (2 mg total) by mouth daily with breakfast, Disp: 90 tablet, Rfl: 1    methenamine hippurate (HIPREX) 1 g tablet, Take 1 tablet (1 g total) by mouth 2 (two) times a day with meals, Disp: 60 tablet, Rfl: 5    Mirabegron ER 50 MG TB24, , Disp: , Rfl:     mirtazapine (REMERON) 7.5 MG tablet, Take 7.5 mg by mouth daily at bedtime, Disp: , Rfl:     Polysaccharide Iron Complex (HEMATEX IRON COMPLEX PO), Take by mouth, Disp: , Rfl:     TiZANidine (ZANAFLEX) 2 MG capsule, Take 1 capsule (2 mg total) by mouth daily as needed for muscle spasms 1 tablet once daily if needed for muscle spasm, Disp: 15 capsule, Rfl: 0    Allergies   Allergen Reactions    Pollen Extract Sneezing    Vancomycin Rash       Physical Exam:    /75   Pulse 98   Temp 98 °F (36.7 °C)   Resp 16   Ht 5' 7\" (1.702 m)  "  Wt 132 kg (290 lb 6.4 oz)   LMP 04/10/2017   SpO2 97%   BMI 45.48 kg/m²     Constitutional: normal, well developed, well nourished, alert, in no distress and non-toxic and no overt pain behavior. and obese  Eyes: anicteric  HEENT: grossly intact  Neck: supple, symmetric, trachea midline and no masses   Pulmonary:even and unlabored  Cardiovascular:No edema or pitting edema present  Skin:Normal without rashes or lesions and well hydrated  Psychiatric:Mood and affect appropriate  Neurologic:Cranial Nerves II-XII grossly intact  Musculoskeletal:antalgic    Imaging            No orders to display       No orders of the defined types were placed in this encounter.

## 2024-08-20 ENCOUNTER — PATIENT OUTREACH (OUTPATIENT)
Dept: CASE MANAGEMENT | Facility: OTHER | Age: 64
End: 2024-08-20

## 2024-08-20 NOTE — PROGRESS NOTES
SEVEN MONTANO received IB from [facility] with update. Pt ineligible for programs with STS at this time. Application for senior shared ride was put on hold until pt turns 64 later this year.     No other needs were identified. SE[facility] MONT[facility]O resolved episode and removed self from care team. OP CM team available should pt have needs in the future.

## 2024-08-31 ENCOUNTER — OFFICE VISIT (OUTPATIENT)
Dept: URGENT CARE | Facility: MEDICAL CENTER | Age: 64
End: 2024-08-31
Payer: COMMERCIAL

## 2024-08-31 VITALS
HEART RATE: 106 BPM | RESPIRATION RATE: 18 BRPM | OXYGEN SATURATION: 95 % | DIASTOLIC BLOOD PRESSURE: 78 MMHG | SYSTOLIC BLOOD PRESSURE: 150 MMHG | TEMPERATURE: 97.8 F

## 2024-08-31 DIAGNOSIS — R82.998 LEUKOCYTES IN URINE: Primary | ICD-10-CM

## 2024-08-31 DIAGNOSIS — R39.9 UTI SYMPTOMS: ICD-10-CM

## 2024-08-31 LAB
SL AMB  POCT GLUCOSE, UA: NEGATIVE
SL AMB LEUKOCYTE ESTERASE,UA: ABNORMAL
SL AMB POCT BILIRUBIN,UA: NEGATIVE
SL AMB POCT BLOOD,UA: 1.01
SL AMB POCT CLARITY,UA: CLEAR
SL AMB POCT COLOR,UA: YELLOW
SL AMB POCT KETONES,UA: NEGATIVE
SL AMB POCT NITRITE,UA: NEGATIVE
SL AMB POCT PH,UA: 5
SL AMB POCT SPECIFIC GRAVITY,UA: ABNORMAL
SL AMB POCT URINE PROTEIN: 30
SL AMB POCT UROBILINOGEN: 0.2

## 2024-08-31 PROCEDURE — 99213 OFFICE O/P EST LOW 20 MIN: CPT

## 2024-08-31 PROCEDURE — 81002 URINALYSIS NONAUTO W/O SCOPE: CPT

## 2024-08-31 PROCEDURE — S9088 SERVICES PROVIDED IN URGENT: HCPCS

## 2024-08-31 PROCEDURE — 87086 URINE CULTURE/COLONY COUNT: CPT

## 2024-08-31 RX ORDER — CEPHALEXIN 500 MG/1
500 CAPSULE ORAL EVERY 12 HOURS SCHEDULED
Qty: 14 CAPSULE | Refills: 0 | Status: SHIPPED | OUTPATIENT
Start: 2024-08-31 | End: 2024-09-07

## 2024-08-31 NOTE — PROGRESS NOTES
[facility]        NAME: Loretta Lewis is a 63 y.o. female  : 1960    MRN: [id number]  DATE: 2024  TIME: 5:34 PM    Assessment and Plan   Leukocytes in urine [R82.998]  1. Leukocytes in urine  cephalexin (KEFLEX) 500 mg capsule      2. UTI symptoms  POCT urine dip    Urine culture    cephalexin (KEFLEX) 500 mg capsule    Urine culture            Patient Instructions       Follow up with PCP in 3-5 days.  Proceed to  ER if symptoms worsen.    If tests are performed, our office will contact you with results only if changes need to made to the care plan discussed with you at the visit. You can review your full results on [facility].    Chief Complaint     Chief Complaint   Patient presents with   • Possible UTI     Fever x 2-3 days.  Also having cloudy urine with urgency x 1 week.  Did have sepsis related to UTI last year.  Taking tylenol         History of Present Illness       Patient here with cloudy urine with urinary urgency and frequency x1 week. She has history of UTI with sepsis. Taking tylenol for symptoms. Patient having fever x2-3 days. TMAX 101.5 which was yesterday. Thursday is when she started to notice feeling feverish. Patient denies any URI or cold symptoms.         Review of Systems   Review of Systems   Constitutional:  Positive for chills, fatigue and fever. Negative for appetite change.   HENT:  Negative for ear pain and sore throat.    Respiratory:  Negative for cough and shortness of breath.    Cardiovascular:  Negative for chest pain and palpitations.   Gastrointestinal:  Negative for abdominal pain and vomiting.   Genitourinary:  Positive for dysuria, frequency and urgency. Negative for hematuria.   Musculoskeletal:  Negative for arthralgias and back pain.   Skin:  Negative for color change and rash.   All other systems reviewed and are negative.        Current Medications       Current Outpatient Medications:   •  Alpha-Lipoic Acid 300 MG CAPS, Take 300  mg by mouth 2 (two) times a day, Disp: , Rfl:   •  cephalexin (KEFLEX) 500 mg capsule, Take 1 capsule (500 mg total) by mouth every 12 (twelve) hours for 7 days, Disp: 14 capsule, Rfl: 0  •  clonazePAM (KlonoPIN) 0.5 mg tablet, Take 2 tablets (1 mg total) by mouth daily at bedtime as needed for anxiety, Disp: 60 tablet, Rfl: 0  •  docusate sodium (COLACE) 100 mg capsule, Take 100 mg by mouth daily as needed for constipation, Disp: , Rfl:   •  DULoxetine (CYMBALTA) 60 mg delayed release capsule, Take 1 capsule (60 mg total) by mouth 2 (two) times a day, Disp: 180 capsule, Rfl: 0  •  famotidine (PEPCID) 20 mg tablet, Take 20 mg by mouth daily, Disp: , Rfl:   •  gabapentin (NEURONTIN) 100 mg capsule, One in am, one at lunch, and 3 at hs, Disp: 360 capsule, Rfl: 1  •  glimepiride (AMARYL) 1 mg tablet, Take 2 tablets (2 mg total) by mouth daily with breakfast, Disp: 90 tablet, Rfl: 1  •  methenamine hippurate (HIPREX) 1 g tablet, Take 1 tablet (1 g total) by mouth 2 (two) times a day with meals, Disp: 60 tablet, Rfl: 5  •  Mirabegron ER 50 MG TB24, , Disp: , Rfl:   •  mirtazapine (REMERON) 7.5 MG tablet, Take 7.5 mg by mouth daily at bedtime, Disp: , Rfl:   •  Polysaccharide Iron Complex (HEMATEX IRON COMPLEX PO), Take by mouth, Disp: , Rfl:   •  TiZANidine (ZANAFLEX) 2 MG capsule, Take 1 capsule (2 mg total) by mouth daily as needed for muscle spasms 1 tablet once daily if needed for muscle spasm, Disp: 90 capsule, Rfl: 1    Current Allergies     Allergies as of 08/31/2024 - Reviewed 08/31/2024   Allergen Reaction Noted   • Pollen extract Sneezing 07/25/2017   • Vancomycin Rash 04/10/2017            The following portions of the patient's history were reviewed and updated as appropriate: allergies, current medications, past family history, past medical history, past social history, past surgical history and problem list.     Past Medical History:   Diagnosis Date   • INESSA (acute kidney injury) (HCC) 07/09/2023   • Anemia      Intermittent   • Arthritis    • Diabetes mellitus (HCC)    • Hypertension    • Migraine    • MRSA (methicillin resistant Staphylococcus aureus)    • Obesity (BMI 35.0-39.9 without comorbidity) 2017   • Type 2 diabetes mellitus with stage 3a chronic kidney disease, without long-term current use of insulin (HCC)        Past Surgical History:   Procedure Laterality Date   • ACHILLES TENDON REPAIR     • ANKLE SURGERY Left 2019    Excision of Lipoma   • CARPAL TUNNEL RELEASE Right    •  SECTION     • CHOLECYSTECTOMY     • GANGLION CYST EXCISION Left     Left wrist   • LUMBAR LAMINECTOMY  2018    x 3 disks   • MOUTH SURGERY  2023    Oral tooth extraction x 3 with a retained root   • NASAL SINUS SURGERY         Family History   Problem Relation Age of Onset   • Hypertension Mother    • Hepatitis Mother         Hep C   • Stroke Father    • Arrhythmia Father    • Melanoma Father    • Cancer Father    • No Known Problems Sister    • No Known Problems Maternal Grandmother    • No Known Problems Maternal Grandfather    • No Known Problems Paternal Grandmother    • No Known Problems Paternal Grandfather    • No Known Problems Son    • No Known Problems Maternal Aunt    • No Known Problems Maternal Aunt    • No Known Problems Maternal Aunt    • No Known Problems Maternal Aunt    • No Known Problems Paternal Aunt          Medications have been verified.        Objective   /78 (BP Location: Right arm)   Pulse (!) 106   Temp 97.8 °F (36.6 °C) (Skin)   Resp 18   LMP 04/10/2017   SpO2 95%        Physical Exam     Physical Exam  Vitals and nursing note reviewed.   Constitutional:       General: She is awake.      Appearance: Normal appearance. She is well-developed. She is obese.   HENT:      Head: Normocephalic and atraumatic.      Mouth/Throat:      Lips: Pink.      Mouth: Mucous membranes are moist.      Pharynx: Oropharynx is clear.   Cardiovascular:      Rate and Rhythm: Regular rhythm.  Tachycardia present.      Pulses: Normal pulses.      Heart sounds: Normal heart sounds.   Pulmonary:      Effort: Pulmonary effort is normal.      Breath sounds: Normal breath sounds.   Skin:     General: Skin is warm and dry.      Capillary Refill: Capillary refill takes less than 2 seconds.      Findings: No rash.   Neurological:      General: No focal deficit present.      Mental Status: She is alert. Mental status is at baseline.   Psychiatric:         Mood and Affect: Mood normal.         Behavior: Behavior is cooperative.

## 2024-08-31 NOTE — PATIENT INSTRUCTIONS
You may take over the counter Tylenol (Acetaminophen)  as needed, as directed on packaging.   Be sure to get plenty of rest, and drinking fluids to remain hydrated.     If tests have been performed at [facility], our office will contact you ONLY with results if changes need to be made to the care plan discussed with you at the visit.  You can review your full results on [facility] MyChart  Please follow up with your primary provider in the next several days. Should you have any worsening of symptoms, or lack of improvement please be re-evaluated. If needed for significant concerns, consider 911 or ER evaluation.

## 2024-09-02 LAB — BACTERIA UR CULT: NORMAL

## 2024-09-03 ENCOUNTER — TELEPHONE (OUTPATIENT)
Age: 64
End: 2024-09-03

## 2024-09-03 NOTE — TELEPHONE ENCOUNTER
Contacted patient off of [facility]  to verify needs of services in attempts to offer patient an appointment at [place] office with new resident. spoke with patient whom stated they are no longer interested due to finding services elsewhere.

## 2024-09-10 ENCOUNTER — TELEPHONE (OUTPATIENT)
Dept: PAIN MEDICINE | Facility: CLINIC | Age: 64
End: 2024-09-10

## 2024-09-10 DIAGNOSIS — G89.4 CHRONIC PAIN SYNDROME: ICD-10-CM

## 2024-09-10 DIAGNOSIS — M17.0 PRIMARY OSTEOARTHRITIS OF BOTH KNEES: Primary | ICD-10-CM

## 2024-09-10 DIAGNOSIS — M54.16 LUMBAR RADICULOPATHY: ICD-10-CM

## 2024-09-10 DIAGNOSIS — M47.816 LUMBAR SPONDYLOSIS: ICD-10-CM

## 2024-09-10 NOTE — TELEPHONE ENCOUNTER
Shereen Lewis   to [facility] (supporting Fernando Poe MD)         9/9/24  8:14 PM  Dr. Poe, could you add an order for PT on my right knee as well as my back? That's been bothering me more and more. Thank you!     Shereen Lewis

## 2024-09-13 DIAGNOSIS — N39.0 RECURRENT UTI: ICD-10-CM

## 2024-09-13 RX ORDER — METHENAMINE HIPPURATE 1000 MG/1
1 TABLET ORAL 2 TIMES DAILY WITH MEALS
Qty: 180 TABLET | Refills: 5 | Status: SHIPPED | OUTPATIENT
Start: 2024-09-13

## 2024-09-14 DIAGNOSIS — N18.31 TYPE 2 DIABETES MELLITUS WITH STAGE 3A CHRONIC KIDNEY DISEASE, WITHOUT LONG-TERM CURRENT USE OF INSULIN (HCC): ICD-10-CM

## 2024-09-14 DIAGNOSIS — E11.22 TYPE 2 DIABETES MELLITUS WITH STAGE 3A CHRONIC KIDNEY DISEASE, WITHOUT LONG-TERM CURRENT USE OF INSULIN (HCC): ICD-10-CM

## 2024-09-16 ENCOUNTER — EVALUATION (OUTPATIENT)
Dept: PHYSICAL THERAPY | Facility: CLINIC | Age: 64
End: 2024-09-16
Payer: COMMERCIAL

## 2024-09-16 DIAGNOSIS — M54.16 LUMBAR RADICULOPATHY: ICD-10-CM

## 2024-09-16 DIAGNOSIS — M51.36 DEGENERATIVE DISC DISEASE, LUMBAR: Primary | ICD-10-CM

## 2024-09-16 DIAGNOSIS — M79.18 MYOFASCIAL PAIN SYNDROME: ICD-10-CM

## 2024-09-16 DIAGNOSIS — M17.0 PRIMARY OSTEOARTHRITIS OF BOTH KNEES: ICD-10-CM

## 2024-09-16 DIAGNOSIS — M47.816 LUMBAR SPONDYLOSIS: ICD-10-CM

## 2024-09-16 PROCEDURE — 97110 THERAPEUTIC EXERCISES: CPT

## 2024-09-16 PROCEDURE — 97162 PT EVAL MOD COMPLEX 30 MIN: CPT

## 2024-09-16 RX ORDER — GLIMEPIRIDE 1 MG/1
2 TABLET ORAL
Qty: 180 TABLET | Refills: 1 | Status: SHIPPED | OUTPATIENT
Start: 2024-09-16

## 2024-09-16 NOTE — PROGRESS NOTES
PT Evaluation     Today's date: 2024  Patient name: Loretta Lewis  : 1960  MRN: [id number]  Referring provider: Fernando Poe MD  Dx:   Encounter Diagnosis     ICD-10-CM    1. Degenerative disc disease, lumbar  M51.36 Ambulatory referral to Physical Therapy      2. Lumbar radiculopathy  M54.16 Ambulatory referral to Physical Therapy      3. Lumbar spondylosis  M47.816 Ambulatory referral to Physical Therapy      4. Myofascial pain syndrome  M79.18 Ambulatory referral to Physical Therapy      5. Primary osteoarthritis of both knees  M17.0           Start Time: 1300  Stop Time: 1400  Total time in clinic (min): 60 minutes    Assessment  Impairments: abnormal gait, abnormal or restricted ROM, abnormal movement, activity intolerance, impaired physical strength, lacks appropriate home exercise program, pain with function, poor posture , activity limitations and endurance  Symptom irritability: moderate    Assessment details: The patient is a 63 y.o. female presenting to Physical Therapy today with the chief complaint of R sided lumbar spine and R knee pain. Upon completion of the initial PT evaluation the patient is demonstrating with limitations in thoracolumbar mobility, core conditioning, R knee mobility, BLE strength, gait mechanics, and pain. Patient is currently having difficulty with activities such as standing tolerance, ambulation, stair navigation, lifting, pushing, pulling, and carrying due to symptomatology. Patient would benefit from a course of skilled Physical Therapy services to address functional limitations to return to prior level of function. Thank you for your referral.   Understanding of Dx/Px/POC: excellent     Prognosis: good    Goals  STG: (6 weeks)  1.) Patient will initiate HEP Independently   2.) Patient will have a 50% reduction in overall symptoms   3.) Patient will demonstrate improved BLE strength evidenced by a 1-2 MMT grade increase  4.) Patient will demonstrate  improved thoracolumbar mobility evidence by a 50% improvement in all planes of motion  5.) Patient will demonstrate improved standing tolerance >/= 30 minutes w/o symptoms    LTG: (12 weeks)   1.) Patient will be d/c to an HEP independently  2.) Patient will improve functional abilities evidenced by FOTO scores  3.) Eliminate pain with functional activity   4.) Patient will demonstrate improved ability to lift, push, pull, and carry safely w/o symptoms   5.) Patient will demonstrate improved thoracolumbar mobility evidence by a 90% improvement in all planes of motion as evidence of restored function    Plan  Patient would benefit from: PT eval and skilled physical therapy  Referral necessary: No  Planned modality interventions: cryotherapy and thermotherapy: hydrocollator packs    Planned therapy interventions: functional ROM exercises, graded activity, graded exercise, graded motor, home exercise program, flexibility, joint mobilization, manual therapy, neuromuscular re-education, patient education, postural training, self care, strengthening, stretching, therapeutic activities, therapeutic exercise, therapeutic training and gait training    Frequency: 1-2x week  Duration in weeks: 12  Plan of Care beginning date: 9/16/2024  Plan of Care expiration date: 12/9/2024  Treatment plan discussed with: patient  Plan details: Plan of care was reviewed and discussed thoroughly with the patient on their current condition. Patient was instructed on a HEP with written instructions. Patient is in agreement with PT recommendations and will attend Physical Therapy 1-2x/week for the next 12 weeks to address current deficits.      Subjective Evaluation    History of Present Illness  Mechanism of injury: The patient reports today with R sided low back and R knee pain that started in 2016. Patient states having a prior history of low back surgery in 2018 that provided some relief to her symptoms. She notes currently having R sided  low back pain that is intermittent in nature and is aggravated by activity and prolonged positioning. She states R medial knee pain that radiates anteriorly at times. She reports receiving X-rays that revealed severe medial compartment degenerative changes of the R knee and degenerative changes of the lumbar spine. Patient states walking with a SPC in the community and no AD for household ambulation. She is currently having difficulty with activities such as standing tolerance, ambulation, stair navigation, sitting tolerance, lifting, pushing, pulling, and carrying. She is now referred to OPPT.          Recurrent probem    Quality of life: good    Patient Goals  Patient goals for therapy: decreased pain, increased motion, increased strength, independence with ADLs/IADLs and return to sport/leisure activities    Pain  Current pain ratin  At best pain ratin  At worst pain ratin (R Knee 8/10 , R Low Back 8/10)  Quality: dull ache, tight and sharp (spasms)  Relieving factors: medications (compression sleeve)  Aggravating factors: walking, stair climbing, lifting, standing and sitting (lifting, pushing, pulling)  Progression: improved    Social Support    Employment status: not working    Diagnostic Tests  X-ray: abnormal  MRI studies: abnormal  Treatments  Current treatment: physical therapy      Objective     Concurrent Complaints  Positive for night pain and disturbed sleep. Negative for bladder dysfunction, bowel dysfunction and saddle (S4) numbness    Postural Observations  Seated posture: poor  Standing posture: poor  Correction of posture: makes symptoms better    Additional Postural Observation Details  Patient demonstrates with increased thoracic kyphosis, rounded shoulders, and forward head with postural mechanics.     Palpation     Right   Hypertonic in the lumbar paraspinals and quadratus lumborum.     Tenderness     Lumbar Spine  No tenderness in the spinous process, left transverse process and  right transverse process.     Left Hip   No tenderness in the ASIS and PSIS.     Right Hip   No tenderness in the ASIS and PSIS.     Neurological Testing     Sensation     Lumbar   Left   Intact: light touch    Right   Intact: light touch    Additional Neurological Details  Patient denies numbness/tingling into BLE's.    Active Range of Motion     Lumbar   Flexion:  with pain Restriction level: moderate  Extension:  Restriction level: minimal  Left lateral flexion:  with pain Restriction level: moderate  Right lateral flexion:  with pain Restriction level: moderate  Left rotation:  with pain Restriction level: moderate  Right rotation:  with pain Restriction level: moderate  Left Knee   Normal active range of motion    Right Knee   Flexion: 115 degrees   Extension: 0 degrees     Strength/Myotome Testing     Left Hip   Planes of Motion   Flexion: 4-  Extension: 4-  Abduction: 4-  Adduction: 4-    Right Hip   Planes of Motion   Flexion: 3+  Extension: 4-  Abduction: 3+  Adduction: 4-    Left Knee   Flexion: 4  Extension: 4  Quadriceps contraction: fair    Right Knee   Flexion: 4-  Extension: 4-  Quadriceps contraction: poor    Left Ankle/Foot   Dorsiflexion: 5  Plantar flexion: 5  Inversion: 5  Eversion: 5    Right Ankle/Foot   Dorsiflexion: 5  Plantar flexion: 5  Inversion: 5  Eversion: 5    Muscle Activation     Additional Muscle Activation Details  Patient demonstrates with decreased transverse abdominal activation.    Tests     Lumbar     Left   Negative passive SLR.     Right   Negative passive SLR.     Left Pelvic Girdle/Sacrum   Negative: active SLR test.     Right Pelvic Girdle/Sacrum   Negative: active SLR test.     Left Hip   Positive FADIR.   Negative ZIA.     Right Hip   Positive FADIR.   Negative ZIA.     Ambulation     Comments   Patient ambulates with a SPC for community ambulation. She demonstrates decreased stride length, decreased step length, decreased step width, and decreased migue with gait  "mechanics. She ascends/descends stairs non-reciprocally with utilization of BUE.             Precautions: HTN, Lumbar DDD, CKD      Manuals 9/16         Stretching: R Hamstring, Gastroc, Hip Flx, Hip IR/ER, Hip ABD          MR R Knee Flexion/Extension                               Neuro Re-Ed          TA Draw-in  5\" Hold x10          TA Draw-in w/ SLR          TA Draw-in w/ Heel Slides           TA Draw-in w/ PB Press Down           TA Draw-in w/ Anti Rot                               Ther Ex          LTR's 5\" Hold x5 ea         S/L Open Books           Standing Hamstring Stretch           Standing Gastroc Stretch           Quad Sets  5\" Hold x10          LAQ  X10          HR/TR          Forward Step Ups          Lateral Step Ups           Seated Thoracolumbar Flexion w/ PB           Trunk Extension Machine           Trunk Flexion Machine           NU step for muscular endurance           HEP Instruction  BM          Ther Activity                              Gait Training                              Modalities          MHP  10'  L/S                        "

## 2024-09-23 DIAGNOSIS — F41.1 GENERALIZED ANXIETY DISORDER: ICD-10-CM

## 2024-09-24 ENCOUNTER — APPOINTMENT (OUTPATIENT)
Dept: PHYSICAL THERAPY | Facility: CLINIC | Age: 64
End: 2024-09-24
Payer: COMMERCIAL

## 2024-09-24 RX ORDER — CLONAZEPAM 0.5 MG/1
1 TABLET ORAL
Qty: 60 TABLET | Refills: 0 | Status: SHIPPED | OUTPATIENT
Start: 2024-09-24

## 2024-10-01 ENCOUNTER — OFFICE VISIT (OUTPATIENT)
Dept: PHYSICAL THERAPY | Facility: CLINIC | Age: 64
End: 2024-10-01
Payer: COMMERCIAL

## 2024-10-01 DIAGNOSIS — M17.0 PRIMARY OSTEOARTHRITIS OF BOTH KNEES: ICD-10-CM

## 2024-10-01 DIAGNOSIS — M54.16 LUMBAR RADICULOPATHY: ICD-10-CM

## 2024-10-01 DIAGNOSIS — M79.18 MYOFASCIAL PAIN SYNDROME: ICD-10-CM

## 2024-10-01 DIAGNOSIS — M51.360 DEGENERATION OF INTERVERTEBRAL DISC OF LUMBAR REGION WITH DISCOGENIC BACK PAIN: Primary | ICD-10-CM

## 2024-10-01 DIAGNOSIS — M47.816 LUMBAR SPONDYLOSIS: ICD-10-CM

## 2024-10-01 PROCEDURE — 97140 MANUAL THERAPY 1/> REGIONS: CPT

## 2024-10-01 PROCEDURE — 97110 THERAPEUTIC EXERCISES: CPT

## 2024-10-01 NOTE — PROGRESS NOTES
"Daily Note     Today's date: 10/1/2024  Patient name: Loretta Lewis  : 1960  MRN: [id number]  Referring provider: Fernando Poe MD  Dx:   Encounter Diagnosis     ICD-10-CM    1. Degeneration of intervertebral disc of lumbar region with discogenic back pain  M51.360       2. Lumbar radiculopathy  M54.16       3. Lumbar spondylosis  M47.816       4. Myofascial pain syndrome  M79.18       5. Primary osteoarthritis of both knees  M17.0         Start Time: 1347  Stop Time: 1438  Total time in clinic (min): 51 minutes    Subjective: Patient reports home exercise program is going well this far. She notes knee pain with prolonged walking. Patient states experiencing low back stiffness when waking up in the morning.       Objective: See treatment diary below      Assessment: Tolerated treatment well. We progressed the current program with the addition of hamstring stretching, aerobic exercise, thoracolumbar mobility, and b/l gastroc strengthening with good tolerance from patient. We will continue to progress patient within tolerance to address functional deficits. She demonstrated fatigue post treatment and would benefit from continued PT at this time.      Plan: Continue per plan of care.      Precautions: HTN, Lumbar DDD, CKD      Manuals 9/16 10/1        Stretching: R Hamstring, Gastroc, Hip Flx, Hip IR/ER, Hip ABD  BM Ham, Hip IR/ER, Hip ABD        MR R Knee Flexion/Extension                               Neuro Re-Ed          TA Draw-in  5\" Hold x10  5\" Hold x10         TA Draw-in w/ SLR          TA Draw-in w/ Heel Slides           TA Draw-in w/ PB Press Down           TA Draw-in w/ Anti Rot                               Ther Ex          LTR's 5\" Hold x5 ea 5\" Hold x10 ea        S/L Open Books           Standing Hamstring Stretch   20\" Hold x4 8\" step         Standing Gastroc Stretch           Quad Sets  5\" Hold x10  5\" Hold 2x10         LAQ  X10  2x10         HR/TR  2x10         Forward Step Ups        "   Lateral Step Ups           Seated Thoracolumbar Flexion w/ PB   X10 w/ PB         Trunk Extension Machine           Trunk Flexion Machine           NU step for muscular endurance   5' L3         HEP Instruction  BM          Ther Activity                              Gait Training                              Modalities          MHP  10'  L/S 5' L/S

## 2024-10-07 ENCOUNTER — OFFICE VISIT (OUTPATIENT)
Dept: PHYSICAL THERAPY | Facility: CLINIC | Age: 64
End: 2024-10-07
Payer: COMMERCIAL

## 2024-10-07 DIAGNOSIS — M51.360 DEGENERATION OF INTERVERTEBRAL DISC OF LUMBAR REGION WITH DISCOGENIC BACK PAIN: Primary | ICD-10-CM

## 2024-10-07 DIAGNOSIS — M17.0 PRIMARY OSTEOARTHRITIS OF BOTH KNEES: ICD-10-CM

## 2024-10-07 DIAGNOSIS — M47.816 LUMBAR SPONDYLOSIS: ICD-10-CM

## 2024-10-07 DIAGNOSIS — M54.16 LUMBAR RADICULOPATHY: ICD-10-CM

## 2024-10-07 DIAGNOSIS — M79.18 MYOFASCIAL PAIN SYNDROME: ICD-10-CM

## 2024-10-07 PROCEDURE — 97140 MANUAL THERAPY 1/> REGIONS: CPT

## 2024-10-07 PROCEDURE — 97110 THERAPEUTIC EXERCISES: CPT

## 2024-10-07 NOTE — PROGRESS NOTES
"Daily Note     Today's date: 10/7/2024  Patient name: Loretta Lewis  : 1960  MRN: [id number]  Referring provider: Fernando Poe MD  Dx:   Encounter Diagnosis     ICD-10-CM    1. Degeneration of intervertebral disc of lumbar region with discogenic back pain  M51.360       2. Lumbar radiculopathy  M54.16       3. Lumbar spondylosis  M47.816       4. Myofascial pain syndrome  M79.18       5. Primary osteoarthritis of both knees  M17.0         Start Time: 1345  Stop Time: 1440  Total time in clinic (min): 55 minutes    Subjective: Patient reports increased bilateral knee soreness after last treatment session. She notes muscular tightness in the low back this afternoon.      Objective: See treatment diary below      Assessment: Tolerated treatment well. We progressed the current program with the addition of core conditioning with knee mobility exercise with good tolerance from patient. She demonstrated good tolerance to progression. Patient demonstrated fatigue post treatment, exhibited good technique with therapeutic exercises, and would benefit from continued PT at this time.      Plan: Continue per plan of care.      Precautions: HTN, Lumbar DDD, CKD      Manuals 9/16 10/1 10/7       Stretching: R Hamstring, Gastroc, Hip Flx, Hip IR/ER, Hip ABD  BM Ham, Hip IR/ER, Hip ABD BM Ham, Hip IR/ER, Hip ABD        MR R Knee Flexion/Extension                               Neuro Re-Ed          TA Draw-in  5\" Hold x10  5\" Hold x10  HEP        TA Draw-in w/ SLR          TA Draw-in w/ Heel Slides    2x10 ea w/ sliding board         TA Draw-in w/ PB Press Down           TA Draw-in w/ Anti Rot                               Ther Ex          LTR's 5\" Hold x5 ea 5\" Hold x10 ea HEP        S/L Open Books           Standing Hamstring Stretch   20\" Hold x4 8\" step  20\" Hold x4 8\" step        Standing Gastroc Stretch    20\" Hold x3 w/ 1/2 foam        Quad Sets  5\" Hold x10  5\" Hold 2x10  5\" Hold 2x10        LAQ  X10  2x10  " 2x10        HR/TR  2x10  2x10        Forward Step Ups          Lateral Step Ups           Seated Thoracolumbar Flexion w/ PB   X10 w/ PB  X10 w/ PB        Trunk Extension Machine           Trunk Flexion Machine           NU step for muscular endurance   5' L3  5' L3       HEP Instruction  BM          Ther Activity                              Gait Training                              Modalities          MHP  10'  L/S 5' L/S  5' L/S

## 2024-10-08 ENCOUNTER — APPOINTMENT (OUTPATIENT)
Dept: PHYSICAL THERAPY | Facility: CLINIC | Age: 64
End: 2024-10-08
Payer: COMMERCIAL

## 2024-10-14 ENCOUNTER — APPOINTMENT (OUTPATIENT)
Dept: PHYSICAL THERAPY | Facility: CLINIC | Age: 64
End: 2024-10-14
Payer: COMMERCIAL

## 2024-10-15 ENCOUNTER — APPOINTMENT (OUTPATIENT)
Dept: PHYSICAL THERAPY | Facility: CLINIC | Age: 64
End: 2024-10-15
Payer: COMMERCIAL

## 2024-10-21 ENCOUNTER — OFFICE VISIT (OUTPATIENT)
Dept: PAIN MEDICINE | Facility: CLINIC | Age: 64
End: 2024-10-21
Payer: COMMERCIAL

## 2024-10-21 ENCOUNTER — APPOINTMENT (OUTPATIENT)
Dept: PHYSICAL THERAPY | Facility: CLINIC | Age: 64
End: 2024-10-21
Payer: COMMERCIAL

## 2024-10-21 VITALS
SYSTOLIC BLOOD PRESSURE: 132 MMHG | HEART RATE: 99 BPM | RESPIRATION RATE: 17 BRPM | OXYGEN SATURATION: 97 % | WEIGHT: 287 LBS | DIASTOLIC BLOOD PRESSURE: 82 MMHG | TEMPERATURE: 97.8 F | HEIGHT: 67 IN | BODY MASS INDEX: 45.04 KG/M2

## 2024-10-21 DIAGNOSIS — M54.16 LUMBAR RADICULOPATHY: ICD-10-CM

## 2024-10-21 DIAGNOSIS — Z98.1 HISTORY OF LUMBAR SPINAL FUSION: ICD-10-CM

## 2024-10-21 DIAGNOSIS — M17.11 PRIMARY OSTEOARTHRITIS OF RIGHT KNEE: Primary | ICD-10-CM

## 2024-10-21 DIAGNOSIS — M47.27 OSTEOARTHRITIS OF SPINE WITH RADICULOPATHY, LUMBOSACRAL REGION: ICD-10-CM

## 2024-10-21 DIAGNOSIS — G89.4 CHRONIC PAIN SYNDROME: ICD-10-CM

## 2024-10-21 DIAGNOSIS — M62.830 BACK SPASM: ICD-10-CM

## 2024-10-21 DIAGNOSIS — M47.816 LUMBAR SPONDYLOSIS: ICD-10-CM

## 2024-10-21 PROCEDURE — 99214 OFFICE O/P EST MOD 30 MIN: CPT | Performed by: ANESTHESIOLOGY

## 2024-10-21 RX ORDER — TIZANIDINE HYDROCHLORIDE 2 MG/1
2 CAPSULE, GELATIN COATED ORAL DAILY PRN
Qty: 90 CAPSULE | Refills: 3 | Status: SHIPPED | OUTPATIENT
Start: 2024-10-21 | End: 2025-01-19

## 2024-10-21 NOTE — PROGRESS NOTES
Assessment:  1. Primary osteoarthritis of right knee    2. Chronic pain syndrome    3. Back spasm    4. Osteoarthritis of spine with radiculopathy, lumbosacral region    5. Lumbar radiculopathy    6. Lumbar spondylosis    7. History of lumbar spinal fusion        Plan:  Patient is a 63-year-old female with complaints of low back pain and right knee pain with chronic pain since regular lumbar degenerative disease lumbar radiculopathy presents to office for follow-up visit.  Patient was unable to make it to the physical therapy session secondary to lack of transportation however we did provide patient with a home exercise regimen for lumbar core strengthening exercises at last office visit on 8/19/2024 to which patient perform the exercises 5 days a week 3 sets 12 repetitions of each exercise and noted no significant improvement in low back pain and radicular symptoms.  At this time we need further diagnostic imaging.  1.  We will order an MRI of the lumbar spine to better assess the discogenic pathology by patient's low back pain and leg pain  2.  Will provide patient with a orthopedic referral in regards to severe osteoarthritis with fluid collection of the right knee  3.  Follow-up in 1 month to review diagnostic imaging and plan interventional management      History of Present Illness:  The patient is a 63 y.o. female who presents for a follow up office visit in regards to Back Pain and Knee Pain.   The patient’s current symptoms include 4 out of 10 intermittent dosaging, cramping pain    Current pain medications includes: Tizanidine 2 mg p.o daily.  The patient reports that this regimen is providing 20% pain relief.  The patient is reporting no side effects from this pain medication regimen.    I have personally reviewed and/or updated the patient's past medical history, past surgical history, family history, social history, current medications, allergies, and vital signs today.         Review of Systems  Review  of Systems   Musculoskeletal:  Positive for gait problem.        Joint stiffness      All other systems reviewed and are negative.          Past Medical History:   Diagnosis Date    INESSA (acute kidney injury) (HCC) 2023    Anemia     Intermittent    Arthritis     Diabetes mellitus (HCC)     Hypertension     Migraine     MRSA (methicillin resistant Staphylococcus aureus)     Obesity (BMI 35.0-39.9 without comorbidity) 2017    Type 2 diabetes mellitus with stage 3a chronic kidney disease, without long-term current use of insulin (HCC)        Past Surgical History:   Procedure Laterality Date    ACHILLES TENDON REPAIR      ANKLE SURGERY Left 2019    Excision of Lipoma    CARPAL TUNNEL RELEASE Right      SECTION      CHOLECYSTECTOMY      GANGLION CYST EXCISION Left     Left wrist    LUMBAR LAMINECTOMY  2018    x 3 disks    MOUTH SURGERY  2023    Oral tooth extraction x 3 with a retained root    NASAL SINUS SURGERY         Family History   Problem Relation Age of Onset    Hypertension Mother     Hepatitis Mother         Hep C    Stroke Father     Arrhythmia Father     Melanoma Father     Cancer Father     No Known Problems Sister     No Known Problems Maternal Grandmother     No Known Problems Maternal Grandfather     No Known Problems Paternal Grandmother     No Known Problems Paternal Grandfather     No Known Problems Son     No Known Problems Maternal Aunt     No Known Problems Maternal Aunt     No Known Problems Maternal Aunt     No Known Problems Maternal Aunt     No Known Problems Paternal Aunt        Social History     Occupational History    Not on file   Tobacco Use    Smoking status: Former     Current packs/day: 0.00     Average packs/day: 1.5 packs/day for 35.0 years (52.5 ttl pk-yrs)     Types: Cigarettes     Start date:      Quit date: 2015     Years since quittin.8    Smokeless tobacco: Never    Tobacco comments:     Vapes - Daily - includes 3 mg nicotine   Vaping  "Use    Vaping status: Every Day    Start date: 1/1/2016    Substances: Nicotine, Flavoring   Substance and Sexual Activity    Alcohol use: Not Currently    Drug use: No    Sexual activity: Not Currently     Partners: Male         Current Outpatient Medications:     Alpha-Lipoic Acid 300 MG CAPS, Take 300 mg by mouth 2 (two) times a day, Disp: , Rfl:     clonazePAM (KlonoPIN) 0.5 mg tablet, Take 2 tablets (1 mg total) by mouth daily at bedtime as needed for anxiety, Disp: 60 tablet, Rfl: 0    docusate sodium (COLACE) 100 mg capsule, Take 100 mg by mouth daily as needed for constipation, Disp: , Rfl:     DULoxetine (CYMBALTA) 60 mg delayed release capsule, Take 1 capsule (60 mg total) by mouth 2 (two) times a day, Disp: 180 capsule, Rfl: 0    famotidine (PEPCID) 20 mg tablet, Take 20 mg by mouth daily, Disp: , Rfl:     gabapentin (NEURONTIN) 100 mg capsule, One in am, one at lunch, and 3 at hs, Disp: 360 capsule, Rfl: 1    glimepiride (AMARYL) 1 mg tablet, take 2 tablets by mouth daily with breakfast, Disp: 180 tablet, Rfl: 1    methenamine hippurate (HIPREX) 1 g tablet, take 1 tablet by mouth twice a day with meals, Disp: 180 tablet, Rfl: 5    Mirabegron ER 50 MG TB24, , Disp: , Rfl:     mirtazapine (REMERON) 7.5 MG tablet, Take 7.5 mg by mouth daily at bedtime, Disp: , Rfl:     Polysaccharide Iron Complex (HEMATEX IRON COMPLEX PO), Take by mouth, Disp: , Rfl:     TiZANidine (ZANAFLEX) 2 MG capsule, Take 1 capsule (2 mg total) by mouth daily as needed for muscle spasms 1 tablet once daily if needed for muscle spasm, Disp: 90 capsule, Rfl: 1    Allergies   Allergen Reactions    Pollen Extract Sneezing    Vancomycin Rash       Physical Exam:    /89   Pulse 91   Temp 97.8 °F (36.6 °C)   Resp 17   Ht 5' 7\" (1.702 m)   Wt 130 kg (287 lb)   LMP 04/10/2017   SpO2 97%   BMI 44.95 kg/m²     Constitutional:normal, well developed, well nourished, alert, in no distress and non-toxic and no overt pain behavior. and " obese  Eyes:anicteric  HEENT:grossly intact  Neck:supple, symmetric, trachea midline and no masses   Pulmonary:even and unlabored  Cardiovascular:No edema or pitting edema present  Skin:Normal without rashes or lesions and well hydrated  Psychiatric:Mood and affect appropriate  Neurologic:Cranial Nerves II-XII grossly intact  Musculoskeletal:normal    Lumbar/Sacral Spine examination demonstrates.  Decreased range of motion lumbar spine with pain upon: flexion, lateral rotation to the left/right, and bending to the left/right.  Bilateral lumbar paraspinals tender to palpation. Muscle spasms noted in the lumbar area bilaterally. 4/5 lower extremity strength in all muscle groups bilaterally. Positive seated straight leg raise for bilateral lower extremities.  Sensitivity to light touch intact bilateral lower extremities. 2+ reflexes in the patella and Achilles.  No ankle clonus    Imaging  No orders to display   LUMBAR SPINE     INDICATION:   Other intervertebral disc degeneration, lumbar region. Radiculopathy, lumbar region. Spondylosis without myelopathy or radiculopathy, lumbar region. Myalgia, other site.      COMPARISON:  None.     VIEWS:  XR SPINE LUMBAR MINIMUM 4 VIEWS NON INJURY  Images: 4     FINDINGS:     There are 5 non rib bearing lumbar vertebral bodies.      There is no evidence of acute fracture or destructive osseous lesion.     Grade I spondylolithesis L5 on S1.      L4-S1 posterior instrumented fusion with intervertebral spacer placement without hardware complication. Mild multilevel disc degeneration at the nonsurgical levels.     The pedicles appear intact.     There are atherosclerotic calcifications. Soft tissues are otherwise unremarkable.     IMPRESSION:        No acute osseous abnormality.       Degenerative and postsurgical changes as described.    No orders of the defined types were placed in this encounter.

## 2024-10-22 ENCOUNTER — OFFICE VISIT (OUTPATIENT)
Dept: OBGYN CLINIC | Facility: CLINIC | Age: 64
End: 2024-10-22
Payer: COMMERCIAL

## 2024-10-22 ENCOUNTER — APPOINTMENT (OUTPATIENT)
Dept: PHYSICAL THERAPY | Facility: CLINIC | Age: 64
End: 2024-10-22
Payer: COMMERCIAL

## 2024-10-22 VITALS
OXYGEN SATURATION: 96 % | TEMPERATURE: 97.9 F | WEIGHT: 286.6 LBS | HEIGHT: 67 IN | BODY MASS INDEX: 44.98 KG/M2 | HEART RATE: 128 BPM | RESPIRATION RATE: 16 BRPM | DIASTOLIC BLOOD PRESSURE: 84 MMHG | SYSTOLIC BLOOD PRESSURE: 173 MMHG

## 2024-10-22 DIAGNOSIS — G89.4 CHRONIC PAIN SYNDROME: ICD-10-CM

## 2024-10-22 DIAGNOSIS — M17.11 PRIMARY OSTEOARTHRITIS OF RIGHT KNEE: ICD-10-CM

## 2024-10-22 PROCEDURE — 99204 OFFICE O/P NEW MOD 45 MIN: CPT | Performed by: STUDENT IN AN ORGANIZED HEALTH CARE EDUCATION/TRAINING PROGRAM

## 2024-10-22 PROCEDURE — 20610 DRAIN/INJ JOINT/BURSA W/O US: CPT | Performed by: STUDENT IN AN ORGANIZED HEALTH CARE EDUCATION/TRAINING PROGRAM

## 2024-10-22 RX ORDER — LIDOCAINE HYDROCHLORIDE 10 MG/ML
2 INJECTION, SOLUTION INFILTRATION; PERINEURAL
Status: COMPLETED | OUTPATIENT
Start: 2024-10-22 | End: 2024-10-22

## 2024-10-22 RX ORDER — TRIAMCINOLONE ACETONIDE 40 MG/ML
80 INJECTION, SUSPENSION INTRA-ARTICULAR; INTRAMUSCULAR
Status: COMPLETED | OUTPATIENT
Start: 2024-10-22 | End: 2024-10-22

## 2024-10-22 RX ORDER — BUPIVACAINE HYDROCHLORIDE 2.5 MG/ML
2 INJECTION, SOLUTION INFILTRATION; PERINEURAL
Status: COMPLETED | OUTPATIENT
Start: 2024-10-22 | End: 2024-10-22

## 2024-10-22 RX ADMIN — BUPIVACAINE HYDROCHLORIDE 2 ML: 2.5 INJECTION, SOLUTION INFILTRATION; PERINEURAL at 11:30

## 2024-10-22 RX ADMIN — LIDOCAINE HYDROCHLORIDE 2 ML: 10 INJECTION, SOLUTION INFILTRATION; PERINEURAL at 11:30

## 2024-10-22 RX ADMIN — TRIAMCINOLONE ACETONIDE 80 MG: 40 INJECTION, SUSPENSION INTRA-ARTICULAR; INTRAMUSCULAR at 11:30

## 2024-10-22 NOTE — PROGRESS NOTES
Orthopedic Surgery Office Note  Loretta Lewis (63 y.o. female)   : 1960   MRN: [id number]   Encounter Date: 10/22/2024 with Dr. Alejandro Diaz, DO  Chief Complaint   Patient presents with    Right Knee - Pain     Patient with fluid and pain       Assessment, Plan, & Discussion:     Osteoarthritis of right knee  Discussed with the patient that:  - Reviewed physical exam and imaging with patient at time of visit. Radiographic findings demonstrate severe medial compartment osteoarthritis of right knee.  - Discussed with patient that arthritis is a chronic, incurable, irreversible disease and that management would be focused on alleviating symptoms, as it is not possible to reverse or remove the degenerative changes. Discussed treatment options to include symptom masking with voltaren gel and OTC pain relievers, muscle strengthening with PT to have the joint rely on strong muscle rather than bad bone, and possible consideration of corticosteroid or visco supplementation injections in the future. Discussed definitive treatment as total joint replacement, which would be an elective, pain-relieving procedure, and that symptoms and radiographs may not align, leaving it to the patient to determine when the symptoms would warrant the surgery.  -Patient is very adamant that she does not want a knee replacement.  - patient was offered and accepted CS injection to the right knee at time of visit, she tolerated the procedure well  - referral for physical therapy placed at time of visit,  - she will follow up in the office in 3 months for consideration of repeat cortisone injection if symptoms persist.  - Patient has an upcoming appointment with weight management in 2024  - The patient expresses understanding and is in agreement with today's treatment plan.       Plan:   CSI of right knee joint was performed  Activity as tolerated  WBAT  ROM without restrictions  Begin outpatient PT for range of motion and  strengthening for right knee osteoarthritis  Return in about 3 months (around 1/22/2025) for Recheck.      Surgery:   No surgery planned at this time  Surgery deferred by patient at this time      Orders:     Diagnoses and all orders for this visit:    Primary osteoarthritis of right knee  -     Ambulatory referral to Orthopedic Surgery  -     Ambulatory Referral to Physical Therapy; Future    Chronic pain syndrome  -     Ambulatory referral to Orthopedic Surgery    Other orders  -     Large joint arthrocentesis         History of Present Illness:     Loretta Lewis is a 63 y.o. female who presents for consultation at the request of Fernando Poe MD  regarding right knee osteoarthritis.  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  She states that she has previously had cortisone injections to her right knee with the last one being several years ago when she was still working.  She states that since she has retired she has become more sedentary and has noticed that with the decrease of movement she has also gained a significant amount of weight.  She is seen for her back concerns with Dr. Poe.  As far as her right knee, she states that her pain started probably within the past year.  She previously tore her Achilles on her left ankle and is unsure if her walking in a boot or walking differently has anything to do with the right knee being painful.  She states that she has previously tried physical therapy however for the next 3 to 4 days after her therapy session she would noted difficulty in functioning in her daily life.  She states that she is not interested in a knee replacement, and that she is willing to do any treatment that is required of her.  She states that the pain is at the anterior, medial, and lateral aspect of her right knee.  She states that when she starts getting the pain in her knee she feels like it travels down towards her  "foot slightly.    Review of Systems  Constitutional: Negative for fatigue, fever or loss of appetite.   HENT: Negative.    Respiratory: Negative for shortness of breath, dyspnea.    Cardiovascular: Negative for chest pain/tightness.   Gastrointestinal: Negative for abdominal pain, N/V.   Endocrine: Negative for cold/heat intolerance, unexplained weight loss/gain.   Genitourinary: Negative for flank pain, dysuria  Skin: Negative for rash.    Psychiatric/Behavioral: Negative for agitation.  All else negative unless otherwise noted in HPI    Physical Exam:   General:  BP (!) 173/84   Pulse (!) 128   Temp 97.9 °F (36.6 °C) (Temporal)   Resp 16   Ht 5' 7\" (1.702 m)   Wt 130 kg (286 lb 9.6 oz)   LMP 04/10/2017   SpO2 96%   BMI 44.89 kg/m²   Cons: Appears well.  No apparent distress.  Psych: Alert. Oriented x3.  Mood and affect normal.  Eyes: PERRLA, EOMI  Resp: Normal effort.  No audible wheezing or stridor.  CV: Extremities warm and well perfused.   Abd:    No distention or guarding.   Skin: Warm. No visible lesions.  Neuro: Normal muscle tone.      Orthopedic Exam:   right knee(s) -   Patient ambulates with antalgic gait pattern  Uses Single Point Cane assistive device  Genu Varus anatomical deformity  Skin is warm and dry to touch with no signs of erythema, ecchymosis, or infection  Mild generalized soft tissue swelling or effusion noted  ROM (0° - 120°)   MMT: 5/5 throughout  TTP over medial joint line, TTP over lateral joint line, TTP over anterior aspect of knee  Flexor and extensor mechanisms are intact   Knee is stable to varus and valgus stress  - Lachman's  - Anterior Drawer, - Posterior Drawer  - Courtney's  Patella tracks centrally without palpable crepitus  Calf compartments are soft and supple  - Jarek's sign  2+ DP and PT pulses with brisk capillary refill to the toes  Sural, saphenous, tibial, superficial, and deep peroneal motor and sensory distributions intact  Sensation light touch intact " distally        Imaging/Studies:     Study: XR right knee  Date: 8/19/2024  Report: I have read and agree with the radiologist report. and I have personally reviewed the imaging in PACS and my impression is as follows:  I have personally reviewed imaging and my impression is as follows:   IMPRESSION:     Severe medial compartment degenerative changes as above.  Varus angulation secondarily.  Mild patellar enthesopathy.  Moderate to large effusion.        Large joint arthrocentesis: R knee  [place] Protocol:  procedure performed by consultantConsent: Verbal consent obtained.  Risks and benefits: risks, benefits and alternatives were discussed  Consent given by: patient  Patient understanding: patient states understanding of the procedure being performed  Site marked: the operative site was marked  Radiology Images displayed and confirmed. If images not available, report reviewed: imaging studies available  Patient identity confirmed: verbally with patient  Supporting Documentation  Indications: pain and joint swelling   Procedure Details  Location: knee - R knee  Needle gauge: 21 G.  Ultrasound guidance: no  Approach: anteromedial  Medications administered: 80 mg triamcinolone acetonide 40 mg/mL; 2 mL bupivacaine 0.25 %; 2 mL lidocaine 1 %    Patient tolerance: patient tolerated the procedure well with no immediate complications  Dressing:  Sterile dressing applied               Medical, Surgical, Family, and Social History    The patient's medical history, family history, and social history, were reviewed and updated as appropriate.    Past Medical History:   Diagnosis Date    INESSA (acute kidney injury) (HCC) 07/09/2023    Anemia     Intermittent    Arthritis     Diabetes mellitus (HCC)     Hypertension     Migraine     MRSA (methicillin resistant Staphylococcus aureus)     Obesity (BMI 35.0-39.9 without comorbidity) 07/25/2017    Type 2 diabetes mellitus with stage 3a chronic kidney disease, without long-term  current use of insulin (HCC)      Past Surgical History:   Procedure Laterality Date    ACHILLES TENDON REPAIR      ANKLE SURGERY Left 2019    Excision of Lipoma    CARPAL TUNNEL RELEASE Right      SECTION      CHOLECYSTECTOMY      GANGLION CYST EXCISION Left 1999    Left wrist    LUMBAR LAMINECTOMY  2018    x 3 disks    MOUTH SURGERY  2023    Oral tooth extraction x 3 with a retained root    NASAL SINUS SURGERY       Family History   Problem Relation Age of Onset    Hypertension Mother     Hepatitis Mother         Hep C    Stroke Father     Arrhythmia Father     Melanoma Father     Cancer Father     No Known Problems Sister     No Known Problems Maternal Grandmother     No Known Problems Maternal Grandfather     No Known Problems Paternal Grandmother     No Known Problems Paternal Grandfather     No Known Problems Son     No Known Problems Maternal Aunt     No Known Problems Maternal Aunt     No Known Problems Maternal Aunt     No Known Problems Maternal Aunt     No Known Problems Paternal Aunt      Social History     Occupational History    Not on file   Tobacco Use    Smoking status: Former     Current packs/day: 0.00     Average packs/day: 1.5 packs/day for 35.0 years (52.5 ttl pk-yrs)     Types: Cigarettes     Start date:      Quit date:      Years since quittin.8    Smokeless tobacco: Never    Tobacco comments:     Vapes - Daily - includes 3 mg nicotine   Vaping Use    Vaping status: Every Day    Start date: 2016    Substances: Nicotine, Flavoring   Substance and Sexual Activity    Alcohol use: Not Currently    Drug use: No    Sexual activity: Not Currently     Partners: Male     Allergies   Allergen Reactions    Pollen Extract Sneezing    Vancomycin Rash       Current Outpatient Medications:     Alpha-Lipoic Acid 300 MG CAPS, Take 300 mg by mouth 2 (two) times a day, Disp: , Rfl:     clonazePAM (KlonoPIN) 0.5 mg tablet, Take 2 tablets (1 mg total) by mouth daily at bedtime as  needed for anxiety, Disp: 60 tablet, Rfl: 0    docusate sodium (COLACE) 100 mg capsule, Take 100 mg by mouth daily as needed for constipation, Disp: , Rfl:     DULoxetine (CYMBALTA) 60 mg delayed release capsule, Take 1 capsule (60 mg total) by mouth 2 (two) times a day, Disp: 180 capsule, Rfl: 0    famotidine (PEPCID) 20 mg tablet, Take 20 mg by mouth daily, Disp: , Rfl:     gabapentin (NEURONTIN) 100 mg capsule, One in am, one at lunch, and 3 at hs, Disp: 360 capsule, Rfl: 1    glimepiride (AMARYL) 1 mg tablet, take 2 tablets by mouth daily with breakfast, Disp: 180 tablet, Rfl: 1    methenamine hippurate (HIPREX) 1 g tablet, take 1 tablet by mouth twice a day with meals, Disp: 180 tablet, Rfl: 5    Mirabegron ER 50 MG TB24, , Disp: , Rfl:     mirtazapine (REMERON) 7.5 MG tablet, Take 7.5 mg by mouth daily at bedtime, Disp: , Rfl:     Polysaccharide Iron Complex (HEMATEX IRON COMPLEX PO), Take by mouth, Disp: , Rfl:     TiZANidine (ZANAFLEX) 2 MG capsule, Take 1 capsule (2 mg total) by mouth daily as needed for muscle spasms 1 tablet once daily if needed for muscle spasm, Disp: 90 capsule, Rfl: 3      This Visit:     35 minutes was spent in the coordination of care, reviewing of imaging and with the patient on the date of service        Scribe Attestation      I,:  Helena Gutierrez am acting as a scribe while in the presence of the attending physician.:       I,:  Alejandro Diaz DO personally performed the services described in this documentation    as scribed in my presence.:             Dr. Alejandro Diaz DO, Orthopedic Surgeon  [facility]

## 2024-10-28 ENCOUNTER — APPOINTMENT (OUTPATIENT)
Dept: PHYSICAL THERAPY | Facility: CLINIC | Age: 64
End: 2024-10-28
Payer: COMMERCIAL

## 2024-10-28 DIAGNOSIS — F41.1 GENERALIZED ANXIETY DISORDER: ICD-10-CM

## 2024-10-28 DIAGNOSIS — M47.27 OSTEOARTHRITIS OF SPINE WITH RADICULOPATHY, LUMBOSACRAL REGION: ICD-10-CM

## 2024-10-29 ENCOUNTER — APPOINTMENT (OUTPATIENT)
Dept: PHYSICAL THERAPY | Facility: CLINIC | Age: 64
End: 2024-10-29
Payer: COMMERCIAL

## 2024-10-29 ENCOUNTER — OFFICE VISIT (OUTPATIENT)
Dept: PHYSICAL THERAPY | Facility: CLINIC | Age: 64
End: 2024-10-29
Payer: COMMERCIAL

## 2024-10-29 DIAGNOSIS — M51.360 DEGENERATION OF INTERVERTEBRAL DISC OF LUMBAR REGION WITH DISCOGENIC BACK PAIN: Primary | ICD-10-CM

## 2024-10-29 DIAGNOSIS — M47.816 LUMBAR SPONDYLOSIS: ICD-10-CM

## 2024-10-29 DIAGNOSIS — M17.0 PRIMARY OSTEOARTHRITIS OF BOTH KNEES: ICD-10-CM

## 2024-10-29 DIAGNOSIS — M79.18 MYOFASCIAL PAIN SYNDROME: ICD-10-CM

## 2024-10-29 DIAGNOSIS — M54.16 LUMBAR RADICULOPATHY: ICD-10-CM

## 2024-10-29 PROCEDURE — 97112 NEUROMUSCULAR REEDUCATION: CPT

## 2024-10-29 PROCEDURE — 97110 THERAPEUTIC EXERCISES: CPT

## 2024-10-29 RX ORDER — GABAPENTIN 100 MG/1
CAPSULE ORAL
Qty: 360 CAPSULE | Refills: 0 | Status: SHIPPED | OUTPATIENT
Start: 2024-10-29

## 2024-10-29 NOTE — PROGRESS NOTES
"Daily Note     Today's date: 10/29/2024  Patient name: Loretta Lewis  : 1960  MRN: [id number]  Referring provider: Fernando Poe MD  Dx:   Encounter Diagnosis     ICD-10-CM    1. Degeneration of intervertebral disc of lumbar region with discogenic back pain  M51.360       2. Lumbar radiculopathy  M54.16       3. Lumbar spondylosis  M47.816       4. Myofascial pain syndrome  M79.18       5. Primary osteoarthritis of both knees  M17.0           Start Time: 1345  Stop Time: 1435  Total time in clinic (min): 50 minutes    Subjective: Patient reports following up with Dr. Diaz and Dr. Poe. She is to continue with Physical Therapy treatment. She notes receiving an injection in her R knee.      Objective: See treatment diary below      Assessment: Tolerated treatment well. PT updated home exercise program this afternoon. We continued with the current program focused on core conditioning, b/l knee strengthening, b/l knee mobility, and thoracolumbar mobility exercise with good tolerance. Patient demonstrated fatigue post treatment and would benefit from continued PT.      Plan: Continue per plan of care.      Precautions: HTN, Lumbar DDD, CKD      Manuals 9/16 10/1 10/7 10/29      Stretching: R Hamstring, Gastroc, Hip Flx, Hip IR/ER, Hip ABD  BM Ham, Hip IR/ER, Hip ABD BM Ham, Hip IR/ER, Hip ABD        MR R Knee Flexion/Extension                               Neuro Re-Ed          TA Draw-in w/ SLR          TA Draw-in w/ Heel Slides    2x10 ea w/ sliding board   2x10 ea w/ sliding board       TA Draw-in w/ PB Press Down           TA Draw-in w/ Anti Rot                               Ther Ex          S/L Open Books           Standing Hamstring Stretch   20\" Hold x4 8\" step  20\" Hold x4 8\" step  20\" Hold x4 8\" step       Standing Gastroc Stretch    20\" Hold x3 w/ 1/2 foam  20\" Hold x3 w/ 1/2 foam       Quad Sets  5\" Hold x10  5\" Hold 2x10  5\" Hold 2x10  5\" Hold 2x10       LAQ  X10  2x10  2x10  2x10     "   HR/TR  2x10  2x10  2x10       Forward Step Ups          Lateral Step Ups           Seated Thoracolumbar Flexion w/ PB   X10 w/ PB  X10 w/ PB  X10 w/ PB       Trunk Extension Machine           Trunk Flexion Machine           NU step for muscular endurance   5' L3  5' L3 10' L5      HEP Instruction  BM          Ther Activity                              Gait Training                              Modalities          MHP  10'  L/S 5' L/S  5' L/S       Ice    5' b/l Knees

## 2024-10-30 ENCOUNTER — VBI (OUTPATIENT)
Dept: ADMINISTRATIVE | Facility: OTHER | Age: 64
End: 2024-10-30

## 2024-10-30 NOTE — TELEPHONE ENCOUNTER
10/30/24 10:25 AM     Chart reviewed for CRC: Colonoscopy was/were not submitted to the patient's insurance.     Nelda Saha MA   [facility]

## 2024-10-30 NOTE — TELEPHONE ENCOUNTER
10/30/24 10:26 AM     Chart reviewed for CRC: Colonoscopy was/were not submitted to the patient's insurance.     Nelda Saha MA   [facility]

## 2024-10-31 RX ORDER — CLONAZEPAM 0.5 MG/1
0.5 TABLET ORAL
Qty: 30 TABLET | Refills: 0 | Status: SHIPPED | OUTPATIENT
Start: 2024-10-31

## 2024-11-01 ENCOUNTER — HOSPITAL ENCOUNTER (OUTPATIENT)
Dept: MRI IMAGING | Facility: HOSPITAL | Age: 64
Discharge: HOME | End: 2024-11-01
Attending: ANESTHESIOLOGY
Payer: COMMERCIAL

## 2024-11-01 DIAGNOSIS — M47.27 OSTEOARTHRITIS OF SPINE WITH RADICULOPATHY, LUMBOSACRAL REGION: ICD-10-CM

## 2024-11-01 DIAGNOSIS — M62.830 BACK SPASM: ICD-10-CM

## 2024-11-01 DIAGNOSIS — G89.4 CHRONIC PAIN SYNDROME: ICD-10-CM

## 2024-11-01 DIAGNOSIS — Z98.1 HISTORY OF LUMBAR SPINAL FUSION: ICD-10-CM

## 2024-11-01 DIAGNOSIS — M47.816 LUMBAR SPONDYLOSIS: ICD-10-CM

## 2024-11-01 DIAGNOSIS — M54.16 LUMBAR RADICULOPATHY: ICD-10-CM

## 2024-11-01 PROCEDURE — 72148 MRI LUMBAR SPINE W/O DYE: CPT

## 2024-11-05 ENCOUNTER — OFFICE VISIT (OUTPATIENT)
Dept: PHYSICAL THERAPY | Facility: CLINIC | Age: 64
End: 2024-11-05
Payer: COMMERCIAL

## 2024-11-05 ENCOUNTER — APPOINTMENT (OUTPATIENT)
Dept: PHYSICAL THERAPY | Facility: CLINIC | Age: 64
End: 2024-11-05
Payer: COMMERCIAL

## 2024-11-05 DIAGNOSIS — M79.18 MYOFASCIAL PAIN SYNDROME: ICD-10-CM

## 2024-11-05 DIAGNOSIS — M51.360 DEGENERATION OF INTERVERTEBRAL DISC OF LUMBAR REGION WITH DISCOGENIC BACK PAIN: Primary | ICD-10-CM

## 2024-11-05 DIAGNOSIS — M54.16 LUMBAR RADICULOPATHY: ICD-10-CM

## 2024-11-05 DIAGNOSIS — M17.0 PRIMARY OSTEOARTHRITIS OF BOTH KNEES: ICD-10-CM

## 2024-11-05 DIAGNOSIS — M47.816 LUMBAR SPONDYLOSIS: ICD-10-CM

## 2024-11-05 PROCEDURE — 97110 THERAPEUTIC EXERCISES: CPT

## 2024-11-05 PROCEDURE — 97112 NEUROMUSCULAR REEDUCATION: CPT

## 2024-11-06 ENCOUNTER — APPOINTMENT (OUTPATIENT)
Dept: LAB | Facility: MEDICAL CENTER | Age: 64
End: 2024-11-06
Payer: COMMERCIAL

## 2024-11-06 DIAGNOSIS — N18.4 CKD (CHRONIC KIDNEY DISEASE) STAGE 4, GFR 15-29 ML/MIN (HCC): ICD-10-CM

## 2024-11-06 DIAGNOSIS — E55.9 VITAMIN D DEFICIENCY: ICD-10-CM

## 2024-11-06 DIAGNOSIS — E61.1 IRON DEFICIENCY: ICD-10-CM

## 2024-11-06 DIAGNOSIS — N25.81 SECONDARY HYPERPARATHYROIDISM OF RENAL ORIGIN (HCC): ICD-10-CM

## 2024-11-06 LAB
25(OH)D3 SERPL-MCNC: 24.4 NG/ML (ref 30–100)
ALBUMIN SERPL BCG-MCNC: 4.1 G/DL (ref 3.5–5)
ALP SERPL-CCNC: 103 U/L (ref 34–104)
ALT SERPL W P-5'-P-CCNC: 24 U/L (ref 7–52)
ANION GAP SERPL CALCULATED.3IONS-SCNC: 8 MMOL/L (ref 4–13)
AST SERPL W P-5'-P-CCNC: 25 U/L (ref 13–39)
BACTERIA UR QL AUTO: ABNORMAL /HPF
BASOPHILS # BLD AUTO: 0.04 THOUSANDS/ÂΜL (ref 0–0.1)
BASOPHILS NFR BLD AUTO: 1 % (ref 0–1)
BILIRUB SERPL-MCNC: 0.27 MG/DL (ref 0.2–1)
BILIRUB UR QL STRIP: NEGATIVE
BUN SERPL-MCNC: 28 MG/DL (ref 5–25)
CALCIUM SERPL-MCNC: 9.7 MG/DL (ref 8.4–10.2)
CHLORIDE SERPL-SCNC: 106 MMOL/L (ref 96–108)
CLARITY UR: ABNORMAL
CO2 SERPL-SCNC: 22 MMOL/L (ref 21–32)
COLOR UR: ABNORMAL
CREAT SERPL-MCNC: 1.93 MG/DL (ref 0.6–1.3)
CREAT UR-MCNC: 70.9 MG/DL
EOSINOPHIL # BLD AUTO: 0.23 THOUSAND/ÂΜL (ref 0–0.61)
EOSINOPHIL NFR BLD AUTO: 4 % (ref 0–6)
ERYTHROCYTE [DISTWIDTH] IN BLOOD BY AUTOMATED COUNT: 13.3 % (ref 11.6–15.1)
FERRITIN SERPL-MCNC: 131 NG/ML (ref 11–307)
GFR SERPL CREATININE-BSD FRML MDRD: 26 ML/MIN/1.73SQ M
GLUCOSE P FAST SERPL-MCNC: 185 MG/DL (ref 65–99)
GLUCOSE UR STRIP-MCNC: NEGATIVE MG/DL
HCT VFR BLD AUTO: 37.3 % (ref 34.8–46.1)
HGB BLD-MCNC: 11.9 G/DL (ref 11.5–15.4)
HGB UR QL STRIP.AUTO: NEGATIVE
IMM GRANULOCYTES # BLD AUTO: 0.01 THOUSAND/UL (ref 0–0.2)
IMM GRANULOCYTES NFR BLD AUTO: 0 % (ref 0–2)
IRON SATN MFR SERPL: 17 % (ref 15–50)
IRON SERPL-MCNC: 50 UG/DL (ref 50–212)
KETONES UR STRIP-MCNC: NEGATIVE MG/DL
LEUKOCYTE ESTERASE UR QL STRIP: ABNORMAL
LYMPHOCYTES # BLD AUTO: 1.43 THOUSANDS/ÂΜL (ref 0.6–4.47)
LYMPHOCYTES NFR BLD AUTO: 26 % (ref 14–44)
MAGNESIUM SERPL-MCNC: 1.8 MG/DL (ref 1.9–2.7)
MCH RBC QN AUTO: 29.5 PG (ref 26.8–34.3)
MCHC RBC AUTO-ENTMCNC: 31.9 G/DL (ref 31.4–37.4)
MCV RBC AUTO: 93 FL (ref 82–98)
MICROALBUMIN UR-MCNC: 60.5 MG/L
MICROALBUMIN/CREAT 24H UR: 85 MG/G CREATININE (ref 0–30)
MONOCYTES # BLD AUTO: 0.31 THOUSAND/ÂΜL (ref 0.17–1.22)
MONOCYTES NFR BLD AUTO: 6 % (ref 4–12)
NEUTROPHILS # BLD AUTO: 3.53 THOUSANDS/ÂΜL (ref 1.85–7.62)
NEUTS SEG NFR BLD AUTO: 63 % (ref 43–75)
NITRITE UR QL STRIP: NEGATIVE
NON-SQ EPI CELLS URNS QL MICRO: ABNORMAL /HPF
NRBC BLD AUTO-RTO: 0 /100 WBCS
PH UR STRIP.AUTO: 6.5 [PH]
PHOSPHATE SERPL-MCNC: 4.1 MG/DL (ref 2.3–4.1)
PLATELET # BLD AUTO: 300 THOUSANDS/UL (ref 149–390)
PMV BLD AUTO: 10.3 FL (ref 8.9–12.7)
POTASSIUM SERPL-SCNC: 4.6 MMOL/L (ref 3.5–5.3)
PROT SERPL-MCNC: 7.7 G/DL (ref 6.4–8.4)
PROT UR STRIP-MCNC: ABNORMAL MG/DL
PTH-INTACT SERPL-MCNC: 115.2 PG/ML (ref 12–88)
RBC # BLD AUTO: 4.03 MILLION/UL (ref 3.81–5.12)
RBC #/AREA URNS AUTO: ABNORMAL /HPF
SODIUM SERPL-SCNC: 136 MMOL/L (ref 135–147)
SP GR UR STRIP.AUTO: 1.01 (ref 1–1.03)
TIBC SERPL-MCNC: 296 UG/DL (ref 250–450)
UIBC SERPL-MCNC: 246 UG/DL (ref 155–355)
UROBILINOGEN UR STRIP-ACNC: <2 MG/DL
WBC # BLD AUTO: 5.55 THOUSAND/UL (ref 4.31–10.16)
WBC #/AREA URNS AUTO: ABNORMAL /HPF

## 2024-11-06 PROCEDURE — 83540 ASSAY OF IRON: CPT

## 2024-11-06 PROCEDURE — 82043 UR ALBUMIN QUANTITATIVE: CPT

## 2024-11-06 PROCEDURE — 83970 ASSAY OF PARATHORMONE: CPT

## 2024-11-06 PROCEDURE — 84100 ASSAY OF PHOSPHORUS: CPT

## 2024-11-06 PROCEDURE — 80053 COMPREHEN METABOLIC PANEL: CPT

## 2024-11-06 PROCEDURE — 83735 ASSAY OF MAGNESIUM: CPT

## 2024-11-06 PROCEDURE — 82570 ASSAY OF URINE CREATININE: CPT

## 2024-11-06 PROCEDURE — 82306 VITAMIN D 25 HYDROXY: CPT

## 2024-11-06 PROCEDURE — 82728 ASSAY OF FERRITIN: CPT

## 2024-11-06 PROCEDURE — 83550 IRON BINDING TEST: CPT

## 2024-11-06 PROCEDURE — 36415 COLL VENOUS BLD VENIPUNCTURE: CPT

## 2024-11-06 PROCEDURE — 81001 URINALYSIS AUTO W/SCOPE: CPT

## 2024-11-06 PROCEDURE — 85025 COMPLETE CBC W/AUTO DIFF WBC: CPT

## 2024-11-07 ENCOUNTER — TELEPHONE (OUTPATIENT)
Age: 64
End: 2024-11-07

## 2024-11-07 NOTE — TELEPHONE ENCOUNTER
I called and spoke with Shereen regarding the following:    ----- Message from Severo Olmedo PA-C sent at 11/7/2024  3:13 PM EST -----  Labs reviewed, kidney function stable, vitamin D is low. Results will be reviewed at [facility]      She is aware of results. She had no questions or concerns.

## 2024-11-11 ENCOUNTER — APPOINTMENT (OUTPATIENT)
Dept: PHYSICAL THERAPY | Facility: CLINIC | Age: 64
End: 2024-11-11
Payer: COMMERCIAL

## 2024-11-12 ENCOUNTER — OFFICE VISIT (OUTPATIENT)
Dept: FAMILY MEDICINE CLINIC | Facility: CLINIC | Age: 64
End: 2024-11-12
Payer: COMMERCIAL

## 2024-11-12 ENCOUNTER — TELEPHONE (OUTPATIENT)
Age: 64
End: 2024-11-12

## 2024-11-12 VITALS
SYSTOLIC BLOOD PRESSURE: 148 MMHG | WEIGHT: 293 LBS | DIASTOLIC BLOOD PRESSURE: 86 MMHG | BODY MASS INDEX: 45.99 KG/M2 | TEMPERATURE: 97.6 F | HEIGHT: 67 IN

## 2024-11-12 DIAGNOSIS — N39.0 RECURRENT UTI: ICD-10-CM

## 2024-11-12 DIAGNOSIS — Z23 ENCOUNTER FOR IMMUNIZATION: ICD-10-CM

## 2024-11-12 DIAGNOSIS — E11.22 TYPE 2 DIABETES MELLITUS WITH STAGE 4 CHRONIC KIDNEY DISEASE, WITHOUT LONG-TERM CURRENT USE OF INSULIN (HCC): Primary | ICD-10-CM

## 2024-11-12 DIAGNOSIS — N25.81 SECONDARY HYPERPARATHYROIDISM OF RENAL ORIGIN (HCC): ICD-10-CM

## 2024-11-12 DIAGNOSIS — F41.0 GENERALIZED ANXIETY DISORDER WITH PANIC ATTACKS: ICD-10-CM

## 2024-11-12 DIAGNOSIS — F41.1 GENERALIZED ANXIETY DISORDER WITH PANIC ATTACKS: ICD-10-CM

## 2024-11-12 DIAGNOSIS — G47.33 OBSTRUCTIVE SLEEP APNEA: ICD-10-CM

## 2024-11-12 DIAGNOSIS — N18.4 TYPE 2 DIABETES MELLITUS WITH STAGE 4 CHRONIC KIDNEY DISEASE, WITHOUT LONG-TERM CURRENT USE OF INSULIN (HCC): Primary | ICD-10-CM

## 2024-11-12 DIAGNOSIS — I10 ESSENTIAL HYPERTENSION: ICD-10-CM

## 2024-11-12 DIAGNOSIS — N18.4 CKD (CHRONIC KIDNEY DISEASE) STAGE 4, GFR 15-29 ML/MIN (HCC): ICD-10-CM

## 2024-11-12 PROBLEM — E87.1 HYPONATREMIA: Status: RESOLVED | Noted: 2023-07-09 | Resolved: 2024-11-12

## 2024-11-12 PROBLEM — E87.5 HYPERKALEMIA: Status: RESOLVED | Noted: 2023-07-20 | Resolved: 2024-11-12

## 2024-11-12 LAB — SL AMB POCT HEMOGLOBIN AIC: 7.5 (ref ?–6.5)

## 2024-11-12 PROCEDURE — G0008 ADMIN INFLUENZA VIRUS VAC: HCPCS | Performed by: FAMILY MEDICINE

## 2024-11-12 PROCEDURE — 83036 HEMOGLOBIN GLYCOSYLATED A1C: CPT | Performed by: FAMILY MEDICINE

## 2024-11-12 PROCEDURE — 90662 IIV NO PRSV INCREASED AG IM: CPT | Performed by: FAMILY MEDICINE

## 2024-11-12 PROCEDURE — 99214 OFFICE O/P EST MOD 30 MIN: CPT | Performed by: FAMILY MEDICINE

## 2024-11-12 RX ORDER — METHENAMINE HIPPURATE 1000 MG/1
1 TABLET ORAL 2 TIMES DAILY WITH MEALS
Qty: 180 TABLET | Refills: 1 | Status: SHIPPED | OUTPATIENT
Start: 2024-11-12

## 2024-11-12 RX ORDER — CLONAZEPAM 1 MG/1
1 TABLET, ORALLY DISINTEGRATING ORAL
Qty: 30 TABLET | Refills: 0 | Status: SHIPPED | OUTPATIENT
Start: 2024-11-12

## 2024-11-12 NOTE — ASSESSMENT & PLAN NOTE
Lab Results   Component Value Date    EGFR 26 11/06/2024    EGFR 25 07/19/2024    EGFR 25 04/15/2024    CREATININE 1.93 (H) 11/06/2024    CREATININE 2.03 (H) 07/19/2024    CREATININE 2.04 (H) 04/15/2024

## 2024-11-12 NOTE — ASSESSMENT & PLAN NOTE
Lab Results   Component Value Date    HGBA1C 7.5 (A) 11/12/2024       Orders:    POCT hemoglobin A1c    Ambulatory Referral to Endocrinology; Future

## 2024-11-12 NOTE — PROGRESS NOTES
Ambulatory Visit  Name: Loretta Lewis      : 1960      MRN: [id number]  Encounter Provider: Preet Us DO  Encounter Date: 2024   Encounter department: [facility]    Assessment & Plan  Type 2 diabetes mellitus with stage 4 chronic kidney disease, without long-term current use of insulin ([facility])    Lab Results   Component Value Date    HGBA1C 7.5 (A) 2024       Orders:    POCT hemoglobin A1c    Ambulatory Referral to Endocrinology; Future    Essential hypertension         Obstructive sleep apnea         Secondary hyperparathyroidism of renal origin ([facility])         CKD (chronic kidney disease) stage 4, GFR 15-29 ml/min ([facility])  Lab Results   Component Value Date    EGFR 26 2024    EGFR 25 2024    EGFR 25 04/15/2024    CREATININE 1.93 (H) 2024    CREATININE 2.03 (H) 2024    CREATININE 2.04 (H) 04/15/2024            Generalized anxiety disorder with panic attacks    Orders:    clonazePAM (KlonoPIN) 1 MG disintegrating tablet; Take 1 tablet (1 mg total) by mouth daily at bedtime    Recurrent UTI         Encounter for immunization    Orders:    influenza vaccine, high-dose, PF 0.5 mL (Fluzone High Dose)      Depression Screening and Follow-up Plan: Patient was screened for depression during today's encounter. They screened negative with a PHQ-9 score of 1.    Tobacco Cessation Counseling: Tobacco cessation counseling was provided. The patient is sincerely urged to quit consumption of tobacco. She is ready to quit tobacco. Medication options and side effects of medication not discussed. Patient agreed to medication.       History of Present Illness     Patient presents today for a follow-up regarding diabetes currently controlled, juice obesity, central hypertension, chronic kidney disease stage IV, right knee pain, depression and insomnia          Review of Systems   Constitutional:  Positive for activity change and fatigue.   HENT:  Negative for dental problem and  "hearing loss.    Eyes:  Negative for visual disturbance.   Respiratory:  Positive for shortness of breath. Negative for chest tightness.    Cardiovascular:  Positive for leg swelling. Negative for chest pain and palpitations.   Gastrointestinal:  Negative for abdominal pain, constipation and diarrhea.   Genitourinary:  Positive for dysuria, frequency and urgency. Negative for difficulty urinating.   Musculoskeletal:  Positive for arthralgias, back pain, gait problem and joint swelling.   Psychiatric/Behavioral:  Positive for dysphoric mood. The patient is nervous/anxious.            Objective     /86 (BP Location: Left arm, Patient Position: Sitting, Cuff Size: Standard)   Temp 97.6 °F (36.4 °C) (Temporal)   Ht 5' 7\" (1.702 m)   Wt 133 kg (294 lb)   LMP 04/10/2017   BMI 46.05 kg/m²     Physical Exam  Constitutional:       Appearance: She is obese.   HENT:      Head: Normocephalic and atraumatic.   Eyes:      Extraocular Movements: Extraocular movements intact.      Pupils: Pupils are equal, round, and reactive to light.   Cardiovascular:      Rate and Rhythm: Normal rate and regular rhythm.      Pulses: no weak pulses.           Dorsalis pedis pulses are 2+ on the right side and 2+ on the left side.        Posterior tibial pulses are 2+ on the right side and 2+ on the left side.   Pulmonary:      Effort: Pulmonary effort is normal.      Breath sounds: Normal breath sounds.   Abdominal:      Tenderness: There is no right CVA tenderness or left CVA tenderness.   Musculoskeletal:         General: Tenderness present.      Right lower leg: Edema present.      Left lower leg: Edema present.      Comments: Right knee tender decreased range of motion with cool effusion   Feet:      Right foot:      Skin integrity: No ulcer, skin breakdown, erythema, warmth, callus or dry skin.      Left foot:      Skin integrity: No ulcer, skin breakdown, erythema, warmth, callus or dry skin.   Neurological:      General: No " focal deficit present.      Mental Status: She is alert and oriented to person, place, and time.   Psychiatric:         Behavior: Behavior normal.         Thought Content: Thought content normal.         Judgment: Judgment normal.      Comments: Mood depression with anxiety no suicidal ideation patient actually is motivated to try major lifestyle changes     Patient's shoes and socks removed.    Right Foot/Ankle   Right Foot Inspection  Skin Exam: skin normal and skin intact. No dry skin, no warmth, no callus, no erythema, no maceration, no abnormal color, no pre-ulcer, no ulcer and no callus.     Toe Exam: ROM and strength within normal limits. No swelling, no tenderness, erythema and  no right toe deformity    Sensory   Vibration: intact  Proprioception: intact  Monofilament testing: intact    Vascular  Capillary refills: < 3 seconds  The right DP pulse is 2+. The right PT pulse is 2+.     Left Foot/Ankle  Left Foot Inspection  Skin Exam: skin normal and skin intact. No dry skin, no warmth, no erythema, no maceration, normal color, no pre-ulcer, no ulcer and no callus.     Toe Exam: ROM and strength within normal limits. No swelling, no tenderness, no erythema and no left toe deformity.     Sensory   Vibration: intact  Proprioception: intact  Monofilament testing: intact    Vascular  Capillary refills: < 3 seconds  The left DP pulse is 2+. The left PT pulse is 2+.     Assign Risk Category  No deformity present  No loss of protective sensation  No weak pulses  Risk: 0

## 2024-11-12 NOTE — TELEPHONE ENCOUNTER
Reason for call:   [x] Refill   [] Prior Auth  [] Other:     Office:   [] PCP/Provider -   [x] Specialty/Provider -     methenamine hippurate (HIPREX) 1 g tablet 1 g, Oral, 2 times daily with meals       Quantity: 90    Pharmacy: [facility]    Does the patient have enough for 3 days?   [] Yes   [x] No - Send as HP to POD

## 2024-11-12 NOTE — TELEPHONE ENCOUNTER
PATIENT CALLED,    REQUESTING LAB WORK  ORDER FOR A1C PRIOR TO APPT TODAY. 11/12 @ 2:00PM FOR 4 MO FU.    Please call patient to let know.  PLEASE ADVISE DR. BHATT. THANK YOU

## 2024-11-18 ENCOUNTER — PATIENT OUTREACH (OUTPATIENT)
Age: 64
End: 2024-11-18

## 2024-11-18 NOTE — PROGRESS NOTES
Left voicemail for Shereen in attempt to complete previsit ahead of her appointment with provider 11/21/24.

## 2024-11-19 ENCOUNTER — APPOINTMENT (OUTPATIENT)
Dept: PHYSICAL THERAPY | Facility: CLINIC | Age: 64
End: 2024-11-19
Payer: COMMERCIAL

## 2024-11-21 ENCOUNTER — OFFICE VISIT (OUTPATIENT)
Dept: BARIATRICS | Facility: CLINIC | Age: 64
End: 2024-11-21
Payer: COMMERCIAL

## 2024-11-21 VITALS
OXYGEN SATURATION: 96 % | HEART RATE: 97 BPM | DIASTOLIC BLOOD PRESSURE: 92 MMHG | HEIGHT: 66 IN | RESPIRATION RATE: 18 BRPM | WEIGHT: 288.2 LBS | SYSTOLIC BLOOD PRESSURE: 152 MMHG | BODY MASS INDEX: 46.32 KG/M2 | TEMPERATURE: 97.7 F

## 2024-11-21 DIAGNOSIS — N18.4 TYPE 2 DIABETES MELLITUS WITH STAGE 4 CHRONIC KIDNEY DISEASE, WITHOUT LONG-TERM CURRENT USE OF INSULIN (HCC): ICD-10-CM

## 2024-11-21 DIAGNOSIS — E11.22 TYPE 2 DIABETES MELLITUS WITH STAGE 4 CHRONIC KIDNEY DISEASE, WITHOUT LONG-TERM CURRENT USE OF INSULIN (HCC): ICD-10-CM

## 2024-11-21 DIAGNOSIS — G47.33 OBSTRUCTIVE SLEEP APNEA: ICD-10-CM

## 2024-11-21 DIAGNOSIS — E66.01 OBESITY, CLASS III, BMI 40-49.9 (MORBID OBESITY) (HCC): Primary | ICD-10-CM

## 2024-11-21 DIAGNOSIS — E66.01 CLASS 3 SEVERE OBESITY WITH SERIOUS COMORBIDITY AND BODY MASS INDEX (BMI) OF 45.0 TO 49.9 IN ADULT, UNSPECIFIED OBESITY TYPE (HCC): ICD-10-CM

## 2024-11-21 DIAGNOSIS — I10 ESSENTIAL HYPERTENSION: ICD-10-CM

## 2024-11-21 DIAGNOSIS — E66.813 CLASS 3 SEVERE OBESITY WITH SERIOUS COMORBIDITY AND BODY MASS INDEX (BMI) OF 45.0 TO 49.9 IN ADULT, UNSPECIFIED OBESITY TYPE (HCC): ICD-10-CM

## 2024-11-21 PROCEDURE — 99204 OFFICE O/P NEW MOD 45 MIN: CPT | Performed by: PHYSICIAN ASSISTANT

## 2024-11-21 RX ORDER — TIRZEPATIDE 2.5 MG/.5ML
INJECTION, SOLUTION SUBCUTANEOUS
Qty: 2 ML | Refills: 0 | Status: SHIPPED | OUTPATIENT
Start: 2024-11-21

## 2024-11-21 NOTE — TELEPHONE ENCOUNTER
Patient has been added to the Medication Management wait list without a referral.    Insurance: [employer]  Insurance Type:    Commercial []   Medicaid [x]   [employer] (if applicable)   Medicare []  Location Preference: [place]  Provider Preference: Jeannine Jaramillo  Virtual: Yes [x] No []  Were outside resources sent: Yes [] No [x]   Bill For Surgical Tray: no Billing Type: Third-Party Bill Expected Date Of Service: 11/21/2024 Performing Laboratory: 0

## 2024-11-21 NOTE — PATIENT INSTRUCTIONS
Go to [email or web address] for further information. Instructions on how to inject yourself are located on the website.    - Mounjaro was sent to your pharmacy. The prior authorization process will been done through our prior authorization team and can take up to 3-4 weeks to process through the insurance.     - Start Mounjaro 2.5 mg subcutaneously weekly. After you have taken the second pen, please give me an update, as we will likely increase the dose the next month if you are tolerating it well.     - Side effects of Zepbound include nausea, vomiting, diarrhea, or constipation. Keep an eye on your heart rate while on Zepbound. If you resting heart rate is greater than 100 beats per minutes, please notify me. Please eat small frequent meals to help reduce nausea. Lemon water and saltine crackers may help with this. If you experience fever, nausea/vomiting, and pain radiating to your back this may be a sign of pancreatitis. Please have ED evaluation if this occurs.     - Please hold Zepbound for one week prior to a procedure such as surgery, upper endoscopy, or colonoscopy for risk of aspiration.     - Zepbound can reduce the effectiveness of oral hormonal birth control (birth control pills). Recommend a barrier backup method such as condoms to prevent pregnancy.

## 2024-11-21 NOTE — ASSESSMENT & PLAN NOTE
- Start Mounjaro 0.25mg once weekly   - Hoping to improve with weight loss   Lab Results   Component Value Date    HGBA1C 7.5 (A) 11/12/2024

## 2024-11-21 NOTE — PROGRESS NOTES
Assessment/Plan:    Loretta Lewis  is a 64 y.o. female with excess weight/obesity here to pursue weight managment.     Obesity, Class III, BMI 40-49.9 (morbid obesity) ([facility])  - Discussed options of and the role of weight loss medications.  - Initial weight loss goal of 5-10% weight loss for improved health  - Patient is interested in pursuing Conservative Program  - Screening labs: Up to date, impaired fasting glucose, elevated HgbA1C, normal TSH, elevated triglycerides, low HDL  - Calorie goal: 5539-0859  - Start Mounjaro 2.5mg once weekly  - Medication agreement signed: Yes  - Patient to call endocrinologist for daily protein requirement due to stage 4 kidney disease  Follow up 2 months with nonsurgical provider in [place]   Dietician: Follow up with dietician in [facility]        Type 2 diabetes mellitus with stage 4 chronic kidney disease, without long-term current use of insulin ([facility])  - Start Mounjaro 0.25mg once weekly   - Hoping to improve with weight loss   Lab Results   Component Value Date    HGBA1C 7.5 (A) 11/12/2024       Obstructive sleep apnea  - currently on CPAP  - may improve with weight loss    Essential hypertension  - 152/92 today        Goals/General Recommendations:     Food log (ie.) www.[employer].com,sparkpeople.com, loseit.com, [employer]com, etc.   Practice mindful eating.  Be sure to set aside time to eat, eat slowly, and savor your food.  Do NOT drink your calories and aim for AT LEAST 64oz of water daily. No sugar sweetened beverages.  No juice (eat the fruit instead).  Eat breakfast daily.  Do not skip meals.    Exercise:   Increase physical activity by 10 minutes daily. Gradually increase physical activity to a goal of 5 days per week for 30 minutes of MODERATE intensity PLUS 2 days per week of FULL BODY resistance training. Resistance training can increase muscle mass and increase our resting metabolic rate.   FULL BODY resistance training is recommended 2-3 times a week.  Do not  do this on consecutive days to allow for muscle recovery.  Aim for a bare minimum 8-10,000 steps, even on days you do not exercise.     Monitoring:   Weigh yourself daily.  If this causes undue stress, then just weigh yourself once a week.  Weigh yourself the same time of the day with the same amount of clothing on.  Preferably this should be done after waking up, before you eat, and with no clothing or minimal clothing on.    Follow up in approximately 2 months with Surgical Advanced Practitioner and dietician     Diagnoses and all orders for this visit:    Obesity, Class III, BMI 40-49.9 (morbid obesity) ([facility])  -     Tirzepatide (Mounjaro) 2.5 MG/0.5ML SOAJ; Inject 2.5mg once weekly    Class 3 severe obesity with serious comorbidity and body mass index (BMI) of 45.0 to 49.9 in adult, unspecified obesity type ([facility])  -     Ambulatory Referral to Weight Management    Essential hypertension    Obstructive sleep apnea    Type 2 diabetes mellitus with stage 4 chronic kidney disease, without long-term current use of insulin ([facility])  -     Tirzepatide (Mounjaro) 2.5 MG/0.5ML SOAJ; Inject 2.5mg once weekly        Food log (ie.) www.[employer].com,sparkpeople.com,loseit.com,calorieking.com,etc. , No sugary beverages. At least 64oz of water daily., and Goal protein intake of 60-80 grams per day  25-35 grams of dietary fiber per day  Gradually increase physical activity to a goal of 5 days per week for 30 minutes of MODERATE intensity PLUS 2 days per week of FULL BODY resistance training  2012-2443 calorie diet      - Discussed options of Conservative Program and the role of weight loss medications.  - Explained the importance of making lifestyle changes first before starting anti-obesity medications.  - Patient is interested in pursuing Conservative Program  - Initial weight loss goal of 5-10% weight loss for improved health as studies have shown this is where we see the greatest impact on improving health and decreasing  risk of obesity related conditions.  - Weight loss can improve patient's co-morbid conditions and/or prevent weight-related complications.  - Stop Bang: Patient currently on CPAP  - Labs reviewed: Yes        Subjective:     Patient ID: Loretta Lewis  is a 64 y.o. female with excess weight/obesity here to pursue weight managment.    Past Medical History:   Diagnosis Date    INESSA (acute kidney injury) (HCC) 07/09/2023    Anemia     Intermittent    Arthritis     Diabetes mellitus (HCC)     Hypertension     Migraine     MRSA (methicillin resistant Staphylococcus aureus)     Obesity (BMI 35.0-39.9 without comorbidity) 07/25/2017    Type 2 diabetes mellitus with stage 3a chronic kidney disease, without long-term current use of insulin (HCC)        HPI:    Severity: Severe  Onset:  20's after pregnancy   Highest weight: 288 lb  Lowest Weight: 150lb  Current weight: 288lb  What has been tried: Diet and Exercise, has been on metformin in the past from PCP  Contributing factors: Poor Food Choices, Insufficient Physical Activity, and Pregnancy, past injuries  Associated symptoms and effects: fatigue, increased joint pain, decreased self esteem, and depression   Goal weight: 200lb  5% weight loss = 14lb  20% weight loss = 57lb    Wt Readings from Last 20 Encounters:   11/21/24 131 kg (288 lb 3.2 oz)   11/12/24 133 kg (294 lb)   10/22/24 130 kg (286 lb 9.6 oz)   10/21/24 130 kg (287 lb)   08/19/24 132 kg (290 lb 6.4 oz)   07/26/24 132 kg (291 lb)   07/09/24 133 kg (293 lb 12.8 oz)   04/15/24 131 kg (288 lb 6.4 oz)   04/04/24 132 kg (291 lb)   01/23/24 126 kg (277 lb)   01/04/24 126 kg (277 lb)   10/11/23 120 kg (264 lb 9.6 oz)   08/30/23 120 kg (265 lb)   08/14/23 112 kg (248 lb)   07/28/23 120 kg (264 lb)   07/20/23 117 kg (259 lb)   07/14/23 124 kg (274 lb 7.6 oz)   07/07/23 125 kg (275 lb)   07/07/23 126 kg (278 lb)   04/26/23 126 kg (278 lb)        Food logging:  B: blueberry muffin or bowl of cereal   S: mini pretzels with  hummus  L: sandwich with deli meat + ibarra +/- swiss cheese on wheat bread  S: pretzels + hummus or bag of chips  D: Red meat or fish + yam   S: buttered popcorn or pretzels + hummus      Hunger/Cravings: sweets  Dining out: Once a week  Hydration: 2 cans of regular soda, 1/2 gallon, no coffee or iced tea  Alcohol: Very rare, 1-2x per year  Smoking: Vapes nicotine  Exercise: Very little, goes to PT and uses recumbent bike  Weight Monitoring:   Sleep: Varies, goes to bed between 4-5am, gets at least 6-7 hours of sleep  STOP-BANG Score:  Occupation: Disabled  Contraception: Postmenopausal  Colonoscopy: Cologaured ordered by Dr. Us, she will call to get updated    Patient denies personal and family history of pancreatitis, thyroid cancer, MEN-2 tumors.  Denies any hx of glaucoma, seizures, kidney stones, Admits to previous gallstones (removed)  Denies Hx of CAD, PAD, palpitations, arrhythmia.   Denies uncontrolled anxiety, admits to depression (mostly controlled cymbalta and remeron), suicidal behavior or thinking, insomnia or sleep disturbance.       The following portions of the patient's history were reviewed and updated as appropriate: allergies, current medications, past family history, past medical history, past social history, past surgical history, and problem list.    Review of Systems   Constitutional:  Negative for chills and fever.   HENT:  Negative for ear pain and sore throat.    Eyes:  Negative for pain and visual disturbance.   Respiratory:  Negative for cough and shortness of breath.    Cardiovascular:  Negative for chest pain and palpitations.   Gastrointestinal:  Negative for abdominal pain and vomiting.   Genitourinary:  Negative for dysuria and hematuria.   Musculoskeletal:  Positive for arthralgias (right knee pain) and back pain.   Skin:  Negative for color change and rash.   Neurological:  Negative for seizures and syncope.   All other systems reviewed and are negative.      Objective:    BP  "152/92 (BP Location: Right arm, Patient Position: Sitting, Cuff Size: Large)   Pulse 97   Temp 97.7 °F (36.5 °C) (Temporal)   Resp 18   Ht 5' 5.5\" (1.664 m)   Wt 131 kg (288 lb 3.2 oz)   LMP 04/10/2017   SpO2 96%   BMI 47.23 kg/m²     Physical Exam  Constitutional:       Appearance: Normal appearance. She is obese.   HENT:      Head: Normocephalic and atraumatic.      Nose: Nose normal.      Mouth/Throat:      Mouth: Mucous membranes are moist.   Eyes:      Extraocular Movements: Extraocular movements intact.      Pupils: Pupils are equal, round, and reactive to light.   Cardiovascular:      Rate and Rhythm: Normal rate and regular rhythm.      Pulses: Normal pulses.      Heart sounds: Normal heart sounds.   Pulmonary:      Effort: Pulmonary effort is normal.      Breath sounds: Normal breath sounds.   Abdominal:      Palpations: Abdomen is soft.   Musculoskeletal:      Cervical back: Normal range of motion.   Skin:     General: Skin is warm.   Neurological:      General: No focal deficit present.      Mental Status: She is alert and oriented to person, place, and time.   Psychiatric:         Mood and Affect: Mood normal.         Behavior: Behavior normal.        "

## 2024-11-21 NOTE — ASSESSMENT & PLAN NOTE
- Discussed options of and the role of weight loss medications.  - Initial weight loss goal of 5-10% weight loss for improved health  - Patient is interested in pursuing Conservative Program  - Screening labs: Up to date, impaired fasting glucose, elevated HgbA1C, normal TSH, elevated triglycerides, low HDL  - Calorie goal: 8635-3852  - Start Mounjaro 2.5mg once weekly  - Medication agreement signed: Yes  - Patient to call endocrinologist for daily protein requirement due to stage 4 kidney disease  Follow up 2 months with nonsurgical provider in [place]   Dietician: Follow up with dietician in [facility]       Follow-up with your primary care physician on Monday. Return to the emergency room for worsening symptoms or any new concerns. [FreeTextEntry1] : pvr 10 ml \par will observe \par if symptoms worsen consider adding finasteride

## 2024-11-25 ENCOUNTER — TELEPHONE (OUTPATIENT)
Age: 64
End: 2024-11-25

## 2024-11-25 ENCOUNTER — TELEPHONE (OUTPATIENT)
Dept: BARIATRICS | Facility: CLINIC | Age: 64
End: 2024-11-25

## 2024-11-25 NOTE — TELEPHONE ENCOUNTER
Patients insurance called regarding PA and wanted to verify name of prescribing provider and office contact number, fax and address.

## 2024-11-26 ENCOUNTER — TELEPHONE (OUTPATIENT)
Age: 64
End: 2024-11-26

## 2024-11-26 ENCOUNTER — APPOINTMENT (OUTPATIENT)
Dept: PHYSICAL THERAPY | Facility: CLINIC | Age: 64
End: 2024-11-26
Payer: COMMERCIAL

## 2024-11-26 NOTE — TELEPHONE ENCOUNTER
Patient of Tamy Scherer PA-C. Was seen in the office on 11/21/24. Prescribed Mounjaro 2.5 mg. Would like to wait until the beginning of January 2025 to start medication, especially with the holidays coming up. She would also like to reschedule all of her December appointments. Has appointment on 12/2 with Dietician and . Please advise.

## 2024-11-26 NOTE — TELEPHONE ENCOUNTER
Spoke to patient about starting the mounjaro in January kevyn said that is fine as the pricing of the medication right now patient said is to much but will be cheaper in the new year

## 2024-11-26 NOTE — TELEPHONE ENCOUNTER
Shereen called bc weight management is planning on starting her on mounjaro for weight loss and to help with her blood sugars. She would like to know if Dr. Ballard is ok with her starting the medication. Please advise patient. Thank you

## 2024-11-27 NOTE — TELEPHONE ENCOUNTER
called to inform office PA was approved but medication is over $200/mo. They have notified pharmacy and will be calling patient next.

## 2024-11-27 NOTE — TELEPHONE ENCOUNTER
I called and spoke with Shereen regarding her concern. She is aware it is okay for her to take Mounjaro.

## 2024-12-02 ENCOUNTER — CLINICAL SUPPORT (OUTPATIENT)
Age: 64
End: 2024-12-02

## 2024-12-02 ENCOUNTER — TELEPHONE (OUTPATIENT)
Dept: BARIATRICS | Facility: CLINIC | Age: 64
End: 2024-12-02

## 2024-12-02 VITALS — HEIGHT: 66 IN | WEIGHT: 287.7 LBS | BODY MASS INDEX: 46.24 KG/M2

## 2024-12-02 DIAGNOSIS — R63.5 ABNORMAL WEIGHT GAIN: Primary | ICD-10-CM

## 2024-12-02 PROCEDURE — RECHECK: Performed by: DIETITIAN, REGISTERED

## 2024-12-02 PROCEDURE — WMDI30: Performed by: DIETITIAN, REGISTERED
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
## 2024-12-02 NOTE — TELEPHONE ENCOUNTER
Reached out to Shereen as this RD received a message that Shereen wanted to cx her appointment scheduled for today. However, when speaking via phone with Shereen this morning, she stated that she planned to keep her appointment scheduled for 1:30 PM this afternoon.

## 2024-12-09 ENCOUNTER — VBI (OUTPATIENT)
Dept: ADMINISTRATIVE | Facility: OTHER | Age: 64
End: 2024-12-09

## 2024-12-09 NOTE — TELEPHONE ENCOUNTER
12/09/24 11:28 AM     Chart reviewed for CRC: Colonoscopy was/were not submitted to the patient's insurance.     Nelda Saha MA   [facility]

## 2025-01-06 ENCOUNTER — OFFICE VISIT (OUTPATIENT)
Dept: NEPHROLOGY | Facility: CLINIC | Age: 65
End: 2025-01-06
Payer: COMMERCIAL

## 2025-01-06 VITALS
OXYGEN SATURATION: 99 % | WEIGHT: 288 LBS | RESPIRATION RATE: 16 BRPM | DIASTOLIC BLOOD PRESSURE: 88 MMHG | BODY MASS INDEX: 46.28 KG/M2 | SYSTOLIC BLOOD PRESSURE: 136 MMHG | TEMPERATURE: 98.5 F | HEART RATE: 101 BPM | HEIGHT: 66 IN

## 2025-01-06 DIAGNOSIS — E11.22 TYPE 2 DIABETES MELLITUS WITH STAGE 4 CHRONIC KIDNEY DISEASE, WITHOUT LONG-TERM CURRENT USE OF INSULIN (HCC): ICD-10-CM

## 2025-01-06 DIAGNOSIS — E66.01 OBESITY, CLASS III, BMI 40-49.9 (MORBID OBESITY) (HCC): ICD-10-CM

## 2025-01-06 DIAGNOSIS — N25.81 SECONDARY HYPERPARATHYROIDISM OF RENAL ORIGIN (HCC): ICD-10-CM

## 2025-01-06 DIAGNOSIS — I12.9 HYPERTENSIVE KIDNEY DISEASE WITH CKD (CHRONIC KIDNEY DISEASE), STAGE 1-4 OR UNSPECIFIED CHRONIC KIDNEY DISEASE: ICD-10-CM

## 2025-01-06 DIAGNOSIS — N39.0 RECURRENT UTI: ICD-10-CM

## 2025-01-06 DIAGNOSIS — E83.42 HYPOMAGNESEMIA: ICD-10-CM

## 2025-01-06 DIAGNOSIS — N18.4 CKD (CHRONIC KIDNEY DISEASE) STAGE 4, GFR 15-29 ML/MIN (HCC): Primary | ICD-10-CM

## 2025-01-06 DIAGNOSIS — N18.4 TYPE 2 DIABETES MELLITUS WITH STAGE 4 CHRONIC KIDNEY DISEASE, WITHOUT LONG-TERM CURRENT USE OF INSULIN (HCC): ICD-10-CM

## 2025-01-06 DIAGNOSIS — E55.9 VITAMIN D DEFICIENCY: ICD-10-CM

## 2025-01-06 PROCEDURE — 99214 OFFICE O/P EST MOD 30 MIN: CPT | Performed by: INTERNAL MEDICINE

## 2025-01-06 PROCEDURE — G2211 COMPLEX E/M VISIT ADD ON: HCPCS | Performed by: INTERNAL MEDICINE

## 2025-01-06 RX ORDER — ERGOCALCIFEROL 1.25 MG/1
50000 CAPSULE ORAL WEEKLY
Qty: 13 CAPSULE | Refills: 3 | Status: SHIPPED | OUTPATIENT
Start: 2025-01-06

## 2025-01-06 NOTE — ASSESSMENT & PLAN NOTE
Lab Results   Component Value Date    EGFR 26 11/06/2024    EGFR 25 07/19/2024    EGFR 25 04/15/2024    CREATININE 1.93 (H) 11/06/2024    CREATININE 2.03 (H) 07/19/2024    CREATININE 2.04 (H) 04/15/2024     Kidney function is about stable with a creatinine of around 1.9-2 mg/dL.  Continue to optimize care and avoid potential for toxins.

## 2025-01-06 NOTE — ASSESSMENT & PLAN NOTE
Lab Results   Component Value Date    EGFR 26 11/06/2024    EGFR 25 07/19/2024    EGFR 25 04/15/2024    CREATININE 1.93 (H) 11/06/2024    CREATININE 2.03 (H) 07/19/2024    CREATININE 2.04 (H) 04/15/2024     Blood pressure is controlled at this time, continue to encourage low sodium diet, currently not on active antihypertensive medications.

## 2025-01-06 NOTE — ASSESSMENT & PLAN NOTE
Continue with methenamine hippurate, no urinary tract infection since its initiation.  Continue to avoid antibiotics if possible for urinary symptoms at this time.

## 2025-01-06 NOTE — ASSESSMENT & PLAN NOTE
Recommended for the patient to start a magnesium supplement, either in the form of magnesium glycinate or magnesium chloride, 1 tablet 3 times weekly.

## 2025-01-06 NOTE — PROGRESS NOTES
[facility]  Locojose Ballard DO    Name: Loretta Lewis  YOB: 1960      Assessment/Plan:    CKD (chronic kidney disease) stage 4, GFR 15-29 ml/min ([facility])  Lab Results   Component Value Date    EGFR 26 11/06/2024    EGFR 25 07/19/2024    EGFR 25 04/15/2024    CREATININE 1.93 (H) 11/06/2024    CREATININE 2.03 (H) 07/19/2024    CREATININE 2.04 (H) 04/15/2024     Kidney function is about stable with a creatinine of around 1.9-2 mg/dL.  Continue to optimize care and avoid potential for toxins.    Hypertensive kidney disease with CKD (chronic kidney disease), stage 1-4 or unspecified chronic kidney disease  Lab Results   Component Value Date    EGFR 26 11/06/2024    EGFR 25 07/19/2024    EGFR 25 04/15/2024    CREATININE 1.93 (H) 11/06/2024    CREATININE 2.03 (H) 07/19/2024    CREATININE 2.04 (H) 04/15/2024     Blood pressure is controlled at this time, continue to encourage low sodium diet, currently not on active antihypertensive medications.    Secondary hyperparathyroidism of renal origin ([facility])  Parathyroid hormone level slightly elevated but currently not indicated to initiate an activated vitamin D agent.  However, patient has a vitamin D deficiency, will work on improving deficiency which should improve PTH levels.  Continue to monitor for now.    Vitamin D deficiency  Will initiate ergocalciferol 50,000 units once weekly.  Follow-up vitamin D levels once yearly.    Hypomagnesemia  Recommended for the patient to start a magnesium supplement, either in the form of magnesium glycinate or magnesium chloride, 1 tablet 3 times weekly.    Obesity, Class III, BMI 40-49.9 (morbid obesity) ([facility])  Patient initiated on Mounjaro, however she has not had a chance to started due to financial constraints which should be relieved this month with the patient starting it toward the end of this month.  Continue care and treatment according to our weight management colleagues.   Patient has also spoken to our dietitian, she will be trying to implement some of her strategies toward her weight loss goals.    Recurrent UTI  Continue with methenamine hippurate, no urinary tract infection since its initiation.  Continue to avoid antibiotics if possible for urinary symptoms at this time.         Problem List Items Addressed This Visit          Endocrine    Secondary hyperparathyroidism of renal origin ([facility])    Parathyroid hormone level slightly elevated but currently not indicated to initiate an activated vitamin D agent.  However, patient has a vitamin D deficiency, will work on improving deficiency which should improve PTH levels.  Continue to monitor for now.         Relevant Orders    PTH, intact    Type 2 diabetes mellitus with stage 4 chronic kidney disease, without long-term current use of insulin ([facility])    Relevant Orders    Lipid panel    TSH + Free T4    Hemoglobin A1C       Genitourinary    CKD (chronic kidney disease) stage 4, GFR 15-29 ml/min ([facility]) - Primary    Lab Results   Component Value Date    EGFR 26 11/06/2024    EGFR 25 07/19/2024    EGFR 25 04/15/2024    CREATININE 1.93 (H) 11/06/2024    CREATININE 2.03 (H) 07/19/2024    CREATININE 2.04 (H) 04/15/2024     Kidney function is about stable with a creatinine of around 1.9-2 mg/dL.  Continue to optimize care and avoid potential for toxins.         Relevant Orders    Comprehensive metabolic panel    CBC and differential    Albumin / creatinine urine ratio    Urinalysis with microscopic    Protein / creatinine ratio, urine    Phosphorus    Recurrent UTI    Continue with methenamine hippurate, no urinary tract infection since its initiation.  Continue to avoid antibiotics if possible for urinary symptoms at this time.         Hypertensive kidney disease with CKD (chronic kidney disease), stage 1-4 or unspecified chronic kidney disease    Lab Results   Component Value Date    EGFR 26 11/06/2024    EGFR 25 07/19/2024    EGFR 25  04/15/2024    CREATININE 1.93 (H) 11/06/2024    CREATININE 2.03 (H) 07/19/2024    CREATININE 2.04 (H) 04/15/2024     Blood pressure is controlled at this time, continue to encourage low sodium diet, currently not on active antihypertensive medications.            Other    Obesity, Class III, BMI 40-49.9 (morbid obesity) (HCC)    Patient initiated on Mounjaro, however she has not had a chance to started due to financial constraints which should be relieved this month with the patient starting it toward the end of this month.  Continue care and treatment according to our weight management colleagues.  Patient has also spoken to our dietitian, she will be trying to implement some of her strategies toward her weight loss goals.         Hypomagnesemia    Recommended for the patient to start a magnesium supplement, either in the form of magnesium glycinate or magnesium chloride, 1 tablet 3 times weekly.         Relevant Orders    Magnesium    Vitamin D deficiency    Will initiate ergocalciferol 50,000 units once weekly.  Follow-up vitamin D levels once yearly.         Relevant Medications    ergocalciferol (ERGOCALCIFEROL) 1.25 MG (04905 UT) capsule         Patient is stable for the renal standpoint, we will see her back for regular appointment in 6 months.  Labs done prior to that appointment provider in this visit.    Ergocalciferol started during this visit, also recommended starting 3 times weekly magnesium supplement.    Subjective:      Patient ID: Loretta Lewis is a 64 y.o. female.    Patient presents for follow up.    We reviewed labs in detail, most recent creatinine noted to be 1.93 mg/dL, estimate GFR 26 mL/min.    There were no significant electrolyte abnormalities noted.  Patient is taking all medications as prescribed with no specific side effects and denies use of nonsteroid anti-inflammatory medications.              The following portions of the patient's history were reviewed and updated as appropriate:  allergies, current medications, past family history, past medical history, past social history, past surgical history and problem list.    Review of Systems   All other systems reviewed and are negative.        Social History     Socioeconomic History    Marital status: Single     Spouse name: None    Number of children: None    Years of education: None    Highest education level: None   Occupational History    None   Tobacco Use    Smoking status: Former     Current packs/day: 0.00     Average packs/day: 1.5 packs/day for 35.0 years (52.5 ttl pk-yrs)     Types: Cigarettes     Start date: 1980     Quit date: 2015     Years since quitting: 10.0    Smokeless tobacco: Never    Tobacco comments:     Vapes - Daily - includes 3 mg nicotine   Vaping Use    Vaping status: Every Day    Start date: 1/1/2016    Substances: Nicotine, Flavoring   Substance and Sexual Activity    Alcohol use: Not Currently    Drug use: No    Sexual activity: Not Currently     Partners: Male   Other Topics Concern    None   Social History Narrative    None     Social Drivers of Health     Financial Resource Strain: Low Risk  (9/18/2024)    Received from [employer]    Financial Resource Strain     Do you have any trouble paying for your medications, or do you think you might in the future?: No     Does your family have trouble paying for medicine? (Household - for ages 0-17 years): Not on file   Food Insecurity: No Food Insecurity (9/18/2024)    Received from [employer]    Hunger Vital Sign     Worried About Running Out of Food in the Last Year: Never true     Ran Out of Food in the Last Year: Never true   Recent Concern: Food Insecurity - Food Insecurity Present (7/7/2024)    Nursing - Inadequate Food Risk Classification     Worried About Running Out of Food in the Last Year: Sometimes true     Ran Out of Food in the Last Year: Never true     Ran Out of Food in the Last Year: Not on file   Transportation Needs: No Transportation Needs (9/18/2024)     Received from [facility]    Transportation Needs     READ ONLY Do you have trouble getting a ride to medical visits or work?: Never True     Does your family have a hard time getting a ride to doctors’ visits? (Household - for ages 0-17 years): Not on file     Has lack of transportation kept you from medical appointments, meetings, work, or from getting things needed for daily living? Check all that apply.: No     Do you (or your family) have trouble finding or paying for a ride (transportation)? (Household - for ages 0-17 years): Not on file   Recent Concern: Transportation Needs - Unmet Transportation Needs (7/7/2024)    PRAPARE - Transportation     Lack of Transportation (Medical): No     Lack of Transportation (Non-Medical): Yes   Physical Activity: Not on file   Stress: Not on file   Social Connections: Socially Integrated (9/18/2024)    Received from [facility]    Social Connections     How often do you feel lonely or isolated from those around you?: Rarely   Intimate Partner Violence: Not on file   Housing Stability: Low Risk  (9/18/2024)    Received from [facility]    Housing Stability     Do you currently live in a shelter or have no steady place to sleep at night?: No     READ ONLY Do you think you are at risk of becoming homeless?: No     Does your family worry about paying for your home or becoming homeless? (Household - for ages 0-17 years): Not on file     Are you homeless or worried that you might be in the future?: No     Are you (or your family) homeless or worried that you might be in the future? (Household - for ages 0-17 years): Not on file   Recent Concern: Housing Stability - High Risk (7/7/2024)    Housing Stability Vital Sign     Unable to Pay for Housing in the Last Year: Yes     Number of Times Moved in the Last Year: 0     Homeless in the Last Year: No     Past Medical History:   Diagnosis Date    INESSA (acute kidney injury) (HCC) 07/09/2023    Anemia     Intermittent    Arthritis     Diabetes  mellitus (HCC)     Hypertension     Migraine     MRSA (methicillin resistant Staphylococcus aureus)     Obesity (BMI 35.0-39.9 without comorbidity) 2017    Type 2 diabetes mellitus with stage 3a chronic kidney disease, without long-term current use of insulin (HCC)      Past Surgical History:   Procedure Laterality Date    ACHILLES TENDON REPAIR      ANKLE SURGERY Left 2019    Excision of Lipoma    CARPAL TUNNEL RELEASE Right      SECTION      CHOLECYSTECTOMY      GANGLION CYST EXCISION Left     Left wrist    LUMBAR LAMINECTOMY  2018    x 3 disks    MOUTH SURGERY  2023    Oral tooth extraction x 3 with a retained root    NASAL SINUS SURGERY         Current Outpatient Medications:     Alpha-Lipoic Acid 300 MG CAPS, Take 300 mg by mouth 2 (two) times a day, Disp: , Rfl:     clonazePAM (KlonoPIN) 1 MG disintegrating tablet, Take 1 tablet (1 mg total) by mouth daily at bedtime, Disp: 30 tablet, Rfl: 0    docusate sodium (COLACE) 100 mg capsule, Take 100 mg by mouth daily as needed for constipation, Disp: , Rfl:     DULoxetine (CYMBALTA) 60 mg delayed release capsule, Take 1 capsule (60 mg total) by mouth 2 (two) times a day, Disp: 180 capsule, Rfl: 0    ergocalciferol (ERGOCALCIFEROL) 1.25 MG (49227 UT) capsule, Take 1 capsule (50,000 Units total) by mouth once a week, Disp: 13 capsule, Rfl: 3    famotidine (PEPCID) 20 mg tablet, Take 20 mg by mouth daily, Disp: , Rfl:     gabapentin (NEURONTIN) 100 mg capsule, One in am, one at lunch, and 3 at hs, Disp: 360 capsule, Rfl: 0    glimepiride (AMARYL) 1 mg tablet, take 2 tablets by mouth daily with breakfast, Disp: 180 tablet, Rfl: 1    methenamine hippurate (HIPREX) 1 g tablet, Take 1 tablet (1 g total) by mouth 2 (two) times a day with meals, Disp: 180 tablet, Rfl: 1    Mirabegron ER 50 MG TB24, , Disp: , Rfl:     mirtazapine (REMERON) 7.5 MG tablet, Take 7.5 mg by mouth daily at bedtime, Disp: , Rfl:     Polysaccharide Iron Complex (HEMATEX  "IRON COMPLEX PO), Take by mouth, Disp: , Rfl:     TiZANidine (ZANAFLEX) 2 MG capsule, Take 1 capsule (2 mg total) by mouth daily as needed for muscle spasms 1 tablet once daily if needed for muscle spasm, Disp: 90 capsule, Rfl: 3    Tirzepatide (Mounjaro) 2.5 MG/0.5ML SOAJ, Inject 2.5mg once weekly (Patient not taking: Reported on 1/6/2025), Disp: 2 mL, Rfl: 0    Lab Results   Component Value Date    SODIUM 136 11/06/2024    K 4.6 11/06/2024     11/06/2024    CO2 22 11/06/2024    AGAP 8 11/06/2024    BUN 28 (H) 11/06/2024    CREATININE 1.93 (H) 11/06/2024    GLUC 125 07/14/2023    GLUF 185 (H) 11/06/2024    CALCIUM 9.7 11/06/2024    AST 25 11/06/2024    ALT 24 11/06/2024    ALKPHOS 103 11/06/2024    TP 7.7 11/06/2024    TBILI 0.27 11/06/2024    EGFR 26 11/06/2024     Lab Results   Component Value Date    WBC 5.55 11/06/2024    HGB 11.9 11/06/2024    HCT 37.3 11/06/2024    MCV 93 11/06/2024     11/06/2024     Lab Results   Component Value Date    CHOLESTEROL 157 07/19/2024    CHOLESTEROL 166 04/01/2024    CHOLESTEROL 166 09/15/2022     Lab Results   Component Value Date    HDL 41 (L) 07/19/2024    HDL 42 (L) 04/01/2024    HDL 42 (L) 09/15/2022     Lab Results   Component Value Date    LDLCALC 77 07/19/2024    LDLCALC 87 04/01/2024    LDLCALC 96 09/15/2022     Lab Results   Component Value Date    TRIG 196 (H) 07/19/2024    TRIG 186 (H) 04/01/2024    TRIG 139 09/15/2022     No results found for: \"CHOLHDL\"  Lab Results   Component Value Date    NSV4PTMUARLP 3.693 04/15/2024     Lab Results   Component Value Date    .2 (H) 11/06/2024    CALCIUM 9.7 11/06/2024    PHOS 4.1 11/06/2024     Lab Results   Component Value Date    SPEP See Comment 04/21/2023    UPEP See Comment 04/21/2023     No results found for: \"MICROALBUR\", \"[id number]\"        Objective:      /88 (Patient Position: Sitting, Cuff Size: Standard)   Pulse 101   Temp 98.5 °F (36.9 °C) (Temporal)   Resp 16   Ht 5' 5.5\" (1.664 m)   " Wt 131 kg (288 lb)   LMP 04/10/2017   SpO2 99%   BMI 47.20 kg/m²          Physical Exam  Vitals reviewed.   Constitutional:       General: She is not in acute distress.     Appearance: Normal appearance.   HENT:      Head: Normocephalic and atraumatic.      Right Ear: External ear normal.      Left Ear: External ear normal.   Eyes:      Conjunctiva/sclera: Conjunctivae normal.   Cardiovascular:      Rate and Rhythm: Normal rate and regular rhythm.      Heart sounds: Normal heart sounds.   Pulmonary:      Effort: No respiratory distress.      Breath sounds: No wheezing.   Abdominal:      Palpations: Abdomen is soft.   Skin:     General: Skin is warm and dry.   Neurological:      General: No focal deficit present.      Mental Status: She is alert and oriented to person, place, and time.   Psychiatric:         Mood and Affect: Mood normal.         Behavior: Behavior normal.

## 2025-01-06 NOTE — ASSESSMENT & PLAN NOTE
Parathyroid hormone level slightly elevated but currently not indicated to initiate an activated vitamin D agent.  However, patient has a vitamin D deficiency, will work on improving deficiency which should improve PTH levels.  Continue to monitor for now.

## 2025-01-06 NOTE — ASSESSMENT & PLAN NOTE
Patient initiated on Mounjaro, however she has not had a chance to started due to financial constraints which should be relieved this month with the patient starting it toward the end of this month.  Continue care and treatment according to our weight management colleagues.  Patient has also spoken to our dietitian, she will be trying to implement some of her strategies toward her weight loss goals.

## 2025-01-13 DIAGNOSIS — F41.1 GENERALIZED ANXIETY DISORDER WITH PANIC ATTACKS: ICD-10-CM

## 2025-01-13 DIAGNOSIS — F41.0 GENERALIZED ANXIETY DISORDER WITH PANIC ATTACKS: ICD-10-CM

## 2025-01-15 RX ORDER — CLONAZEPAM 1 MG/1
1 TABLET, ORALLY DISINTEGRATING ORAL
Qty: 30 TABLET | Refills: 0 | Status: SHIPPED | OUTPATIENT
Start: 2025-01-15

## 2025-01-16 ENCOUNTER — TELEPHONE (OUTPATIENT)
Age: 65
End: 2025-01-16

## 2025-01-16 NOTE — TELEPHONE ENCOUNTER
Patient returned a call from [facility]. Upon transferring the call to Traci, the [employer] hold music would not disconnect. Traci reached out to the patient later and rescheduled her appointment.

## 2025-01-16 NOTE — TELEPHONE ENCOUNTER
Pt called in stating wants to keep appt scheduled with Tamy on 01/23/25. Pt made transportation arrangement.

## 2025-01-16 NOTE — TELEPHONE ENCOUNTER
Patient would like to change her appointment on 1/23/25 with Tamy Scherer in [place] to the [place] location with Christina Vasquez as she lives only 3 blocks from the [place] office. Please call her back to reschedule today after 3:30 p.m. or tomorrow. Thank you.

## 2025-01-28 ENCOUNTER — TELEPHONE (OUTPATIENT)
Age: 65
End: 2025-01-28

## 2025-02-05 PROBLEM — N39.0 RECURRENT UTI: Status: RESOLVED | Noted: 2023-08-14 | Resolved: 2025-02-05

## 2025-02-11 ENCOUNTER — TELEPHONE (OUTPATIENT)
Age: 65
End: 2025-02-11

## 2025-02-13 ENCOUNTER — TELEPHONE (OUTPATIENT)
Dept: BARIATRICS | Facility: CLINIC | Age: 65
End: 2025-02-13

## 2025-02-13 ENCOUNTER — TELEPHONE (OUTPATIENT)
Age: 65
End: 2025-02-13

## 2025-02-13 DIAGNOSIS — G47.33 OBSTRUCTIVE SLEEP APNEA: ICD-10-CM

## 2025-02-13 DIAGNOSIS — N18.4 TYPE 2 DIABETES MELLITUS WITH STAGE 4 CHRONIC KIDNEY DISEASE, WITHOUT LONG-TERM CURRENT USE OF INSULIN (HCC): Primary | ICD-10-CM

## 2025-02-13 DIAGNOSIS — E11.22 TYPE 2 DIABETES MELLITUS WITH STAGE 4 CHRONIC KIDNEY DISEASE, WITHOUT LONG-TERM CURRENT USE OF INSULIN (HCC): Primary | ICD-10-CM

## 2025-02-13 DIAGNOSIS — I10 ESSENTIAL HYPERTENSION: ICD-10-CM

## 2025-02-13 DIAGNOSIS — E66.01 OBESITY, CLASS III, BMI 40-49.9 (MORBID OBESITY) (HCC): ICD-10-CM

## 2025-02-13 DIAGNOSIS — E66.01 CLASS 3 SEVERE OBESITY WITH SERIOUS COMORBIDITY AND BODY MASS INDEX (BMI) OF 45.0 TO 49.9 IN ADULT, UNSPECIFIED OBESITY TYPE (HCC): ICD-10-CM

## 2025-02-13 DIAGNOSIS — E66.813 CLASS 3 SEVERE OBESITY WITH SERIOUS COMORBIDITY AND BODY MASS INDEX (BMI) OF 45.0 TO 49.9 IN ADULT, UNSPECIFIED OBESITY TYPE (HCC): ICD-10-CM

## 2025-02-13 RX ORDER — TIRZEPATIDE 5 MG/.5ML
5 INJECTION, SOLUTION SUBCUTANEOUS WEEKLY
Qty: 2 ML | Refills: 0 | Status: SHIPPED | OUTPATIENT
Start: 2025-02-13

## 2025-02-13 RX ORDER — TIRZEPATIDE 5 MG/.5ML
5 INJECTION, SOLUTION SUBCUTANEOUS WEEKLY
Qty: 2 ML | Refills: 0 | Status: SHIPPED | OUTPATIENT
Start: 2025-02-13 | End: 2025-02-13

## 2025-02-13 NOTE — TELEPHONE ENCOUNTER
Patient had to cancel appointment for today - she is now scheduled for 3/6.      Patient took last injection of Mounjaro yesterday.  Has no refills.    Patient wondering if a new script can be sent in.  Denies any problems with current dose.

## 2025-02-13 NOTE — TELEPHONE ENCOUNTER
Patient called to reschedule her appointment from today. Made a warm transfer to Traci in the [place] office, but Toya froze. Traci will call the patient back.

## 2025-02-17 DIAGNOSIS — M47.27 OSTEOARTHRITIS OF SPINE WITH RADICULOPATHY, LUMBOSACRAL REGION: ICD-10-CM

## 2025-02-17 RX ORDER — GABAPENTIN 100 MG/1
CAPSULE ORAL
Qty: 360 CAPSULE | Refills: 1 | Status: SHIPPED | OUTPATIENT
Start: 2025-02-17

## 2025-02-28 ENCOUNTER — RA CDI HCC (OUTPATIENT)
Dept: OTHER | Facility: HOSPITAL | Age: 65
End: 2025-02-28

## 2025-02-28 NOTE — PROGRESS NOTES
HCC coding opportunities          Chart Reviewed number of suggestions sent to Provider: 1   E11.65    Patients Insurance     Medicare Insurance: [employer] Advantage

## 2025-03-05 ENCOUNTER — APPOINTMENT (OUTPATIENT)
Dept: LAB | Facility: MEDICAL CENTER | Age: 65
End: 2025-03-05
Payer: COMMERCIAL

## 2025-03-05 DIAGNOSIS — N25.81 SECONDARY HYPERPARATHYROIDISM OF RENAL ORIGIN (HCC): ICD-10-CM

## 2025-03-05 DIAGNOSIS — N18.4 TYPE 2 DIABETES MELLITUS WITH STAGE 4 CHRONIC KIDNEY DISEASE, WITHOUT LONG-TERM CURRENT USE OF INSULIN (HCC): Primary | ICD-10-CM

## 2025-03-05 DIAGNOSIS — E83.42 HYPOMAGNESEMIA: ICD-10-CM

## 2025-03-05 DIAGNOSIS — E11.22 TYPE 2 DIABETES MELLITUS WITH STAGE 4 CHRONIC KIDNEY DISEASE, WITHOUT LONG-TERM CURRENT USE OF INSULIN (HCC): Primary | ICD-10-CM

## 2025-03-05 DIAGNOSIS — N18.4 CKD (CHRONIC KIDNEY DISEASE) STAGE 4, GFR 15-29 ML/MIN (HCC): ICD-10-CM

## 2025-03-05 LAB
ALBUMIN SERPL BCG-MCNC: 4 G/DL (ref 3.5–5)
ALP SERPL-CCNC: 100 U/L (ref 34–104)
ALT SERPL W P-5'-P-CCNC: 24 U/L (ref 7–52)
ANION GAP SERPL CALCULATED.3IONS-SCNC: 8 MMOL/L (ref 4–13)
AST SERPL W P-5'-P-CCNC: 22 U/L (ref 13–39)
BASOPHILS # BLD AUTO: 0.04 THOUSANDS/ÂΜL (ref 0–0.1)
BASOPHILS NFR BLD AUTO: 1 % (ref 0–1)
BILIRUB SERPL-MCNC: 0.35 MG/DL (ref 0.2–1)
BUN SERPL-MCNC: 22 MG/DL (ref 5–25)
CALCIUM SERPL-MCNC: 9.1 MG/DL (ref 8.4–10.2)
CHLORIDE SERPL-SCNC: 105 MMOL/L (ref 96–108)
CHOLEST SERPL-MCNC: 154 MG/DL (ref ?–200)
CO2 SERPL-SCNC: 22 MMOL/L (ref 21–32)
CREAT SERPL-MCNC: 2.04 MG/DL (ref 0.6–1.3)
EOSINOPHIL # BLD AUTO: 0.18 THOUSAND/ÂΜL (ref 0–0.61)
EOSINOPHIL NFR BLD AUTO: 3 % (ref 0–6)
ERYTHROCYTE [DISTWIDTH] IN BLOOD BY AUTOMATED COUNT: 12.9 % (ref 11.6–15.1)
EST. AVERAGE GLUCOSE BLD GHB EST-MCNC: 143 MG/DL
GFR SERPL CREATININE-BSD FRML MDRD: 25 ML/MIN/1.73SQ M
GLUCOSE P FAST SERPL-MCNC: 161 MG/DL (ref 65–99)
HBA1C MFR BLD: 6.6 %
HCT VFR BLD AUTO: 38 % (ref 34.8–46.1)
HDLC SERPL-MCNC: 40 MG/DL
HGB BLD-MCNC: 12 G/DL (ref 11.5–15.4)
IMM GRANULOCYTES # BLD AUTO: 0.02 THOUSAND/UL (ref 0–0.2)
IMM GRANULOCYTES NFR BLD AUTO: 0 % (ref 0–2)
LDLC SERPL CALC-MCNC: 83 MG/DL (ref 0–100)
LYMPHOCYTES # BLD AUTO: 2.2 THOUSANDS/ÂΜL (ref 0.6–4.47)
LYMPHOCYTES NFR BLD AUTO: 33 % (ref 14–44)
MAGNESIUM SERPL-MCNC: 2 MG/DL (ref 1.9–2.7)
MCH RBC QN AUTO: 29.1 PG (ref 26.8–34.3)
MCHC RBC AUTO-ENTMCNC: 31.6 G/DL (ref 31.4–37.4)
MCV RBC AUTO: 92 FL (ref 82–98)
MONOCYTES # BLD AUTO: 0.44 THOUSAND/ÂΜL (ref 0.17–1.22)
MONOCYTES NFR BLD AUTO: 7 % (ref 4–12)
NEUTROPHILS # BLD AUTO: 3.87 THOUSANDS/ÂΜL (ref 1.85–7.62)
NEUTS SEG NFR BLD AUTO: 56 % (ref 43–75)
NONHDLC SERPL-MCNC: 114 MG/DL
NRBC BLD AUTO-RTO: 0 /100 WBCS
PHOSPHATE SERPL-MCNC: 3.8 MG/DL (ref 2.3–4.1)
PLATELET # BLD AUTO: 292 THOUSANDS/UL (ref 149–390)
PMV BLD AUTO: 10.1 FL (ref 8.9–12.7)
POTASSIUM SERPL-SCNC: 4.5 MMOL/L (ref 3.5–5.3)
PROT SERPL-MCNC: 7.7 G/DL (ref 6.4–8.4)
PTH-INTACT SERPL-MCNC: 305.9 PG/ML (ref 12–88)
RBC # BLD AUTO: 4.12 MILLION/UL (ref 3.81–5.12)
SODIUM SERPL-SCNC: 135 MMOL/L (ref 135–147)
T4 FREE SERPL-MCNC: 0.54 NG/DL (ref 0.61–1.12)
TRIGL SERPL-MCNC: 154 MG/DL (ref ?–150)
TSH SERPL DL<=0.05 MIU/L-ACNC: 5.14 UIU/ML (ref 0.45–4.5)
WBC # BLD AUTO: 6.75 THOUSAND/UL (ref 4.31–10.16)

## 2025-03-05 PROCEDURE — 83036 HEMOGLOBIN GLYCOSYLATED A1C: CPT

## 2025-03-05 PROCEDURE — 84439 ASSAY OF FREE THYROXINE: CPT

## 2025-03-05 PROCEDURE — 36415 COLL VENOUS BLD VENIPUNCTURE: CPT

## 2025-03-05 PROCEDURE — 84443 ASSAY THYROID STIM HORMONE: CPT

## 2025-03-05 PROCEDURE — 80061 LIPID PANEL: CPT

## 2025-03-05 PROCEDURE — 85025 COMPLETE CBC W/AUTO DIFF WBC: CPT

## 2025-03-05 PROCEDURE — 83970 ASSAY OF PARATHORMONE: CPT

## 2025-03-05 PROCEDURE — 83735 ASSAY OF MAGNESIUM: CPT

## 2025-03-05 PROCEDURE — 80053 COMPREHEN METABOLIC PANEL: CPT

## 2025-03-05 PROCEDURE — 84100 ASSAY OF PHOSPHORUS: CPT

## 2025-03-06 ENCOUNTER — APPOINTMENT (OUTPATIENT)
Dept: LAB | Facility: MEDICAL CENTER | Age: 65
End: 2025-03-06
Payer: COMMERCIAL

## 2025-03-06 ENCOUNTER — RESULTS FOLLOW-UP (OUTPATIENT)
Age: 65
End: 2025-03-06

## 2025-03-06 DIAGNOSIS — N18.4 CKD (CHRONIC KIDNEY DISEASE) STAGE 4, GFR 15-29 ML/MIN (HCC): Primary | ICD-10-CM

## 2025-03-06 LAB
BACTERIA UR QL AUTO: ABNORMAL /HPF
BILIRUB UR QL STRIP: NEGATIVE
CLARITY UR: ABNORMAL
COLOR UR: ABNORMAL
CREAT UR-MCNC: 60.5 MG/DL
CREAT UR-MCNC: 60.5 MG/DL
GLUCOSE UR STRIP-MCNC: NEGATIVE MG/DL
HGB UR QL STRIP.AUTO: NEGATIVE
KETONES UR STRIP-MCNC: NEGATIVE MG/DL
LEUKOCYTE ESTERASE UR QL STRIP: ABNORMAL
MICROALBUMIN UR-MCNC: 44.2 MG/L
MICROALBUMIN/CREAT 24H UR: 73 MG/G CREATININE (ref 0–30)
NITRITE UR QL STRIP: NEGATIVE
NON-SQ EPI CELLS URNS QL MICRO: ABNORMAL /HPF
PH UR STRIP.AUTO: 6 [PH]
PROT UR STRIP-MCNC: ABNORMAL MG/DL
PROT UR-MCNC: 14.7 MG/DL
PROT/CREAT UR: 0.2 MG/G{CREAT} (ref 0–0.1)
RBC #/AREA URNS AUTO: ABNORMAL /HPF
SP GR UR STRIP.AUTO: 1.01 (ref 1–1.03)
UROBILINOGEN UR STRIP-ACNC: <2 MG/DL
WBC #/AREA URNS AUTO: ABNORMAL /HPF

## 2025-03-06 PROCEDURE — 82570 ASSAY OF URINE CREATININE: CPT

## 2025-03-06 PROCEDURE — 84156 ASSAY OF PROTEIN URINE: CPT

## 2025-03-06 PROCEDURE — 82043 UR ALBUMIN QUANTITATIVE: CPT

## 2025-03-06 PROCEDURE — 81001 URINALYSIS AUTO W/SCOPE: CPT

## 2025-03-12 ENCOUNTER — OFFICE VISIT (OUTPATIENT)
Dept: FAMILY MEDICINE CLINIC | Facility: CLINIC | Age: 65
End: 2025-03-12
Payer: COMMERCIAL

## 2025-03-12 VITALS
OXYGEN SATURATION: 97 % | HEART RATE: 94 BPM | WEIGHT: 287.4 LBS | BODY MASS INDEX: 47.1 KG/M2 | SYSTOLIC BLOOD PRESSURE: 152 MMHG | DIASTOLIC BLOOD PRESSURE: 68 MMHG | TEMPERATURE: 97.6 F

## 2025-03-12 DIAGNOSIS — F41.1 GENERALIZED ANXIETY DISORDER: ICD-10-CM

## 2025-03-12 DIAGNOSIS — I12.9 HYPERTENSIVE KIDNEY DISEASE WITH CKD (CHRONIC KIDNEY DISEASE), STAGE 1-4 OR UNSPECIFIED CHRONIC KIDNEY DISEASE: ICD-10-CM

## 2025-03-12 DIAGNOSIS — E11.22 TYPE 2 DIABETES MELLITUS WITH STAGE 4 CHRONIC KIDNEY DISEASE, WITHOUT LONG-TERM CURRENT USE OF INSULIN (HCC): ICD-10-CM

## 2025-03-12 DIAGNOSIS — G47.33 OBSTRUCTIVE SLEEP APNEA: ICD-10-CM

## 2025-03-12 DIAGNOSIS — N18.4 TYPE 2 DIABETES MELLITUS WITH STAGE 4 CHRONIC KIDNEY DISEASE, WITHOUT LONG-TERM CURRENT USE OF INSULIN (HCC): ICD-10-CM

## 2025-03-12 DIAGNOSIS — I10 ESSENTIAL HYPERTENSION: Primary | ICD-10-CM

## 2025-03-12 PROCEDURE — G2211 COMPLEX E/M VISIT ADD ON: HCPCS | Performed by: FAMILY MEDICINE

## 2025-03-12 PROCEDURE — 99214 OFFICE O/P EST MOD 30 MIN: CPT | Performed by: FAMILY MEDICINE

## 2025-03-12 NOTE — PROGRESS NOTES
Name: Loretta Lewis      : 1960      MRN: [id number]  Encounter Provider: Preet Us DO  Encounter Date: 3/12/2025   Encounter department: [facility]  :  Assessment & Plan           History of Present Illness   Shereen here for a follow-up visit doing very well with weight reduction all only on 5 mg of Mounjaro at the current time follows with Dr. Santizo he is happy with her kidney function parathyroid level is up a little bit but he is not too concerned about that she has been to the eye doctor she is also has no open sores on her feet and with no neuropathy      Review of Systems   Constitutional:  Negative for activity change, appetite change, diaphoresis, fatigue and fever.   HENT: Negative.     Eyes: Negative.    Respiratory:  Negative for apnea, cough, chest tightness, shortness of breath and wheezing.    Cardiovascular:  Negative for chest pain, palpitations and leg swelling.   Gastrointestinal:  Negative for abdominal distention, abdominal pain, anal bleeding, constipation, diarrhea, nausea and vomiting.   Endocrine: Negative for cold intolerance, heat intolerance, polydipsia, polyphagia and polyuria.   Genitourinary:  Negative for difficulty urinating, dysuria, flank pain, hematuria and urgency.   Musculoskeletal:  Negative for arthralgias, back pain, gait problem, joint swelling and myalgias.   Skin:  Negative for color change, rash and wound.   Allergic/Immunologic: Negative for environmental allergies, food allergies and immunocompromised state.   Neurological:  Negative for dizziness, seizures, syncope, speech difficulty, numbness and headaches.   Hematological:  Negative for adenopathy. Does not bruise/bleed easily.   Psychiatric/Behavioral:  Negative for agitation, behavioral problems, hallucinations, sleep disturbance and suicidal ideas.        Objective   /68   Pulse 94   Temp 97.6 °F (36.4 °C)   Wt 130 kg (287 lb 6.4 oz)   LMP 04/10/2017   SpO2 97%   BMI 47.10  kg/m²      Physical Exam  Constitutional:       Appearance: She is well-developed.   HENT:      Head: Normocephalic and atraumatic.      Right Ear: External ear normal.      Left Ear: External ear normal.      Nose: Nose normal.   Eyes:      Conjunctiva/sclera: Conjunctivae normal.      Pupils: Pupils are equal, round, and reactive to light.   Cardiovascular:      Rate and Rhythm: Normal rate and regular rhythm.      Heart sounds: Normal heart sounds. No murmur heard.     No friction rub.   Pulmonary:      Effort: Pulmonary effort is normal. No respiratory distress.      Breath sounds: Normal breath sounds. No wheezing or rales.   Chest:      Chest wall: No tenderness.   Abdominal:      General: Bowel sounds are normal.      Palpations: Abdomen is soft.   Musculoskeletal:         General: Normal range of motion.      Cervical back: Normal range of motion and neck supple.   Skin:     General: Skin is warm and dry.      Capillary Refill: Capillary refill takes 2 to 3 seconds.   Neurological:      Mental Status: She is alert and oriented to person, place, and time.   Psychiatric:         Behavior: Behavior normal.         Thought Content: Thought content normal.         Judgment: Judgment normal.

## 2025-03-13 ENCOUNTER — OFFICE VISIT (OUTPATIENT)
Dept: BARIATRICS | Facility: CLINIC | Age: 65
End: 2025-03-13
Payer: COMMERCIAL

## 2025-03-13 VITALS
HEIGHT: 66 IN | BODY MASS INDEX: 46.16 KG/M2 | WEIGHT: 287.2 LBS | HEART RATE: 82 BPM | SYSTOLIC BLOOD PRESSURE: 128 MMHG | DIASTOLIC BLOOD PRESSURE: 68 MMHG | RESPIRATION RATE: 18 BRPM | OXYGEN SATURATION: 97 % | TEMPERATURE: 97.8 F

## 2025-03-13 DIAGNOSIS — G47.33 OBSTRUCTIVE SLEEP APNEA: ICD-10-CM

## 2025-03-13 DIAGNOSIS — E11.22 TYPE 2 DIABETES MELLITUS WITH STAGE 4 CHRONIC KIDNEY DISEASE, WITHOUT LONG-TERM CURRENT USE OF INSULIN (HCC): Primary | ICD-10-CM

## 2025-03-13 DIAGNOSIS — I10 ESSENTIAL HYPERTENSION: ICD-10-CM

## 2025-03-13 DIAGNOSIS — I12.9 HYPERTENSIVE KIDNEY DISEASE WITH CKD (CHRONIC KIDNEY DISEASE), STAGE 1-4 OR UNSPECIFIED CHRONIC KIDNEY DISEASE: ICD-10-CM

## 2025-03-13 DIAGNOSIS — N18.4 TYPE 2 DIABETES MELLITUS WITH STAGE 4 CHRONIC KIDNEY DISEASE, WITHOUT LONG-TERM CURRENT USE OF INSULIN (HCC): Primary | ICD-10-CM

## 2025-03-13 DIAGNOSIS — E66.01 OBESITY, CLASS III, BMI 40-49.9 (MORBID OBESITY) (HCC): ICD-10-CM

## 2025-03-13 PROCEDURE — 99214 OFFICE O/P EST MOD 30 MIN: CPT | Performed by: PHYSICIAN ASSISTANT

## 2025-03-13 RX ORDER — TIRZEPATIDE 7.5 MG/.5ML
INJECTION, SOLUTION SUBCUTANEOUS
Qty: 2 ML | Refills: 0 | Status: SHIPPED | OUTPATIENT
Start: 2025-03-13

## 2025-03-13 NOTE — ASSESSMENT & PLAN NOTE
- Continue to monitor    Lab Results   Component Value Date    EGFR 25 03/05/2025    EGFR 26 11/06/2024    EGFR 25 07/19/2024    CREATININE 2.04 (H) 03/05/2025    CREATININE 1.93 (H) 11/06/2024    CREATININE 2.03 (H) 07/19/2024

## 2025-03-13 NOTE — PROGRESS NOTES
Assessment/Plan:    Obesity, Class III, BMI 40-49.9 (morbid obesity) ([facility])  - Initial weight loss goal of 5-10% weight loss for improved health  - Patient is interested in pursuing Conservative Program  - Screening labs: Up to date, impaired fasting glucose, elevated HgbA1C, normal TSH, elevated triglycerides, low HDL  - HgbA1C improving  - Calorie goal: 0992-1337  - Continue Mounjaro. Patient just finished last dose of 5mg, increase to 7.5mg weekly and send me a message when need a refill and we will increase dose to 10mg  - Medication agreement signed: Yes, at consult  - Patient to call endocrinologist for daily protein requirement due to stage 4 kidney disease  Follow up 3 months with me then she has an appt for September to follow up with nonsurgical provider in [place] as that is closer for her  Dietician: Patient notes that she met with dietician        Hypertensive kidney disease with CKD (chronic kidney disease), stage 1-4 or unspecified chronic kidney disease  - Continue to monitor    Lab Results   Component Value Date    EGFR 25 03/05/2025    EGFR 26 11/06/2024    EGFR 25 07/19/2024    CREATININE 2.04 (H) 03/05/2025    CREATININE 1.93 (H) 11/06/2024    CREATININE 2.03 (H) 07/19/2024       Type 2 diabetes mellitus with stage 4 chronic kidney disease, without long-term current use of insulin ([facility])  - Continue Mounjaro once weekly   - Hoping to improve with weight loss   - A1C improving  Lab Results   Component Value Date    HGBA1C 6.6 (H) 03/05/2025         Goals:    Food log (ie.) www.[employer]com,sparkpeople.com,Itaconixit.com,Fishbowl.com,etc.   No sugary beverages. At least 64oz of water daily.  Increase physical activity by 10 minutes daily. Gradually increase physical activity to a goal of 5 days per week for 30 minutes of MODERATE intensity PLUS 2 days per week of FULL BODY resistance training    Calorie goal: 3396-6493  Food log (ie.)  www.myfitnesspal.com,[employer].com,loseit.com,Recurious.com,etc.   No sugary beverages. At least 64oz of water daily.  Goal protein intake of 60-80 grams per day  25-35 grams of dietary fiber per day    Follow up in approximately 3 months with Surgical Advanced Practitioner.     Diagnoses and all orders for this visit:    Type 2 diabetes mellitus with stage 4 chronic kidney disease, without long-term current use of insulin ([facility])  -     Tirzepatide (Mounjaro) 7.5 MG/0.5ML SOAJ; Take 7.5mg weekly    Obesity, Class III, BMI 40-49.9 (morbid obesity) ([facility])  -     Tirzepatide (Mounjaro) 7.5 MG/0.5ML SOAJ; Take 7.5mg weekly    Obstructive sleep apnea  -     Tirzepatide (Mounjaro) 7.5 MG/0.5ML SOAJ; Take 7.5mg weekly    Essential hypertension  -     Tirzepatide (Mounjaro) 7.5 MG/0.5ML SOAJ; Take 7.5mg weekly    Hypertensive kidney disease with CKD (chronic kidney disease), stage 1-4 or unspecified chronic kidney disease          Subjective:   Chief Complaint   Patient presents with    Follow-up        Patient ID: Loretta Lewis  is a 64 y.o. female with excess weight/obesity here to pursue weight managment.  Patient is pursuing Conservative Program.     Tolerating Mounjaro well with no significant side effects.     Notes she does not have as many cravings, but does still have a sweet tooth.      HPI    Weight at last visit: 288  Current weight: 287  Change: -1lb      Wt Readings from Last 10 Encounters:   03/13/25 130 kg (287 lb 3.2 oz)   03/12/25 130 kg (287 lb 6.4 oz)   01/06/25 131 kg (288 lb)   12/02/24 130 kg (287 lb 11.2 oz)   11/21/24 131 kg (288 lb 3.2 oz)   11/12/24 133 kg (294 lb)   10/22/24 130 kg (286 lb 9.6 oz)   10/21/24 130 kg (287 lb)   08/19/24 132 kg (290 lb 6.4 oz)   07/26/24 132 kg (291 lb)        Food recall:  (decreasing serving sizes and starting to watch calories)  B: blueberry muffin or bowl of cereal   S: mini pretzels with hummus  L: sandwich with deli meat + ibarra +/- swiss cheese on wheat  "bread  S: pretzels + hummus or bag of chips  D: Red meat or fish + yam   S: buttered popcorn or pretzels + hummus      Hunger/Cravings: sweets  Dining out: Once a week  Hydration: Cirkul water bottle, no coffee or iced tea  Alcohol: Very rare, 1-2x per year  Smoking: Vapes nicotine  Exercise: Very little, goes to PT and uses recumbent bike  Sleep: Varies, goes to bed between 4-5am, gets at least 6-7 hours of sleep  Occupation: Disabled  Contraception: Postmenopausal  Colonoscopy: Cologaured ordered by Dr. Us      The following portions of the patient's history were reviewed and updated as appropriate: allergies, current medications, past family history, past medical history, past social history, past surgical history, and problem list.    Review of Systems   Constitutional:  Negative for chills and fever.   HENT:  Negative for ear pain and sore throat.    Eyes:  Negative for pain and visual disturbance.   Respiratory:  Negative for cough and shortness of breath.    Cardiovascular:  Negative for chest pain and palpitations.   Gastrointestinal:  Negative for abdominal pain and vomiting.   Genitourinary:  Negative for dysuria and hematuria.   Musculoskeletal:  Positive for arthralgias (right knee pain) and back pain.   Skin:  Negative for color change and rash.   Neurological:  Negative for seizures and syncope.   All other systems reviewed and are negative.      Objective:    /68 (BP Location: Left arm, Patient Position: Sitting, Cuff Size: Large)   Pulse 82   Temp 97.8 °F (36.6 °C) (Temporal)   Resp 18   Ht 5' 5.5\" (1.664 m)   Wt 130 kg (287 lb 3.2 oz)   LMP 04/10/2017   SpO2 97%   BMI 47.07 kg/m²      Physical Exam  Constitutional:       Appearance: Normal appearance. She is obese.   HENT:      Head: Normocephalic and atraumatic.      Nose: Nose normal.      Mouth/Throat:      Mouth: Mucous membranes are moist.   Eyes:      Extraocular Movements: Extraocular movements intact.      Pupils: Pupils " are equal, round, and reactive to light.   Cardiovascular:      Rate and Rhythm: Normal rate and regular rhythm.      Pulses: Normal pulses.      Heart sounds: Normal heart sounds.   Pulmonary:      Effort: Pulmonary effort is normal.      Breath sounds: Normal breath sounds.   Abdominal:      Palpations: Abdomen is soft.   Musculoskeletal:      Cervical back: Normal range of motion.   Skin:     General: Skin is warm.   Neurological:      General: No focal deficit present.      Mental Status: She is alert and oriented to person, place, and time.   Psychiatric:         Mood and Affect: Mood normal.         Behavior: Behavior normal.

## 2025-03-13 NOTE — ASSESSMENT & PLAN NOTE
- Initial weight loss goal of 5-10% weight loss for improved health  - Patient is interested in pursuing Conservative Program  - Screening labs: Up to date, impaired fasting glucose, elevated HgbA1C, normal TSH, elevated triglycerides, low HDL  - HgbA1C improving  - Calorie goal: 6803-7682  - Continue Mounjaro. Patient just finished last dose of 5mg, increase to 7.5mg weekly and send me a message when need a refill and we will increase dose to 10mg  - Medication agreement signed: Yes, at consult  - Patient to call endocrinologist for daily protein requirement due to stage 4 kidney disease  Follow up 3 months with me then she has an appt for September to follow up with nonsurgical provider in [place] as that is closer for her  Dietician: Patient notes that she met with dietician

## 2025-03-13 NOTE — ASSESSMENT & PLAN NOTE
- Continue Mounjaro once weekly   - Hoping to improve with weight loss   - A1C improving  Lab Results   Component Value Date    HGBA1C 6.6 (H) 03/05/2025

## 2025-04-09 ENCOUNTER — VBI (OUTPATIENT)
Dept: ADMINISTRATIVE | Facility: OTHER | Age: 65
End: 2025-04-09

## 2025-04-09 NOTE — TELEPHONE ENCOUNTER
04/09/25 11:34 AM     Chart reviewed for CRC: Colonoscopy was/were not submitted to the patient's insurance.     Nelda Saha MA   [facility]

## 2025-04-11 DIAGNOSIS — G47.33 OBSTRUCTIVE SLEEP APNEA: ICD-10-CM

## 2025-04-11 DIAGNOSIS — E66.813 OBESITY, CLASS III, BMI 40-49.9 (MORBID OBESITY): ICD-10-CM

## 2025-04-11 DIAGNOSIS — E11.22 TYPE 2 DIABETES MELLITUS WITH STAGE 4 CHRONIC KIDNEY DISEASE, WITHOUT LONG-TERM CURRENT USE OF INSULIN (HCC): Primary | ICD-10-CM

## 2025-04-11 DIAGNOSIS — N18.4 TYPE 2 DIABETES MELLITUS WITH STAGE 4 CHRONIC KIDNEY DISEASE, WITHOUT LONG-TERM CURRENT USE OF INSULIN (HCC): ICD-10-CM

## 2025-04-11 DIAGNOSIS — I10 ESSENTIAL HYPERTENSION: ICD-10-CM

## 2025-04-11 DIAGNOSIS — N18.4 TYPE 2 DIABETES MELLITUS WITH STAGE 4 CHRONIC KIDNEY DISEASE, WITHOUT LONG-TERM CURRENT USE OF INSULIN (HCC): Primary | ICD-10-CM

## 2025-04-11 DIAGNOSIS — E11.22 TYPE 2 DIABETES MELLITUS WITH STAGE 4 CHRONIC KIDNEY DISEASE, WITHOUT LONG-TERM CURRENT USE OF INSULIN (HCC): ICD-10-CM

## 2025-04-11 RX ORDER — TIRZEPATIDE 10 MG/.5ML
10 INJECTION, SOLUTION SUBCUTANEOUS WEEKLY
Qty: 2 ML | Refills: 1 | Status: SHIPPED | OUTPATIENT
Start: 2025-04-11 | End: 2025-05-11

## 2025-04-11 RX ORDER — TIRZEPATIDE 7.5 MG/.5ML
INJECTION, SOLUTION SUBCUTANEOUS
Qty: 2 ML | Refills: 0 | OUTPATIENT
Start: 2025-04-11

## 2025-04-11 RX ORDER — TIRZEPATIDE 10 MG/.5ML
10 INJECTION, SOLUTION SUBCUTANEOUS WEEKLY
Qty: 2 ML | Refills: 1 | Status: SHIPPED | OUTPATIENT
Start: 2025-04-11 | End: 2025-04-11

## 2025-04-11 NOTE — TELEPHONE ENCOUNTER
I called and spoke with patient, she is aware of I sent in the next maintenance dose of 10mg to [facility] mail [facility] with 1 refill to last until our follow up appointment.  Let me know if you have any problems.

## 2025-04-15 DIAGNOSIS — E11.22 TYPE 2 DIABETES MELLITUS WITH STAGE 3A CHRONIC KIDNEY DISEASE, WITHOUT LONG-TERM CURRENT USE OF INSULIN (HCC): ICD-10-CM

## 2025-04-15 DIAGNOSIS — N18.31 TYPE 2 DIABETES MELLITUS WITH STAGE 3A CHRONIC KIDNEY DISEASE, WITHOUT LONG-TERM CURRENT USE OF INSULIN (HCC): ICD-10-CM

## 2025-04-18 RX ORDER — GLIMEPIRIDE 1 MG/1
2 TABLET ORAL
Qty: 180 TABLET | Refills: 0 | Status: SHIPPED | OUTPATIENT
Start: 2025-04-18

## 2025-04-23 DIAGNOSIS — N39.0 RECURRENT UTI: ICD-10-CM

## 2025-04-23 RX ORDER — METHENAMINE HIPPURATE 1000 MG/1
1 TABLET ORAL 2 TIMES DAILY WITH MEALS
Qty: 180 TABLET | Refills: 1 | Status: SHIPPED | OUTPATIENT
Start: 2025-04-23

## 2025-05-14 ENCOUNTER — TELEPHONE (OUTPATIENT)
Age: 65
End: 2025-05-14

## 2025-05-14 DIAGNOSIS — M62.830 BACK SPASM: ICD-10-CM

## 2025-05-14 DIAGNOSIS — M47.816 LUMBAR SPONDYLOSIS: ICD-10-CM

## 2025-05-14 DIAGNOSIS — G89.4 CHRONIC PAIN SYNDROME: ICD-10-CM

## 2025-05-14 DIAGNOSIS — M54.16 LUMBAR RADICULOPATHY: ICD-10-CM

## 2025-05-14 DIAGNOSIS — M47.816 LUMBAR SPONDYLOSIS: Primary | ICD-10-CM

## 2025-05-14 DIAGNOSIS — M79.18 MYOFASCIAL PAIN SYNDROME: ICD-10-CM

## 2025-05-14 RX ORDER — TIZANIDINE 2 MG/1
2 TABLET ORAL EVERY 8 HOURS PRN
Qty: 90 TABLET | Refills: 0 | Status: SHIPPED | OUTPATIENT
Start: 2025-05-14 | End: 2025-06-13

## 2025-05-14 RX ORDER — TIZANIDINE 2 MG/1
2 TABLET ORAL EVERY 8 HOURS PRN
Qty: 90 TABLET | Refills: 0 | Status: SHIPPED | OUTPATIENT
Start: 2025-05-14 | End: 2025-05-14 | Stop reason: SDUPTHER

## 2025-05-14 NOTE — TELEPHONE ENCOUNTER
[facility]er: [facility]    Doctor: Dr. SANCHEZ    Reason for call: Patient changed her phamracy and insurance is not covering caps if it can be changed to tablets   Please advise    TiZANidine (ZANAFLEX) 2 MG capsule   Call back#:

## 2025-06-05 DIAGNOSIS — G47.33 OBSTRUCTIVE SLEEP APNEA: ICD-10-CM

## 2025-06-05 DIAGNOSIS — N18.4 TYPE 2 DIABETES MELLITUS WITH STAGE 4 CHRONIC KIDNEY DISEASE, WITHOUT LONG-TERM CURRENT USE OF INSULIN (HCC): Primary | ICD-10-CM

## 2025-06-05 DIAGNOSIS — E11.22 TYPE 2 DIABETES MELLITUS WITH STAGE 4 CHRONIC KIDNEY DISEASE, WITHOUT LONG-TERM CURRENT USE OF INSULIN (HCC): Primary | ICD-10-CM

## 2025-06-05 DIAGNOSIS — E66.813 CLASS 3 SEVERE OBESITY WITH SERIOUS COMORBIDITY AND BODY MASS INDEX (BMI) OF 45.0 TO 49.9 IN ADULT, UNSPECIFIED OBESITY TYPE: ICD-10-CM

## 2025-06-05 RX ORDER — TIRZEPATIDE 10 MG/.5ML
10 INJECTION, SOLUTION SUBCUTANEOUS WEEKLY
Qty: 2 ML | Refills: 0 | Status: SHIPPED | OUTPATIENT
Start: 2025-06-05 | End: 2025-06-13 | Stop reason: SDUPTHER

## 2025-06-07 DIAGNOSIS — E11.22 TYPE 2 DIABETES MELLITUS WITH STAGE 4 CHRONIC KIDNEY DISEASE, WITHOUT LONG-TERM CURRENT USE OF INSULIN (HCC): ICD-10-CM

## 2025-06-07 DIAGNOSIS — N18.4 TYPE 2 DIABETES MELLITUS WITH STAGE 4 CHRONIC KIDNEY DISEASE, WITHOUT LONG-TERM CURRENT USE OF INSULIN (HCC): ICD-10-CM

## 2025-06-07 DIAGNOSIS — G47.33 OBSTRUCTIVE SLEEP APNEA: ICD-10-CM

## 2025-06-07 DIAGNOSIS — E66.813 CLASS 3 SEVERE OBESITY WITH SERIOUS COMORBIDITY AND BODY MASS INDEX (BMI) OF 45.0 TO 49.9 IN ADULT, UNSPECIFIED OBESITY TYPE: ICD-10-CM

## 2025-06-09 RX ORDER — TIRZEPATIDE 10 MG/.5ML
INJECTION, SOLUTION SUBCUTANEOUS
Qty: 2 ML | Refills: 1 | OUTPATIENT
Start: 2025-06-09

## 2025-06-13 ENCOUNTER — OFFICE VISIT (OUTPATIENT)
Dept: BARIATRICS | Facility: CLINIC | Age: 65
End: 2025-06-13
Payer: COMMERCIAL

## 2025-06-13 VITALS
HEART RATE: 108 BPM | DIASTOLIC BLOOD PRESSURE: 84 MMHG | SYSTOLIC BLOOD PRESSURE: 148 MMHG | TEMPERATURE: 97.9 F | OXYGEN SATURATION: 97 % | BODY MASS INDEX: 44.23 KG/M2 | WEIGHT: 275.2 LBS | HEIGHT: 66 IN

## 2025-06-13 DIAGNOSIS — N18.4 TYPE 2 DIABETES MELLITUS WITH STAGE 4 CHRONIC KIDNEY DISEASE, WITHOUT LONG-TERM CURRENT USE OF INSULIN (HCC): Primary | ICD-10-CM

## 2025-06-13 DIAGNOSIS — E66.813 OBESITY, CLASS III, BMI 40-49.9 (MORBID OBESITY): ICD-10-CM

## 2025-06-13 DIAGNOSIS — E11.22 TYPE 2 DIABETES MELLITUS WITH STAGE 4 CHRONIC KIDNEY DISEASE, WITHOUT LONG-TERM CURRENT USE OF INSULIN (HCC): Primary | ICD-10-CM

## 2025-06-13 DIAGNOSIS — E66.813 CLASS 3 SEVERE OBESITY WITH SERIOUS COMORBIDITY AND BODY MASS INDEX (BMI) OF 45.0 TO 49.9 IN ADULT, UNSPECIFIED OBESITY TYPE: ICD-10-CM

## 2025-06-13 DIAGNOSIS — G47.33 OBSTRUCTIVE SLEEP APNEA: ICD-10-CM

## 2025-06-13 PROCEDURE — 99214 OFFICE O/P EST MOD 30 MIN: CPT | Performed by: PHYSICIAN ASSISTANT

## 2025-06-13 RX ORDER — TIRZEPATIDE 10 MG/.5ML
10 INJECTION, SOLUTION SUBCUTANEOUS WEEKLY
Qty: 2 ML | Refills: 2 | Status: SHIPPED | OUTPATIENT
Start: 2025-06-13 | End: 2025-07-11

## 2025-06-13 NOTE — ASSESSMENT & PLAN NOTE
- Initial weight loss goal of 5-10% weight loss for improved health  - Patient is interested in pursuing Conservative Program  - Screening labs: Up to date, impaired fasting glucose, elevated HgbA1C, normal TSH, elevated triglycerides, low HDL  - HgbA1C improving  - Calorie goal: [phone]  - Continue Mounjaro 10mg weekly until follow up appointment with Corazon  - Medication agreement signed: Yes, at consult  - Patient to call endocrinologist for daily protein requirement due to stage 4 kidney disease  - Follow up in September with nonsurgical provider in [place] as that is closer for her  - Dietician: Patient notes that she met with dietician

## 2025-06-13 NOTE — PROGRESS NOTES
Assessment/Plan:    Obesity, Class III, BMI 40-49.9 (morbid obesity) ([facility])  - Initial weight loss goal of 5-10% weight loss for improved health  - Patient is interested in pursuing Conservative Program  - Screening labs: Up to date, impaired fasting glucose, elevated HgbA1C, normal TSH, elevated triglycerides, low HDL  - HgbA1C improving  - Calorie goal: 2950-5472  - Continue Mounjaro 10mg weekly until follow up appointment with Corazon  - Medication agreement signed: Yes, at consult  - Patient to call endocrinologist for daily protein requirement due to stage 4 kidney disease  - Follow up in September with nonsurgical provider in [place] as that is closer for her  - Dietician: Patient notes that she met with dietician        Type 2 diabetes mellitus with stage 4 chronic kidney disease, without long-term current use of insulin ([facility])  - Continue Mounjaro once weekly   - Hoping to improve with weight loss   - A1C improving  Lab Results   Component Value Date    HGBA1C 6.6 (H) 03/05/2025         Goals:    Food log (ie.) www.myfitnesspal.com,sparkpeople.com,loseit.com,calorieking.com,etc.   No sugary beverages. At least 64oz of water daily.  Increase physical activity by 10 minutes daily. Gradually increase physical activity to a goal of 5 days per week for 30 minutes of MODERATE intensity PLUS 2 days per week of FULL BODY resistance training    Calorie goal: 8488-8376 calories per day  Food log (ie.) www.myfitnesspal.com,sparkpeople.com,loseit.com,calorieking.com,etc.   No sugary beverages. At least 64oz of water daily.  Goal protein intake of 60-80 grams per day  25-35 grams of dietary fiber per day    Follow up in approximately 3 months with Non-Surgical Physician/Advanced Practitioner.     Diagnoses and all orders for this visit:    Type 2 diabetes mellitus with stage 4 chronic kidney disease, without long-term current use of insulin ([facility])  -     Tirzepatide (Mounjaro) 10 MG/0.5ML SOAJ; Inject 10 mg under the skin  once a week for 28 days    Class 3 severe obesity with serious comorbidity and body mass index (BMI) of 45.0 to 49.9 in adult, unspecified obesity type  -     Tirzepatide (Mounjaro) 10 MG/0.5ML SOAJ; Inject 10 mg under the skin once a week for 28 days    Obstructive sleep apnea  -     Tirzepatide (Mounjaro) 10 MG/0.5ML SOAJ; Inject 10 mg under the skin once a week for 28 days    Obesity, Class III, BMI 40-49.9 (morbid obesity)  -     Tirzepatide (Mounjaro) 10 MG/0.5ML SOAJ; Inject 10 mg under the skin once a week for 28 days          Subjective:   Chief Complaint   Patient presents with    Follow-up        Patient ID: Loretta Lewis  is a 64 y.o. female with excess weight/obesity and here to pursue weight managment.  Patient is pursuing Conservative Program.     HPI    On Mounjaro 10mg weekly with no signficant side effects. Takes colace PRN    Getting water intake in    Weight at last visit: 287  Current: 275.2  Change: 11.8 lbs    Wt Readings from Last 10 Encounters:   06/13/25 125 kg (275 lb 3.2 oz)   03/13/25 130 kg (287 lb 3.2 oz)   03/12/25 130 kg (287 lb 6.4 oz)   01/06/25 131 kg (288 lb)   12/02/24 130 kg (287 lb 11.2 oz)   11/21/24 131 kg (288 lb 3.2 oz)   11/12/24 133 kg (294 lb)   10/22/24 130 kg (286 lb 9.6 oz)   10/21/24 130 kg (287 lb)   08/19/24 132 kg (290 lb 6.4 oz)      Food recall:  (decreasing serving sizes and starting to watch calories)  B: blueberry muffin or bowl of cereal   S: mini pretzels with hummus  L: sandwich with deli meat + ibarra +/- swiss cheese on wheat bread  S: pretzels + hummus or bag of chips  D: Red meat or fish + yam   S: buttered popcorn or pretzels + hummus      Hunger/Cravings: sweets  Dining out: Once a week  Hydration: Cirkul water bottle, no coffee or iced tea  Alcohol: Very rare, 1-2x per year  Smoking: Vapes nicotine  Exercise: Very little, goes to PT and uses recumbent bike  Sleep: Varies, goes to bed between 4-5am, gets at least 6-7 hours of sleep  Occupation:  "Disabled  Contraception: Postmenopausal  Colonoscopy: Cologaured ordered by Dr. Us      The following portions of the patient's history were reviewed and updated as appropriate: allergies, current medications, past family history, past medical history, past social history, past surgical history, and problem list.    Review of Systems   Constitutional:  Negative for chills and fever.   HENT:  Negative for ear pain and sore throat.    Eyes:  Negative for pain and visual disturbance.   Respiratory:  Negative for cough and shortness of breath.    Cardiovascular:  Negative for chest pain and palpitations.   Gastrointestinal:  Negative for abdominal pain and vomiting.   Genitourinary:  Negative for dysuria and hematuria.   Musculoskeletal:  Positive for arthralgias (right knee pain) and back pain.   Skin:  Negative for color change and rash.   Neurological:  Negative for seizures and syncope.   All other systems reviewed and are negative.      Objective:    /84   Pulse (!) 108   Temp 97.9 °F (36.6 °C)   Ht 5' 5.5\" (1.664 m)   Wt 125 kg (275 lb 3.2 oz)   LMP 04/10/2017   SpO2 97%   BMI 45.10 kg/m²      Physical Exam  Constitutional:       Appearance: Normal appearance. She is obese.   HENT:      Head: Normocephalic and atraumatic.      Nose: Nose normal.      Mouth/Throat:      Mouth: Mucous membranes are moist.     Eyes:      Extraocular Movements: Extraocular movements intact.      Pupils: Pupils are equal, round, and reactive to light.       Cardiovascular:      Rate and Rhythm: Normal rate and regular rhythm.      Pulses: Normal pulses.      Heart sounds: Normal heart sounds.   Pulmonary:      Effort: Pulmonary effort is normal.      Breath sounds: Normal breath sounds.   Abdominal:      Palpations: Abdomen is soft.     Musculoskeletal:      Cervical back: Normal range of motion.     Skin:     General: Skin is warm.     Neurological:      General: No focal deficit present.      Mental Status: She is " alert and oriented to person, place, and time.     Psychiatric:         Mood and Affect: Mood normal.         Behavior: Behavior normal.

## 2025-07-03 ENCOUNTER — RA CDI HCC (OUTPATIENT)
Dept: OTHER | Facility: HOSPITAL | Age: 65
End: 2025-07-03

## 2025-07-10 ENCOUNTER — APPOINTMENT (OUTPATIENT)
Dept: LAB | Facility: MEDICAL CENTER | Age: 65
End: 2025-07-10
Attending: INTERNAL MEDICINE
Payer: COMMERCIAL

## 2025-07-10 DIAGNOSIS — N18.4 CKD (CHRONIC KIDNEY DISEASE) STAGE 4, GFR 15-29 ML/MIN (HCC): ICD-10-CM

## 2025-07-10 LAB
25(OH)D3 SERPL-MCNC: 42.8 NG/ML (ref 30–100)
ALBUMIN SERPL BCG-MCNC: 4.2 G/DL (ref 3.5–5)
ALP SERPL-CCNC: 107 U/L (ref 34–104)
ALT SERPL W P-5'-P-CCNC: 32 U/L (ref 7–52)
ANION GAP SERPL CALCULATED.3IONS-SCNC: 12 MMOL/L (ref 4–13)
AST SERPL W P-5'-P-CCNC: 24 U/L (ref 13–39)
BACTERIA UR QL AUTO: ABNORMAL /HPF
BASOPHILS # BLD AUTO: 0.05 THOUSANDS/ÂΜL (ref 0–0.1)
BASOPHILS NFR BLD AUTO: 1 % (ref 0–1)
BILIRUB SERPL-MCNC: 0.34 MG/DL (ref 0.2–1)
BILIRUB UR QL STRIP: NEGATIVE
BUN SERPL-MCNC: 24 MG/DL (ref 5–25)
CALCIUM SERPL-MCNC: 9 MG/DL (ref 8.4–10.2)
CHLORIDE SERPL-SCNC: 105 MMOL/L (ref 96–108)
CLARITY UR: CLEAR
CO2 SERPL-SCNC: 20 MMOL/L (ref 21–32)
COLOR UR: ABNORMAL
CREAT SERPL-MCNC: 1.97 MG/DL (ref 0.6–1.3)
CREAT UR-MCNC: 102.7 MG/DL
CREAT UR-MCNC: 102.7 MG/DL
EOSINOPHIL # BLD AUTO: 0.17 THOUSAND/ÂΜL (ref 0–0.61)
EOSINOPHIL NFR BLD AUTO: 2 % (ref 0–6)
ERYTHROCYTE [DISTWIDTH] IN BLOOD BY AUTOMATED COUNT: 12.8 % (ref 11.6–15.1)
GFR SERPL CREATININE-BSD FRML MDRD: 26 ML/MIN/1.73SQ M
GLUCOSE P FAST SERPL-MCNC: 115 MG/DL (ref 65–99)
GLUCOSE UR STRIP-MCNC: NEGATIVE MG/DL
HCT VFR BLD AUTO: 37.3 % (ref 34.8–46.1)
HGB BLD-MCNC: 12.1 G/DL (ref 11.5–15.4)
HGB UR QL STRIP.AUTO: NEGATIVE
HYALINE CASTS #/AREA URNS LPF: ABNORMAL /LPF
IMM GRANULOCYTES # BLD AUTO: 0.02 THOUSAND/UL (ref 0–0.2)
IMM GRANULOCYTES NFR BLD AUTO: 0 % (ref 0–2)
KETONES UR STRIP-MCNC: NEGATIVE MG/DL
LEUKOCYTE ESTERASE UR QL STRIP: ABNORMAL
LYMPHOCYTES # BLD AUTO: 2.47 THOUSANDS/ÂΜL (ref 0.6–4.47)
LYMPHOCYTES NFR BLD AUTO: 34 % (ref 14–44)
MAGNESIUM SERPL-MCNC: 1.7 MG/DL (ref 1.9–2.7)
MCH RBC QN AUTO: 29.4 PG (ref 26.8–34.3)
MCHC RBC AUTO-ENTMCNC: 32.4 G/DL (ref 31.4–37.4)
MCV RBC AUTO: 91 FL (ref 82–98)
MICROALBUMIN UR-MCNC: 52.7 MG/L
MICROALBUMIN/CREAT 24H UR: 51 MG/G CREATININE (ref 0–30)
MONOCYTES # BLD AUTO: 0.4 THOUSAND/ÂΜL (ref 0.17–1.22)
MONOCYTES NFR BLD AUTO: 6 % (ref 4–12)
NEUTROPHILS # BLD AUTO: 4.22 THOUSANDS/ÂΜL (ref 1.85–7.62)
NEUTS SEG NFR BLD AUTO: 57 % (ref 43–75)
NITRITE UR QL STRIP: NEGATIVE
NON-SQ EPI CELLS URNS QL MICRO: ABNORMAL /HPF
NRBC BLD AUTO-RTO: 0 /100 WBCS
PH UR STRIP.AUTO: 6 [PH]
PHOSPHATE SERPL-MCNC: 3.7 MG/DL (ref 2.3–4.1)
PLATELET # BLD AUTO: 317 THOUSANDS/UL (ref 149–390)
PMV BLD AUTO: 9.8 FL (ref 8.9–12.7)
POTASSIUM SERPL-SCNC: 3.9 MMOL/L (ref 3.5–5.3)
PROT SERPL-MCNC: 7.8 G/DL (ref 6.4–8.4)
PROT UR STRIP-MCNC: NEGATIVE MG/DL
PROT UR-MCNC: 16.2 MG/DL
PROT/CREAT UR: 0.2 MG/G{CREAT}
PTH-INTACT SERPL-MCNC: 222.5 PG/ML (ref 12–88)
RBC # BLD AUTO: 4.11 MILLION/UL (ref 3.81–5.12)
RBC #/AREA URNS AUTO: ABNORMAL /HPF
SODIUM SERPL-SCNC: 137 MMOL/L (ref 135–147)
SP GR UR STRIP.AUTO: 1.01 (ref 1–1.03)
UROBILINOGEN UR STRIP-ACNC: <2 MG/DL
WBC # BLD AUTO: 7.33 THOUSAND/UL (ref 4.31–10.16)
WBC #/AREA URNS AUTO: ABNORMAL /HPF

## 2025-07-10 PROCEDURE — 82570 ASSAY OF URINE CREATININE: CPT

## 2025-07-10 PROCEDURE — 84156 ASSAY OF PROTEIN URINE: CPT

## 2025-07-10 PROCEDURE — 81001 URINALYSIS AUTO W/SCOPE: CPT

## 2025-07-10 PROCEDURE — 82306 VITAMIN D 25 HYDROXY: CPT

## 2025-07-10 PROCEDURE — 84100 ASSAY OF PHOSPHORUS: CPT

## 2025-07-10 PROCEDURE — 83735 ASSAY OF MAGNESIUM: CPT

## 2025-07-10 PROCEDURE — 82043 UR ALBUMIN QUANTITATIVE: CPT

## 2025-07-10 PROCEDURE — 36415 COLL VENOUS BLD VENIPUNCTURE: CPT

## 2025-07-10 PROCEDURE — 80053 COMPREHEN METABOLIC PANEL: CPT

## 2025-07-10 PROCEDURE — 83970 ASSAY OF PARATHORMONE: CPT

## 2025-07-10 PROCEDURE — 85025 COMPLETE CBC W/AUTO DIFF WBC: CPT

## 2025-07-11 DIAGNOSIS — M47.27 OSTEOARTHRITIS OF SPINE WITH RADICULOPATHY, LUMBOSACRAL REGION: ICD-10-CM

## 2025-07-11 RX ORDER — GABAPENTIN 100 MG/1
CAPSULE ORAL
Qty: 360 CAPSULE | Refills: 1 | Status: SHIPPED | OUTPATIENT
Start: 2025-07-11 | End: 2025-07-18 | Stop reason: SDUPTHER

## 2025-07-14 ENCOUNTER — OFFICE VISIT (OUTPATIENT)
Dept: FAMILY MEDICINE CLINIC | Facility: CLINIC | Age: 65
End: 2025-07-14
Payer: COMMERCIAL

## 2025-07-14 VITALS
HEIGHT: 66 IN | TEMPERATURE: 96.5 F | SYSTOLIC BLOOD PRESSURE: 122 MMHG | DIASTOLIC BLOOD PRESSURE: 86 MMHG | BODY MASS INDEX: 44.2 KG/M2 | HEART RATE: 117 BPM | WEIGHT: 275 LBS | OXYGEN SATURATION: 96 %

## 2025-07-14 DIAGNOSIS — N18.4 TYPE 2 DIABETES MELLITUS WITH STAGE 4 CHRONIC KIDNEY DISEASE, WITHOUT LONG-TERM CURRENT USE OF INSULIN (HCC): ICD-10-CM

## 2025-07-14 DIAGNOSIS — R25.1 TREMOR: ICD-10-CM

## 2025-07-14 DIAGNOSIS — Z00.00 MEDICARE ANNUAL WELLNESS VISIT, SUBSEQUENT: ICD-10-CM

## 2025-07-14 DIAGNOSIS — Z00.00 ENCOUNTER FOR SUBSEQUENT ANNUAL WELLNESS VISIT IN MEDICARE PATIENT: Primary | ICD-10-CM

## 2025-07-14 DIAGNOSIS — E11.22 TYPE 2 DIABETES MELLITUS WITH STAGE 3A CHRONIC KIDNEY DISEASE, WITHOUT LONG-TERM CURRENT USE OF INSULIN (HCC): ICD-10-CM

## 2025-07-14 DIAGNOSIS — Z12.31 ENCOUNTER FOR SCREENING MAMMOGRAM FOR BREAST CANCER: ICD-10-CM

## 2025-07-14 DIAGNOSIS — N18.31 TYPE 2 DIABETES MELLITUS WITH STAGE 3A CHRONIC KIDNEY DISEASE, WITHOUT LONG-TERM CURRENT USE OF INSULIN (HCC): ICD-10-CM

## 2025-07-14 DIAGNOSIS — E11.22 TYPE 2 DIABETES MELLITUS WITH STAGE 4 CHRONIC KIDNEY DISEASE, WITHOUT LONG-TERM CURRENT USE OF INSULIN (HCC): ICD-10-CM

## 2025-07-14 DIAGNOSIS — F32.2 CURRENT SEVERE EPISODE OF MAJOR DEPRESSIVE DISORDER WITHOUT PSYCHOTIC FEATURES, UNSPECIFIED WHETHER RECURRENT (HCC): ICD-10-CM

## 2025-07-14 LAB — SL AMB POCT HEMOGLOBIN AIC: 6 (ref ?–6.5)

## 2025-07-14 PROCEDURE — G0439 PPPS, SUBSEQ VISIT: HCPCS | Performed by: FAMILY MEDICINE

## 2025-07-14 PROCEDURE — 83036 HEMOGLOBIN GLYCOSYLATED A1C: CPT | Performed by: FAMILY MEDICINE

## 2025-07-14 RX ORDER — TIRZEPATIDE 10 MG/.5ML
INJECTION, SOLUTION SUBCUTANEOUS
COMMUNITY
Start: 2025-01-05

## 2025-07-14 RX ORDER — CLONAZEPAM 0.5 MG/1
0.5 TABLET ORAL
COMMUNITY
Start: 2025-06-27

## 2025-07-14 NOTE — ASSESSMENT & PLAN NOTE
Lab Results   Component Value Date    HGBA1C 6.0 07/14/2025       Orders:    POCT hemoglobin A1c

## 2025-07-14 NOTE — ASSESSMENT & PLAN NOTE
Depression Screening Follow-up Plan: Patient's depression screening was positive with a PHQ-9 score of 6. Patient with underlying depression and was advised to continue current medications as prescribed.

## 2025-07-14 NOTE — PROGRESS NOTES
Name: Loretta Lewis      : 1960      MRN: [id number]  Encounter Provider: Preet Us DO  Encounter Date: 2025   Encounter department: [facility]  :  Assessment & Plan  Encounter for subsequent annual wellness visit in Medicare patient         Type 2 diabetes mellitus with stage 4 chronic kidney disease, without long-term current use of insulin (HCC)    Lab Results   Component Value Date    HGBA1C 6.0 2025       Orders:    POCT hemoglobin A1c    Encounter for screening mammogram for breast cancer    Orders:    Mammo screening bilateral w 3d and cad; Future    Tremor    Orders:    Ambulatory Referral to Neurology; Future    Medicare annual wellness visit, subsequent         Current severe episode of major depressive disorder without psychotic features, unspecified whether recurrent (HCC)  Depression Screening Follow-up Plan: Patient's depression screening was positive with a PHQ-9 score of 6. Patient with underlying depression and was advised to continue current medications as prescribed.           Depression Screening and Follow-up Plan: Patient's depression screening was positive with a PHQ-9 score of 6.   Patient with underlying depression and was advised to continue current medications as prescribed.       Preventive health issues were discussed with patient, and age appropriate screening tests were ordered as noted in patient's After Visit Summary. Personalized health advice and appropriate referrals for health education or preventive services given if needed, as noted in patient's After Visit Summary.    History of Present Illness     Patient presents today for the purposes of Medicare subsequent well visit       Patient Care Team:  Preet Us DO as PCP - General (Family Medicine)  Doni Grover DPM as PCP - Surgeon (Podiatry)  Loco Ballard DO (Nephrology)  COLIN Douglass as Nurse Practitioner (Nephrology)  Fernando Poe MD (Pain Medicine)  Yandy  ARYA Mayo (Bariatrics)  Tamy Scherer PA-C as Physician Assistant (Bariatrics)    Review of Systems   Constitutional:  Negative for activity change, appetite change, diaphoresis, fatigue and fever.   HENT: Negative.     Eyes: Negative.    Respiratory:  Negative for apnea, cough, chest tightness, shortness of breath and wheezing.    Cardiovascular:  Negative for chest pain, palpitations and leg swelling.   Gastrointestinal:  Negative for abdominal distention, abdominal pain, anal bleeding, constipation, diarrhea, nausea and vomiting.   Endocrine: Negative for cold intolerance, heat intolerance, polydipsia, polyphagia and polyuria.   Genitourinary:  Negative for difficulty urinating, dysuria, flank pain, hematuria and urgency.   Musculoskeletal:  Negative for arthralgias, back pain, gait problem, joint swelling and myalgias.   Skin:  Negative for color change, rash and wound.   Allergic/Immunologic: Negative for environmental allergies, food allergies and immunocompromised state.   Neurological:  Negative for dizziness, seizures, syncope, speech difficulty, numbness and headaches.   Hematological:  Negative for adenopathy. Does not bruise/bleed easily.   Psychiatric/Behavioral:  Negative for agitation, behavioral problems, hallucinations, sleep disturbance and suicidal ideas.      Medical History Reviewed by provider this encounter:  Tobacco  Allergies  Meds  Problems  Med Hx  Surg Hx  Fam Hx       Annual Wellness Visit Questionnaire   Loretta is here for her Subsequent Wellness visit.     Health Risk Assessment:   Patient rates overall health as good. Patient feels that their physical health rating is same. Patient is satisfied with their life. Eyesight was rated as same. Hearing was rated as same. Patient feels that their emotional and mental health rating is much better. Patients states they are never, rarely angry. Patient states they are often unusually tired/fatigued. Pain experienced in the last 7  days has been some. Patient's pain rating has been 4/10. Patient states that she has experienced no weight loss or gain in last 6 months.     Depression Screening:   PHQ-9 Score: 6      Fall Risk Screening:   In the past year, patient has experienced: no history of falling in past year      Urinary Incontinence Screening:   Patient has leaked urine accidently in the last six months.     Home Safety:  Patient does not have trouble with stairs inside or outside of their home. Patient has no working smoke alarms and has no working carbon monoxide detector. Home safety hazards include: none.     Nutrition:   Current diet is Diabetic.     Medications:   Patient is not currently taking any over-the-counter supplements. Patient is able to manage medications.     Activities of Daily Living (ADLs)/Instrumental Activities of Daily Living (IADLs):   Walk and transfer into and out of bed and chair?: Yes  Dress and groom yourself?: Yes    Bathe or shower yourself?: Yes    Feed yourself? Yes  Do your laundry/housekeeping?: Yes  Manage your money, pay your bills and track your expenses?: Yes  Make your own meals?: Yes    Do your own shopping?: Yes    Previous Hospitalizations:   Any hospitalizations or ED visits within the last 12 months?: No      Advance Care Planning:   Living will: No    Durable POA for healthcare: No    Advanced directive: No    Advanced directive counseling given: No    Five wishes given: No    Patient declined ACP directive: No    End of Life Decisions reviewed with patient: Yes    Provider agrees with end of life decisions: Yes      Cognitive Screening:   Provider or family/friend/caregiver concerned regarding cognition?: No    Preventive Screenings      Cardiovascular Screening:    General: Screening Current      Diabetes Screening:     General: Screening Not Indicated and History Diabetes      Colorectal Cancer Screening:     General: Screening Not Indicated      Breast Cancer Screening:     General:  Screening Current      Cervical Cancer Screening:    General: Screening Not Indicated      Osteoporosis Screening:    General: Screening Not Indicated      Abdominal Aortic Aneurysm (AAA) Screening:        General: Screening Not Indicated      Lung Cancer Screening:     General: Screening Not Indicated      Hepatitis C Screening:    General: Screening Current    Immunizations:  - Immunizations due: Prevnar 20 and Zoster (Shingrix)    Screening, Brief Intervention, and Referral to Treatment (SBIRT)     Screening  Typical number of drinks in a day: 0  Typical number of drinks in a week: 0  Interpretation: Low risk drinking behavior.    AUDIT-C Screenin) How often did you have a drink containing alcohol in the past year? monthly or less  2) How many drinks did you have on a typical day when you were drinking in the past year? 0  3) How often did you have 6 or more drinks on one occasion in the past year? never    AUDIT-C Score: 1  Interpretation: Score 0-2 (female): Negative screen for alcohol misuse    Single Item Drug Screening:  How often have you used an illegal drug (including marijuana) or a prescription medication for non-medical reasons in the past year? never    Single Item Drug Screen Score: 0  Interpretation: Negative screen for possible drug use disorder    SDOH Risk Assessment  Social determinants of health (SDOH) risk assesment tool was completed. The tool at a minimum covered housing stability, food insecurity, transportation needs, and utility difficulty. Patient had at risk responses for the following SDOH domains: food insecurity and housing stability.     Social Drivers of Health     Financial Resource Strain: Low Risk  (2025)    Received from [employer]    Financial Resource Strain     Do you have any trouble paying for your medications, or do you think you might in the future?: No   Food Insecurity: Food Insecurity Present (2025)    Nursing - Inadequate Food Risk Classification      "Worried About Running Out of Food in the Last Year: Sometimes true     Ran Out of Food in the Last Year: Never true   Transportation Needs: No Transportation Needs (7/13/2025)    PRAPARE - Transportation     Lack of Transportation (Medical): No     Lack of Transportation (Non-Medical): No   Housing Stability: High Risk (7/13/2025)    Housing Stability Vital Sign     Unable to Pay for Housing in the Last Year: Yes     Number of Times Moved in the Last Year: 0     Homeless in the Last Year: No   Utilities: Not At Risk (7/13/2025)    [facility] Utilities     Threatened with loss of utilities: No     No results found.    Objective   /86 (BP Location: Left arm, Patient Position: Sitting, Cuff Size: Large)   Pulse (!) 117   Temp (!) 96.5 °F (35.8 °C) (Temporal)   Ht 5' 5.5\" (1.664 m)   Wt 125 kg (275 lb)   LMP 04/10/2017   SpO2 96%   BMI 45.07 kg/m²     Physical Exam  Constitutional:       Appearance: She is well-developed. She is obese.   HENT:      Head: Normocephalic and atraumatic.      Right Ear: External ear normal.      Left Ear: External ear normal.      Nose: Nose normal.     Eyes:      Conjunctiva/sclera: Conjunctivae normal.      Pupils: Pupils are equal, round, and reactive to light.       Cardiovascular:      Rate and Rhythm: Normal rate and regular rhythm.      Heart sounds: Normal heart sounds. No murmur heard.     No friction rub.   Pulmonary:      Effort: Pulmonary effort is normal. No respiratory distress.      Breath sounds: Normal breath sounds. No wheezing or rales.   Chest:      Chest wall: No tenderness.   Abdominal:      General: Bowel sounds are normal.      Palpations: Abdomen is soft.     Musculoskeletal:         General: Normal range of motion.      Cervical back: Normal range of motion and neck supple.     Skin:     General: Skin is warm and dry.      Capillary Refill: Capillary refill takes 2 to 3 seconds.     Neurological:      Mental Status: She is alert and oriented to person, place, " and time.     Psychiatric:         Behavior: Behavior normal.         Thought Content: Thought content normal.         Judgment: Judgment normal.

## 2025-07-15 RX ORDER — GLIMEPIRIDE 1 MG/1
2 TABLET ORAL
Qty: 180 TABLET | Refills: 0 | OUTPATIENT
Start: 2025-07-15

## 2025-07-16 RX ORDER — PRAVASTATIN SODIUM 20 MG
20 TABLET ORAL
Qty: 30 TABLET | Refills: 5 | Status: SHIPPED | OUTPATIENT
Start: 2025-07-16 | End: 2025-07-25 | Stop reason: SDDI

## 2025-07-18 ENCOUNTER — OFFICE VISIT (OUTPATIENT)
Dept: NEPHROLOGY | Facility: CLINIC | Age: 65
End: 2025-07-18
Payer: COMMERCIAL

## 2025-07-18 VITALS
HEIGHT: 66 IN | HEART RATE: 97 BPM | RESPIRATION RATE: 16 BRPM | TEMPERATURE: 98.2 F | BODY MASS INDEX: 43.87 KG/M2 | DIASTOLIC BLOOD PRESSURE: 79 MMHG | OXYGEN SATURATION: 99 % | SYSTOLIC BLOOD PRESSURE: 140 MMHG | WEIGHT: 273 LBS

## 2025-07-18 DIAGNOSIS — E61.1 IRON DEFICIENCY: ICD-10-CM

## 2025-07-18 DIAGNOSIS — E83.42 HYPOMAGNESEMIA: ICD-10-CM

## 2025-07-18 DIAGNOSIS — M47.27 OSTEOARTHRITIS OF SPINE WITH RADICULOPATHY, LUMBOSACRAL REGION: ICD-10-CM

## 2025-07-18 DIAGNOSIS — N25.81 SECONDARY HYPERPARATHYROIDISM OF RENAL ORIGIN (HCC): ICD-10-CM

## 2025-07-18 DIAGNOSIS — N18.4 CKD (CHRONIC KIDNEY DISEASE) STAGE 4, GFR 15-29 ML/MIN (HCC): Primary | ICD-10-CM

## 2025-07-18 DIAGNOSIS — E03.9 HYPOTHYROIDISM, UNSPECIFIED TYPE: ICD-10-CM

## 2025-07-18 DIAGNOSIS — R53.82 CHRONIC FATIGUE: ICD-10-CM

## 2025-07-18 DIAGNOSIS — N18.4 TYPE 2 DIABETES MELLITUS WITH STAGE 4 CHRONIC KIDNEY DISEASE, WITHOUT LONG-TERM CURRENT USE OF INSULIN (HCC): ICD-10-CM

## 2025-07-18 DIAGNOSIS — E55.9 VITAMIN D DEFICIENCY: ICD-10-CM

## 2025-07-18 DIAGNOSIS — E11.22 TYPE 2 DIABETES MELLITUS WITH STAGE 4 CHRONIC KIDNEY DISEASE, WITHOUT LONG-TERM CURRENT USE OF INSULIN (HCC): ICD-10-CM

## 2025-07-18 DIAGNOSIS — I12.9 HYPERTENSIVE KIDNEY DISEASE WITH CKD (CHRONIC KIDNEY DISEASE), STAGE 1-4 OR UNSPECIFIED CHRONIC KIDNEY DISEASE: ICD-10-CM

## 2025-07-18 PROCEDURE — 99214 OFFICE O/P EST MOD 30 MIN: CPT | Performed by: NURSE PRACTITIONER

## 2025-07-18 RX ORDER — GABAPENTIN 100 MG/1
CAPSULE ORAL
Qty: 450 CAPSULE | Refills: 1 | Status: SHIPPED | OUTPATIENT
Start: 2025-07-18

## 2025-07-18 RX ORDER — CALCITRIOL 0.25 UG/1
0.25 CAPSULE, LIQUID FILLED ORAL 3 TIMES WEEKLY
Qty: 12 CAPSULE | Refills: 2 | Status: SHIPPED | OUTPATIENT
Start: 2025-07-18 | End: 2025-07-25 | Stop reason: SDUPTHER

## 2025-07-18 NOTE — ASSESSMENT & PLAN NOTE
Lab Results   Component Value Date    EGFR 26 07/10/2025    EGFR 25 03/05/2025    EGFR 26 11/06/2024    CREATININE 1.97 (H) 07/10/2025    CREATININE 2.04 (H) 03/05/2025    CREATININE 1.93 (H) 11/06/2024   Blood pressure goal less than 130/80 mmHg.  Currently on antihypertensive medication.  Recommend home monitoring and reporting elevated readings to office.  If antihypertensive medication needed could try low-dose lisinopril and see how kidney function/potassium trends

## 2025-07-18 NOTE — PROGRESS NOTES
[facility]   Office Follow-Up  Loretta Lewis 64 y.o. female MRN: [id number]    Encounter: [id number]        Assessment & Plan    Loretta Lewis was seen in the [place] office today. All diagnoses and orders for visit:     Assessment & Plan  CKD (chronic kidney disease) stage 4, GFR 15-29 ml/min ([facility])  Lab Results   Component Value Date    EGFR 26 07/10/2025    EGFR 25 03/05/2025    EGFR 26 11/06/2024    CREATININE 1.97 (H) 07/10/2025    CREATININE 2.04 (H) 03/05/2025    CREATININE 1.93 (H) 11/06/2024   CKD 4A2 baseline creatinine 1.9-2.1 mg/dL.  Etiology presumed diabetic nephropathy, nephrosclerosis, obesity related, renal senescence, history of INESSA, chronic tubulointerstitial nephritis    Regarding guideline directed medical therapy for CKD progression-previously on lisinopril but discontinued due to INESSA and hyperkalemia.  SGLT2 inhibitor has been avoided due to recurrent UTI/interstitial cystitis.  Could consider finerenone.  Currently on Mounjaro-consider switching to Ozempic for CKD progression benefits as demonstrated in the FLOW trial    Repeat labs 1 month including TSH  Hypothyroidism, unspecified type  Check TSH 1 month  Secondary hyperparathyroidism of renal origin ([facility])  Start calcitriol 0.25 mcg thrice weekly and check PTH in 1 month  Type 2 diabetes mellitus with stage 4 chronic kidney disease, without long-term current use of insulin ([facility])    Lab Results   Component Value Date    HGBA1C 6.0 07/14/2025   A1c well-controlled at present.  Management per PCP/weight management  Hypertensive kidney disease with CKD (chronic kidney disease), stage 1-4 or unspecified chronic kidney disease  Lab Results   Component Value Date    EGFR 26 07/10/2025    EGFR 25 03/05/2025    EGFR 26 11/06/2024    CREATININE 1.97 (H) 07/10/2025    CREATININE 2.04 (H) 03/05/2025    CREATININE 1.93 (H) 11/06/2024   Blood pressure goal less than 130/80 mmHg.  Currently on antihypertensive  medication.  Recommend home monitoring and reporting elevated readings to office.  If antihypertensive medication needed could try low-dose lisinopril and see how kidney function/potassium trends  Iron deficiency  Taking heme polypeptide twice daily  Hypomagnesemia  Increase magnesium glycinate to 200 mg daily  Vitamin D deficiency  Continue vitamin D supplementation  Chronic fatigue  Julio TSH in 1 month with other labs.  I have asked patient to follow-up with PCP about fatigue.  Consider potential cardiac etiology-discussed with patient.  She denies chest pain, palpitations.  She prefers not to pursue stress test at present and wants to focus on weight loss        HPI: Loretta Lewis is a 64 y.o. female who is here for follow-up    Pertinent medical problems include: CKD 4, T2DM, history of hypertension, obesity BMI 45    Started on Mounjaro since our last visit.  Having some GI side effects with a large, high carbohydrate meals.  PTH remains elevated-start calcitriol and repeat labs in 1 month      ROS:   Review of Systems   Constitutional:  Positive for appetite change and fatigue. Negative for chills and fever.   HENT:  Negative for ear pain and sore throat.    Eyes:  Negative for pain and visual disturbance.   Respiratory:  Negative for cough and shortness of breath.    Cardiovascular:  Negative for chest pain and palpitations.   Gastrointestinal:  Negative for abdominal pain and vomiting.   Genitourinary:  Negative for dysuria and hematuria.   Musculoskeletal:  Negative for arthralgias and back pain.   Skin:  Negative for color change and rash.   Neurological:  Negative for seizures and syncope.   All other systems reviewed and are negative.      Allergies: Pollen extract and Vancomycin    Medications: Current Medications[1]    Past Medical History[2]  Past Surgical History[3]  Family History[4]   reports that she quit smoking about 10 years ago. Her smoking use included cigarettes. She started smoking about  "45 years ago. She has a 52.5 pack-year smoking history. She has never used smokeless tobacco. She reports that she does not currently use alcohol. She reports that she does not use drugs.      Physical Exam:   Vitals:    07/18/25 1327   BP: 140/79   Patient Position: Sitting   Cuff Size: Adult   Pulse: 97   Resp: 16   Temp: 98.2 °F (36.8 °C)   TempSrc: Temporal   SpO2: 99%   Weight: 124 kg (273 lb)   Height: 5' 5.5\" (1.664 m)     Body mass index is 44.74 kg/m².    General: conscious, cooperative, in no acute distress, appears stated age  Eyes: conjunctivae pale, anicteric sclerae  ENT: lips and mucous membranes moist  Neck: supple, no JVD, no masses  Chest:  essentially clear breath sounds bilaterally, no crackles, ronchus or wheezings  CVS: S1 & S2, normal rate, regular rhythm  Abdomen: soft, non-tender, non-distended, normoactive bowel sounds, rounded obese  Extremities: no edema of both legs  Skin: no rash   Neuro: awake, alert, oriented       Diagnostic Data:  Lab: I have personally reviewed pertinent lab results.,   CBC:       CMP: No results found for: \"SODIUM\", \"K\", \"CL\", \"CO2\", \"ANIONGAP\", \"BUN\", \"CREATININE\", \"GLUCOSE\", \"CALCIUM\", \"AST\", \"ALT\", \"ALKPHOS\", \"PROT\", \"BILITOT\", \"EGFR\",   PT/INR: No results found for: \"PT\", \"INR\",   Magnesium: No components found for: \"MAG\",  Phosphorous: No results found for: \"PHOS\"    Patient Instructions   Increase magnesium glycinate to 200 mg daily    Check thyroid level now    Start calcitriol 0.25 mcg three times a week  Repeat labs in 1 month      Portions of the record may have been created with voice recognition software. Occasional wrong word or \"sound a like\" substitutions may have occurred due to the inherent limitations of voice recognition software. Read the chart carefully and recognize, using context, where substitutions have occurred.    If you have any questions, please contact the dictating provider.       [1]   Current Outpatient Medications:     " Alpha-Lipoic Acid 300 MG CAPS, Take 300 mg by mouth in the morning and 300 mg before bedtime., Disp: , Rfl:     calcitriol (ROCALTROL) 0.25 mcg capsule, Take 1 capsule (0.25 mcg total) by mouth 3 (three) times a week, Disp: 12 capsule, Rfl: 2    clonazePAM (KlonoPIN) 0.5 mg tablet, Take 0.5 mg by mouth daily at bedtime, Disp: , Rfl:     docusate sodium (COLACE) 100 mg capsule, Take 100 mg by mouth daily as needed for constipation, Disp: , Rfl:     DULoxetine (CYMBALTA) 60 mg delayed release capsule, Take 1 capsule (60 mg total) by mouth 2 (two) times a day, Disp: 180 capsule, Rfl: 0    ergocalciferol (ERGOCALCIFEROL) 1.25 MG (50959 UT) capsule, Take 1 capsule (50,000 Units total) by mouth once a week, Disp: 13 capsule, Rfl: 3    famotidine (PEPCID) 20 mg tablet, Take 20 mg by mouth in the morning., Disp: , Rfl:     gabapentin (NEURONTIN) 100 mg capsule, Take 1 capsule by mouth in the morning 1 at lunch and 3 at bedtime, Disp: 360 capsule, Rfl: 1    methenamine hippurate (HIPREX) 1 g tablet, Take 1 tablet by mouth twice a day with meals, Disp: 180 tablet, Rfl: 1    Mirabegron ER 50 MG TB24, , Disp: , Rfl:     mirtazapine (REMERON) 7.5 MG tablet, Take 7.5 mg by mouth daily at bedtime, Disp: , Rfl:     Mounjaro 10 MG/0.5ML SOAJ, , Disp: , Rfl:     Polysaccharide Iron Complex (HEMATEX IRON COMPLEX PO), Take by mouth, Disp: , Rfl:     tiZANidine (ZANAFLEX) 2 mg tablet, Take 1 tablet (2 mg total) by mouth every 8 (eight) hours as needed for muscle spasms, Disp: 90 tablet, Rfl: 0    pravastatin (PRAVACHOL) 20 mg tablet, Take 1 tablet (20 mg total) by mouth daily at bedtime (Patient not taking: Reported on 7/18/2025), Disp: 30 tablet, Rfl: 5  [2]   Past Medical History:  Diagnosis Date    INESSA (acute kidney injury) (HCC) 07/09/2023    Allergic     Anemia     Intermittent    Arthritis     Depression Years ago    Diabetes mellitus ([facility])     Fractures 1975    Fx R fibula & tibia    Headache(784.0)     Hypertension     Migraine      MRSA (methicillin resistant Staphylococcus aureus)     Neurological disorder     Peripheral neuropathy    Neuromuscular disorder (HCC)     Peripheral neuropathy    Obesity     Obesity (BMI 35.0-39.9 without comorbidity) 2017    Osteoarthritis     Type 2 diabetes mellitus with stage 3a chronic kidney disease, without long-term current use of insulin (HCC)     Visual impairment    [3]   Past Surgical History:  Procedure Laterality Date    ACHILLES TENDON REPAIR      ANKLE SURGERY Left 2019    Excision of Lipoma    BACK SURGERY  2017    CARPAL TUNNEL RELEASE Right      SECTION      CHOLECYSTECTOMY      GANGLION CYST EXCISION Left     Left wrist    HAND SURGERY  2015    R carpal tunnel repair    LAMINECTOMY  2017    LUMBAR LAMINECTOMY  2018    x 3 disks    MOUTH SURGERY  2023    Oral tooth extraction x 3 with a retained root    NASAL SINUS SURGERY      SPINE SURGERY  3/17    TENDON REPAIR  2015    Torn L Achilles tendon    WRIST SURGERY      L ganglion cyst removal   [4]   Family History  Problem Relation Name Age of Onset    Hypertension Mother Fernando     Hepatitis Mother Fernando         Hep C    Arthritis Mother Fernando     Neurological problems Mother Fernando     Stroke Father Bill     Arrhythmia Father Bill     Melanoma Father Bill     Cancer Father Bill     Neurological problems Father Bill     No Known Problems Sister      No Known Problems Maternal Grandmother      No Known Problems Maternal Grandfather      No Known Problems Paternal Grandmother      No Known Problems Paternal Grandfather      No Known Problems Son      No Known Problems Maternal Aunt      No Known Problems Maternal Aunt      No Known Problems Maternal Aunt      No Known Problems Maternal Aunt      No Known Problems Paternal Aunt      Crohn's disease Sister Oma     Crohn's disease Sister Oma     Prostate cancer Brother Feliz Pina Jr.     Crohn's disease Sister Oma     Prostate cancer Brother Feliz Pina Jr.

## 2025-07-18 NOTE — ASSESSMENT & PLAN NOTE
Lab Results   Component Value Date    HGBA1C 6.0 07/14/2025   A1c well-controlled at present.  Management per PCP/weight management

## 2025-07-18 NOTE — TELEPHONE ENCOUNTER
Pharm asking for 90 day supply with quantity to match   Sig - 1 po in AM, 1 po @ Lunch, & 3 HS #450

## 2025-07-18 NOTE — PATIENT INSTRUCTIONS
Increase magnesium glycinate to 200 mg daily    Check thyroid level now    Start calcitriol 0.25 mcg three times a week  Repeat labs in 1 month

## 2025-07-18 NOTE — ASSESSMENT & PLAN NOTE
Lab Results   Component Value Date    EGFR 26 07/10/2025    EGFR 25 03/05/2025    EGFR 26 11/06/2024    CREATININE 1.97 (H) 07/10/2025    CREATININE 2.04 (H) 03/05/2025    CREATININE 1.93 (H) 11/06/2024   CKD 4A2 baseline creatinine 1.9-2.1 mg/dL.  Etiology presumed diabetic nephropathy, nephrosclerosis, obesity related, renal senescence, history of INESSA, chronic tubulointerstitial nephritis    Regarding guideline directed medical therapy for CKD progression-previously on lisinopril but discontinued due to INESSA and hyperkalemia.  SGLT2 inhibitor has been avoided due to recurrent UTI/interstitial cystitis.  Could consider finerenone.  Currently on Mounjaro-consider switching to Ozempic for CKD progression benefits as demonstrated in the FLOW trial    Repeat labs 1 month including TSH

## 2025-07-24 ENCOUNTER — TELEPHONE (OUTPATIENT)
Dept: NEPHROLOGY | Facility: CLINIC | Age: 65
End: 2025-07-24

## 2025-07-24 ENCOUNTER — TELEPHONE (OUTPATIENT)
Age: 65
End: 2025-07-24

## 2025-07-24 NOTE — TELEPHONE ENCOUNTER
Pharmacy contacted Rx refill line in regard to calcitriol (ROCALTROL) 0.25 mcg capsule. Pharmacy stated if a new prescription can be sent for a 90 day supply due to insurance. Please send new prescription to [email or web address] - [facility] - [address] [place] [address]

## 2025-07-25 DIAGNOSIS — N25.81 SECONDARY HYPERPARATHYROIDISM OF RENAL ORIGIN (HCC): ICD-10-CM

## 2025-07-25 RX ORDER — CALCITRIOL 0.25 UG/1
0.25 CAPSULE, LIQUID FILLED ORAL 3 TIMES WEEKLY
Qty: 39 CAPSULE | Refills: 3 | Status: SHIPPED | OUTPATIENT
Start: 2025-07-25

## 2025-08-05 ENCOUNTER — VBI (OUTPATIENT)
Dept: ADMINISTRATIVE | Facility: OTHER | Age: 65
End: 2025-08-05